# Patient Record
Sex: FEMALE | Race: WHITE | NOT HISPANIC OR LATINO | Employment: UNEMPLOYED | URBAN - METROPOLITAN AREA
[De-identification: names, ages, dates, MRNs, and addresses within clinical notes are randomized per-mention and may not be internally consistent; named-entity substitution may affect disease eponyms.]

---

## 2017-06-06 DIAGNOSIS — B00.1 COLD SORE: ICD-10-CM

## 2017-06-07 RX ORDER — VALACYCLOVIR HYDROCHLORIDE 1 G/1
1000 TABLET, FILM COATED ORAL 2 TIMES DAILY
Qty: 4 TABLET | Refills: 6 | Status: SHIPPED | OUTPATIENT
Start: 2017-06-07 | End: 2018-06-27 | Stop reason: SDUPTHER

## 2017-06-20 ENCOUNTER — HOSPITAL ENCOUNTER (OUTPATIENT)
Dept: RADIOLOGY | Facility: HOSPITAL | Age: 42
Discharge: HOME | End: 2017-06-20
Attending: FAMILY MEDICINE
Payer: COMMERCIAL

## 2017-06-20 ENCOUNTER — OFFICE VISIT (OUTPATIENT)
Dept: INTERNAL MEDICINE | Facility: CLINIC | Age: 42
End: 2017-06-20
Payer: COMMERCIAL

## 2017-06-20 VITALS
DIASTOLIC BLOOD PRESSURE: 70 MMHG | HEIGHT: 60 IN | SYSTOLIC BLOOD PRESSURE: 118 MMHG | HEART RATE: 66 BPM | WEIGHT: 122.81 LBS | BODY MASS INDEX: 24.11 KG/M2

## 2017-06-20 VITALS
BODY MASS INDEX: 24.6 KG/M2 | HEIGHT: 60 IN | HEIGHT: 59 IN | WEIGHT: 122 LBS | BODY MASS INDEX: 23.95 KG/M2 | WEIGHT: 122 LBS

## 2017-06-20 DIAGNOSIS — Z00.00 PREVENTATIVE HEALTH CARE: Primary | ICD-10-CM

## 2017-06-20 DIAGNOSIS — Z00.00 PREVENTATIVE HEALTH CARE: ICD-10-CM

## 2017-06-20 DIAGNOSIS — Z12.31 VISIT FOR SCREENING MAMMOGRAM: ICD-10-CM

## 2017-06-20 PROCEDURE — 77067 SCR MAMMO BI INCL CAD: CPT | Mod: TC

## 2017-06-20 PROCEDURE — 99396 PREV VISIT EST AGE 40-64: CPT | Mod: S$GLB,,, | Performed by: FAMILY MEDICINE

## 2017-06-20 PROCEDURE — 99999 PR PBB SHADOW E&M-EST. PATIENT-LVL III: CPT | Mod: PBBFAC,,, | Performed by: FAMILY MEDICINE

## 2017-06-20 PROCEDURE — 77063 BREAST TOMOSYNTHESIS BI: CPT | Mod: 26,,, | Performed by: RADIOLOGY

## 2017-06-20 PROCEDURE — 77067 SCR MAMMO BI INCL CAD: CPT | Mod: 26,,, | Performed by: RADIOLOGY

## 2017-06-22 ENCOUNTER — TELEPHONE (OUTPATIENT)
Dept: RADIOLOGY | Facility: HOSPITAL | Age: 42
End: 2017-06-22

## 2017-06-22 NOTE — TELEPHONE ENCOUNTER
Spoke with patient and explained mammogram findings.Patient expressed understanding of results. Patient is scheduled for a abnormal mammogram follow up appointment at [facility] on 6/28/17

## 2017-06-28 ENCOUNTER — HOSPITAL ENCOUNTER (OUTPATIENT)
Dept: RADIOLOGY | Facility: HOSPITAL | Age: 42
Discharge: HOME | End: 2017-06-28
Attending: FAMILY MEDICINE
Payer: COMMERCIAL

## 2017-06-28 DIAGNOSIS — R92.8 ABNORMAL MAMMOGRAM: ICD-10-CM

## 2017-06-28 PROCEDURE — 77061 BREAST TOMOSYNTHESIS UNI: CPT | Mod: 26,LT,, | Performed by: RADIOLOGY

## 2017-06-28 PROCEDURE — 77061 BREAST TOMOSYNTHESIS UNI: CPT | Mod: TC,LT

## 2017-06-28 PROCEDURE — 77065 DX MAMMO INCL CAD UNI: CPT | Mod: TC,LT

## 2017-06-28 PROCEDURE — 77065 DX MAMMO INCL CAD UNI: CPT | Mod: 26,LT,, | Performed by: RADIOLOGY

## 2017-06-28 PROCEDURE — 76642 ULTRASOUND BREAST LIMITED: CPT | Mod: TC,LT

## 2017-06-28 PROCEDURE — 76642 ULTRASOUND BREAST LIMITED: CPT | Mod: 26,LT,, | Performed by: RADIOLOGY

## 2017-11-21 ENCOUNTER — TELEPHONE (OUTPATIENT)
Dept: PRIMARY CARE CLINIC | Facility: CLINIC | Age: 42
End: 2017-11-21

## 2017-11-21 ENCOUNTER — OFFICE VISIT (OUTPATIENT)
Dept: PRIMARY CARE CLINIC | Facility: CLINIC | Age: 42
End: 2017-11-21
Payer: COMMERCIAL

## 2017-11-21 VITALS
BODY MASS INDEX: 23.99 KG/M2 | TEMPERATURE: 98 F | DIASTOLIC BLOOD PRESSURE: 86 MMHG | HEART RATE: 94 BPM | HEIGHT: 59 IN | WEIGHT: 119 LBS | SYSTOLIC BLOOD PRESSURE: 132 MMHG

## 2017-11-21 DIAGNOSIS — J06.9 UPPER RESPIRATORY TRACT INFECTION, UNSPECIFIED TYPE: Primary | ICD-10-CM

## 2017-11-21 PROCEDURE — 99999 PR PBB SHADOW E&M-EST. PATIENT-LVL III: CPT | Mod: PBBFAC,,, | Performed by: PHYSICIAN ASSISTANT

## 2017-11-21 PROCEDURE — 99213 OFFICE O/P EST LOW 20 MIN: CPT | Mod: S$GLB,,, | Performed by: PHYSICIAN ASSISTANT

## 2017-11-21 RX ORDER — METHYLPREDNISOLONE 4 MG/1
4 TABLET ORAL DAILY
Qty: 10 TABLET | Refills: 0 | Status: SHIPPED | OUTPATIENT
Start: 2017-11-21 | End: 2017-12-01

## 2017-11-21 RX ORDER — PROMETHAZINE HYDROCHLORIDE AND DEXTROMETHORPHAN HYDROBROMIDE 6.25; 15 MG/5ML; MG/5ML
5 SYRUP ORAL EVERY 6 HOURS PRN
Qty: 240 ML | Refills: 0 | Status: SHIPPED | OUTPATIENT
Start: 2017-11-21 | End: 2017-12-01

## 2017-11-21 NOTE — PROGRESS NOTES
Subjective:       Patient ID: Dina Sosa is a 42 y.o. female.    Chief Complaint: Cough (congestion) and Sore Throat    HPI  Review of Systems    Objective:      Physical Exam    Assessment:       1. Upper respiratory tract infection, unspecified type        Plan:       ***

## 2017-11-21 NOTE — TELEPHONE ENCOUNTER
----- Message from Lori Jasmine sent at 11/21/2017  8:42 AM CST -----  Contact: Patient  Dina, patient [phone], Calling for same day appointment, sore throat, sinus congestion. Please advise. Thanks.

## 2017-11-21 NOTE — PROGRESS NOTES
Subjective:       Patient ID: Dina Sosa is a 42 y.o. female.    Chief Complaint: Cough (congestion) and Sore Throat    Cough   This is a new problem. The current episode started in the past 7 days. The problem has been gradually worsening. The problem occurs every few minutes. The cough is non-productive. Associated symptoms include chills, a fever, nasal congestion, postnasal drip, rhinorrhea, a sore throat and sweats. Pertinent negatives include no chest pain, ear congestion, ear pain, eye redness, hemoptysis, myalgias, shortness of breath or wheezing. Associated symptoms comments: Red, itchy eyes. The symptoms are aggravated by lying down. Treatments tried: sudafed. The treatment provided mild relief. Her past medical history is significant for environmental allergies.     Review of Systems   Constitutional: Positive for chills and fever. Negative for activity change, appetite change and fatigue.   HENT: Positive for congestion, postnasal drip, rhinorrhea and sore throat. Negative for ear discharge, ear pain, hearing loss, nosebleeds, trouble swallowing and voice change.    Eyes: Negative for discharge, redness and visual disturbance.   Respiratory: Positive for cough. Negative for hemoptysis, chest tightness, shortness of breath and wheezing.    Cardiovascular: Negative for chest pain and leg swelling.   Gastrointestinal: Negative.    Musculoskeletal: Negative for myalgias and neck pain.   Allergic/Immunologic: Positive for environmental allergies.       Objective:      Physical Exam   Constitutional: She appears well-developed and well-nourished. No distress.   HENT:   Head: Normocephalic and atraumatic.   Right Ear: External ear normal.   Left Ear: External ear normal.   Mouth/Throat: Uvula is midline, oropharynx is clear and moist and mucous membranes are normal. No oral lesions. No uvula swelling. No oropharyngeal exudate, posterior oropharyngeal edema or posterior oropharyngeal erythema.   Eyes:  Conjunctivae and EOM are normal. Pupils are equal, round, and reactive to light.   Neck: Normal range of motion. Neck supple. No thyromegaly present.   Cardiovascular: Normal rate, regular rhythm and normal heart sounds.  Exam reveals no gallop and no friction rub.    No murmur heard.  Pulmonary/Chest: Effort normal and breath sounds normal. No respiratory distress. She has no wheezes. She has no rales.   Abdominal: Soft. Bowel sounds are normal. There is no tenderness.   Skin: She is not diaphoretic.       Assessment:       1. Upper respiratory tract infection, unspecified type        Plan:       Dina was seen today for cough and sore throat.    Diagnoses and all orders for this visit:    Upper respiratory tract infection, unspecified type  -     methylPREDNISolone (MEDROL) 4 MG Tab; Take 1 tablet (4 mg total) by mouth once daily.  -     promethazine-dextromethorphan (PROMETHAZINE-DM) 6.25-15 mg/5 mL Syrp; Take 5 mLs by mouth every 6 (six) hours as needed.    Patient instructed to contact clinic if symptoms worsen

## 2017-12-08 DIAGNOSIS — Z00.00 PREVENTATIVE HEALTH CARE: ICD-10-CM

## 2017-12-08 RX ORDER — BUPROPION HYDROCHLORIDE 150 MG/1
150 TABLET ORAL DAILY
Qty: 90 TABLET | Refills: 1 | Status: SHIPPED | OUTPATIENT
Start: 2017-12-08 | End: 2018-06-02 | Stop reason: SDUPTHER

## 2018-03-06 ENCOUNTER — HOSPITAL ENCOUNTER (OUTPATIENT)
Dept: RADIOLOGY | Facility: HOSPITAL | Age: 43
Discharge: HOME | End: 2018-03-06
Attending: NURSE PRACTITIONER
Payer: COMMERCIAL

## 2018-03-06 ENCOUNTER — OFFICE VISIT (OUTPATIENT)
Dept: INTERNAL MEDICINE | Facility: CLINIC | Age: 43
End: 2018-03-06
Payer: COMMERCIAL

## 2018-03-06 VITALS
BODY MASS INDEX: 23.24 KG/M2 | WEIGHT: 118.38 LBS | SYSTOLIC BLOOD PRESSURE: 130 MMHG | OXYGEN SATURATION: 98 % | HEART RATE: 89 BPM | DIASTOLIC BLOOD PRESSURE: 100 MMHG | HEIGHT: 60 IN

## 2018-03-06 DIAGNOSIS — R03.0 ELEVATED BLOOD PRESSURE READING: ICD-10-CM

## 2018-03-06 DIAGNOSIS — R07.9 CHEST PAIN, UNSPECIFIED TYPE: Primary | ICD-10-CM

## 2018-03-06 DIAGNOSIS — R07.9 CHEST PAIN, UNSPECIFIED TYPE: ICD-10-CM

## 2018-03-06 PROCEDURE — 71046 X-RAY EXAM CHEST 2 VIEWS: CPT | Mod: 26,,, | Performed by: RADIOLOGY

## 2018-03-06 PROCEDURE — 99214 OFFICE O/P EST MOD 30 MIN: CPT | Mod: S$GLB,,, | Performed by: NURSE PRACTITIONER

## 2018-03-06 PROCEDURE — 71046 X-RAY EXAM CHEST 2 VIEWS: CPT | Mod: TC

## 2018-03-06 PROCEDURE — 93010 ELECTROCARDIOGRAM REPORT: CPT | Mod: S$GLB,,, | Performed by: INTERNAL MEDICINE

## 2018-03-06 PROCEDURE — 93005 ELECTROCARDIOGRAM TRACING: CPT | Mod: S$GLB,,, | Performed by: NURSE PRACTITIONER

## 2018-03-06 PROCEDURE — 99999 PR PBB SHADOW E&M-EST. PATIENT-LVL IV: CPT | Mod: PBBFAC,,, | Performed by: NURSE PRACTITIONER

## 2018-03-06 NOTE — PATIENT INSTRUCTIONS
Blood work and chest xray today  I will send you a message with your results    Follow up in 2 weeks for blood pressure evaluation, bring in your home blood pressure readings    To ER for any worsening

## 2018-03-06 NOTE — PROGRESS NOTES
Subjective:       Patient ID: Dina Sosa is a 43 y.o. female.    Chief Complaint: Chest Pain    Pt here c/o left sided chest pain. Pt states pain is constant/ dull .     Started  afternoon.  Pt denies any trauma.  Pain worse with inspiration or movement.  Pt ran a 5k on Saturday.  Pt states that she had some nausea at night.        Chest Pain    Associated symptoms include abdominal pain, headaches and nausea. Pertinent negatives include no cough, diaphoresis, fever or shortness of breath.   Abdominal Pain   This is a new problem. The current episode started yesterday. The onset quality is sudden. The problem occurs constantly. The most recent episode lasted 24 hours. The pain is located in the LUQ. The pain is at a severity of 6/10. The pain is moderate. Associated symptoms include headaches, myalgias and nausea. Pertinent negatives include no dysuria or fever. The pain is aggravated by certain positions, coughing, deep breathing and movement. The pain is relieved by nothing. The treatment provided no relief. Her past medical history is significant for abdominal surgery.     Past Medical History:   Diagnosis Date    Anxiety      Past Surgical History:   Procedure Laterality Date     SECTION      x2    HYSTERECTOMY  2002    ROGER, ovaries remain (AUB)    TONSILLECTOMY      TYMPANOSTOMY TUBE PLACEMENT      x6     Social History     Social History Narrative    No narrative on file     Family History   Problem Relation Age of Onset    Breast cancer Maternal Aunt 40    Breast cancer Paternal Grandmother      Dec'd    Hypertension Father     Hypertension Sister     Melanoma Neg Hx      Vitals:    18 1436 18 1500 18 1501   BP: (!) 128/90 (!) 110/90 (!) 130/100   Pulse: 89     SpO2: 98%     Weight: 53.7 kg (118 lb 6.2 oz)     Height: 5' (1.524 m)     PainSc:   6       Outpatient Encounter Prescriptions as of 3/6/2018   Medication Sig Dispense Refill    buPROPion  "(WELLBUTRIN XL) 150 MG TB24 tablet TAKE 1 TABLET (150 MG TOTAL) BY MOUTH ONCE DAILY. 90 tablet 1    valacyclovir (VALTREX) 1000 MG tablet Take 1 tablet (1,000 mg total) by mouth 2 (two) times daily. 4 tablet 6     No facility-administered encounter medications on file as of 3/6/2018.      Wt Readings from Last 3 Encounters:   03/06/18 53.7 kg (118 lb 6.2 oz)   11/21/17 54 kg (119 lb)   06/20/17 55.3 kg (122 lb)     Last 3 sets of Vitals    Vitals - 1 value per visit 6/20/2017 11/21/2017 3/6/2018   SYSTOLIC - 132 130   DIASTOLIC - 86 100   PULSE - 94 89   TEMPERATURE - 97.8 -   SPO2 - - 98   Weight (lb) 122 119 118.39   Weight (kg) 55.339 53.978 53.7   HEIGHT 4' 11" 4' 11" 5' 0"   BODY MASS INDEX 24.64 24.04 23.12   VISIT REPORT - - -   Pain Score  - 7 6     No results found for: CBC  Review of Systems   Constitutional: Negative for chills, diaphoresis, fatigue and fever.   HENT: Negative for congestion.    Eyes: Negative for discharge and itching.   Respiratory: Negative for cough and shortness of breath.    Cardiovascular: Positive for chest pain.   Gastrointestinal: Positive for abdominal pain and nausea.   Genitourinary: Negative for dysuria.   Musculoskeletal: Positive for myalgias.   Skin: Negative for rash.   Neurological: Positive for headaches.   Hematological: Negative for adenopathy.       Objective:      Physical Exam   Constitutional: She is oriented to person, place, and time. She appears well-developed and well-nourished. No distress.   HENT:   Head: Normocephalic and atraumatic.   Right Ear: External ear normal.   Left Ear: External ear normal.   Nose: Nose normal.   Mouth/Throat: Oropharynx is clear and moist. No oropharyngeal exudate.   Eyes: Conjunctivae and EOM are normal. Pupils are equal, round, and reactive to light. Right eye exhibits no discharge. Left eye exhibits no discharge.   Neck: Normal range of motion. Neck supple.   Cardiovascular: Normal rate, regular rhythm, normal heart sounds " and intact distal pulses.    Pulmonary/Chest: Effort normal and breath sounds normal. No stridor. No respiratory distress. She exhibits tenderness.       Neurological: She is alert and oriented to person, place, and time.   Skin: Skin is warm and dry. Capillary refill takes less than 2 seconds. No rash noted. She is not diaphoretic. No erythema. No pallor.   Psychiatric: She has a normal mood and affect. Her behavior is normal. Judgment and thought content normal.   Nursing note and vitals reviewed.      Assessment:       1. Chest pain, unspecified type    2. Elevated blood pressure reading        Plan:       Dina was seen today for chest pain.    Diagnoses and all orders for this visit:    Chest pain, unspecified type  -     EKG 12-lead  -     CBC auto differential; Future  -     Comprehensive metabolic panel; Future  -     Amylase; Future  -     Lipase; Future  -     X-Ray Chest PA And Lateral; Future  -     Troponin I; Future    Elevated blood pressure reading  Comments:  pt to take hoem BP ( mom is a retired nurse )  and follow up in 2 weeks for BP check adn bring in home readings        Patient Instructions   Blood work and chest xray today  I will send you a message with your results    Follow up in 2 weeks for blood pressure evaluation, bring in your home blood pressure readings    To ER for any worsening

## 2018-04-10 ENCOUNTER — OFFICE VISIT (OUTPATIENT)
Dept: PRIMARY CARE CLINIC | Facility: CLINIC | Age: 43
End: 2018-04-10
Payer: COMMERCIAL

## 2018-04-10 VITALS
HEART RATE: 76 BPM | DIASTOLIC BLOOD PRESSURE: 93 MMHG | TEMPERATURE: 99 F | BODY MASS INDEX: 23.16 KG/M2 | WEIGHT: 118 LBS | RESPIRATION RATE: 18 BRPM | HEIGHT: 60 IN | SYSTOLIC BLOOD PRESSURE: 131 MMHG | OXYGEN SATURATION: 100 %

## 2018-04-10 DIAGNOSIS — J32.9 SINUSITIS, UNSPECIFIED CHRONICITY, UNSPECIFIED LOCATION: Primary | ICD-10-CM

## 2018-04-10 DIAGNOSIS — J40 BRONCHITIS: ICD-10-CM

## 2018-04-10 DIAGNOSIS — F41.9 ANXIETY: ICD-10-CM

## 2018-04-10 PROCEDURE — 96372 THER/PROPH/DIAG INJ SC/IM: CPT | Mod: S$GLB,,, | Performed by: FAMILY MEDICINE

## 2018-04-10 PROCEDURE — 99999 PR PBB SHADOW E&M-EST. PATIENT-LVL III: CPT | Mod: PBBFAC,,, | Performed by: FAMILY MEDICINE

## 2018-04-10 PROCEDURE — 99213 OFFICE O/P EST LOW 20 MIN: CPT | Mod: 25,S$GLB,, | Performed by: FAMILY MEDICINE

## 2018-04-10 RX ORDER — AZITHROMYCIN 250 MG/1
TABLET, FILM COATED ORAL
Qty: 6 TABLET | Refills: 0 | Status: SHIPPED | OUTPATIENT
Start: 2018-04-10 | End: 2018-04-14

## 2018-04-10 RX ORDER — BETAMETHASONE SODIUM PHOSPHATE AND BETAMETHASONE ACETATE 3; 3 MG/ML; MG/ML
12 INJECTION, SUSPENSION INTRA-ARTICULAR; INTRALESIONAL; INTRAMUSCULAR; SOFT TISSUE
Status: COMPLETED | OUTPATIENT
Start: 2018-04-10 | End: 2018-04-10

## 2018-04-10 RX ORDER — FLUTICASONE PROPIONATE 50 MCG
2 SPRAY, SUSPENSION (ML) NASAL DAILY
Qty: 1 BOTTLE | Refills: 5 | Status: SHIPPED | OUTPATIENT
Start: 2018-04-10 | End: 2018-05-01

## 2018-04-10 RX ORDER — METHYLPREDNISOLONE 4 MG/1
TABLET ORAL
Qty: 1 PACKAGE | Refills: 0 | Status: SHIPPED | OUTPATIENT
Start: 2018-04-10 | End: 2018-05-01

## 2018-04-10 RX ORDER — VALACYCLOVIR HYDROCHLORIDE 1 G/1
1 TABLET, FILM COATED ORAL CONTINUOUS PRN
COMMUNITY
Start: 2018-04-10 | End: 2018-05-01

## 2018-04-10 RX ADMIN — BETAMETHASONE SODIUM PHOSPHATE AND BETAMETHASONE ACETATE 12 MG: 3; 3 INJECTION, SUSPENSION INTRA-ARTICULAR; INTRALESIONAL; INTRAMUSCULAR; SOFT TISSUE at 06:04

## 2018-04-10 NOTE — PROGRESS NOTES
Patient ID by name and . Allergies verified. Celestone 12 mg IM injection given in left ventrogluteal using aseptic technique. Aspirated with no blood noted. Patient tolerated well. Given per physicians order. No adverse reaction noted.

## 2018-04-10 NOTE — PROGRESS NOTES
Subjective:       Patient ID: Dina Sosa is a 43 y.o. female.    Chief Complaint: URI (x 3 days. Denies fever. )    HPI: 43-year-old white female states on Saturday proximally 4 PM became ill--no fever, profusely runny nose, with the postnasal drip, slight sore throat, no cough.  No pneumonia asthma TB no smoking    ROS:  Skin: no psoriasis, eczema, skin cancer  HEENT: Occas  Headache, no ocular pain, blurred vision, diplopia, epistaxis, hoarseness change in voice, thyroid trouble  Lung: No pneumonia, asthma, Tb, wheezing, SOB, no smoking  Heart: No chest pain, ankle edema, palpitations, MI, yumiko murmur, hypertension, hyperlipidemia  Abdomen: No nausea, vomiting, diarrhea, constipation, ulcers, hepatitis, gallbladder disease, melena, hematochezia, hematemesis  : no UTI, renal disease, stones  GYN LMP hysterectomy about 17 years ago--mammogram June of last year  MS: no fractures, O/A, lupus, rheumatoid, gout  Neuro: No dizziness, LOC, seizures    No diabetes, no anemia, + anxiety, no depression   Single, 2 children, , lives alone        Objective:   Physical Exam:  General: Well nourished, well developed, no acute distress  Skin: No lesions  HEENT: Eyes PERRLA, EOM intact, nose + clear discharge, throat +1/4 erythema, ears fluid bilaterally   NECK: Supple, no bruits, No JVD, no nodes  Lungs: Clear, no rales, rhonchi, wheezing + coarse cough  Heart: Regular rate and rhythm, no murmurs, gallops, or rubs  Abdomen: flat, bowel sounds positive, no tenderness, or organomegaly  MS: Range of motion and muscle strength intact  Neuro: Alert, CN intact, oriented X 3  Extremities: No cyanosis, clubbing, or edema         Assessment:       1. Sinusitis, unspecified chronicity, unspecified location    2. Bronchitis    3. Anxiety        Plan:       Sinusitis, unspecified chronicity, unspecified location    Bronchitis    Anxiety    Other orders  -     betamethasone acetate-betamethasone sodium  phosphate injection 12 mg; Inject 2 mLs (12 mg total) into the muscle one time.  -     azithromycin (Z-TRAMAINE) 250 MG tablet; 2 tabs by mouth day 1, then 1 tab by mouth daily x 4 days  Dispense: 6 tablet; Refill: 0  -     methylPREDNISolone (MEDROL DOSEPACK) 4 mg tablet; use as directed  Dispense: 1 Package; Refill: 0      Celestone/Zithromax/Medrol/Bromfed-DM if prefers later may get Hydromet ALLERGIC to codeine so now Phenergan With Codeine  Flonase 2 sprays each nostril every morning with claritan 10 mg once finished medicationb

## 2018-05-02 ENCOUNTER — TELEPHONE (OUTPATIENT)
Dept: INTERNAL MEDICINE | Facility: CLINIC | Age: 43
End: 2018-05-02

## 2018-05-02 NOTE — TELEPHONE ENCOUNTER
----- Message from Timothy Ceballos sent at 5/2/2018  2:30 PM CDT -----  Contact: self/[phone]  Pt states that she was over at the ER yesterday because she started to cough up blood. They ran some test on her and was unable to find anything wrong. Now pt is starting to have these things happen all over again. She states that this is the only thing happening nothing else is going on. She would like to speak with someone . Please advise.      Thanks

## 2018-05-03 ENCOUNTER — HOSPITAL ENCOUNTER (EMERGENCY)
Facility: HOSPITAL | Age: 43
Discharge: HOME | End: 2018-05-03
Attending: EMERGENCY MEDICINE
Payer: COMMERCIAL

## 2018-05-03 VITALS
RESPIRATION RATE: 16 BRPM | WEIGHT: 118 LBS | HEIGHT: 60 IN | TEMPERATURE: 99 F | DIASTOLIC BLOOD PRESSURE: 91 MMHG | BODY MASS INDEX: 23.16 KG/M2 | HEART RATE: 85 BPM | OXYGEN SATURATION: 98 % | SYSTOLIC BLOOD PRESSURE: 134 MMHG

## 2018-05-03 DIAGNOSIS — R04.2 COUGH WITH HEMOPTYSIS: Primary | ICD-10-CM

## 2018-05-03 LAB
ALBUMIN SERPL BCP-MCNC: 4.3 G/DL
ALP SERPL-CCNC: 82 U/L
ALT SERPL W/O P-5'-P-CCNC: 20 U/L
ANION GAP SERPL CALC-SCNC: 9 MMOL/L
AST SERPL-CCNC: 22 U/L
BASOPHILS # BLD AUTO: 0.05 K/UL
BASOPHILS NFR BLD: 0.5 %
BILIRUB SERPL-MCNC: 0.4 MG/DL
BUN SERPL-MCNC: 9 MG/DL
CALCIUM SERPL-MCNC: 9.9 MG/DL
CHLORIDE SERPL-SCNC: 104 MMOL/L
CO2 SERPL-SCNC: 26 MMOL/L
CREAT SERPL-MCNC: 0.8 MG/DL
DIFFERENTIAL METHOD: ABNORMAL
EOSINOPHIL # BLD AUTO: 0.1 K/UL
EOSINOPHIL NFR BLD: 1.3 %
ERYTHROCYTE [DISTWIDTH] IN BLOOD BY AUTOMATED COUNT: 14.1 %
EST. GFR  (AFRICAN AMERICAN): >60 ML/MIN/1.73 M^2
EST. GFR  (NON AFRICAN AMERICAN): >60 ML/MIN/1.73 M^2
GLUCOSE SERPL-MCNC: 92 MG/DL
HCT VFR BLD AUTO: 45.4 %
HGB BLD-MCNC: 14.6 G/DL
IMM GRANULOCYTES # BLD AUTO: 0.1 K/UL
IMM GRANULOCYTES NFR BLD AUTO: 1 %
INR PPP: 0.9
LYMPHOCYTES # BLD AUTO: 2.4 K/UL
LYMPHOCYTES NFR BLD: 23.7 %
MCH RBC QN AUTO: 28.3 PG
MCHC RBC AUTO-ENTMCNC: 32.2 G/DL
MCV RBC AUTO: 88 FL
MONOCYTES # BLD AUTO: 0.7 K/UL
MONOCYTES NFR BLD: 6.5 %
NEUTROPHILS # BLD AUTO: 6.8 K/UL
NEUTROPHILS NFR BLD: 67 %
NRBC BLD-RTO: 0 /100 WBC
PLATELET # BLD AUTO: 374 K/UL
PMV BLD AUTO: 9.7 FL
POTASSIUM SERPL-SCNC: 3.7 MMOL/L
PROT SERPL-MCNC: 7.8 G/DL
PROTHROMBIN TIME: 10 SEC
RBC # BLD AUTO: 5.16 M/UL
SODIUM SERPL-SCNC: 139 MMOL/L
WBC # BLD AUTO: 10.19 K/UL

## 2018-05-03 PROCEDURE — 85610 PROTHROMBIN TIME: CPT

## 2018-05-03 PROCEDURE — 99283 EMERGENCY DEPT VISIT LOW MDM: CPT | Mod: ,,, | Performed by: PHYSICIAN ASSISTANT

## 2018-05-03 PROCEDURE — 85025 COMPLETE CBC W/AUTO DIFF WBC: CPT

## 2018-05-03 PROCEDURE — 80053 COMPREHEN METABOLIC PANEL: CPT

## 2018-05-03 PROCEDURE — 99284 EMERGENCY DEPT VISIT MOD MDM: CPT

## 2018-05-03 NOTE — ED PROVIDER NOTES
"Encounter Date: 5/3/2018       History     Chief Complaint   Patient presents with    Hemoptysis     Pt reports 3 episodes of "coughing up blood" today.  pt has been seen twice in [facility] this week for same.     Patient is a 43 year old female with PMHX of anxiety. She presents to the ED for hemoptysis. Patient was seen at [facility] on 18 and yesterday for similar complaints. She reports having three episodes of hemoptysis this morning approximately one hour apart. Reports having sensation within throat prior to initiation of cough. Reports having bright red blood tinged sputum that has increased since yesterday. Reports associated lightheadedness and generalized weakness. She denies fever,chills, nausea, vomiting, sob, chest pain, abd pain, dysuria, diarrhea, or constipation. She is a former smoker and denies alcohol use. Patient is scheduled to see Dr. Maxwell in pulmonology on 05/10/18.     According to our records, patient underwent CTA chest non coronary yesterday and found to have No pulmonary artery thromboembolism. 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 No acute abnormality.          Review of patient's allergies indicates:   Allergen Reactions    Codeine Itching    Morphine Swelling     Past Medical History:   Diagnosis Date    Anxiety      Past Surgical History:   Procedure Laterality Date     SECTION      x2    HYSTERECTOMY      ROGER, ovaries remain (AUB)    TONSILLECTOMY      TYMPANOSTOMY TUBE PLACEMENT      x6     Family History   Problem Relation Age of Onset    Breast cancer Maternal Aunt 40    Breast cancer Paternal Grandmother      Dec'd    Hypertension Father     Hypertension Sister     Melanoma Neg Hx      Social History   Substance Use Topics    Smoking status: Former Smoker    Smokeless tobacco: Never Used    Alcohol use No     Review of Systems "   Constitutional: Negative for fever.   HENT: Negative for sore throat.    Respiratory: Positive for cough (blood tinged sputum). Negative for shortness of breath.    Cardiovascular: Negative for chest pain.   Gastrointestinal: Negative for abdominal pain, nausea and vomiting.   Genitourinary: Negative for dysuria.   Musculoskeletal: Negative for back pain.   Skin: Negative for rash.   Neurological: Positive for weakness and light-headedness.   Hematological: Does not bruise/bleed easily.       Physical Exam     Initial Vitals [05/03/18 1158]   BP Pulse Resp Temp SpO2   (!) 134/91 85 16 98.9 °F (37.2 °C) 98 %      MAP       105.33         Physical Exam    Vitals reviewed.  Constitutional: She appears well-developed and well-nourished. She is not diaphoretic. No distress.   Patient resting comfortably in NAD on RA.    HENT:   Head: Normocephalic and atraumatic.   Nose: Nose normal.   Mouth/Throat: Uvula is midline, oropharynx is clear and moist and mucous membranes are normal. No trismus in the jaw. No uvula swelling. No oropharyngeal exudate, posterior oropharyngeal edema or posterior oropharyngeal erythema.   Eyes: Conjunctivae and EOM are normal. Pupils are equal, round, and reactive to light.   Neck: Normal range of motion.   Cardiovascular: Normal rate and regular rhythm. Exam reveals no friction rub.    No murmur heard.  Pulmonary/Chest: Breath sounds normal. No respiratory distress. She has no wheezes. She has no rales.   Abdominal: Soft. Bowel sounds are normal. She exhibits no distension. There is no tenderness. There is no rebound.   Musculoskeletal: Normal range of motion.   Neurological: She is alert and oriented to person, place, and time. She has normal strength. No sensory deficit.   Skin: Skin is warm and dry. No erythema.   Psychiatric: She has a normal mood and affect. Thought content normal.         ED Course   Procedures  Labs Reviewed   CBC W/ AUTO DIFFERENTIAL - Abnormal; Notable for the  following:        Result Value    Platelets 374 (*)     Immature Granulocytes 1.0 (*)     Immature Grans (Abs) 0.10 (*)     All other components within normal limits   COMPREHENSIVE METABOLIC PANEL   PROTIME-INR        Imaging Results          X-Ray Chest PA And Lateral (Final result)  Result time 05/03/18 12:54:15    Final result by Phillip Marvin MD (05/03/18 12:54:15)                 Impression:      1. Possible developing consolidation versus artifact projected over the lateral aspect of the left upper lung zone, not confirmed on lateral view.  Correlation recommended.      Electronically signed by: Phillip Marvin MD  Date:    05/03/2018  Time:    12:54             Narrative:    EXAMINATION:  XR CHEST PA AND LATERAL    CLINICAL HISTORY:  Hemoptysis    TECHNIQUE:  PA and lateral views of the chest were performed.    COMPARISON:  CT 05/02/2018, radiograph 05/01/2018    FINDINGS:  The cardiomediastinal silhouette is not enlarged.  There is no pleural effusion.  The trachea is midline.  The lungs are symmetrically expanded bilaterally , noting a vague focus of possible increased parenchymal attenuation versus artifact projecting over the lateral aspect of the left upper lung zone, not confirmed on previous exam.  No large focal consolidation seen.  There is no pneumothorax.  The osseous structures are unremarkable.                                         APC / Resident Notes:   Patient is a 43 year old female with PMHX of anxiety. She presents to the ED for hemoptysis.    Will order labs and imaging. Will continue to monitor.     Differential diagnoses include, but are not limited to: bronchitis, URI, symptomatic anemia, or electrolyte imbalance.     No leukocytosis. Hemodynamically stable. Thrombocytosis. PT-INR 10.0-0.9. CXR found to have Possible developing consolidation versus artifact projected over the lateral aspect of the left upper lung zone.  No acute process.     Patient reassessed, reports symptoms  improved with ED management. Patient denies repeat episodes of hemoptysis while in ED. I have discussed emergency department findings, and plan with the patient. Will discharge home with F/U with pulmonologist and PCP. Patient verbalizes understanding of plan and agrees. Return precautions given.     I have discussed and reviewed with my supervising physician.            Clinical Impression:   The encounter diagnosis was Cough with hemoptysis.    Disposition:   Disposition: Discharged  Condition: Stable                        Saloni Morris PA-C  05/03/18 2003

## 2018-05-03 NOTE — ED TRIAGE NOTES
Pt presents with a c/o hemoptysis for the last 3 days. Symptoms were sudden. Pt has a bag with bright red blood stained tissue. Pt states symptoms are worsening. Pt denies SOB. Pt denies being on blood thinners. Pt denies recent travel or incarceration.

## 2018-05-09 DIAGNOSIS — R05.9 COUGH: Primary | ICD-10-CM

## 2018-05-10 ENCOUNTER — OFFICE VISIT (OUTPATIENT)
Dept: PULMONOLOGY | Facility: CLINIC | Age: 43
End: 2018-05-10
Payer: COMMERCIAL

## 2018-05-10 ENCOUNTER — HOSPITAL ENCOUNTER (OUTPATIENT)
Dept: PULMONOLOGY | Facility: CLINIC | Age: 43
Discharge: HOME | End: 2018-05-10
Payer: COMMERCIAL

## 2018-05-10 VITALS
WEIGHT: 120 LBS | BODY MASS INDEX: 24.19 KG/M2 | HEART RATE: 97 BPM | DIASTOLIC BLOOD PRESSURE: 76 MMHG | SYSTOLIC BLOOD PRESSURE: 106 MMHG | HEIGHT: 59 IN | OXYGEN SATURATION: 99 %

## 2018-05-10 DIAGNOSIS — Z87.898 HISTORY OF HEMOPTYSIS: Primary | ICD-10-CM

## 2018-05-10 DIAGNOSIS — R05.9 COUGH: ICD-10-CM

## 2018-05-10 LAB
PRE FEV1 FVC: 85
PRE FEV1: 2.94
PRE FVC: 3.44
PREDICTED FEV1 FVC: 87
PREDICTED FEV1: 2.39
PREDICTED FVC: 2.79

## 2018-05-10 PROCEDURE — 3008F BODY MASS INDEX DOCD: CPT | Mod: CPTII,S$GLB,, | Performed by: INTERNAL MEDICINE

## 2018-05-10 PROCEDURE — 99204 OFFICE O/P NEW MOD 45 MIN: CPT | Mod: S$GLB,,, | Performed by: INTERNAL MEDICINE

## 2018-05-10 PROCEDURE — 99999 PR PBB SHADOW E&M-EST. PATIENT-LVL III: CPT | Mod: PBBFAC,,, | Performed by: INTERNAL MEDICINE

## 2018-05-10 PROCEDURE — 94010 BREATHING CAPACITY TEST: CPT | Mod: S$GLB,,, | Performed by: INTERNAL MEDICINE

## 2018-05-10 NOTE — PROGRESS NOTES
Subjective:       Patient ID: Dina Sosa is a 43 y.o. female.    Chief Complaint: Hemoptysis    HPI  42 yo female comes for follow up on painless hemoptysis that occurred earlier in the week on three separate days. No invoking event. Hasn't smoked in several years , has two family members with the hx of lung cancer. 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 Denies any accompanying symptoms such as cough or pleuritic pain.  She had a chest x-ray and a CTA this week, both are negative and I reviewed them with the patient. No obvious source of bleeding.  PFT's today are excellent.       No flowsheet data found.]  Review of Systems   Constitutional: Negative.    HENT: Negative.    Eyes: Negative.    Respiratory: Negative.    Cardiovascular: Negative.    Genitourinary: Negative.    Musculoskeletal: Negative.    Skin: Negative.    Gastrointestinal: Negative.    Neurological: Negative.    Psychiatric/Behavioral: Negative.        Objective:      Physical Exam   Constitutional: She is oriented to person, place, and time. She appears well-developed and well-nourished. No distress.   HENT:   Head: Normocephalic and atraumatic.   Right Ear: External ear normal.   Left Ear: External ear normal.   Nose: Nose normal.   Mouth/Throat: Oropharynx is clear and moist.   Eyes: Conjunctivae and EOM are normal. Pupils are equal, round, and reactive to light.   Neck: Normal range of motion. Neck supple. No JVD present. No thyromegaly present.   Cardiovascular: Normal rate, regular rhythm, normal heart sounds and intact distal pulses.  Exam reveals no gallop.    No murmur heard.  Pulmonary/Chest: Breath sounds normal. No stridor. No respiratory distress. She has no wheezes. She has no rales. She exhibits no tenderness.   Bilateral faint sibilant rales over the upper lobes.    Abdominal: Soft. Bowel sounds are normal. She exhibits no distension and no mass. There is no  tenderness. There is no rebound and no guarding.   Musculoskeletal: Normal range of motion. She exhibits no edema.   Lymphadenopathy:     She has no cervical adenopathy.   Neurological: She is alert and oriented to person, place, and time. She has normal reflexes. She displays normal reflexes. No cranial nerve deficit.   Skin: Skin is warm and dry. No rash noted.   Psychiatric: She has a normal mood and affect. Her behavior is normal. Judgment and thought content normal.   Nursing note and vitals reviewed.          Assessment:       No diagnosis found.    Outpatient Encounter Prescriptions as of 5/10/2018   Medication Sig Dispense Refill    buPROPion (WELLBUTRIN XL) 150 MG TB24 tablet TAKE 1 TABLET (150 MG TOTAL) BY MOUTH ONCE DAILY. 90 tablet 1     No facility-administered encounter medications on file as of 5/10/2018.      No orders of the defined types were placed in this encounter.    Plan:             Normal pulmonary assessment, Have given her my card with my cell #, to call if bleeding re occurs. Then would consider bronchoscope in the face of active bleeding.

## 2018-06-02 DIAGNOSIS — Z00.00 PREVENTATIVE HEALTH CARE: ICD-10-CM

## 2018-06-04 RX ORDER — BUPROPION HYDROCHLORIDE 150 MG/1
150 TABLET ORAL DAILY
Qty: 90 TABLET | Refills: 1 | Status: SHIPPED | OUTPATIENT
Start: 2018-06-04 | End: 2018-07-10 | Stop reason: SDUPTHER

## 2018-06-27 DIAGNOSIS — B00.1 COLD SORE: ICD-10-CM

## 2018-06-27 RX ORDER — VALACYCLOVIR HYDROCHLORIDE 1 G/1
1000 TABLET, FILM COATED ORAL 2 TIMES DAILY
Qty: 4 TABLET | Refills: 2 | Status: SHIPPED | OUTPATIENT
Start: 2018-06-27 | End: 2018-07-10 | Stop reason: SDUPTHER

## 2018-07-10 ENCOUNTER — OFFICE VISIT (OUTPATIENT)
Dept: INTERNAL MEDICINE | Facility: CLINIC | Age: 43
End: 2018-07-10
Payer: COMMERCIAL

## 2018-07-10 ENCOUNTER — HOSPITAL ENCOUNTER (OUTPATIENT)
Dept: RADIOLOGY | Facility: HOSPITAL | Age: 43
Discharge: HOME | End: 2018-07-10
Attending: FAMILY MEDICINE
Payer: COMMERCIAL

## 2018-07-10 VITALS
WEIGHT: 121.69 LBS | BODY MASS INDEX: 23.89 KG/M2 | HEIGHT: 60 IN | SYSTOLIC BLOOD PRESSURE: 124 MMHG | HEART RATE: 80 BPM | DIASTOLIC BLOOD PRESSURE: 84 MMHG | OXYGEN SATURATION: 98 %

## 2018-07-10 DIAGNOSIS — Z00.00 PREVENTATIVE HEALTH CARE: ICD-10-CM

## 2018-07-10 DIAGNOSIS — B00.1 COLD SORE: ICD-10-CM

## 2018-07-10 PROCEDURE — 99214 OFFICE O/P EST MOD 30 MIN: CPT | Mod: S$GLB,,, | Performed by: FAMILY MEDICINE

## 2018-07-10 PROCEDURE — 77067 SCR MAMMO BI INCL CAD: CPT | Mod: 26,,, | Performed by: RADIOLOGY

## 2018-07-10 PROCEDURE — 77063 BREAST TOMOSYNTHESIS BI: CPT | Mod: 26,,, | Performed by: RADIOLOGY

## 2018-07-10 PROCEDURE — 3008F BODY MASS INDEX DOCD: CPT | Mod: CPTII,S$GLB,, | Performed by: FAMILY MEDICINE

## 2018-07-10 PROCEDURE — 77067 SCR MAMMO BI INCL CAD: CPT | Mod: TC

## 2018-07-10 PROCEDURE — 99999 PR PBB SHADOW E&M-EST. PATIENT-LVL III: CPT | Mod: PBBFAC,,, | Performed by: FAMILY MEDICINE

## 2018-07-10 RX ORDER — VALACYCLOVIR HYDROCHLORIDE 1 G/1
1000 TABLET, FILM COATED ORAL 2 TIMES DAILY
Qty: 4 TABLET | Refills: 6 | Status: SHIPPED | OUTPATIENT
Start: 2018-07-10 | End: 2019-01-17

## 2018-07-10 RX ORDER — BUPROPION HYDROCHLORIDE 150 MG/1
150 TABLET ORAL DAILY
Qty: 90 TABLET | Refills: 3 | Status: SHIPPED | OUTPATIENT
Start: 2018-07-10 | End: 2019-11-08 | Stop reason: ALTCHOICE

## 2018-07-10 NOTE — PROGRESS NOTES
Subjective:       Patient ID: Dina Sosa is a 43 y.o. female.    Chief Complaint: Annual Exam  Dina Sosa 43 y.o. female is here for office visit to review care and physical exam, reports had coughed up blood.  Went to ERm,, had Pulm appt.  Radiography, testing reviewed, not revealing.  Had nose bleed not long ago as well.  Feels well presently      HPI  Review of Systems   Constitutional: Negative for activity change, fatigue, fever and unexpected weight change.   HENT: Positive for rhinorrhea. Negative for congestion, hearing loss, postnasal drip and trouble swallowing.    Eyes: Negative for discharge, redness and visual disturbance.   Respiratory: Negative for chest tightness, shortness of breath and wheezing.    Cardiovascular: Negative for chest pain, palpitations and leg swelling.   Gastrointestinal: Negative for abdominal distention, blood in stool, constipation, diarrhea and vomiting.   Endocrine: Negative for polydipsia and polyuria.   Genitourinary: Negative for decreased urine volume, difficulty urinating, dysuria, flank pain, hematuria, menstrual problem, pelvic pain and urgency.   Musculoskeletal: Negative for arthralgias, back pain, gait problem, joint swelling, neck pain and neck stiffness.   Skin: Negative for color change, rash and wound.   Neurological: Negative for dizziness, syncope, weakness and headaches.   Psychiatric/Behavioral: Negative for behavioral problems, confusion, dysphoric mood and sleep disturbance. The patient is not nervous/anxious.        Objective:      Physical Exam   Constitutional: She is oriented to person, place, and time. She appears well-developed and well-nourished. No distress.   HENT:   Head: Normocephalic.   Mouth/Throat: No oropharyngeal exudate.   Eyes: EOM are normal. Pupils are equal, round, and reactive to light. No scleral icterus.   Neck: Neck supple. No JVD present. No thyromegaly present.   Cardiovascular: Normal rate, regular rhythm and  normal heart sounds.  Exam reveals no gallop and no friction rub.    No murmur heard.  Pulmonary/Chest: Effort normal and breath sounds normal. She has no wheezes. She has no rales.   Abdominal: Soft. Bowel sounds are normal. She exhibits no distension and no mass. There is no tenderness. There is no guarding.   Musculoskeletal: Normal range of motion. She exhibits no edema.   Lymphadenopathy:     She has no cervical adenopathy.   Neurological: She is alert and oriented to person, place, and time. She has normal reflexes. She displays normal reflexes. No cranial nerve deficit. She exhibits normal muscle tone.   Skin: Skin is warm. No rash noted. No erythema.   Psychiatric: She has a normal mood and affect. Thought content normal.       Assessment:       1. Preventative health care    2. Cold sore        Plan:       Dina was seen today for annual exam.    Diagnoses and all orders for this visit:    Preventative health care  -     Cancel: Mammo Digital Screening Bilateral With CAD; Future  -     buPROPion (WELLBUTRIN XL) 150 MG TB24 tablet; Take 1 tablet (150 mg total) by mouth once daily.    Cold sore  -     valACYclovir (VALTREX) 1000 MG tablet; Take 1 tablet (1,000 mg total) by mouth 2 (two) times daily.    - discussed xcare, rtc if has bleeding again

## 2018-08-17 ENCOUNTER — TELEPHONE (OUTPATIENT)
Dept: INTERNAL MEDICINE | Facility: CLINIC | Age: 43
End: 2018-08-17
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
## 2018-08-21 ENCOUNTER — OFFICE VISIT (OUTPATIENT)
Dept: PRIMARY CARE CLINIC | Facility: CLINIC | Age: 43
End: 2018-08-21
Payer: COMMERCIAL

## 2018-08-21 VITALS
DIASTOLIC BLOOD PRESSURE: 83 MMHG | BODY MASS INDEX: 23.75 KG/M2 | RESPIRATION RATE: 18 BRPM | WEIGHT: 121 LBS | OXYGEN SATURATION: 99 % | HEART RATE: 84 BPM | SYSTOLIC BLOOD PRESSURE: 118 MMHG | HEIGHT: 60 IN

## 2018-08-21 DIAGNOSIS — S16.1XXA STRAIN OF NECK MUSCLE, INITIAL ENCOUNTER: Primary | ICD-10-CM

## 2018-08-21 DIAGNOSIS — F41.9 ANXIETY: ICD-10-CM

## 2018-08-21 DIAGNOSIS — M62.9 MUSCULOSKELETAL DISORDER INVOLVING UPPER TRAPEZIUS MUSCLE: ICD-10-CM

## 2018-08-21 PROCEDURE — 99213 OFFICE O/P EST LOW 20 MIN: CPT | Mod: 25,S$GLB,, | Performed by: FAMILY MEDICINE

## 2018-08-21 PROCEDURE — 99999 PR PBB SHADOW E&M-EST. PATIENT-LVL III: CPT | Mod: PBBFAC,,, | Performed by: FAMILY MEDICINE

## 2018-08-21 PROCEDURE — 3008F BODY MASS INDEX DOCD: CPT | Mod: CPTII,S$GLB,, | Performed by: FAMILY MEDICINE

## 2018-08-21 PROCEDURE — 96372 THER/PROPH/DIAG INJ SC/IM: CPT | Mod: S$GLB,,, | Performed by: FAMILY MEDICINE

## 2018-08-21 RX ORDER — CYCLOBENZAPRINE HCL 10 MG
10 TABLET ORAL NIGHTLY
Qty: 30 TABLET | Refills: 2 | Status: SHIPPED | OUTPATIENT
Start: 2018-08-21 | End: 2018-08-21

## 2018-08-21 RX ORDER — METHYLPREDNISOLONE 4 MG/1
TABLET ORAL
Qty: 1 PACKAGE | Refills: 0 | Status: SHIPPED | OUTPATIENT
Start: 2018-08-21 | End: 2018-09-11

## 2018-08-21 RX ORDER — IBUPROFEN 600 MG/1
TABLET ORAL
Qty: 60 TABLET | Refills: 2 | Status: SHIPPED | OUTPATIENT
Start: 2018-08-21 | End: 2018-09-14

## 2018-08-21 RX ORDER — BUTALBITAL, ACETAMINOPHEN AND CAFFEINE 50; 325; 40 MG/1; MG/1; MG/1
1 TABLET ORAL EVERY 6 HOURS PRN
Qty: 30 TABLET | Refills: 0 | Status: SHIPPED | OUTPATIENT
Start: 2018-08-21 | End: 2018-09-14

## 2018-08-21 RX ORDER — CYCLOBENZAPRINE HCL 10 MG
10 TABLET ORAL 3 TIMES DAILY PRN
Qty: 30 TABLET | Refills: 2 | Status: SHIPPED | OUTPATIENT
Start: 2018-08-21 | End: 2018-08-31

## 2018-08-21 RX ORDER — BETAMETHASONE SODIUM PHOSPHATE AND BETAMETHASONE ACETATE 3; 3 MG/ML; MG/ML
12 INJECTION, SUSPENSION INTRA-ARTICULAR; INTRALESIONAL; INTRAMUSCULAR; SOFT TISSUE
Status: COMPLETED | OUTPATIENT
Start: 2018-08-21 | End: 2018-08-21

## 2018-08-21 RX ADMIN — BETAMETHASONE SODIUM PHOSPHATE AND BETAMETHASONE ACETATE 12 MG: 3; 3 INJECTION, SUSPENSION INTRA-ARTICULAR; INTRALESIONAL; INTRAMUSCULAR; SOFT TISSUE at 05:08
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
## 2018-09-14 ENCOUNTER — OFFICE VISIT (OUTPATIENT)
Dept: PRIMARY CARE CLINIC | Facility: CLINIC | Age: 43
End: 2018-09-14
Payer: COMMERCIAL

## 2018-09-14 VITALS
HEIGHT: 60 IN | RESPIRATION RATE: 18 BRPM | BODY MASS INDEX: 23.16 KG/M2 | DIASTOLIC BLOOD PRESSURE: 93 MMHG | OXYGEN SATURATION: 97 % | HEART RATE: 80 BPM | SYSTOLIC BLOOD PRESSURE: 144 MMHG | WEIGHT: 118 LBS

## 2018-09-14 DIAGNOSIS — J40 BRONCHITIS: ICD-10-CM

## 2018-09-14 DIAGNOSIS — J32.9 SINUSITIS, UNSPECIFIED CHRONICITY, UNSPECIFIED LOCATION: Primary | ICD-10-CM

## 2018-09-14 PROCEDURE — 3008F BODY MASS INDEX DOCD: CPT | Mod: CPTII,S$GLB,, | Performed by: FAMILY MEDICINE

## 2018-09-14 PROCEDURE — 99213 OFFICE O/P EST LOW 20 MIN: CPT | Mod: 25,S$GLB,, | Performed by: FAMILY MEDICINE

## 2018-09-14 PROCEDURE — 99999 PR PBB SHADOW E&M-EST. PATIENT-LVL III: CPT | Mod: PBBFAC,,, | Performed by: FAMILY MEDICINE

## 2018-09-14 PROCEDURE — 96372 THER/PROPH/DIAG INJ SC/IM: CPT | Mod: S$GLB,,, | Performed by: FAMILY MEDICINE

## 2018-09-14 RX ORDER — BETAMETHASONE SODIUM PHOSPHATE AND BETAMETHASONE ACETATE 3; 3 MG/ML; MG/ML
12 INJECTION, SUSPENSION INTRA-ARTICULAR; INTRALESIONAL; INTRAMUSCULAR; SOFT TISSUE
Status: COMPLETED | OUTPATIENT
Start: 2018-09-14 | End: 2018-09-14

## 2018-09-14 RX ORDER — METHYLPREDNISOLONE 4 MG/1
TABLET ORAL
Qty: 1 PACKAGE | Refills: 0 | Status: SHIPPED | OUTPATIENT
Start: 2018-09-14 | End: 2019-01-17

## 2018-09-14 RX ORDER — BROMPHENIRAMINE MALEATE, PSEUDOEPHEDRINE HYDROCHLORIDE, AND DEXTROMETHORPHAN HYDROBROMIDE 2; 30; 10 MG/5ML; MG/5ML; MG/5ML
5 SYRUP ORAL EVERY 6 HOURS PRN
Qty: 180 ML | Refills: 0 | Status: SHIPPED | OUTPATIENT
Start: 2018-09-14 | End: 2018-09-24

## 2018-09-14 RX ORDER — AZITHROMYCIN 250 MG/1
TABLET, FILM COATED ORAL
Qty: 6 TABLET | Refills: 0 | Status: SHIPPED | OUTPATIENT
Start: 2018-09-14 | End: 2018-09-18

## 2018-09-14 RX ADMIN — BETAMETHASONE SODIUM PHOSPHATE AND BETAMETHASONE ACETATE 12 MG: 3; 3 INJECTION, SUSPENSION INTRA-ARTICULAR; INTRALESIONAL; INTRAMUSCULAR; SOFT TISSUE at 03:09

## 2018-09-14 NOTE — PROGRESS NOTES
Subjective:       Patient ID: Dina Sosa is a 43 y.o. female.    Chief Complaint: Sinus Problem (cough congestion)    HPI:  43-year-old female in for cold--sick x3 days--+fever, +runny nose, +sore throat, + cough, +phlegm-yellow.  No pneumonia asthma TB no smoking    ROS:  Skin: no psoriasis, eczema, skin cancer  HEENT: + headache,no  ocular pain, blurred vision, diplopia, epistaxis, hoarseness change in voice, thyroid trouble  Lung: No pneumonia, asthma, Tb, wheezing, SOB, no smoking  Heart: No chest pain, ankle edema, palpitations, MI, yumiko murmur, hypertension, hyperlipidemia  Abdomen: No nausea, vomiting, diarrhea, constipation, ulcers, hepatitis, gallbladder disease, melena, hematochezia, hematemesis  : no UTI, renal disease, stones  GYN LMP hysterectomy mammogram 5 months ago  MS: no fractures, O/A, lupus, rheumatoid, gout  Neuro: + dizziness, no LOC, seizures   No diabetes, no anemia, no anxiety, no depression   , 2 children , 4 step children , Work office work Lives with  and 4 children      Objective:   Physical Exam:  General: Well nourished, well developed, no acute distress  Skin: No lesions  HEENT: Eyes PERRLA, EOM intact, nose clear discharge, throat 1/4 erythematous ears TMs clear  NECK: Supple, no bruits, No JVD, no nodes  Lungs: Clear, no rales, rhonchi, wheezing +coarse cough  Heart: Regular rate and rhythm, no murmurs, gallops, or rubs  Abdomen: flat, bowel sounds positive, no tenderness, or organomegaly  MS: Range of motion and muscle strength intact  Neuro: Alert, CN intact, oriented X 3  Extremities: No cyanosis, clubbing, or edema         Assessment:       1. Sinusitis, unspecified chronicity, unspecified location    2. Bronchitis        Plan:       Sinusitis, unspecified chronicity, unspecified location    Bronchitis    Other orders  -     betamethasone acetate-betamethasone sodium phosphate injection 12 mg; Inject 2 mLs (12 mg total) into the muscle one time.  -      azithromycin (Z-TRAMAINE) 250 MG tablet; 2 tabs by mouth day 1, then 1 tab by mouth daily x 4 days  Dispense: 6 tablet; Refill: 0  -     methylPREDNISolone (MEDROL DOSEPACK) 4 mg tablet; use as directed  Dispense: 1 Package; Refill: 0  -     brompheniramine-pseudoeph-DM (BROMFED DM) 2-30-10 mg/5 mL Syrp; Take 5 mLs by mouth every 6 (six) hours as needed.  Dispense: 180 mL; Refill: 0      If not better 1 week Levaquin   Answers for HPI/ROS submitted by the patient on 9/14/2018   activity change: No  unexpected weight change: No  neck pain: No  hearing loss: No  rhinorrhea: Yes  trouble swallowing: No  eye discharge: No  visual disturbance: No  chest tightness: No  wheezing: No  chest pain: No  palpitations: No  blood in stool: No  constipation: No  vomiting: No  diarrhea: No  polydipsia: No  polyuria: No  difficulty urinating: No  hematuria: No  menstrual problem: No  dysuria: No  joint swelling: No  arthralgias: No  headaches: Yes  weakness: Yes  confusion: No  dysphoric mood: No

## 2018-09-14 NOTE — LETTER
September 14, 2018      [facility]  [address] [facility]  [address] [address]  Phone: [phone]  Fax: [phone]       Patient: Dina Sosa   YOB: 1975  Date of Visit: 09/14/2018    To Whom It May Concern:    Mikal Sosa  was at [facility] on 09/14/2018. Please excuse patient from work 09/13/2018 - 09/14/2018. She may return to work on 09/17/2018 with no restrictions. If you have any questions or concerns, or if I can be of further assistance, please do not hesitate to contact me.    Sincerely,    Tamra Roberts LPN

## 2018-09-18 RX ORDER — BUTALBITAL, ACETAMINOPHEN AND CAFFEINE 50; 325; 40 MG/1; MG/1; MG/1
TABLET ORAL
Qty: 30 TABLET | Refills: 0 | Status: SHIPPED | OUTPATIENT
Start: 2018-09-18 | End: 2019-01-17

## 2018-09-18 NOTE — TELEPHONE ENCOUNTER
Call tell patient Fioricet night usually refilled over the phone since just seen will refill but in the future please give 1 patient is seen in the office

## 2018-10-15 PROBLEM — Z00.00 PREVENTATIVE HEALTH CARE: Status: RESOLVED | Noted: 2018-07-10 | Resolved: 2018-10-15

## 2018-11-02 RX ORDER — IBUPROFEN 600 MG/1
TABLET ORAL
Qty: 60 TABLET | Refills: 5 | Status: SHIPPED | OUTPATIENT
Start: 2018-11-02 | End: 2019-01-17

## 2018-11-29 ENCOUNTER — PATIENT MESSAGE (OUTPATIENT)
Dept: PRIMARY CARE CLINIC | Facility: CLINIC | Age: 43
End: 2018-11-29

## 2018-12-01 RX ORDER — VARENICLINE TARTRATE 0.5 (11)-1
KIT ORAL
Qty: 1 PACKAGE | Refills: 0 | Status: SHIPPED | OUTPATIENT
Start: 2018-12-01 | End: 2019-01-17

## 2019-01-17 ENCOUNTER — OFFICE VISIT (OUTPATIENT)
Dept: PRIMARY CARE CLINIC | Facility: CLINIC | Age: 44
End: 2019-01-17
Payer: COMMERCIAL

## 2019-01-17 VITALS
BODY MASS INDEX: 24.94 KG/M2 | WEIGHT: 127 LBS | RESPIRATION RATE: 18 BRPM | DIASTOLIC BLOOD PRESSURE: 84 MMHG | SYSTOLIC BLOOD PRESSURE: 126 MMHG | HEIGHT: 60 IN | HEART RATE: 87 BPM | OXYGEN SATURATION: 99 %

## 2019-01-17 DIAGNOSIS — F41.9 ANXIETY: ICD-10-CM

## 2019-01-17 DIAGNOSIS — J40 BRONCHITIS: ICD-10-CM

## 2019-01-17 DIAGNOSIS — J32.9 SINUSITIS, UNSPECIFIED CHRONICITY, UNSPECIFIED LOCATION: Primary | ICD-10-CM

## 2019-01-17 PROCEDURE — 99213 PR OFFICE/OUTPT VISIT, EST, LEVL III, 20-29 MIN: ICD-10-PCS | Mod: 25,S$GLB,, | Performed by: FAMILY MEDICINE

## 2019-01-17 PROCEDURE — 96372 PR INJECTION,THERAP/PROPH/DIAG2ST, IM OR SUBCUT: ICD-10-PCS | Mod: S$GLB,,, | Performed by: FAMILY MEDICINE

## 2019-01-17 PROCEDURE — 3008F PR BODY MASS INDEX (BMI) DOCUMENTED: ICD-10-PCS | Mod: CPTII,S$GLB,, | Performed by: FAMILY MEDICINE

## 2019-01-17 PROCEDURE — 99999 PR PBB SHADOW E&M-EST. PATIENT-LVL III: CPT | Mod: PBBFAC,,, | Performed by: FAMILY MEDICINE

## 2019-01-17 PROCEDURE — 3008F BODY MASS INDEX DOCD: CPT | Mod: CPTII,S$GLB,, | Performed by: FAMILY MEDICINE

## 2019-01-17 PROCEDURE — 99999 PR PBB SHADOW E&M-EST. PATIENT-LVL III: ICD-10-PCS | Mod: PBBFAC,,, | Performed by: FAMILY MEDICINE

## 2019-01-17 PROCEDURE — 96372 THER/PROPH/DIAG INJ SC/IM: CPT | Mod: S$GLB,,, | Performed by: FAMILY MEDICINE

## 2019-01-17 PROCEDURE — 99213 OFFICE O/P EST LOW 20 MIN: CPT | Mod: 25,S$GLB,, | Performed by: FAMILY MEDICINE

## 2019-01-17 RX ORDER — AZITHROMYCIN 250 MG/1
TABLET, FILM COATED ORAL
Qty: 6 TABLET | Refills: 0 | Status: SHIPPED | OUTPATIENT
Start: 2019-01-17 | End: 2019-01-21

## 2019-01-17 RX ORDER — BROMPHENIRAMINE MALEATE, PSEUDOEPHEDRINE HYDROCHLORIDE, AND DEXTROMETHORPHAN HYDROBROMIDE 2; 30; 10 MG/5ML; MG/5ML; MG/5ML
5 SYRUP ORAL EVERY 6 HOURS PRN
Qty: 180 ML | Refills: 0 | Status: SHIPPED | OUTPATIENT
Start: 2019-01-17 | End: 2019-01-27

## 2019-01-17 RX ORDER — METHYLPREDNISOLONE 4 MG/1
TABLET ORAL
Qty: 1 PACKAGE | Refills: 0 | Status: SHIPPED | OUTPATIENT
Start: 2019-01-17 | End: 2019-05-09

## 2019-01-17 RX ORDER — BETAMETHASONE SODIUM PHOSPHATE AND BETAMETHASONE ACETATE 3; 3 MG/ML; MG/ML
12 INJECTION, SUSPENSION INTRA-ARTICULAR; INTRALESIONAL; INTRAMUSCULAR; SOFT TISSUE
Status: COMPLETED | OUTPATIENT
Start: 2019-01-17 | End: 2019-01-17

## 2019-01-17 RX ADMIN — BETAMETHASONE SODIUM PHOSPHATE AND BETAMETHASONE ACETATE 12 MG: 3; 3 INJECTION, SUSPENSION INTRA-ARTICULAR; INTRALESIONAL; INTRAMUSCULAR; SOFT TISSUE at 04:01

## 2019-01-17 NOTE — PROGRESS NOTES
Patient ID verified by name and . Allergies verified. Celestone 12 mg IM injection given in right ventrogluteal using aseptic technique. Aspirated with no blood noted. Patient tolerated well. Given per physicians order. No adverse reaction noted.

## 2019-01-17 NOTE — PROGRESS NOTES
Subjective:       Patient ID: Dina Sosa is a 43 y.o. female.    Chief Complaint: Nasal Congestion; Sinusitis; and Cough    HPI:  43-year-old female in for cold--sick times 4-5 days--no fever, +runny nose, +sore throat, +cough, +phlegm--occasionally clear occasionally green yellow.  No pneumonia asthma TB.  No smoking. LMP hysterectomy 2002    ROS:  Skin: no psoriasis, eczema, skin cancer  HEENT: No headache, ocular pain, blurred vision, diplopia, epistaxis, hoarseness change in voice, thyroid trouble  Lung: No pneumonia, asthma, Tb, wheezing, SOB, no smoking  Heart: No chest pain, ankle edema, palpitations, MI, yumiko murmur, hypertension, hyperlipidemia  Abdomen: No nausea, vomiting, diarrhea, constipation, ulcers, hepatitis, gallbladder disease, melena, hematochezia, hematemesis  : no UTI, renal disease, stones  GYN -hysterectomy  MS: no fractures, O/A, lupus, rheumatoid, gout  Neuro: No dizziness, LOC, seizures   No diabetes, no anemia, no anxiety, no depression   , 2 biological children and 4 step children,work , lives with  and 4 children6    Objective:   Physical Exam:  General: Well nourished, well developed, no acute distress  Skin: No lesions  HEENT: Eyes PERRLA, EOM intact, nose clear discharge, throat +1/4 erythematous ears TMs clear  NECK: Supple, no bruits, No JVD, no nodes  Lungs: Clear, no rales, rhonchi, wheezing +coarse cough  Heart: Regular rate and rhythm, no murmurs, gallops, or rubs  Abdomen: flat, bowel sounds positive, no tenderness, or organomegaly  MS: Range of motion and muscle strength intact  Neuro: Alert, CN intact, oriented X 3  Extremities: No cyanosis, clubbing, or edema         Assessment:       1. Sinusitis, unspecified chronicity, unspecified location    2. Bronchitis    3. Anxiety        Plan:       Sinusitis, unspecified chronicity, unspecified location    Bronchitis    Anxiety      Zithromax/Medrol/Bromfed DM/Celestone  If not  better in 2 weeks OK to call for Omnicef

## 2019-02-05 RX ORDER — CYCLOBENZAPRINE HCL 10 MG
10 TABLET ORAL NIGHTLY
Qty: 30 TABLET | Refills: 5 | Status: SHIPPED | OUTPATIENT
Start: 2019-02-05 | End: 2019-02-15

## 2019-05-02 ENCOUNTER — HOSPITAL ENCOUNTER (EMERGENCY)
Facility: HOSPITAL | Age: 44
Discharge: HOME | End: 2019-05-02
Attending: EMERGENCY MEDICINE
Payer: COMMERCIAL

## 2019-05-02 VITALS
OXYGEN SATURATION: 97 % | SYSTOLIC BLOOD PRESSURE: 136 MMHG | RESPIRATION RATE: 12 BRPM | TEMPERATURE: 98 F | HEART RATE: 90 BPM | DIASTOLIC BLOOD PRESSURE: 91 MMHG
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
## 2019-05-02 DIAGNOSIS — M54.50 ACUTE LOW BACK PAIN WITHOUT SCIATICA, UNSPECIFIED BACK PAIN LATERALITY: ICD-10-CM

## 2019-05-02 DIAGNOSIS — M54.2 NECK PAIN: ICD-10-CM

## 2019-05-02 PROCEDURE — 96375 TX/PRO/DX INJ NEW DRUG ADDON: CPT

## 2019-05-02 PROCEDURE — 99284 EMERGENCY DEPT VISIT MOD MDM: CPT | Mod: ,,, | Performed by: PHYSICIAN ASSISTANT

## 2019-05-02 PROCEDURE — 99284 EMERGENCY DEPT VISIT MOD MDM: CPT | Mod: 25

## 2019-05-02 PROCEDURE — 99284 PR EMERGENCY DEPT VISIT,LEVEL IV: ICD-10-PCS | Mod: ,,, | Performed by: PHYSICIAN ASSISTANT

## 2019-05-02 PROCEDURE — 63600175 PHARM REV CODE 636 W HCPCS: Performed by: PHYSICIAN ASSISTANT

## 2019-05-02 PROCEDURE — 96374 THER/PROPH/DIAG INJ IV PUSH: CPT

## 2019-05-02 RX ORDER — FENTANYL CITRATE 50 UG/ML
25 INJECTION, SOLUTION INTRAMUSCULAR; INTRAVENOUS
Status: COMPLETED | OUTPATIENT
Start: 2019-05-02 | End: 2019-05-02

## 2019-05-02 RX ORDER — METHOCARBAMOL 500 MG/1
1000 TABLET, FILM COATED ORAL 3 TIMES DAILY PRN
Qty: 20 TABLET | Refills: 0 | Status: SHIPPED | OUTPATIENT
Start: 2019-05-02 | End: 2019-05-07

## 2019-05-02 RX ORDER — ORPHENADRINE CITRATE 30 MG/ML
60 INJECTION INTRAMUSCULAR; INTRAVENOUS
Status: COMPLETED | OUTPATIENT
Start: 2019-05-02 | End: 2019-05-02

## 2019-05-02 RX ORDER — NAPROXEN 500 MG/1
500 TABLET ORAL 2 TIMES DAILY WITH MEALS
Qty: 20 TABLET | Refills: 0 | Status: SHIPPED | OUTPATIENT
Start: 2019-05-02 | End: 2019-08-23

## 2019-05-02 RX ORDER — KETOROLAC TROMETHAMINE 30 MG/ML
15 INJECTION, SOLUTION INTRAMUSCULAR; INTRAVENOUS
Status: COMPLETED | OUTPATIENT
Start: 2019-05-02 | End: 2019-05-02

## 2019-05-02 RX ADMIN — KETOROLAC TROMETHAMINE 15 MG: 30 INJECTION, SOLUTION INTRAMUSCULAR at 04:05

## 2019-05-02 RX ADMIN — FENTANYL CITRATE 25 MCG: 50 INJECTION INTRAMUSCULAR; INTRAVENOUS at 01:05

## 2019-05-02 RX ADMIN — ORPHENADRINE CITRATE 60 MG: 30 INJECTION INTRAMUSCULAR; INTRAVENOUS at 04:05

## 2019-05-02 NOTE — ED NOTES
Pt resting quietly; awaiting transportation home to arrive; will continue to monitor and provide care

## 2019-05-02 NOTE — ED NOTES
Pt resting quietly at this time; awaiting further orders; will continue to monitor and provide care

## 2019-05-02 NOTE — ED TRIAGE NOTES
Pt reports she fell down an 8 steps approx 1hr PTA; reports her foot caught on a pad on the top step as she was ascending the staircase, awoke laying on her back on the bottom step; +LOC; pt endorses neck pain, midthoracic and lower back pain, bilateral UE tingling; pt endorses decreased sensation to R arm and R leg; pt denies N/V, CP, SOB, change in vision; pt A&Ox4, respirations even, unlabored; lung sounds clear; no obvious deformity noted; no facial droop noted; equal  strength demonstrated

## 2019-05-02 NOTE — ED NOTES
Pt up, ambulating with steady gait; nurse at side to monitor and provide assistance if necessary; pt reports generalized body aches; denies CP, SOB, N/V; awaiting further orders; will continue to monitor and provide care

## 2019-05-02 NOTE — ED NOTES
Pt resting quietly; A&Ox4; respirations even, unlabored; pt remains in c-collar; awaiting orders and results to be completed; will continue to monitor and provide care

## 2019-05-02 NOTE — ED PROVIDER NOTES
Encounter Date: 2019    SCRIBE #1 NOTE: I, Taylor Clemente, am scribing for, and in the presence of,  Dr. Michael. I have scribed the following portions of the note - the APC attestation.       History     Chief Complaint   Patient presents with    Fall     fell down 8 stairs at work, c/o tailbone pain, c collar in place on EMS arrival, moving extremities, reports BUE tingling to fingertips, AAox4     44 year old female with medical history of anxiety presenting to the ED with the chief complaint of fall. Patient experienced a mechanical fall downs stairs today at 10:30am while at work. She reports a floor mat was sticking out over the top of the stairs which caught her foot causing her to fall down 9 stairs. She believes she lost consciousness as she does not remember falling and remembers sitting up at the bottom of the stairs. She was able to stand up and walk back up the stairs after the fall. She report shaving neck pain, bilateral shoulder pain, and lower back pain. She reports having paresthesias to her hands and feet. She denies vision changes, chest pain, SOB, abdominal pain, nausea, vomiting, unilateral extremity weakness, loss of bowel/bladder control. She denies history of neck or back surgeries. Patient received 50mcg of Fentanyl with EMS during transit.          Review of patient's allergies indicates:   Allergen Reactions    Codeine Itching    Morphine Swelling     Past Medical History:   Diagnosis Date    Anxiety      Past Surgical History:   Procedure Laterality Date     SECTION      x2    HYSTERECTOMY  2002    ROGER, ovaries remain (AUB)    TONSILLECTOMY      TYMPANOSTOMY TUBE PLACEMENT      x6     Family History   Problem Relation Age of Onset    Breast cancer Maternal Aunt 40    Breast cancer Paternal Grandmother         Dec'd    Hypertension Father     Hypertension Sister     Melanoma Neg Hx      Social History     Tobacco Use    Smoking status: Former Smoker     Packs/day:  1.00     Years: 22.00     Pack years: 22.00     Types: Cigarettes     Last attempt to quit: 5/10/2016     Years since quittin.9    Smokeless tobacco: Never Used   Substance Use Topics    Alcohol use: No    Drug use: No     Review of Systems   Constitutional: Negative for chills, diaphoresis, fatigue and fever.   HENT: Negative for congestion, sore throat and trouble swallowing.    Eyes: Negative for pain and redness.   Respiratory: Negative for cough and shortness of breath.    Cardiovascular: Negative for chest pain.   Gastrointestinal: Negative for abdominal pain, diarrhea, nausea and vomiting.   Genitourinary: Negative for dysuria and hematuria.   Musculoskeletal: Positive for back pain and neck pain.   Skin: Negative for rash and wound.   Neurological: Positive for numbness and headaches. Negative for dizziness, seizures, speech difficulty, weakness and light-headedness.       Physical Exam     Initial Vitals   BP Pulse Resp Temp SpO2   19 1101 19 1101 19 1101 19 1106 19 1101   (!) 170/80 106 16 99.1 °F (37.3 °C) 100 %      MAP       --                Physical Exam    Constitutional: She appears well-developed and well-nourished. She is not diaphoretic. No distress.   HENT:   Head: Normocephalic and atraumatic.   Mouth/Throat: Oropharynx is clear and moist. No oropharyngeal exudate.   Eyes: EOM are normal. Pupils are equal, round, and reactive to light.   Neck:   C-collar in place   Cardiovascular: Normal rate, regular rhythm and intact distal pulses.   Pulmonary/Chest: Breath sounds normal. No respiratory distress. She has no wheezes.   Abdominal: Soft. She exhibits no distension. There is no tenderness.   Musculoskeletal:   TTP L-spine paraspinal musculature   Neurological: She is alert and oriented to person, place, and time. She has normal strength. A sensory deficit is present. No cranial nerve deficit.   Reduced sensation to LUE and left foot  Follows commands  appropriately. No dysmetria, dysdiadochokinesia, peripheral vision deficits. Negative pronator drift.   Skin: Skin is warm and dry.       ED Course   Procedures  Labs Reviewed - No data to display       Imaging Results          X-Ray Chest PA And Lateral (Final result)  Result time 05/02/19 15:30:51    Final result by Phillip Marvin MD (05/02/19 15:30:51)                 Impression:      1. No acute cardiopulmonary process.      Electronically signed by: Phillip Marvin MD  Date:    05/02/2019  Time:    15:30             Narrative:    EXAMINATION:  XR CHEST PA AND LATERAL    CLINICAL HISTORY:  Unspecified fall, initial encounter    TECHNIQUE:  PA and lateral views of the chest were performed.    COMPARISON:  05/03/2018    FINDINGS:  The cardiomediastinal silhouette is not enlarged..  There is no pleural effusion.  The trachea is midline.  The lungs are symmetrically expanded bilaterally without evidence of acute parenchymal process. No large focal consolidation seen.  There is no pneumothorax.  The osseous structures are unremarkable.                               X-Ray Thoracic Spine AP Lateral (Final result)  Result time 05/02/19 15:29:09    Final result by Phillip Marvin MD (05/02/19 15:29:09)                 Impression:      1. No acute displaced fracture or dislocation of the thoracic spine.      Electronically signed by: Phillip Marvin MD  Date:    05/02/2019  Time:    15:29             Narrative:    EXAMINATION:  XR THORACIC SPINE AP LATERAL    CLINICAL HISTORY:  Unspecified fall, initial encounter    TECHNIQUE:  AP and lateral views of the thoracic spine were performed.    COMPARISON:  None    FINDINGS:  Two views.    Lateral imaging demonstrates adequate alignment of the thoracic spine without significant vertebral body height loss or disc space height loss.  The facet joints are aligned.  AP spinal alignment is grossly unremarkable allowing for positioning.  The visualized lung zones are grossly  clear.  No acute displaced rib fracture.  There may be left nephrolithiasis.                               X-Ray Lumbar Spine Ap And Lateral (Final result)  Result time 05/02/19 15:28:23    Final result by Phillip Marvin MD (05/02/19 15:28:23)                 Impression:      1. No acute displaced fracture or dislocation of the lumbar spine.      Electronically signed by: Phillip Marvin MD  Date:    05/02/2019  Time:    15:28             Narrative:    EXAMINATION:  XR LUMBAR SPINE AP AND LATERAL    CLINICAL HISTORY:  Fall, paresthesias to BLE; reduced sensation to RLE below knee on exam;    TECHNIQUE:  AP, lateral and spot images were performed of the lumbar spine.    COMPARISON:  None    FINDINGS:  Three views.    Lateral imaging demonstrates adequate alignment of the lumbar spine.  The facet joints are aligned noting lower lumbar facet arthropathy, particularly involving L4-L5.  The sacral segments appear grossly aligned.  AP spinal alignment is unremarkable.  The sacroiliac joints are intact noting pseudoarticulation of L5 with the left sacral ala.  There is a focus of calcification projected in the region of the left kidney, nephrolithiasis not excluded.                               CT Head Without Contrast (Final result)  Result time 05/02/19 14:53:41    Final result by Hernán Vázquez DO (05/02/19 14:53:41)                 Impression:      Unremarkable noncontrast CT head specifically without evidence for acute intracranial hemorrhage.  Clinical correlation and further evaluation as warranted.      Electronically signed by: Hernán Vázquez DO  Date:    05/02/2019  Time:    14:53             Narrative:    EXAMINATION:  CT HEAD WITHOUT CONTRAST    CLINICAL HISTORY:  Head trauma, minor, GCS>=13, NOC/NEXUS/CCR neg, first study;Fall down 8 stairs; paresthesias B/L hands, reduced sensation R hand on exam;    TECHNIQUE:  Multiple sequential 5 mm axial images of the head without contrast.  Coronal and sagittal  reformatted imaging from the axial acquisition.    COMPARISON:  None    FINDINGS:  There is no evidence for acute intracranial hemorrhage or sulcal effacement.  The ventricles are normal in size without hydrocephalus.  There is no midline shift or mass effect.  Visualized paranasal sinuses and mastoid air cells are clear.                               CT Cervical Spine Without Contrast (Final result)  Result time 05/02/19 15:02:16    Final result by Phillip Marvin MD (05/02/19 15:02:16)                 Impression:      1. No acute displaced fracture or dislocation of the cervical spine.  Please see separately dictated report for details of the intracranial content.      Electronically signed by: Phillip Marvin MD  Date:    05/02/2019  Time:    15:02             Narrative:    EXAMINATION:  CT CERVICAL SPINE WITHOUT CONTRAST    CLINICAL HISTORY:  Fall down 8 stairs; paresthesias B/L hands, reduced sensation R hand on exam;    TECHNIQUE:  Low dose axial images, sagittal and coronal reformations were performed though the cervical spine.  Contrast was not administered.    COMPARISON:  None    FINDINGS:  There is mild bilateral apical atelectasis.  The soft tissues of the neck are grossly unremarkable, no significant tonsillar enlargement or cervical lymphadenopathy.  The paraspinous and hypopharyngeal soft tissues are grossly unremarkable.    Sagittal reformatted imaging demonstrates adequate alignment of the cervical spine without significant vertebral body height loss or disc space height loss noting mild disc space height loss involving C5-C6.  No acute displaced fracture or dislocation of the cervical spine.  The facet joints are aligned.  Allowing for motion artifact, no convincing severe spinal canal stenosis or severe neuroforaminal narrowing at any level.  There is minimal facet arthropathy.                                 Medical Decision Making:   History:   Old Medical Records: I decided to obtain old  medical records.  Old Records Summarized: records from clinic visits.  Clinical Tests:   Radiological Study: Ordered and Reviewed       APC / Resident Notes:   44 year old female with medical history of anxiety presenting to the ED c/o fall down stairs today at 10:30 am. DDx includes but not limited to concussion, SDH, ICH, cervical muscle sprain, disc herniation, vertebral fracture, cervical radiculopathy, lumbar radiculopathy. Will get CT head, CT c-spine, CXR, X-ray L-spine. Will give analgesia as needed.    CXR without acute intrathoracic processes. X-ray T and L spine without fracture or dislocation. CT head without acute intracranial hemorrhage. CT c-spine shows no acute displaced fracture or dislocation of the cervical spine.    Patient stable on reassessment. C-collar cleared at bedside. Mild tenderness to R lower paraspinal c-spine. No midline tenderness. Sensation to extremities equal on reassessment. Strength symmetric. Patient able to ambulate around the ED without difficulty. Patient stable for discharge with PCP follow-up for re-evaluation in 1 week. Will give RX for Naprosyn and Robaxin for ongoing management of pain. Patient expresses understanding and agreeable to the plan. Return to ED precautions given for new, worsening, or concerning symptoms. I have discussed the care of this patient with my supervising physician.        Scribe Attestation:   Scribe #1: I performed the above scribed service and the documentation accurately describes the services I performed. I attest to the accuracy of the note.    Attending Attestation:     Physician Attestation Statement for NP/PA:   I discussed this assessment and plan of this patient with the NP/PA, but I did not personally examine the patient. The face to face encounter was performed by the NP/PA.    Other NP/PA Attestation Additions:    History of Present Illness: 44 year old woman presents for evaluation of injury sustained earlier today in a trip and  fall.                       Clinical Impression:       ICD-10-CM ICD-9-CM   1. Fall down stairs, initial encounter W10.8XXA E880.9   2. Fall W19.XXXA E888.9   3. Neck pain M54.2 723.1   4. Acute low back pain without sciatica, unspecified back pain laterality M54.5 724.2         Disposition:   Disposition: Discharged  Condition: Stable                        Laith Erwin PA-C  05/02/19 [id number]

## 2019-05-06 ENCOUNTER — TELEPHONE (OUTPATIENT)
Dept: FAMILY MEDICINE | Facility: HOSPITAL | Age: 44
End: 2019-05-06

## 2019-05-06 NOTE — TELEPHONE ENCOUNTER
----- Message from Liz RENETTA Izaguirre sent at 5/6/2019 12:31 PM CDT -----  Contact: PT Portal Request  Appointment Request From: Dina Field    With Provider: Dr. Summers    Preferred Date Range: 5/7/2019 - 5/10/2019    Preferred Times: Any time    Reason for visit: Existing Patient    Comments:  I fell down a flight of stairs at work on 5/2/19 and I need to schedule a follow up appointment.    On Thirsday, May 2nd I fell down a flight of stairs at work.  Still suffering with lower back paid, neck and shoulder pain.

## 2019-05-06 NOTE — TELEPHONE ENCOUNTER
Contacted patient and advised her that I can schedule an appointment with her to see Dr. Summers but that are our clinic does not do workers comp. Pt said she will wait to hear back from her work.

## 2019-05-09 ENCOUNTER — OFFICE VISIT (OUTPATIENT)
Dept: PRIMARY CARE CLINIC | Facility: CLINIC | Age: 44
End: 2019-05-09
Payer: COMMERCIAL

## 2019-05-09 ENCOUNTER — TELEPHONE (OUTPATIENT)
Dept: PRIMARY CARE CLINIC | Facility: CLINIC | Age: 44
End: 2019-05-09

## 2019-05-09 VITALS
BODY MASS INDEX: 26.04 KG/M2 | WEIGHT: 132.63 LBS | TEMPERATURE: 99 F | OXYGEN SATURATION: 99 % | DIASTOLIC BLOOD PRESSURE: 92 MMHG | SYSTOLIC BLOOD PRESSURE: 133 MMHG | HEART RATE: 91 BPM | HEIGHT: 60 IN | RESPIRATION RATE: 18 BRPM

## 2019-05-09 DIAGNOSIS — M62.9 MUSCULOSKELETAL DISORDER INVOLVING UPPER TRAPEZIUS MUSCLE: ICD-10-CM

## 2019-05-09 DIAGNOSIS — S06.0X0A CONCUSSION WITHOUT LOSS OF CONSCIOUSNESS, INITIAL ENCOUNTER: Primary | ICD-10-CM

## 2019-05-09 DIAGNOSIS — S16.1XXA STRAIN OF NECK MUSCLE, INITIAL ENCOUNTER: ICD-10-CM

## 2019-05-09 DIAGNOSIS — G62.9 NEUROPATHY: ICD-10-CM

## 2019-05-09 DIAGNOSIS — S39.012A LUMBOSACRAL STRAIN, INITIAL ENCOUNTER: ICD-10-CM

## 2019-05-09 DIAGNOSIS — M47.816 LUMBAR FACET ARTHROPATHY: ICD-10-CM

## 2019-05-09 DIAGNOSIS — S40.022A TRAUMATIC ECCHYMOSIS OF LEFT UPPER ARM, INITIAL ENCOUNTER: ICD-10-CM

## 2019-05-09 PROCEDURE — 99214 PR OFFICE/OUTPT VISIT, EST, LEVL IV, 30-39 MIN: ICD-10-PCS | Mod: S$GLB,,, | Performed by: FAMILY MEDICINE

## 2019-05-09 PROCEDURE — 99213 OFFICE O/P EST LOW 20 MIN: CPT | Mod: PBBFAC,PN | Performed by: FAMILY MEDICINE

## 2019-05-09 PROCEDURE — 99999 PR PBB SHADOW E&M-EST. PATIENT-LVL III: CPT | Mod: PBBFAC,,, | Performed by: FAMILY MEDICINE

## 2019-05-09 PROCEDURE — 99214 OFFICE O/P EST MOD 30 MIN: CPT | Mod: S$GLB,,, | Performed by: FAMILY MEDICINE

## 2019-05-09 PROCEDURE — 99999 PR PBB SHADOW E&M-EST. PATIENT-LVL III: ICD-10-PCS | Mod: PBBFAC,,, | Performed by: FAMILY MEDICINE

## 2019-05-09 RX ORDER — BUTALBITAL, ACETAMINOPHEN AND CAFFEINE 50; 325; 40 MG/1; MG/1; MG/1
1 TABLET ORAL EVERY 6 HOURS PRN
Qty: 30 TABLET | Refills: 1 | Status: SHIPPED | OUTPATIENT
Start: 2019-05-09 | End: 2019-06-08

## 2019-05-09 RX ORDER — METHOCARBAMOL 500 MG/1
TABLET, FILM COATED ORAL
Qty: 40 TABLET | Refills: 0 | Status: SHIPPED | OUTPATIENT
Start: 2019-05-09 | End: 2019-08-23

## 2019-05-09 RX ORDER — IBUPROFEN 600 MG/1
600 TABLET ORAL EVERY 8 HOURS PRN
Qty: 60 TABLET | Refills: 5 | Status: SHIPPED | OUTPATIENT
Start: 2019-05-09 | End: 2019-08-23 | Stop reason: ALTCHOICE

## 2019-05-09 NOTE — PROGRESS NOTES
Subjective:       Patient ID: Dina Field is a 44 y.o. female.    Chief Complaint: Fall (pt. fell down stair at work on 5/3/19); Back Pain (lower back pain); Neck Pain (that radiates down arms); and Headache (pt. was unconscious )    HPI:  44-year-old white female in for a fall at work--fell 05/02/2019--works for [employer]-- around 10 AM-- patient went downstairs to check the mail--as patient went to walk back up the steps when she got to the top of the steps a map was overlapping the upper step in as she went to put her weight on mat --there was no step under the protruding part of the mat so she lost her balance and fell.  About 8-9 thinks would steps--does not remember the fall at all--just remember stepping on the mat of the top step and then waking up at the bottom of the steps.  Patient does not think she was unconscious very with long because she could hear a woman at the top of the steps calling her name.  Lay down on the floor for about 5-10 minutes before she could catch her breath and was able to set up.   help patient back up to steps--patient statin office chair for about 10-15 minutes.  Started feeling pain everywhere--neck front and back, lower back, left arm.  Patient went to the emergency room [facility]--patient had CT scan of the head which was negative CT scan of the cervical spine showing mild disc space height loss C5-6 some minimal facet arthropathy x-ray lumbar spine showed lumbar facet arthropathy L4-5 pseudo articulation of L5 and left sacral ala x-ray of thoracic spine was normal chest x-ray normal.  Bruise noted on left upper arm--few bruises on the left thigh few bruises on the right thigh and right lateral knee.  Most of the bruises have improved except for the 1 the left upper arm.  Also had a bruise on her chin.  Lab had CBCs CMP PT INR        Told had a concussion--cervical strain lumbosacral strain --multiple contusions.         Patient has a headache nonstop since the accident--occasionally pain is the worst in the bilateral temporal areas and bilateral TMJ areas in jaw other times in the occipital area--quality occasional throbbing and other times a dull headache, severity yesterday was the worst 7 1/2 /10, duration constant.  Occasional gets pins and needles down both shoulders and both arms to the hand like there falling asleep has to constantly shake the arms to get sensation back.  ROS:  Skin: no psoriasis, eczema, skin cancer history multiple ecchymosis to the left thigh right thigh right knee left upper arm see history of present illness  HEENT: + headache, no ocular pain, blurred vision, diplopia, epistaxis, hoarseness change in voice, thyroid trouble  Lung: No pneumonia, asthma, Tb, wheezing, SOB, social smoker  Heart: No chest pain, ankle edema, palpitations, MI, yumiko murmur, hypertension, hyperlipidemia  Abdomen: No nausea, vomiting, diarrhea, constipation, ulcers, hepatitis, gallbladder disease, melena, hematochezia, hematemesis  : no UTI, renal disease, stones  GYN -hysterectomy--last mammogram last year  MS: no fractures, O/A, lupus, rheumatoid, gout--no significant arthritic problems prior to fall see history of present illness cervical strain lumbosacral strain numbness of the arms bilaterally headache  Neuro: No dizziness, LOC, seizures   No diabetes, no anemia, + anxiety, no depression   , 2 biological children and 4 step children,work , lives with  and 4 children    Objective:   Physical Exam:  General: Well nourished, well developed, in moderate discomfort secondary to headache/neck pain/back pain  Skin:  Ecchymosis 9 1/2 x 6 cm area has fading in the center with a purple outline tender to touch  HEENT: Eyes PERRLA, EOM intact, nose clear, throat nonerythematous ears TMs clear  NECK: Supple, no bruits, No JVD, no nodes  Lungs: Clear, no rales, rhonchi, wheezing   Heart: Regular rate  and rhythm, no murmurs, gallops, or rubs  Abdomen: flat, bowel sounds positive, no tenderness, or organomegaly  MS:  Tenderness cervical spine C3 through 7 bilaterally upper trapezius muscles bilaterally upper mid scapular area bilaterally all tender to palpation pain with lateral flexion of the neck to the right with spasm of the left upper trapezius and left cervical paravertebral muscles with lateral flexion rotation of the neck to the left--has muscle spasm in on the right upper trapezius muscle and right paravertebral muscles of the cervical spine pain with anterior flexion 10° extension 10 degree:  Tenderness midthoracic spine but overall range of motion with strength that is normal tenderness lumbar spine L1-S1 bilaterally left greater than right with some radicular pain to the left flank and left hip area--anterior flexion 10° extension 10° lateral flexion rotation 10° straight leg lift pulling sensation with slight radicular pain on the left to the left thigh reflexes +1/for  Neuro: Alert, CN intact, oriented X 3 Romberg negative heel-toe intact  Extremities: No cyanosis, clubbing, or edema         Assessment:       1. Concussion without loss of consciousness, initial encounter    2. Strain of neck muscle, initial encounter    3. Musculoskeletal disorder involving upper trapezius muscle    4. Lumbosacral strain, initial encounter    5. Lumbar facet arthropathy    6. Neuropathy    7. Traumatic ecchymosis of left upper arm, initial encounter        Plan:       Concussion without loss of consciousness, initial encounter    Strain of neck muscle, initial encounter    Musculoskeletal disorder involving upper trapezius muscle    Lumbosacral strain, initial encounter    Lumbar facet arthropathy    Neuropathy    Traumatic ecchymosis of left upper arm, initial encounter    Other orders  -     butalbital-acetaminophen-caffeine -40 mg (FIORICET, ESGIC) -40 mg per tablet; Take 1 tablet by mouth every 6  (six) hours as needed for Pain.  Dispense: 30 tablet; Refill: 1  -     ibuprofen (ADVIL,MOTRIN) 600 MG tablet; Take 1 tablet (600 mg total) by mouth every 8 (eight) hours as needed for Pain.  Dispense: 60 tablet; Refill: 5  -     methocarbamol (ROBAXIN) 500 MG Tab; One p.o. q.h.s. p.r.n. muscle spasm may increase 1 p.o. q.6 hours p.r.n. muscle spasm if needed but will make sleepy  Dispense: 40 tablet; Refill: 0      patient with concussion/cervical strain/thoracic strain/lumbosacral strain/neuropathy upper arms bilaterally left thigh/ecchymosis left upper arm history ecchymosis left thigh right thigh right knee/persistent headache  Fioricet--ibuprofen-Robaxin-headache diary  I saw times 1st 48 hr than Moist heat/theragesic or Tiger balm/range of motion exercise  Return 1 week  Had trauma sheet given  No duty x1 week  Return 1 week if headaches fail to improve will consider Neurology consult possible MRI of the brain--patient appears neurologically intact at this time--does have cervical strain/thoracic strain/lumbosacral strain/ecchymosis left shoulder/neuropathies of the arms bilaterally/left thigh neuropathy/intractable headache

## 2019-05-09 NOTE — TELEPHONE ENCOUNTER
Spoke with patient states when she takes hydrocodone she startes itching all over severely. Says she needs something different.

## 2019-05-09 NOTE — TELEPHONE ENCOUNTER
----- Message from Lori Jasmine sent at 5/9/2019 10:46 AM CDT -----  Contact: Patient  Type: Needs Medical Advice    Who Called:  Dina, patient  Symptoms (please be specific):  N/A  How long has patient had these symptoms:  N/A  Pharmacy name and phone #:    [facility]  [place],  Best Call Back Number: [phone]  Additional Information: Calling because she is allergic to codeine and she was given a prescription for Norco which the pharmacy told her it has codeine in it. Please advise. Thanks.

## 2019-05-09 NOTE — TELEPHONE ENCOUNTER
----- Message from Katie Kessler sent at 5/9/2019 11:36 AM CDT -----  Type:  Patient Returning Call    Who Called:  Patient  Who Left Message for Patient:  n/a  Does the patient know what this is regarding?:  no  Best Call Back Number:  [phone] (home) [phone] (work)

## 2019-05-15 ENCOUNTER — OFFICE VISIT (OUTPATIENT)
Dept: PRIMARY CARE CLINIC | Facility: CLINIC | Age: 44
End: 2019-05-15
Payer: COMMERCIAL

## 2019-05-15 VITALS
TEMPERATURE: 98 F | SYSTOLIC BLOOD PRESSURE: 126 MMHG | DIASTOLIC BLOOD PRESSURE: 87 MMHG | HEART RATE: 70 BPM | WEIGHT: 131.31 LBS | OXYGEN SATURATION: 99 % | BODY MASS INDEX: 25.78 KG/M2 | RESPIRATION RATE: 18 BRPM | HEIGHT: 60 IN

## 2019-05-15 DIAGNOSIS — S40.022A TRAUMATIC ECCHYMOSIS OF LEFT UPPER ARM, INITIAL ENCOUNTER: ICD-10-CM

## 2019-05-15 DIAGNOSIS — F41.9 ANXIETY: ICD-10-CM

## 2019-05-15 DIAGNOSIS — G62.9 NEUROPATHY: ICD-10-CM

## 2019-05-15 DIAGNOSIS — S39.012A LUMBOSACRAL STRAIN, INITIAL ENCOUNTER: ICD-10-CM

## 2019-05-15 DIAGNOSIS — M62.9 MUSCULOSKELETAL DISORDER INVOLVING UPPER TRAPEZIUS MUSCLE: ICD-10-CM

## 2019-05-15 DIAGNOSIS — S16.1XXA STRAIN OF NECK MUSCLE, INITIAL ENCOUNTER: Primary | ICD-10-CM

## 2019-05-15 PROCEDURE — 99214 PR OFFICE/OUTPT VISIT, EST, LEVL IV, 30-39 MIN: ICD-10-PCS | Mod: S$GLB,,, | Performed by: FAMILY MEDICINE

## 2019-05-15 PROCEDURE — 99999 PR PBB SHADOW E&M-EST. PATIENT-LVL IV: ICD-10-PCS | Mod: PBBFAC,,, | Performed by: FAMILY MEDICINE

## 2019-05-15 PROCEDURE — 99999 PR PBB SHADOW E&M-EST. PATIENT-LVL IV: CPT | Mod: PBBFAC,,, | Performed by: FAMILY MEDICINE

## 2019-05-15 PROCEDURE — 99214 OFFICE O/P EST MOD 30 MIN: CPT | Mod: S$GLB,,, | Performed by: FAMILY MEDICINE

## 2019-05-15 RX ORDER — HYDROCODONE BITARTRATE AND ACETAMINOPHEN 5; 325 MG/1; MG/1
1 TABLET ORAL EVERY 6 HOURS PRN
Qty: 30 TABLET | Refills: 0 | Status: SHIPPED | OUTPATIENT
Start: 2019-05-15 | End: 2019-08-23
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
## 2019-06-03 ENCOUNTER — TELEPHONE (OUTPATIENT)
Dept: PRIMARY CARE CLINIC | Facility: CLINIC | Age: 44
End: 2019-06-03

## 2019-06-03 NOTE — TELEPHONE ENCOUNTER
----- Message from Alicia Waddell sent at 6/3/2019  1:21 PM CDT -----  Contact: Ro sanon/MAREKS [phone]  She is calling to request an updated work status.  She also wants to know when her next appt will be? Please call her.  Thank you!

## 2019-06-18 ENCOUNTER — TELEPHONE (OUTPATIENT)
Dept: PRIMARY CARE CLINIC | Facility: CLINIC | Age: 44
End: 2019-06-18

## 2019-06-18 NOTE — TELEPHONE ENCOUNTER
Spoke with patient states she needs a note for workers comp. Says she was given a note to stay out of work until the 29th of June but she has been waiting for an appointment with neurology. Says she got an appointment next week but she needs a note extending her time off. Advised her Dr. Torres is not here and he wont be back until next Tuesday. States she will get the note from her neurologist since she will see them before.

## 2019-06-18 NOTE — TELEPHONE ENCOUNTER
----- Message from Cari Lawson sent at 6/18/2019 12:57 PM CDT -----  Type: Needs Medical Advice    Who Called:  Patient  Best Call Back Number: [phone]  Additional Information: Patient requesting to speak with nurse concerning workers compensation and note she needs/has question/please call patient back to advise.

## 2019-08-23 ENCOUNTER — HOSPITAL ENCOUNTER (OUTPATIENT)
Dept: RADIOLOGY | Facility: HOSPITAL | Age: 44
Discharge: HOME | End: 2019-08-23
Attending: PHYSICIAN ASSISTANT
Payer: COMMERCIAL

## 2019-08-23 ENCOUNTER — OFFICE VISIT (OUTPATIENT)
Dept: INTERNAL MEDICINE | Facility: CLINIC | Age: 44
End: 2019-08-23
Payer: COMMERCIAL

## 2019-08-23 VITALS
BODY MASS INDEX: 25.88 KG/M2 | WEIGHT: 131.81 LBS | SYSTOLIC BLOOD PRESSURE: 112 MMHG | OXYGEN SATURATION: 99 % | HEART RATE: 96 BPM | DIASTOLIC BLOOD PRESSURE: 70 MMHG | HEIGHT: 60 IN | TEMPERATURE: 99 F

## 2019-08-23 DIAGNOSIS — K21.9 GASTROESOPHAGEAL REFLUX DISEASE WITHOUT ESOPHAGITIS: ICD-10-CM

## 2019-08-23 DIAGNOSIS — Z91.09 ENVIRONMENTAL ALLERGIES: ICD-10-CM

## 2019-08-23 DIAGNOSIS — Z00.00 ANNUAL PHYSICAL EXAM: Primary | ICD-10-CM

## 2019-08-23 DIAGNOSIS — Z12.39 BREAST CANCER SCREENING: ICD-10-CM

## 2019-08-23 PROBLEM — M62.9 MUSCULOSKELETAL DISORDER INVOLVING UPPER TRAPEZIUS MUSCLE: Status: RESOLVED | Noted: 2018-08-21 | Resolved: 2019-08-23

## 2019-08-23 PROCEDURE — 77067 MAMMO DIGITAL SCREENING BILAT WITH TOMOSYNTHESIS_CAD: ICD-10-PCS | Mod: 26,,, | Performed by: RADIOLOGY

## 2019-08-23 PROCEDURE — 99999 PR PBB SHADOW E&M-EST. PATIENT-LVL IV: CPT | Mod: PBBFAC,,, | Performed by: PHYSICIAN ASSISTANT

## 2019-08-23 PROCEDURE — 99396 PREV VISIT EST AGE 40-64: CPT | Mod: S$GLB,,, | Performed by: PHYSICIAN ASSISTANT

## 2019-08-23 PROCEDURE — 99396 PR PREVENTIVE VISIT,EST,40-64: ICD-10-PCS | Mod: S$GLB,,, | Performed by: PHYSICIAN ASSISTANT

## 2019-08-23 PROCEDURE — 77063 BREAST TOMOSYNTHESIS BI: CPT | Mod: 26,,, | Performed by: RADIOLOGY

## 2019-08-23 PROCEDURE — 99999 PR PBB SHADOW E&M-EST. PATIENT-LVL IV: ICD-10-PCS | Mod: PBBFAC,,, | Performed by: PHYSICIAN ASSISTANT

## 2019-08-23 PROCEDURE — 77067 SCR MAMMO BI INCL CAD: CPT | Mod: 26,,, | Performed by: RADIOLOGY

## 2019-08-23 PROCEDURE — 77063 MAMMO DIGITAL SCREENING BILAT WITH TOMOSYNTHESIS_CAD: ICD-10-PCS | Mod: 26,,, | Performed by: RADIOLOGY

## 2019-08-23 PROCEDURE — 77067 SCR MAMMO BI INCL CAD: CPT | Mod: TC

## 2019-08-23 RX ORDER — TIZANIDINE 4 MG/1
TABLET ORAL
Refills: 1 | COMMUNITY
Start: 2019-08-19 | End: 2019-09-03

## 2019-08-23 RX ORDER — VALACYCLOVIR HYDROCHLORIDE 1 G/1
1000 TABLET, FILM COATED ORAL 2 TIMES DAILY
Refills: 6 | COMMUNITY
Start: 2019-06-17 | End: 2019-09-03 | Stop reason: SDUPTHER

## 2019-08-23 RX ORDER — HYDROCODONE BITARTRATE AND ACETAMINOPHEN 7.5; 325 MG/1; MG/1
TABLET ORAL
Refills: 0 | COMMUNITY
Start: 2019-08-20 | End: 2021-10-21

## 2019-08-23 NOTE — PROGRESS NOTES
Subjective:       Patient ID: Dina Field is a 44 y.o. female who presents to clinic to establish care and to set up a mammogram appointment. Patient is doing well and denies of any concerns today. However, she does report of allergies with associated coughing and coryza symptoms that is partially alleviated with over the counter allergy medications. She also reports episodes of gastric reflux that she believes is the result of taking Excedrin and naproxen with a burning sensation after eating certain meals. However, she reports relief after avoiding certain foods. Patient also reports of a recent episode of blood in her stool in which she witnessed bright red stool without any pain that occurred only once without any recurrence. Patient is stable otherwise and has no further complaints.    Chief Complaint: Annual Exam    HPI  Review of Systems   Constitutional: Negative for activity change, appetite change, fever and unexpected weight change.   HENT: Positive for rhinorrhea and sneezing. Negative for hearing loss, sore throat and trouble swallowing.    Eyes: Negative for discharge and visual disturbance.   Respiratory: Positive for cough. Negative for chest tightness, shortness of breath and wheezing.    Cardiovascular: Negative for chest pain and palpitations.   Gastrointestinal: Positive for blood in stool. Negative for constipation, diarrhea, nausea and vomiting.   Endocrine: Negative for polydipsia and polyuria.   Genitourinary: Negative for difficulty urinating, dysuria, hematuria and menstrual problem.   Musculoskeletal: Positive for neck pain. Negative for arthralgias and joint swelling.   Neurological: Positive for headaches. Negative for weakness.   Psychiatric/Behavioral: Negative for confusion and dysphoric mood.       Objective:      Physical Exam   Constitutional: She is oriented to person, place, and time. She appears well-developed and well-nourished. No distress.   HENT:   Head:  Normocephalic and atraumatic.   Right Ear: External ear normal.   Left Ear: External ear normal.   Nose: Nose normal.   Mouth/Throat: Oropharynx is clear and moist.   Eyes: Pupils are equal, round, and reactive to light. Conjunctivae and EOM are normal.   Neck: Normal range of motion. Neck supple.   Cardiovascular: Normal rate, regular rhythm and normal heart sounds.   No murmur heard.  Pulmonary/Chest: Effort normal and breath sounds normal. No respiratory distress. She has no wheezes. She exhibits no tenderness.   Abdominal: Soft. Bowel sounds are normal. There is no tenderness.   Musculoskeletal: She exhibits tenderness.   Neurological: She is alert and oriented to person, place, and time.   Skin: Skin is warm and dry. She is not diaphoretic.   Psychiatric: She has a normal mood and affect.   Nursing note and vitals reviewed.      Assessment:       1. Annual physical exam    2. Breast cancer screening    3. Gastroesophageal reflux disease without esophagitis    4. Environmental allergies        Plan:         Dina was seen today for annual exam.    Diagnoses and all orders for this visit:    Annual physical exam  -     CBC auto differential; Future  -     Comprehensive metabolic panel; Future  -     Lipid panel; Future  -     TSH; Future    Breast cancer screening  -     Cancel: Mammo Digital Screening Bilat; Future    Gastroesophageal reflux disease without esophagitis    Environmental allergies    Will call patient with lab results. Patient will return PRN.

## 2019-08-23 NOTE — PROGRESS NOTES
Answers for HPI/ROS submitted by the patient on 8/21/2019   activity change: No  unexpected weight change: No  neck pain: Yes  hearing loss: No  rhinorrhea: Yes  trouble swallowing: No  eye discharge: No  visual disturbance: No  chest tightness: No  wheezing: No  chest pain: No  palpitations: No  blood in stool: Yes  constipation: No  vomiting: No  diarrhea: No  polydipsia: No  polyuria: No  difficulty urinating: No  hematuria: No  menstrual problem: No  dysuria: No  joint swelling: No  arthralgias: No  headaches: Yes  weakness: No  confusion: No  dysphoric mood: No

## 2019-08-30 ENCOUNTER — PATIENT MESSAGE (OUTPATIENT)
Dept: INTERNAL MEDICINE | Facility: CLINIC | Age: 44
End: 2019-08-30

## 2019-08-30 DIAGNOSIS — G47.00 INSOMNIA, UNSPECIFIED TYPE: ICD-10-CM

## 2019-08-30 DIAGNOSIS — E78.5 HYPERLIPIDEMIA, UNSPECIFIED HYPERLIPIDEMIA TYPE: ICD-10-CM

## 2019-08-30 DIAGNOSIS — B00.9 HERPES SIMPLEX: Primary | ICD-10-CM

## 2019-09-03 RX ORDER — VALACYCLOVIR HYDROCHLORIDE 1 G/1
2000 TABLET, FILM COATED ORAL EVERY 12 HOURS
Qty: 30 TABLET | Refills: 0 | Status: SHIPPED | OUTPATIENT
Start: 2019-09-03 | End: 2019-11-08 | Stop reason: SDUPTHER

## 2019-09-03 RX ORDER — TRAZODONE HYDROCHLORIDE 50 MG/1
50 TABLET ORAL NIGHTLY
Qty: 30 TABLET | Refills: 11 | Status: SHIPPED | OUTPATIENT
Start: 2019-09-03 | End: 2020-05-19 | Stop reason: SDUPTHER

## 2019-11-05 ENCOUNTER — PATIENT MESSAGE (OUTPATIENT)
Dept: INTERNAL MEDICINE | Facility: CLINIC | Age: 44
End: 2019-11-05

## 2019-11-05 DIAGNOSIS — R79.89 ABNORMAL CBC: Primary | ICD-10-CM

## 2019-11-08 ENCOUNTER — OFFICE VISIT (OUTPATIENT)
Dept: INTERNAL MEDICINE | Facility: CLINIC | Age: 44
End: 2019-11-08
Payer: MEDICAID

## 2019-11-08 VITALS
WEIGHT: 135.56 LBS | HEART RATE: 88 BPM | HEIGHT: 60 IN | SYSTOLIC BLOOD PRESSURE: 128 MMHG | DIASTOLIC BLOOD PRESSURE: 82 MMHG | BODY MASS INDEX: 26.61 KG/M2

## 2019-11-08 DIAGNOSIS — K21.00 GASTROESOPHAGEAL REFLUX DISEASE WITH ESOPHAGITIS: ICD-10-CM

## 2019-11-08 DIAGNOSIS — F41.9 ANXIETY: ICD-10-CM

## 2019-11-08 DIAGNOSIS — B00.9 HERPES SIMPLEX: ICD-10-CM

## 2019-11-08 DIAGNOSIS — J32.1 CHRONIC FRONTAL SINUSITIS: Primary | ICD-10-CM

## 2019-11-08 DIAGNOSIS — G62.9 NEUROPATHY: ICD-10-CM

## 2019-11-08 PROCEDURE — 99213 OFFICE O/P EST LOW 20 MIN: CPT | Mod: PBBFAC | Performed by: PHYSICIAN ASSISTANT

## 2019-11-08 PROCEDURE — 99999 PR PBB SHADOW E&M-EST. PATIENT-LVL III: ICD-10-PCS | Mod: PBBFAC,,, | Performed by: PHYSICIAN ASSISTANT

## 2019-11-08 PROCEDURE — 99214 PR OFFICE/OUTPT VISIT, EST, LEVL IV, 30-39 MIN: ICD-10-PCS | Mod: S$PBB,,, | Performed by: PHYSICIAN ASSISTANT

## 2019-11-08 PROCEDURE — 99999 PR PBB SHADOW E&M-EST. PATIENT-LVL III: CPT | Mod: PBBFAC,,, | Performed by: PHYSICIAN ASSISTANT

## 2019-11-08 PROCEDURE — 99214 OFFICE O/P EST MOD 30 MIN: CPT | Mod: S$PBB,,, | Performed by: PHYSICIAN ASSISTANT

## 2019-11-08 RX ORDER — TIZANIDINE 4 MG/1
4 TABLET ORAL EVERY 6 HOURS
Refills: 0 | COMMUNITY
Start: 2019-10-14 | End: 2021-01-25

## 2019-11-08 RX ORDER — PANTOPRAZOLE SODIUM 20 MG/1
20 TABLET, DELAYED RELEASE ORAL DAILY
Qty: 30 TABLET | Refills: 11 | Status: SHIPPED | OUTPATIENT
Start: 2019-11-08 | End: 2019-11-25

## 2019-11-08 RX ORDER — DULOXETIN HYDROCHLORIDE 30 MG/1
30 CAPSULE, DELAYED RELEASE ORAL DAILY
Qty: 30 CAPSULE | Refills: 11 | Status: SHIPPED | OUTPATIENT
Start: 2019-11-08 | End: 2019-11-25

## 2019-11-08 RX ORDER — DOXYCYCLINE 100 MG/1
100 CAPSULE ORAL 2 TIMES DAILY
Qty: 20 CAPSULE | Refills: 0 | Status: SHIPPED | OUTPATIENT
Start: 2019-11-08 | End: 2019-11-18

## 2019-11-08 RX ORDER — VALACYCLOVIR HYDROCHLORIDE 1 G/1
2000 TABLET, FILM COATED ORAL EVERY 12 HOURS
Qty: 30 TABLET | Refills: 0 | Status: SHIPPED | OUTPATIENT
Start: 2019-11-08 | End: 2021-04-21 | Stop reason: SDUPTHER

## 2019-11-08 NOTE — PROGRESS NOTES
Subjective:       Patient ID: Dina Field is a 44 y.o. female.    Chief Complaint: Hypertension    Patient presents to clinic with multiple complaints.  Her 1st concern is her blood pressure.  She states she is told whenever she goes to her pain management doctor's office that her pressure is high.  She has no record of high blood pressures [facility].  She states they do use an electronic blood pressure cuff which is very painful when they check her pressure.  She does have occasional headaches but attributes this to a chronic sinus infection.  She states she has had the sinus infection for 3-4 months and is now having frequent bleeding from her sinuses as well as green mucus.  She has not tried any medications for the symptoms.  She has never been on blood pressure medication in the past.  She denies any chest pain or shortness of breath or leg swelling.    Patient also complains of uncontrolled anxiety.  She had been on Wellbutrin for a long time but states she stopped the medication as she felt it was no longer working.  She complains of anxiety, loss of interest, fatigue which she attributes to her pain.  She denies any suicidal or homicidal ideations.  She states she was tried on Lexapro in the past but had an adverse reaction to the medication.    Patient also complains of uncontrolled reflux.  She states she notices the symptoms mainly at night when lying down.  She complains of a burning coming up into her throat.  She states she saw blood in her stool several months ago but has not had a recurrence of any blood or black tarry stools.  She has not tried any medications for the symptoms.        Vitals:    11/08/19 1010   BP: 128/82   BP Location: Left arm   Patient Position: Sitting   BP Method: Medium (Manual)   Pulse: 88   Weight: 61.5 kg (135 lb 9.3 oz)   Height: 5' (1.524 m)         Vitals - 1 value per visit 7/10/2018 8/21/2018 9/14/2018 1/17/2019 5/2/2019   SYSTOLIC 124 118 144  "126 136   DIASTOLIC 84 83 93 84 91   PULSE 80 84 80 87 90   TEMPERATURE     98.2   RESPIRATIONS  18 18 18 12   SPO2 98 99 97 99 97   Weight (lb) 121.69 121 118 127    Weight (kg) 55.2 54.885 53.524 57.607    HEIGHT 5' 0" 5' 0" 5' 0" 5' 0"    BODY MASS INDEX 23.77 23.63 23.05 24.8    VISIT REPORT        Pain Score  0         Vitals - 1 value per visit 5/9/2019 5/15/2019 8/23/2019 11/8/2019   SYSTOLIC 133 126 112 128   DIASTOLIC 92 87 70 82   PULSE 91 70 96 88   TEMPERATURE 98.5 97.9 99.1    RESPIRATIONS 18 18     SPO2 99 99 99    Weight (lb) 132.6 131.3 131.84 135.58   Weight (kg) 60.147 59.557 59.8 61.5   HEIGHT 5' 0" 5' 0" 5' 0" 5' 0"   BODY MASS INDEX 25.9 25.64 25.75 26.48   VISIT REPORT       Pain Score  6 6 0 6         Review of Systems   Constitutional: Negative for activity change, appetite change, chills, fatigue and fever.   HENT: Positive for congestion, postnasal drip, rhinorrhea and sore throat. Negative for ear discharge, ear pain, hearing loss, nosebleeds, trouble swallowing and voice change.    Eyes: Negative for pain, discharge, redness and visual disturbance.   Respiratory: Positive for cough. Negative for chest tightness, shortness of breath and wheezing.    Cardiovascular: Negative for chest pain, palpitations and leg swelling.   Gastrointestinal: Negative.    Genitourinary: Negative for difficulty urinating, dysuria and frequency.   Musculoskeletal: Positive for neck pain. Negative for arthralgias, back pain, gait problem and joint swelling.   Neurological: Positive for headaches. Negative for dizziness, tremors, seizures, speech difficulty, weakness, light-headedness and numbness.   Psychiatric/Behavioral: Negative.        Objective:      Physical Exam   Constitutional: She is oriented to person, place, and time. She appears well-developed and well-nourished.   HENT:   Head: Normocephalic and atraumatic.   Eyes: Pupils are equal, round, and reactive to light. Conjunctivae are normal.   Neck: " Normal range of motion. Neck supple. No JVD present.   Cardiovascular: Normal rate and regular rhythm. Exam reveals no gallop and no friction rub.   No murmur heard.  Pulmonary/Chest: Effort normal and breath sounds normal. No respiratory distress. She has no wheezes. She has no rales.   Neurological: She is alert and oriented to person, place, and time.   Skin: Skin is warm and dry.   Psychiatric: She has a normal mood and affect. Her behavior is normal. Judgment and thought content normal.       Assessment:       1. Chronic frontal sinusitis    2. Gastroesophageal reflux disease with esophagitis    3. Herpes simplex    4. Anxiety    5. Neuropathy        Plan:       Dina was seen today for hypertension.    Diagnoses and all orders for this visit:    Chronic frontal sinusitis  -     doxycycline (MONODOX) 100 MG capsule; Take 1 capsule (100 mg total) by mouth 2 (two) times daily. for 10 days    Gastroesophageal reflux disease with esophagitis  -     pantoprazole (PROTONIX) 20 MG tablet; Take 1 tablet (20 mg total) by mouth once daily.    Herpes simplex  -     valACYclovir (VALTREX) 1000 MG tablet; Take 2 tablets (2,000 mg total) by mouth every 12 (twelve) hours. For 1 day at sign of breakout    Anxiety  -     DULoxetine (CYMBALTA) 30 MG capsule; Take 1 capsule (30 mg total) by mouth once daily.    Neuropathy  -     DULoxetine (CYMBALTA) 30 MG capsule; Take 1 capsule (30 mg total) by mouth once daily.     I explained to patient at this time I did not think she needed blood pressure medication as her blood pressures here with a manual cuff for always controlled.  I encouraged her to monitor her blood pressures at home and give us a call if she noticed that the numbers were getting high.  She expressed understanding.  She is to return to clinic in 2 weeks for medication check for the Cymbalta.

## 2019-11-25 ENCOUNTER — PATIENT MESSAGE (OUTPATIENT)
Dept: INTERNAL MEDICINE | Facility: CLINIC | Age: 44
End: 2019-11-25

## 2019-11-25 ENCOUNTER — OFFICE VISIT (OUTPATIENT)
Dept: INTERNAL MEDICINE | Facility: CLINIC | Age: 44
End: 2019-11-25
Payer: MEDICAID

## 2019-11-25 VITALS
HEART RATE: 88 BPM | OXYGEN SATURATION: 99 % | WEIGHT: 135.81 LBS | BODY MASS INDEX: 26.66 KG/M2 | SYSTOLIC BLOOD PRESSURE: 128 MMHG | DIASTOLIC BLOOD PRESSURE: 88 MMHG | HEIGHT: 60 IN

## 2019-11-25 DIAGNOSIS — B37.9 YEAST INFECTION: Primary | ICD-10-CM

## 2019-11-25 DIAGNOSIS — G62.9 NEUROPATHY: ICD-10-CM

## 2019-11-25 DIAGNOSIS — K21.00 GASTROESOPHAGEAL REFLUX DISEASE WITH ESOPHAGITIS: ICD-10-CM

## 2019-11-25 DIAGNOSIS — F41.9 ANXIETY: ICD-10-CM

## 2019-11-25 PROCEDURE — 99999 PR PBB SHADOW E&M-EST. PATIENT-LVL IV: ICD-10-PCS | Mod: PBBFAC,,, | Performed by: PHYSICIAN ASSISTANT

## 2019-11-25 PROCEDURE — 99214 OFFICE O/P EST MOD 30 MIN: CPT | Mod: PBBFAC | Performed by: PHYSICIAN ASSISTANT

## 2019-11-25 PROCEDURE — 99999 PR PBB SHADOW E&M-EST. PATIENT-LVL IV: CPT | Mod: PBBFAC,,, | Performed by: PHYSICIAN ASSISTANT

## 2019-11-25 PROCEDURE — 99213 OFFICE O/P EST LOW 20 MIN: CPT | Mod: S$PBB,,, | Performed by: PHYSICIAN ASSISTANT

## 2019-11-25 PROCEDURE — 99213 PR OFFICE/OUTPT VISIT, EST, LEVL III, 20-29 MIN: ICD-10-PCS | Mod: S$PBB,,, | Performed by: PHYSICIAN ASSISTANT

## 2019-11-25 RX ORDER — PANTOPRAZOLE SODIUM 40 MG/1
40 TABLET, DELAYED RELEASE ORAL DAILY
Qty: 30 TABLET | Refills: 11 | Status: SHIPPED | OUTPATIENT
Start: 2019-11-25 | End: 2020-03-26 | Stop reason: SDUPTHER

## 2019-11-25 RX ORDER — FLUCONAZOLE 150 MG/1
150 TABLET ORAL DAILY
Qty: 1 TABLET | Refills: 0 | Status: SHIPPED | OUTPATIENT
Start: 2019-11-25 | End: 2019-11-26

## 2019-11-25 RX ORDER — DULOXETIN HYDROCHLORIDE 30 MG/1
30 CAPSULE, DELAYED RELEASE ORAL DAILY
Qty: 90 CAPSULE | Refills: 3 | Status: SHIPPED | OUTPATIENT
Start: 2019-11-25 | End: 2020-03-26 | Stop reason: SDUPTHER

## 2019-11-25 NOTE — PROGRESS NOTES
Subjective:       Patient ID: Dina Field is a 44 y.o. female.    Chief Complaint: Follow-up (cymbalta med check)    Patient presents for follow up of Cymbalta. She reports feeling much better and is very happy with the medication. She still endorses some reflux symptoms and feeling as though something is stuck in her throat. Patient reports prior sinus infection has resolved with antibiotics but now feels she has a yeast infection. She is seeing physical therapy for her back and neck pain. Feels the back is imprvoing but still endorses neck pain. Patient feeling well today otherwise.  She denies any suicidal or homicidal ideations    Review of Systems   Constitutional: Negative for activity change, appetite change, chills, fatigue, fever and unexpected weight change.   HENT: Positive for trouble swallowing. Negative for congestion, ear pain, hearing loss, postnasal drip, rhinorrhea, sinus pressure, sinus pain and sore throat.    Eyes: Negative for pain, discharge and visual disturbance.   Respiratory: Negative for cough, chest tightness, shortness of breath and wheezing.    Cardiovascular: Negative for chest pain and palpitations.   Gastrointestinal: Negative for abdominal distention, abdominal pain, blood in stool, constipation, diarrhea, nausea and vomiting.   Endocrine: Negative for polydipsia and polyuria.   Genitourinary: Negative for difficulty urinating, dysuria, flank pain, hematuria, menstrual problem and urgency.   Musculoskeletal: Negative for arthralgias, back pain, joint swelling, myalgias and neck pain.   Skin: Negative for color change and rash.   Neurological: Positive for headaches. Negative for dizziness, weakness, light-headedness and numbness.   Psychiatric/Behavioral: Negative for agitation, behavioral problems, confusion, decreased concentration, dysphoric mood, hallucinations, self-injury, sleep disturbance and suicidal ideas. The patient is not nervous/anxious and is not  hyperactive.        Objective:      Physical Exam   Constitutional: She is oriented to person, place, and time. She appears well-developed and well-nourished. No distress.   HENT:   Head: Normocephalic and atraumatic.   Mouth/Throat: No oropharyngeal exudate.   Eyes: Pupils are equal, round, and reactive to light. Conjunctivae and EOM are normal.   Neck: Normal range of motion. Neck supple.   Cardiovascular: Normal rate, regular rhythm and normal heart sounds.   No murmur heard.  Pulmonary/Chest: Effort normal. No respiratory distress. She has no wheezes.   Abdominal: Soft. Bowel sounds are normal. She exhibits no distension.   Musculoskeletal: Normal range of motion. She exhibits no edema or deformity.   Neurological: She is alert and oriented to person, place, and time. Coordination normal.   Skin: Skin is warm and dry. No rash noted.   Psychiatric: She has a normal mood and affect. Her behavior is normal. Judgment and thought content normal.       Assessment:       1. Yeast infection    2. Gastroesophageal reflux disease with esophagitis    3. Anxiety    4. Neuropathy        Plan:       Dina was seen today for follow-up.    Diagnoses and all orders for this visit:    Yeast infection  -     fluconazole (DIFLUCAN) 150 MG Tab; Take 1 tablet (150 mg total) by mouth once daily. for 1 day    Gastroesophageal reflux disease with esophagitis  -     pantoprazole (PROTONIX) 40 MG tablet; Take 1 tablet (40 mg total) by mouth once daily.    Anxiety  -     DULoxetine (CYMBALTA) 30 MG capsule; Take 1 capsule (30 mg total) by mouth once daily.    Neuropathy  -     DULoxetine (CYMBALTA) 30 MG capsule; Take 1 capsule (30 mg total) by mouth once daily.

## 2019-12-03 ENCOUNTER — PATIENT MESSAGE (OUTPATIENT)
Dept: INTERNAL MEDICINE | Facility: CLINIC | Age: 44
End: 2019-12-03

## 2019-12-03 DIAGNOSIS — J32.9 SINUSITIS, UNSPECIFIED CHRONICITY, UNSPECIFIED LOCATION: Primary | ICD-10-CM

## 2019-12-03 NOTE — TELEPHONE ENCOUNTER
Pt was seen on 11/08/2019 for sinus infection and is requesting another prescription for antibiotics.    Please advise,  Thank You.

## 2019-12-04 RX ORDER — FLUCONAZOLE 150 MG/1
150 TABLET ORAL DAILY
Qty: 1 TABLET | Refills: 0 | Status: SHIPPED | OUTPATIENT
Start: 2019-12-04 | End: 2019-12-05

## 2019-12-04 RX ORDER — AMOXICILLIN AND CLAVULANATE POTASSIUM 875; 125 MG/1; MG/1
1 TABLET, FILM COATED ORAL EVERY 12 HOURS
Qty: 20 TABLET | Refills: 0 | Status: SHIPPED | OUTPATIENT
Start: 2019-12-04 | End: 2019-12-14

## 2019-12-20 ENCOUNTER — PATIENT MESSAGE (OUTPATIENT)
Dept: INTERNAL MEDICINE | Facility: CLINIC | Age: 44
End: 2019-12-20

## 2019-12-20 DIAGNOSIS — J32.9 SINUSITIS, UNSPECIFIED CHRONICITY, UNSPECIFIED LOCATION: Primary | ICD-10-CM

## 2019-12-20 DIAGNOSIS — J32.1 CHRONIC FRONTAL SINUSITIS: ICD-10-CM

## 2019-12-20 RX ORDER — DOXYCYCLINE 100 MG/1
100 CAPSULE ORAL 2 TIMES DAILY
Qty: 20 CAPSULE | Refills: 0 | Status: SHIPPED | OUTPATIENT
Start: 2019-12-20 | End: 2019-12-30

## 2019-12-20 RX ORDER — FLUCONAZOLE 150 MG/1
150 TABLET ORAL DAILY
Qty: 1 TABLET | Refills: 0 | Status: SHIPPED | OUTPATIENT
Start: 2019-12-20 | End: 2019-12-21

## 2020-01-16 ENCOUNTER — PATIENT OUTREACH (OUTPATIENT)
Dept: ADMINISTRATIVE | Facility: OTHER | Age: 45
End: 2020-01-16

## 2020-01-20 ENCOUNTER — OFFICE VISIT (OUTPATIENT)
Dept: OTOLARYNGOLOGY | Facility: CLINIC | Age: 45
End: 2020-01-20
Payer: MEDICAID

## 2020-01-20 VITALS
WEIGHT: 136.56 LBS | HEIGHT: 60 IN | BODY MASS INDEX: 26.81 KG/M2 | DIASTOLIC BLOOD PRESSURE: 90 MMHG | SYSTOLIC BLOOD PRESSURE: 140 MMHG

## 2020-01-20 DIAGNOSIS — K21.9 GASTROESOPHAGEAL REFLUX DISEASE, ESOPHAGITIS PRESENCE NOT SPECIFIED: ICD-10-CM

## 2020-01-20 DIAGNOSIS — F17.210 CONTINUOUS DEPENDENCE ON CIGARETTE SMOKING: ICD-10-CM

## 2020-01-20 DIAGNOSIS — J34.89 NASAL OBSTRUCTION: ICD-10-CM

## 2020-01-20 DIAGNOSIS — J01.11 ACUTE RECURRENT FRONTAL SINUSITIS: ICD-10-CM

## 2020-01-20 DIAGNOSIS — J34.2 NASAL SEPTAL DEVIATION: ICD-10-CM

## 2020-01-20 DIAGNOSIS — R04.0 RECURRENT EPISTAXIS: ICD-10-CM

## 2020-01-20 DIAGNOSIS — R09.82 POST-NASAL DRIP: ICD-10-CM

## 2020-01-20 DIAGNOSIS — J01.01 ACUTE RECURRENT MAXILLARY SINUSITIS: Primary | ICD-10-CM

## 2020-01-20 DIAGNOSIS — J34.3 HYPERTROPHY OF BOTH INFERIOR NASAL TURBINATES: ICD-10-CM

## 2020-01-20 DIAGNOSIS — J34.89 NASAL VESTIBULITIS: ICD-10-CM

## 2020-01-20 DIAGNOSIS — H92.03 OTALGIA OF BOTH EARS: ICD-10-CM

## 2020-01-20 PROCEDURE — 99205 OFFICE O/P NEW HI 60 MIN: CPT | Mod: 25,S$GLB,, | Performed by: OTOLARYNGOLOGY

## 2020-01-20 PROCEDURE — 99205 PR OFFICE/OUTPT VISIT, NEW, LEVL V, 60-74 MIN: ICD-10-PCS | Mod: 25,S$GLB,, | Performed by: OTOLARYNGOLOGY

## 2020-01-20 PROCEDURE — 31231 NASAL/SINUS ENDOSCOPY: ICD-10-PCS | Mod: S$GLB,,, | Performed by: OTOLARYNGOLOGY

## 2020-01-20 PROCEDURE — 31231 NASAL ENDOSCOPY DX: CPT | Mod: S$GLB,,, | Performed by: OTOLARYNGOLOGY

## 2020-01-20 RX ORDER — MUPIROCIN 20 MG/G
OINTMENT TOPICAL 2 TIMES DAILY
Qty: 22 G | Refills: 0 | Status: SHIPPED | OUTPATIENT
Start: 2020-01-20 | End: 2020-01-27

## 2020-01-20 NOTE — LETTER
March 1, 2020      Ro Evans MD  [address]  [facility] [address]           [facility]  [address]   [place] [address]  Phone: [phone]          Patient: Dina Field   MR Number: [id number]   YOB: 1975   Date of Visit: 1/20/2020       Dear Dr. Ro Evans:    Thank you for referring Dina Field to me for evaluation. Attached you will find relevant portions of my assessment and plan of care.    If you have questions, please do not hesitate to call me. I look forward to following Dina Field along with you.    Sincerely,    Benson Quiros MD    Enclosure  CC:  No Recipients    If you would like to receive this communication electronically, please contact [email or web address] or [phone] to request more information on [employer] access.    For providers and/or their staff who would like to refer a patient to [facility], please contact us through our one-stop-shop provider referral line, [facility], at [phone].    If you feel you have received this communication in error or would no longer like to receive these types of communications, please e-mail [email or web address]

## 2020-01-20 NOTE — PROGRESS NOTES
Subjective:      Dina Field is a 45 y.o. female who was referred to me by Dr. Ro Evans in consultation for sinusitis. Symptoms including post-nasal drip, nasal congestion, ear pain and pressure, sinus pressure/pain, frequent nose bleeds, and nasal drainage.  She describes the symptoms as a chief complaint of recurring sinus infections and ear infections with associated postnasal drip and sometimes bloody mucus.  She endorses partial nasal blockage alternating between the left on the right side but usually worse on the left.  She also endorses nasal discharge which is usually mucus material with sometimes bloody drainage in the mucus.  She denies yellow or green or other discolored nasal discharge usually but does endorse this type of discolored nasal discharge during periods of acute infection.  He reports that she had 3 acute sinus infection and ear infection in the last month the.  These infections have been treated with antibiotics and this result in temporary relief but symptoms return in 2 weeks discontinuing antibiotics.  Her last treatment oral and intramuscular steroids was approximately 6-7 months ago which also provided some relief of symptoms.  Her frequently occurring infections and baseline constant sinonasal symptoms have been present since a trauma that she relates to worsening of these symptoms as well as other injuries in May of 2019 when she fell down a flight of stairs.  She reports multiple injuries associated this trauma and that she apparently needed to have spine surgery but refused to cervical spine surgery that had been offered to her.  Has constant neck and back that she relates these injuries.      She denies allergic rhinitis but does endorse some runny nose symptoms and uses an oral antihistamine (zyrtec) and has also never had allergy testing.  She denies a history of asthma.  She does endorse a history of gastroesophageal reflux disease with symptoms of heartburn and  started medication for this 3 month ago with out much significant difference noted yet.  She also reports recently constant ear fullness and pressure and that her ears have her for the last 3 days.  She has had ear tubes and adenoidectomy in the remote past with bother performed in , and no history of any ear nose and throat procedures since that time. She does not use any nasal sprays because they cause her to have nose bleeds.  She does use some saline spray but not use nasal saline irrigations.  She is a current tobacco smoker and uses 1/2 pack per day.    SNOT-22 score = 73, NOSE score = 19%    Global QOL = 50%      Past Medical History  She has a past medical history of Anxiety.    Past Surgical History  She has a past surgical history that includes  section; Tonsillectomy; Tympanostomy tube placement; and Hysterectomy ().    Family History  Her family history includes Breast cancer in her paternal grandmother; Breast cancer (age of onset: 40) in her maternal aunt; Hypertension in her father and sister.    Social History  She reports that she quit smoking about 3 years ago. Her smoking use included cigarettes. She has a 22.00 pack-year smoking history. She has never used smokeless tobacco. She reports that she does not drink alcohol or use drugs.    Allergies  She is allergic to codeine; lexapro [escitalopram oxalate]; and morphine.    Medications   She has a current medication list which includes the following prescription(s): duloxetine, hydrocodone-acetaminophen, pantoprazole, tizanidine, trazodone, valacyclovir, and triamcinolone.    Review of Systems  Review of Systems   Constitutional: Positive for fatigue.   HENT: Positive for congestion, ear discharge, ear pain, nosebleeds, postnasal drip, rhinorrhea and sinus pressure.    Eyes: Negative.    Respiratory: Negative.    Cardiovascular: Negative.    Gastrointestinal: Negative.    Endocrine: Negative.    Genitourinary: Negative.     Musculoskeletal: Positive for back pain and neck pain.   Skin: Negative.    Allergic/Immunologic: Negative.         Seasonal Allergies   Neurological: Positive for headaches.   Hematological: Negative.    Psychiatric/Behavioral: Negative.           Objective:     BP (!) 140/90 (BP Location: Left arm, Patient Position: Sitting, BP Method: Small (Manual))   Ht 5' (1.524 m)   Wt 62 kg (136 lb 9.2 oz)   LMP 06/01/2001 (Approximate)   BMI 26.67 kg/m²        Constitutional:   Vital signs are normal. She appears well-developed and well-nourished. No distress. Normal speech.      Head:  Normocephalic and atraumatic. No skin lesions. Salivary glands normal.  Facial strength is normal.      Ears:  Hearing normal to normal and whispered voice; external ear normal without scars, lesions, or masses; ear canal, tympanic membrane, and middle ear normal..   Right Ear: No drainage. Tympanic membrane is not perforated, not retracted and not bulging. No middle ear effusion.   Left Ear: No drainage. Tympanic membrane is not perforated, not retracted and not bulging.  No middle ear effusion.     Nose:  Mucosal edema, rhinorrhea (clear mucous) and septal deviation present. No nose lacerations, sinus tenderness, nasal septal hematoma or polyps. No epistaxis.  No foreign bodies. Turbinate hypertrophy.  Right sinus exhibits maxillary sinus tenderness and frontal sinus tenderness. Left sinus exhibits maxillary sinus tenderness and frontal sinus tenderness.     Mouth/Throat  Oropharynx clear and moist without lesions or asymmetry, normal uvula midline and lips, teeth, and gums normal.     Neck:  Neck normal without thyromegaly masses, asymmetry, normal tracheal structure, crepitus, and tenderness, thyroid normal, trachea normal, phonation normal, full range of motion with neck supple and no adenopathy. No stridor present.     Pulmonary/Chest:   Effort normal. No stridor.     Psychiatric:   She has a normal mood and affect. Her speech is  normal and behavior is normal.     Neurological:   She has neurological normal, alert and oriented.     Skin:   No abrasions, lacerations, lesions, or rashes.         Partial dentition with multiple dental extractions.            Left and right ear exams are normal with evidence mild scarring from prior tympanostomy tubes but no middle ear effusion, no tympanic membrane perforation, retraction, or bulging and the external auditory canal is normal.      Procedure    Nasal endoscopy performed. Details of procedure described in separate procedure note.     Images obtained from endoscopy procedure today representing key findings (images also uploaded to media associated with patient's chart):                                                   Data Reviewed    WBC (K/uL)   Date Value   11/25/2019 13.61 (H)     Eosinophil% (%)   Date Value   11/25/2019 1.0     Eos # (K/uL)   Date Value   11/25/2019 0.1     Platelets (K/uL)   Date Value   11/25/2019 391 (H)     Glucose (mg/dL)   Date Value   08/23/2019 88     No results found for: IGE    Past medical records were reviewed with data pertinent to the chief complaint summarized in the HPI. Data was obtained from records including specifically, the documentation from past trauma in May 2019 which the patient relates to onset of worsening sinonasal symptoms.    I independently reviewed the images of the CT sinuses dated 5/2/2019. Pertinent findings include no evidence of paranasal sinus disease or acute sinonasal trauma.        Radiology report reviewed including information from the scan indication:  CLINICAL HISTORY:  Head trauma, minor, GCS>=13, NOC/NEXUS/CCR neg, first study;Fall down 8 stairs; paresthesias B/L hands, reduced sensation R hand on exam;    Impression       Unremarkable noncontrast CT head specifically without evidence for acute intracranial hemorrhage.  Clinical correlation and further evaluation as warranted.     Report from CT cervical spine on May 2, 2019  was also reviewed in relationship to the patient's reported history trauma and sustained injuries including a cervical spine injury:  1. No acute displaced fracture or dislocation of the cervical spine.        Assessment:     1. Acute recurrent maxillary sinusitis    2. Nasal vestibulitis    3. Recurrent epistaxis    4. Acute recurrent frontal sinusitis    5. Nasal obstruction    6. Nasal septal deviation    7. Otalgia of both ears    8. Gastroesophageal reflux disease, esophagitis presence not specified    9. Post-nasal drip    10. Hypertrophy of both inferior nasal turbinates    11. Continuous dependence on cigarette smoking         Plan:     I had a long discussion with the patient regarding her condition and options for further workup and management. After discussing options and their associated risks and benefits, as well as answering all related questions, we agreed on the following plans for further evaluation and management of these issues:     Acute recurrent maxillary sinusitis, Acute recurrent frontal sinusitis  No current evidence of acute infection on examination nasal endoscopy today.  Recurrent infections consistent acute sinusitis patient provided history including facial pain and pressure with discolored nasal discharge which responds temporarily to antibiotics.  Her prior CT imaging which is a limited evaluation CT head performed for indication head trauma did not demonstrate any significant paranasal sinus disease though she reports it was following with trauma that she began to experience such significant infrequent recurrent sinusitis.  I have ordered a sinus CT to evaluate this further. I have also recommended calling to arrange re-evaluation if an acute episode of suspected bacterial sinusitis occurs in the meantime so that I can evaluate this and confirm presence of acute infection, potentially with acquisition of a culture if indicated.   - CT Medtronic Sinuses without; Future    Recurrent  epistaxis, Nasal vestibulitis  -- mupirocin (BACTROBAN) 2 % ointment; by Nasal route 2 (two) times daily. Apply to each nostril for 7 days  Dispense: 22 g; Refill: 0  - For treatment recurrent epistaxis and anterior nasal irritation vestibulitis I have prescribed mupirocin 2% ointment. Instructions on use were provided, specifically to apply a pea size amount inside each side of the nose twice daily to keep the front of the nose moist and coated to help it heal and prevent it from becoming dry and bleeding.   - Recommended to begin using nasal saline spray to keep the nose moist. Examples of nasal saline spray to obtain over-the-counter were provided.  - Recommended to begin using using nasal saline gel to keep the nose moist after finnishing the prescribed mupirocin ointment. Examples of nasal saline gel to obtain over-the-counter were provided.  - Recommendations were also given for treatment acute episodes epistaxis when they occur. I advised the patient to obtain Afrin or equivalent generic drug formulation of oxymetazoline, sold as a nasal decongestant, but to not use this as a nasal decongestant and rather to keep on hand for control of epistaxis. This is very successful at resolving minor nose bleeds.   -- I specifically advised to use 3 large sprays in the nose if bleeding, then to place a cotton ball soaked in the afrin gently inside of the nose. I also advised that they could continue using 3 sprays up to 2 times per day for 3 days if needed for ongoing oozing/bleeding, but should then stop completely as using more than 3 days causes significant congestion after discontinuing.   - Advised to call or return for significant bleeding which cannot be controlled with the above method.    Nasal obstruction, Nasal septal deviation, Hypertrophy of Inferior Nasal Turbinates  - I recommended medical management  nasal saline irrigations and specific instructions were provided on acquiring this treatment. Explanation  was given regarding performing these irrigations, and also provided as written instructions. The importance of daily regular use to achieve therapeutic effect was emphasized.  - CT Medtronic Sinuses without; Future    GERD  - Continue Pantoprazole 40mg daily  - Discussed diet and lifestyle modifications and made recommendations for non-pharmacologic therapy in addition to use of prescribed medication.    Cigarette Smoking  - Recommendations for smoking cessation were given including counseling on the relationship of smoking to worsening of the specific conditions discussed today and for which the patient has sought relief from, as well as the general negative impact of smoking on overall health.    Otalgia  - Patient demonstrates a normal ear exam but does have some sinonasal inflammation and symptomatic rhinitis and/or sinusitis which may be contributing to eustachian tube dysfunction.  I reassured her of her normal examination and no obstruction of her eustachian tubes based on nasal endoscopy.  I explained eustachian tube dysfunction and how this relates sinonasal disease/inflammation to ear symptoms including fullness, pain, and pressure, and thus the indication for addressing sinonasal inflammation in improving eustachian tube function and treating the symptoms which are likely related to this. I have specifically advised nasal steroid spray and nasal saline irrigation therapy. I have explained that this may take awhile to produce noticeable improvement and encouraged daily compliance to obtain the intended benefit over time. Recommended follow-up evaluation and possible future consideration of audiogram if symptoms worsen or there is development of new concerning signs/symptoms associated with this.     Post-nasal drip  - I discussed the many etiologies which can contribute to post-nasal drip and potential multifactorial cause for these symptoms, including allergies, rhinitis of many causes, sinusitis of many  causes, acid reflux, viral illnesses, medications, environmental factors (foods, temperature changes, irritants, etc), and abnormalities of nasal anatomy. The challenge of addressing this issue was also explained given there is often a less clear direct relationship to a single treatable cause, aside from when more clearly associated with a single specific event or other medical condition such as with pregnancy, trauma, or obvious association to change in medication.   -I have advised addressing the most likely potential causes supported by today's evaluation. The plan for addressing each of these potential contributions to post-nasal drip is detailed for each of these respective problems separately.     She voiced understanding of this discussion and instructions for management, and all of her questions were answered. I have recommended follow-up, which has been arranged for the patient today. Follow up in about 1 month (around 2/20/2020). I have encouraged her to call for any questions or concerns in the meantime.     Benson Quiros MD    Orders Placed This Encounter   Procedures    CT Medtronic Sinuses without           Benson Quiros MD    Rhinology, Allergy, and Sinus-Skull Base Surgery    Department of Otorhinolaryngology    [facility]    Phone  [phone]    Fax      [phone]

## 2020-01-20 NOTE — PATIENT INSTRUCTIONS
Information and instructions from your visit with me today:      SALINE SINUS RINSE ([employer] brand): You should do a full bottle, half on one side of your nose and half on the other, 1-2 times per day (or more if able to, you cannot do this too much). Follow the instructions on the box: mix the salt packet with clean water (bottle, previously boiled, distilled, etc -- not tap water) to the line on the bottle to make the irrigation.      It was nice meeting you today, and I look forward to helping you feel better soon. Please don't hesitate to call if you have any other questions or concerns, or if I can be of any assistance in the meantime.       Benson Quiros    [facility]    Phone  [phone]    Fax      [phone]        Benson Quiros MD  Rhinology, Sinus, and Skull Base Surgery  Department of Otorhinolaryngology

## 2020-02-11 ENCOUNTER — TELEPHONE (OUTPATIENT)
Dept: OTOLARYNGOLOGY | Facility: CLINIC | Age: 45
End: 2020-02-11

## 2020-02-11 NOTE — TELEPHONE ENCOUNTER
Called Patient and no answer, left message on voicemail to call me back regarding having to reschedule her follow up appt for Feb 19.

## 2020-02-26 ENCOUNTER — PATIENT OUTREACH (OUTPATIENT)
Dept: ADMINISTRATIVE | Facility: OTHER | Age: 45
End: 2020-02-26

## 2020-02-27 ENCOUNTER — OFFICE VISIT (OUTPATIENT)
Dept: OTOLARYNGOLOGY | Facility: CLINIC | Age: 45
End: 2020-02-27
Payer: MEDICAID

## 2020-02-27 VITALS
DIASTOLIC BLOOD PRESSURE: 80 MMHG | BODY MASS INDEX: 26.75 KG/M2 | HEIGHT: 60 IN | SYSTOLIC BLOOD PRESSURE: 130 MMHG | WEIGHT: 136.25 LBS

## 2020-02-27 DIAGNOSIS — R09.82 POSTNASAL DRIP: ICD-10-CM

## 2020-02-27 DIAGNOSIS — F17.210 CONTINUOUS DEPENDENCE ON CIGARETTE SMOKING: ICD-10-CM

## 2020-02-27 DIAGNOSIS — K21.9 GASTROESOPHAGEAL REFLUX DISEASE, ESOPHAGITIS PRESENCE NOT SPECIFIED: ICD-10-CM

## 2020-02-27 DIAGNOSIS — J01.01 ACUTE RECURRENT MAXILLARY SINUSITIS: Primary | ICD-10-CM

## 2020-02-27 DIAGNOSIS — J34.2 NASAL SEPTAL DEVIATION: ICD-10-CM

## 2020-02-27 DIAGNOSIS — J34.3 HYPERTROPHY OF BOTH INFERIOR NASAL TURBINATES: ICD-10-CM

## 2020-02-27 DIAGNOSIS — J34.89 NASAL OBSTRUCTION: ICD-10-CM

## 2020-02-27 DIAGNOSIS — J01.11 ACUTE RECURRENT FRONTAL SINUSITIS: ICD-10-CM

## 2020-02-27 DIAGNOSIS — J30.2 SEASONAL ALLERGIC RHINITIS, UNSPECIFIED TRIGGER: ICD-10-CM

## 2020-02-27 DIAGNOSIS — R04.0 RECURRENT EPISTAXIS: ICD-10-CM

## 2020-02-27 PROCEDURE — 99214 OFFICE O/P EST MOD 30 MIN: CPT | Mod: S$GLB,,, | Performed by: OTOLARYNGOLOGY

## 2020-02-27 PROCEDURE — 99214 PR OFFICE/OUTPT VISIT, EST, LEVL IV, 30-39 MIN: ICD-10-PCS | Mod: S$GLB,,, | Performed by: OTOLARYNGOLOGY

## 2020-02-27 RX ORDER — TRIAMCINOLONE ACETONIDE 55 UG/1
2 SPRAY, METERED NASAL DAILY
Qty: 16.5 G | Refills: 2 | Status: SHIPPED | OUTPATIENT
Start: 2020-02-27 | End: 2021-01-25

## 2020-02-27 NOTE — PROGRESS NOTES
"  Subjective:      Dina is a 45 y.o. female who comes for follow-up of sinusitis.  Her last visit with me was on 1/20/2020. Her chief complaint today is "chronic sinus infections" and her primary symptoms at this time are of postnasal drip and cough which she relates to her postnasal drip.  Despite anatomic findings of septal deviation and inferior turbinate hypertrophy with significant narrowing of her nasal airway she overall has not had severe nasal congestion or obstruction symptoms and these are less of a concern to her than the postnasal drip.  She has had no recurrence of sinus infections since she was last seen.  She has been using nasal saline irrigations and no nasal steroid sprays.  She uses oral antihistamines which she takes daily for suspected allergies which are worsened seasonally though without clear triggers or allergens identified by any allergy testing.  She has not started any new medications and she was last seen.  She has continued take oral medication for control of reflux which she has started soon before she was last evaluated and these symptoms are unchanged.  She does not have significant symptoms of heartburn reflux currently.    SNOT-22 score = 41  NOSE score = 12    Global QOL assessment ("overall, how do you feel today?" from 1 "very bad" to 10 "very good"): 5    History from prior evaluation 1/20/2020 which this current visit is in follow-up to, is copied below for reference with all changes and updates as described above:  History and Subjective Data 1/20/2020:   Dina Field is a 45 y.o. female who was referred to me by Dr. Ro Evans in consultation for sinusitis. Symptoms including post-nasal drip, nasal congestion, ear pain and pressure, sinus pressure/pain, frequent nose bleeds, and nasal drainage.  She describes the symptoms as a chief complaint of recurring sinus infections and ear infections with associated postnasal drip and sometimes bloody mucus.  She endorses " partial nasal blockage alternating between the left on the right side but usually worse on the left.  She also endorses nasal discharge which is usually mucus material with sometimes bloody drainage in the mucus.  She denies yellow or green or other discolored nasal discharge usually but does endorse this type of discolored nasal discharge during periods of acute infection.  He reports that she had 3 acute sinus infection and ear infection in the last month the.  These infections have been treated with antibiotics and this result in temporary relief but symptoms return in 2 weeks discontinuing antibiotics.  Her last treatment oral and intramuscular steroids was approximately 6-7 months ago which also provided some relief of symptoms.  Her frequently occurring infections and baseline constant sinonasal symptoms have been present since a trauma that she relates to worsening of these symptoms as well as other injuries in May of 2019 when she fell down a flight of stairs.  She reports multiple injuries associated this trauma and that she apparently needed to have spine surgery but refused to cervical spine surgery that had been offered to her.  Has constant neck and back that she relates these injuries.       She denies allergic rhinitis but does endorse some runny nose symptoms and uses an oral antihistamine (zyrtec) and has also never had allergy testing.  She denies a history of asthma.  She does endorse a history of gastroesophageal reflux disease with symptoms of heartburn and started medication for this 3 month ago with out much significant difference noted yet.  She also reports recently constant ear fullness and pressure and that her ears have her for the last 3 days.  She has had ear tubes and adenoidectomy in the remote past with bother performed in 1981, and no history of any ear nose and throat procedures since that time. She does not use any nasal sprays because they cause her to have nose bleeds.  She does  use some saline spray but not use nasal saline irrigations.  She is a current tobacco smoker and uses 1/2 pack per day.     SNOT-22 score = 73, NOSE score = 19%     Global QOL = 50%    Review of Systems:  A detailed review of systems was obtained with pertinent positives, and pertinent changes from last review of systems obtained on 2020, as per the above HPI.    New ROS performed today:  Review of Systems   Constitutional: Positive for fatigue.   HENT: Positive for congestion, postnasal drip, rhinorrhea, sinus pressure, sore throat and trouble swallowing.    Eyes: Negative.    Respiratory: Positive for cough.    Cardiovascular: Negative.    Gastrointestinal: Negative.    Endocrine: Negative.    Genitourinary: Negative.    Musculoskeletal: Negative.    Skin: Negative.    Allergic/Immunologic: Negative.    Neurological: Negative.    Hematological: Negative.    Psychiatric/Behavioral: Negative.        Past Medical History:  She has a past medical history of Anxiety.    Past Surgical History:  She has a past surgical history that includes  section; Tonsillectomy; Tympanostomy tube placement; and Hysterectomy ().    Allergies:  She is allergic to codeine; lexapro [escitalopram oxalate]; and morphine.    Medications:  She has a current medication list which includes the following prescription(s): duloxetine, hydrocodone-acetaminophen, pantoprazole, tizanidine, trazodone, valacyclovir, and triamcinolone.      Objective:     /80 (BP Location: Right arm, Patient Position: Sitting, BP Method: Small (Manual))   Ht 5' (1.524 m)   Wt 61.8 kg (136 lb 3.9 oz)   LMP 2001 (Approximate)   BMI 26.61 kg/m²        Constitutional:   Vital signs are normal. She appears well-developed and well-nourished. No distress. Normal speech.      Head:  Normocephalic and atraumatic. No skin lesions. Salivary glands normal.  Facial strength is normal.      Ears:  Hearing normal to normal and whispered voice; external  ear normal without scars, lesions, or masses; ear canal, tympanic membrane, and middle ear normal..   Right Ear: No drainage. Tympanic membrane is not perforated, not retracted and not bulging. No middle ear effusion.   Left Ear: No drainage. Tympanic membrane is not perforated, not retracted and not bulging.  No middle ear effusion.     Nose:  Mucosal edema, rhinorrhea (clear mucous) and septal deviation present. No nose lacerations, sinus tenderness, nasal septal hematoma or polyps. No epistaxis.  No foreign bodies. Turbinate hypertrophy.  Right sinus exhibits no maxillary sinus tenderness and no frontal sinus tenderness. Left sinus exhibits no maxillary sinus tenderness and no frontal sinus tenderness.     Mouth/Throat  Oropharynx clear and moist without lesions or asymmetry, normal uvula midline and lips, teeth, and gums normal.     Neck:  Neck normal without thyromegaly masses, asymmetry, normal tracheal structure, crepitus, and tenderness, thyroid normal, trachea normal, phonation normal, full range of motion with neck supple and no adenopathy. No stridor present.     Pulmonary/Chest:   Effort normal. No stridor.     Psychiatric:   She has a normal mood and affect. Her speech is normal and behavior is normal.     Neurological:   She has neurological normal, alert and oriented.     Skin:   No abrasions, lacerations, lesions, or rashes.       Procedure    None    Data Reviewed    WBC (K/uL)   Date Value   11/25/2019 13.61 (H)     Eosinophil% (%)   Date Value   11/25/2019 1.0     Eos # (K/uL)   Date Value   11/25/2019 0.1     Platelets (K/uL)   Date Value   11/25/2019 391 (H)     Glucose (mg/dL)   Date Value   08/23/2019 88     No results found for: IGE    Review of radiographic imaging:I personally reviewed and interpreted imaging relative to the chief complaint consisting of CT GetO2 Sinuses performed on 1/27/2020.  Key findings from this imaging study include septal deviation, mild mucosal thickening of  ethmoids and maxillary antrum, with left ana bullosa.  Representative images selected from this study and demonstrating key findings are copied below:               Radiology report reviewed:  Paranasal sinuses and ostiomeatal units are clear.  Deviation of nasal septum to the patient's right.    Assessment:     1. Acute recurrent maxillary sinusitis    2. Acute recurrent frontal sinusitis    3. Recurrent epistaxis    4. Seasonal allergic rhinitis, unspecified trigger    5. Nasal obstruction    6. Nasal septal deviation    7. Hypertrophy of both inferior nasal turbinates    8. Postnasal drip    9. Gastroesophageal reflux disease, esophagitis presence not specified    10. Continuous dependence on cigarette smoking         Plan:     I had a long discussion with the patient regarding her condition and options for further workup and management. After discussing options and their associated risks and benefits, as well as answering all related questions, we agreed on the following plans for further evaluation and management of these issues:    Acute recurrent maxillary and frontal sinusitis  -CT imaging was reviewed which demonstrates no evidence of mucosal thickening or sinus opacifications.  She also denies any additional episodes of sinusitis since she was last seen.  She does however report a total of 4 episodes of sinusitis in the last year after having nasal trauma approximately a year ago and prior to then having about 1 sinus infection per year.  We discussed recommendations for trial appropriate medical therapy with nasal saline irrigations and nasal steroid and findings of nasal congestion and partial obstruction which were reviewed on her CT scan that may predispose to increased frequency of sinus infections from development of obstruction with nasal inflammation and edema of any etiology that compromises the small remaining space for sinus drainage.  - triamcinolone (NASACORT) 55 mcg nasal inhaler; 2 sprays  by Nasal route once daily.  Dispense: 16.5 g; Refill: 2    Recurrent epistaxis  -Past recommendations for reducing recurrence of epistaxis particularly in the setting of starting nasal steroid spray which has provoked epistaxis in the past when she would use fluticasone nasal spray.  I recommended starting Nasacort as an alternative to fluticasone given less occurrence of epistaxis given the difference in the formulation without alcohol in the Nasacort and therefore less drying and less likely to provoke epistaxis.  Additionally I gave counseling on how to appropriately uses medication to avoid distribution of the medication over the anterior septum and thereby deliver medication more effectively into the sinonasal cavities and in less concentration anteriorly were epistaxis may be provoked.    Seasonal allergic rhinitis, unspecified trigger  Symptoms of possible seasonal allergic rhinitis with additional history today to suggest possible seasonal allergies. Treatment with nasal steroids and saline irrigations for allergic rhinitis. Additional considerations of allergy testing and further therapy to be made depending on response.   - Continue oral antihistamine as needed for symptoms/seasonally  - triamcinolone (NASACORT) 55 mcg nasal inhaler; 2 sprays by Nasal route once daily.  Dispense: 16.5 g; Refill: 2    Nasal obstruction, Nasal septal deviation, Hypertrophy of both inferior nasal turbinates  -Discussed potential role for surgical interventions if refractory to adequate medical therapy including topical nasal steroid sprays and saline irrigations. These potential sinonasal surgical interventions would include inferior turbinate reduction and septoplasty. Discussed that this would address partial obstruction and congestion primarily, but would not be directly anticipated to impact symptoms of post-nasal drip, sinus/facial pressure or pain, or other sinonasal concerns, though could secondarily improve additional  symptoms by improving nasal drug and rinse delivery. The likely need for ongoing medical management to treat inflammatory etiologies of symptoms despite possible surgery was also reviewed, emphasizing that surgical procedures would address physical causes of obstruction and congestion and serve as an adjunct rather than replacement of medical management.  - triamcinolone (NASACORT) 55 mcg nasal inhaler; 2 sprays by Nasal route once daily.  Dispense: 16.5 g; Refill: 2    GERD  - Continue Pantoprazole 40mg daily  - Discussed diet and lifestyle modifications and made recommendations for non-pharmacologic therapy in addition to use of prescribed medication.     Cigarette Smoking  - Recommendations for smoking cessation were given including counseling on the relationship of smoking to worsening of the specific conditions discussed today and for which the patient has sought relief from, as well as the general negative impact of smoking on overall health.    Postnasal drip  - I discussed the many etiologies which can contribute to post-nasal drip and potential multifactorial cause for these symptoms, including allergies, rhinitis of many causes, sinusitis of many causes, acid reflux, viral illnesses, medications, environmental factors (foods, temperature changes, irritants, etc), and abnormalities of nasal anatomy. The challenge of addressing this issue was also explained given there is often a less clear direct relationship to a single treatable cause, aside from when more clearly associated with a single specific event or other medical condition such as with pregnancy, trauma, or obvious association to change in medication.   -I have advised addressing the most likely potential causes supported by today's evaluation. The plan for addressing each of these potential contributions to post-nasal drip is detailed for each of these respective problems separately.     She voiced understanding of this discussion and instructions  for management, and all of her questions were answered. I have recommended follow-up, which has been arranged for the patient today. I have encouraged her to call for any questions or concerns in the meantime.     MD Benson Alicea MD    Rhinology, Allergy, and Sinus-Skull Base Surgery    Department of Otorhinolaryngology    [facility]    Phone  [phone]    Fax      [phone]

## 2020-02-27 NOTE — PATIENT INSTRUCTIONS
SALINE NASAL SPRAY (any brand, such as ocean saline spray or simply saline): Begin using nasal saline spray to keep the nose moist. Examples of nasal saline spray:        SALINE NASAL GEL: Begin using nasal saline gel to keep the nose moist. Examples of nasal saline gel:         AFRIN (regular strength): Only use if you have bleeding. Use 3 large sprays in your nose if bleeding, then place a cotton ball soaked in the afrin gently inside your nose. You can continue using 3 sprays up to 2 times per day for 3 days if needed for ongoing oozing bleeding, but should then stop completely as using more than 3 days causes significant congestion after discontinuing. This is very successful at resolving minor nose bleeds. The generic drug name for Afrin is oxymetazoline, and it is sold as a nasal decongestant.  NOTE:  You may not need to do this at all.      Benson Quiros MD  Rhinology, Sinus, and Skull Base Surgery  Department of Otorhinolaryngology

## 2020-03-01 NOTE — PROCEDURES
Nasal/sinus endoscopy  Date/Time: 1/20/2020 2:00 PM  Performed by: Benson Quiros MD  Authorized by: Benson Quiros MD     Consent Done?:  Yes (Verbal)  Anesthesia:     Local anesthetic:  4% Xylocaine spray with Chandrakant-Synephrine    Patient tolerance:  Patient tolerated the procedure well with no immediate complications  Nose:     Procedure Performed:  Nasal Endoscopy  External:      No external nasal deformity  Intranasal:      Mucosa no polyps     Mucosa ulcers not present     Mucosa lesions present (mucosal edema and areas of enlarged vessels)     Enlarged turbinates     Septum gross deformity  Nasopharynx:      No mucosa lesions     Adenoids not present     Posterior choanae patent     Eustachian tube patent       Nasal endoscopy was performed as indicated to evaluate the patient's chief concerns and for further evaluation of findings not able to be fully assessed by physical exam with anterior rhinoscopy. The procedure was performed successfully, completely, and without complications. Positive and negative findings are indicated above for key anatomic structures and clinical questions addressed by this procedure.     Descriptions and representative images of key findings from this diagnostic endoscopy procedure can be found in the associated clinic note of the same date of service. All uploaded images obtained during this exam may also be found in the media section of the patient's chart.        Benson Quiros MD    Rhinology, Allergy, and Sinus-Skull Base Surgery    Department of Otorhinolaryngology    [facility]    Phone  [phone]    Fax      [phone]

## 2020-03-17 ENCOUNTER — PATIENT MESSAGE (OUTPATIENT)
Dept: OTOLARYNGOLOGY | Facility: CLINIC | Age: 45
End: 2020-03-17

## 2020-03-26 DIAGNOSIS — F41.9 ANXIETY: ICD-10-CM

## 2020-03-26 DIAGNOSIS — G62.9 NEUROPATHY: ICD-10-CM

## 2020-03-26 DIAGNOSIS — K21.00 GASTROESOPHAGEAL REFLUX DISEASE WITH ESOPHAGITIS: ICD-10-CM

## 2020-03-26 NOTE — TELEPHONE ENCOUNTER
----- Message from Radha Bang sent at 3/26/2020  2:27 PM CDT -----  Contact: [employer] Request  Appointment Request From: Dina Field    With Provider: BRYSON Song [[facility] - Internal Medicine]    Preferred Date Range: 4/1/2020 - 4/15/2020    Preferred Times: Any time    Reason for visit: Existing Patient    Comments:  Gastrointestinal issues and need to increase the dosage of my medications

## 2020-03-26 NOTE — TELEPHONE ENCOUNTER
Pt stated she needs an increase on both of these medication. Per pt she stated that BRYSON Kuhn advised her if she needed an increase to just let her know.  Please advise.

## 2020-03-27 RX ORDER — DULOXETIN HYDROCHLORIDE 60 MG/1
60 CAPSULE, DELAYED RELEASE ORAL DAILY
Qty: 90 CAPSULE | Refills: 3 | Status: SHIPPED | OUTPATIENT
Start: 2020-03-27 | End: 2021-01-28 | Stop reason: SDUPTHER

## 2020-03-27 RX ORDER — DULOXETIN HYDROCHLORIDE 30 MG/1
30 CAPSULE, DELAYED RELEASE ORAL DAILY
Qty: 90 CAPSULE | Refills: 3 | Status: SHIPPED | OUTPATIENT
Start: 2020-03-27 | End: 2020-03-27

## 2020-03-27 RX ORDER — PANTOPRAZOLE SODIUM 40 MG/1
40 TABLET, DELAYED RELEASE ORAL DAILY
Qty: 30 TABLET | Refills: 11 | Status: SHIPPED | OUTPATIENT
Start: 2020-03-27 | End: 2022-01-26 | Stop reason: SDUPTHER

## 2020-05-19 ENCOUNTER — PATIENT MESSAGE (OUTPATIENT)
Dept: INTERNAL MEDICINE | Facility: CLINIC | Age: 45
End: 2020-05-19

## 2020-05-19 DIAGNOSIS — G47.00 INSOMNIA, UNSPECIFIED TYPE: ICD-10-CM

## 2020-05-19 RX ORDER — TRAZODONE HYDROCHLORIDE 50 MG/1
50 TABLET ORAL NIGHTLY
Qty: 30 TABLET | Refills: 11 | Status: SHIPPED | OUTPATIENT
Start: 2020-05-19 | End: 2021-01-28 | Stop reason: SDUPTHER

## 2021-01-04 ENCOUNTER — PATIENT MESSAGE (OUTPATIENT)
Dept: ADMINISTRATIVE | Facility: HOSPITAL | Age: 46
End: 2021-01-04

## 2021-01-21 ENCOUNTER — OFFICE VISIT (OUTPATIENT)
Dept: PRIMARY CARE CLINIC | Facility: CLINIC | Age: 46
End: 2021-01-21
Payer: MEDICAID

## 2021-01-21 VITALS
OXYGEN SATURATION: 99 % | WEIGHT: 132.25 LBS | HEIGHT: 60 IN | SYSTOLIC BLOOD PRESSURE: 102 MMHG | DIASTOLIC BLOOD PRESSURE: 62 MMHG | TEMPERATURE: 97 F | HEART RATE: 86 BPM | BODY MASS INDEX: 25.97 KG/M2

## 2021-01-21 DIAGNOSIS — Z11.59 NEED FOR HEPATITIS C SCREENING TEST: ICD-10-CM

## 2021-01-21 DIAGNOSIS — N95.1 MENOPAUSAL SYNDROME: ICD-10-CM

## 2021-01-21 DIAGNOSIS — M51.36 DDD (DEGENERATIVE DISC DISEASE), LUMBAR: ICD-10-CM

## 2021-01-21 DIAGNOSIS — K21.9 GASTROESOPHAGEAL REFLUX DISEASE WITHOUT ESOPHAGITIS: ICD-10-CM

## 2021-01-21 DIAGNOSIS — S39.012D SACROILIAC STRAIN, SUBSEQUENT ENCOUNTER: ICD-10-CM

## 2021-01-21 DIAGNOSIS — J32.9 SINUSITIS, UNSPECIFIED CHRONICITY, UNSPECIFIED LOCATION: Primary | ICD-10-CM

## 2021-01-21 DIAGNOSIS — F41.9 ANXIETY: ICD-10-CM

## 2021-01-21 DIAGNOSIS — Z11.4 ENCOUNTER FOR SCREENING FOR HIV: ICD-10-CM

## 2021-01-21 DIAGNOSIS — R51.9 NONINTRACTABLE HEADACHE, UNSPECIFIED CHRONICITY PATTERN, UNSPECIFIED HEADACHE TYPE: ICD-10-CM

## 2021-01-21 LAB
BILIRUB SERPL-MCNC: NORMAL MG/DL
BLOOD URINE, POC: NORMAL
CLARITY, POC UA: CLEAR
COLOR, POC UA: NORMAL
GLUCOSE UR QL STRIP: NORMAL
KETONES UR QL STRIP: NORMAL
LEUKOCYTE ESTERASE URINE, POC: NORMAL
NITRITE, POC UA: NORMAL
PH, POC UA: 5
PROTEIN, POC: NORMAL
SPECIFIC GRAVITY, POC UA: 1
UROBILINOGEN, POC UA: NORMAL

## 2021-01-21 PROCEDURE — 81002 URINALYSIS NONAUTO W/O SCOPE: CPT | Mod: PBBFAC,PN | Performed by: FAMILY MEDICINE

## 2021-01-21 PROCEDURE — 99396 PR PREVENTIVE VISIT,EST,40-64: ICD-10-PCS | Mod: S$PBB,,, | Performed by: FAMILY MEDICINE

## 2021-01-21 PROCEDURE — 99396 PREV VISIT EST AGE 40-64: CPT | Mod: S$PBB,,, | Performed by: FAMILY MEDICINE

## 2021-01-21 PROCEDURE — 99999 PR PBB SHADOW E&M-EST. PATIENT-LVL IV: ICD-10-PCS | Mod: PBBFAC,,, | Performed by: FAMILY MEDICINE

## 2021-01-21 PROCEDURE — 96372 THER/PROPH/DIAG INJ SC/IM: CPT | Mod: PBBFAC,PN

## 2021-01-21 PROCEDURE — 99999 PR PBB SHADOW E&M-EST. PATIENT-LVL IV: CPT | Mod: PBBFAC,,, | Performed by: FAMILY MEDICINE

## 2021-01-21 PROCEDURE — 99214 OFFICE O/P EST MOD 30 MIN: CPT | Mod: PBBFAC,PN,25 | Performed by: FAMILY MEDICINE

## 2021-01-21 RX ORDER — METHYLPREDNISOLONE 4 MG/1
TABLET ORAL
Qty: 1 PACKAGE | Refills: 0 | Status: SHIPPED | OUTPATIENT
Start: 2021-01-21 | End: 2021-04-21

## 2021-01-21 RX ORDER — IBUPROFEN 600 MG/1
TABLET ORAL
Qty: 100 TABLET | Refills: 5 | Status: SHIPPED | OUTPATIENT
Start: 2021-01-21 | End: 2021-10-21

## 2021-01-21 RX ORDER — FLUTICASONE PROPIONATE 50 MCG
2 SPRAY, SUSPENSION (ML) NASAL DAILY
Qty: 18.2 ML | Refills: 5 | Status: SHIPPED | OUTPATIENT
Start: 2021-01-21 | End: 2021-04-21

## 2021-01-21 RX ORDER — TRIAMCINOLONE ACETONIDE 40 MG/ML
40 INJECTION, SUSPENSION INTRA-ARTICULAR; INTRAMUSCULAR ONCE
Status: COMPLETED | OUTPATIENT
Start: 2021-01-21 | End: 2021-01-21

## 2021-01-21 RX ORDER — BUTALBITAL, ACETAMINOPHEN AND CAFFEINE 50; 325; 40 MG/1; MG/1; MG/1
1 TABLET ORAL EVERY 4 HOURS PRN
Qty: 30 TABLET | Refills: 2 | Status: SHIPPED | OUTPATIENT
Start: 2021-01-21 | End: 2021-02-20

## 2021-01-21 RX ORDER — CEFDINIR 300 MG/1
300 CAPSULE ORAL 2 TIMES DAILY
Qty: 20 CAPSULE | Refills: 0 | Status: SHIPPED | OUTPATIENT
Start: 2021-01-21 | End: 2021-01-31

## 2021-01-21 RX ORDER — PREGABALIN 150 MG/1
150 CAPSULE ORAL 2 TIMES DAILY PRN
COMMUNITY
Start: 2020-12-23 | End: 2021-10-21

## 2021-01-21 RX ADMIN — TRIAMCINOLONE ACETONIDE 40 MG: 400 INJECTION, SUSPENSION INTRA-ARTICULAR; INTRAMUSCULAR at 05:01

## 2021-01-25 ENCOUNTER — TELEPHONE (OUTPATIENT)
Dept: NEUROLOGY | Facility: CLINIC | Age: 46
End: 2021-01-25

## 2021-01-28 DIAGNOSIS — G47.00 INSOMNIA, UNSPECIFIED TYPE: ICD-10-CM

## 2021-01-28 DIAGNOSIS — F41.9 ANXIETY: ICD-10-CM

## 2021-01-28 DIAGNOSIS — G62.9 NEUROPATHY: ICD-10-CM

## 2021-02-03 RX ORDER — DULOXETIN HYDROCHLORIDE 60 MG/1
60 CAPSULE, DELAYED RELEASE ORAL DAILY
Qty: 90 CAPSULE | Refills: 3 | Status: SHIPPED | OUTPATIENT
Start: 2021-02-03 | End: 2021-03-17

## 2021-02-03 RX ORDER — TRAZODONE HYDROCHLORIDE 50 MG/1
50 TABLET ORAL NIGHTLY
Qty: 30 TABLET | Refills: 11 | Status: SHIPPED | OUTPATIENT
Start: 2021-02-03 | End: 2021-10-21

## 2021-03-22 ENCOUNTER — TELEPHONE (OUTPATIENT)
Dept: SLEEP MEDICINE | Facility: CLINIC | Age: 46
End: 2021-03-22

## 2021-04-21 ENCOUNTER — OFFICE VISIT (OUTPATIENT)
Dept: PRIMARY CARE CLINIC | Facility: CLINIC | Age: 46
End: 2021-04-21
Payer: MEDICAID

## 2021-04-21 VITALS
OXYGEN SATURATION: 96 % | WEIGHT: 128.75 LBS | HEART RATE: 90 BPM | SYSTOLIC BLOOD PRESSURE: 110 MMHG | BODY MASS INDEX: 25.28 KG/M2 | DIASTOLIC BLOOD PRESSURE: 70 MMHG | RESPIRATION RATE: 16 BRPM | HEIGHT: 60 IN

## 2021-04-21 DIAGNOSIS — B00.9 HERPES SIMPLEX: ICD-10-CM

## 2021-04-21 DIAGNOSIS — N95.1 MENOPAUSAL SYNDROME: ICD-10-CM

## 2021-04-21 DIAGNOSIS — Z98.1 HISTORY OF FUSION OF CERVICAL SPINE: ICD-10-CM

## 2021-04-21 DIAGNOSIS — K21.9 GASTROESOPHAGEAL REFLUX DISEASE WITHOUT ESOPHAGITIS: Primary | ICD-10-CM

## 2021-04-21 DIAGNOSIS — F41.9 ANXIETY: ICD-10-CM

## 2021-04-21 PROCEDURE — 99999 PR PBB SHADOW E&M-EST. PATIENT-LVL III: CPT | Mod: PBBFAC,,, | Performed by: FAMILY MEDICINE

## 2021-04-21 PROCEDURE — 99214 PR OFFICE/OUTPT VISIT, EST, LEVL IV, 30-39 MIN: ICD-10-PCS | Mod: S$PBB,,, | Performed by: FAMILY MEDICINE

## 2021-04-21 PROCEDURE — 99214 OFFICE O/P EST MOD 30 MIN: CPT | Mod: S$PBB,,, | Performed by: FAMILY MEDICINE

## 2021-04-21 PROCEDURE — 99213 OFFICE O/P EST LOW 20 MIN: CPT | Mod: PBBFAC,PN | Performed by: FAMILY MEDICINE

## 2021-04-21 PROCEDURE — 99999 PR PBB SHADOW E&M-EST. PATIENT-LVL III: ICD-10-PCS | Mod: PBBFAC,,, | Performed by: FAMILY MEDICINE

## 2021-04-21 RX ORDER — VALACYCLOVIR HYDROCHLORIDE 1 G/1
2000 TABLET, FILM COATED ORAL EVERY 12 HOURS
Qty: 30 TABLET | Refills: 0 | Status: ON HOLD | OUTPATIENT
Start: 2021-04-21 | End: 2022-10-08 | Stop reason: HOSPADM

## 2021-04-21 RX ORDER — OXYCODONE AND ACETAMINOPHEN 7.5; 325 MG/1; MG/1
1 TABLET ORAL
COMMUNITY
Start: 2021-04-08 | End: 2022-07-19

## 2021-04-21 RX ORDER — DIAZEPAM 5 MG/1
5-10 TABLET ORAL EVERY 6 HOURS PRN
COMMUNITY
Start: 2021-03-19 | End: 2022-07-19 | Stop reason: SDUPTHER

## 2021-04-21 RX ORDER — AMOXICILLIN AND CLAVULANATE POTASSIUM 875; 125 MG/1; MG/1
TABLET, FILM COATED ORAL
COMMUNITY
Start: 2021-03-19 | End: 2021-10-21

## 2021-04-21 RX ORDER — FLUCONAZOLE 150 MG/1
150 TABLET ORAL DAILY
COMMUNITY
Start: 2021-03-19 | End: 2021-10-21

## 2021-04-28 ENCOUNTER — HOSPITAL ENCOUNTER (OUTPATIENT)
Dept: RADIOLOGY | Facility: HOSPITAL | Age: 46
Discharge: HOME | End: 2021-04-28
Attending: FAMILY MEDICINE
Payer: MEDICAID

## 2021-04-28 VITALS — HEIGHT: 60 IN | BODY MASS INDEX: 24.94 KG/M2 | WEIGHT: 127 LBS

## 2021-04-28 DIAGNOSIS — Z12.31 BREAST CANCER SCREENING BY MAMMOGRAM: ICD-10-CM

## 2021-04-28 DIAGNOSIS — N95.1 MENOPAUSAL SYNDROME: ICD-10-CM

## 2021-04-28 PROCEDURE — 77067 SCR MAMMO BI INCL CAD: CPT | Mod: TC

## 2021-04-28 PROCEDURE — 77067 SCR MAMMO BI INCL CAD: CPT | Mod: 26,,, | Performed by: RADIOLOGY

## 2021-04-28 PROCEDURE — 77063 MAMMO DIGITAL SCREENING BILAT WITH TOMO: ICD-10-PCS | Mod: 26,,, | Performed by: RADIOLOGY

## 2021-04-28 PROCEDURE — 77067 MAMMO DIGITAL SCREENING BILAT WITH TOMO: ICD-10-PCS | Mod: 26,,, | Performed by: RADIOLOGY

## 2021-04-28 PROCEDURE — 77063 BREAST TOMOSYNTHESIS BI: CPT | Mod: 26,,, | Performed by: RADIOLOGY

## 2021-04-30 ENCOUNTER — TELEPHONE (OUTPATIENT)
Dept: PRIMARY CARE CLINIC | Facility: CLINIC | Age: 46
End: 2021-04-30

## 2021-10-21 ENCOUNTER — OFFICE VISIT (OUTPATIENT)
Dept: PRIMARY CARE CLINIC | Facility: CLINIC | Age: 46
End: 2021-10-21
Payer: MEDICAID

## 2021-10-21 VITALS
OXYGEN SATURATION: 97 % | BODY MASS INDEX: 25.88 KG/M2 | RESPIRATION RATE: 18 BRPM | HEART RATE: 83 BPM | SYSTOLIC BLOOD PRESSURE: 114 MMHG | WEIGHT: 131.81 LBS | DIASTOLIC BLOOD PRESSURE: 72 MMHG | HEIGHT: 60 IN

## 2021-10-21 DIAGNOSIS — S16.1XXA STRAIN OF NECK MUSCLE, INITIAL ENCOUNTER: ICD-10-CM

## 2021-10-21 DIAGNOSIS — F32.A DEPRESSION, UNSPECIFIED DEPRESSION TYPE: ICD-10-CM

## 2021-10-21 DIAGNOSIS — G62.9 NEUROPATHY: ICD-10-CM

## 2021-10-21 DIAGNOSIS — F41.9 ANXIETY: ICD-10-CM

## 2021-10-21 DIAGNOSIS — M47.816 LUMBAR FACET ARTHROPATHY: ICD-10-CM

## 2021-10-21 DIAGNOSIS — Z98.1 HISTORY OF FUSION OF CERVICAL SPINE: ICD-10-CM

## 2021-10-21 DIAGNOSIS — N95.1 MENOPAUSAL SYNDROME: ICD-10-CM

## 2021-10-21 DIAGNOSIS — K21.9 GASTROESOPHAGEAL REFLUX DISEASE WITHOUT ESOPHAGITIS: ICD-10-CM

## 2021-10-21 DIAGNOSIS — S39.012A LUMBOSACRAL STRAIN, INITIAL ENCOUNTER: ICD-10-CM

## 2021-10-21 DIAGNOSIS — Z23 FLU VACCINE NEED: Primary | ICD-10-CM

## 2021-10-21 PROCEDURE — 99999 PR PBB SHADOW E&M-EST. PATIENT-LVL III: ICD-10-PCS | Mod: PBBFAC,,, | Performed by: FAMILY MEDICINE

## 2021-10-21 PROCEDURE — 90686 IIV4 VACC NO PRSV 0.5 ML IM: CPT | Mod: PBBFAC,PN

## 2021-10-21 PROCEDURE — 99214 PR OFFICE/OUTPT VISIT, EST, LEVL IV, 30-39 MIN: ICD-10-PCS | Mod: S$PBB,,, | Performed by: FAMILY MEDICINE

## 2021-10-21 PROCEDURE — 99999 PR PBB SHADOW E&M-EST. PATIENT-LVL III: CPT | Mod: PBBFAC,,, | Performed by: FAMILY MEDICINE

## 2021-10-21 PROCEDURE — 99214 OFFICE O/P EST MOD 30 MIN: CPT | Mod: S$PBB,,, | Performed by: FAMILY MEDICINE

## 2021-10-21 PROCEDURE — 99213 OFFICE O/P EST LOW 20 MIN: CPT | Mod: PBBFAC,PN | Performed by: FAMILY MEDICINE

## 2021-10-21 RX ORDER — TRAZODONE HYDROCHLORIDE 100 MG/1
100 TABLET ORAL NIGHTLY
Qty: 30 TABLET | Refills: 11 | Status: SHIPPED | OUTPATIENT
Start: 2021-10-21 | End: 2022-04-21

## 2022-01-20 DIAGNOSIS — F41.9 ANXIETY: ICD-10-CM

## 2022-01-20 DIAGNOSIS — G62.9 NEUROPATHY: ICD-10-CM

## 2022-01-20 NOTE — TELEPHONE ENCOUNTER
Care Due:                  Date            Visit Type   Department     Provider  --------------------------------------------------------------------------------                                             [facility]  Last Visit: 10-      None         PRIMARY CARE   Myke Torres                                           [facility]  Next Visit: 04-      None         PRIMARY [facility]   Myke Torres                                                            Last  Test          Frequency    Reason                     Performed    Due Date  --------------------------------------------------------------------------------    CBC.........  12 months..  valACYclovir.............  01- 01-    Cr..........  12 months..  valACYclovir.............  01- 01-    Powered by [employer] by [employer]. Reference number: [id number].   1/20/2022 8:01:53 AM CST

## 2022-01-20 NOTE — TELEPHONE ENCOUNTER
Refill Routing Note   Medication(s) are not appropriate for processing by [facility] for the following reason(s):      - Outside of protocol    [facility] action(s):  Route Medication-related problems identified: Requires labs     Medication Therapy Plan: CDMR. LABS (CBC, SCr) Cymbalta is prescribed for Neuropathy  --->Care Gap information included in message below if applicable.   Medication reconciliation completed: No   Automatic Epic Generated Protocol Data:        Requested Prescriptions   Pending Prescriptions Disp Refills    DULoxetine (CYMBALTA) 60 MG capsule [Pharmacy Med Name: DULOXETINE DR 60MG CAPSULES] 90 capsule 0     Sig: TAKE 1 CAPSULE(60 MG) BY MOUTH EVERY DAY       Psychiatry: Antidepressants - SNRI Failed - 1/20/2022  8:01 AM        Failed - Cr is 1.39 or below and within 360 days     Lab Results   Component Value Date    CREATININE 0.7 01/22/2021    CREATININE 0.9 08/23/2019    CREATININE 0.8 05/03/2018              Failed - eGFR is 30 or above and within 360 days     Lab Results   Component Value Date    EGFRNONAA >60.0 01/22/2021    EGFRNONAA >60 08/23/2019    EGFRNONAA >60.0 05/03/2018                Passed - Patient is at least 18 years old        Passed - Negative Pregnancy Status Check        Passed - Last BP in normal range within 360 days     BP Readings from Last 1 Encounters:   10/21/21 114/72               Passed - Valid encounter within last 15 months     Recent Visits  Date Type Provider Dept   10/21/21 Office Visit Myke Torres MD [facility]   04/21/21 Office Visit Myke Torres MD [facility]   01/21/21 Office Visit Myke Torres MD [facility]   Showing recent visits within past 720 days and meeting all other requirements  Future Appointments  No visits were found meeting these conditions.  Showing future appointments within next 150 days and meeting all other requirements      Future Appointments              In 3  months Myke Torres MD [facility] [address]                      Appointments  past 12m or future 3m with PCP    Date Provider   Last Visit   10/21/2021 Myke Torres MD   Next Visit   4/21/2022 Myke Torres MD   ED visits in past 90 days: 0        Note composed:2:38 PM 01/20/2022

## 2022-01-21 RX ORDER — DULOXETIN HYDROCHLORIDE 60 MG/1
CAPSULE, DELAYED RELEASE ORAL
Qty: 90 CAPSULE | Refills: 0 | Status: SHIPPED | OUTPATIENT
Start: 2022-01-21 | End: 2022-01-28

## 2022-01-25 NOTE — TELEPHONE ENCOUNTER
No new care gaps identified.  Powered by [employer] by "[employer]. Reference number: [id number].   1/25/2022 4:11:15 AM CST

## 2022-01-26 DIAGNOSIS — K21.00 GASTROESOPHAGEAL REFLUX DISEASE WITH ESOPHAGITIS: ICD-10-CM

## 2022-01-27 DIAGNOSIS — G62.9 NEUROPATHY: ICD-10-CM

## 2022-01-27 DIAGNOSIS — F41.9 ANXIETY: ICD-10-CM

## 2022-01-27 NOTE — TELEPHONE ENCOUNTER
No new care gaps identified.  Powered by [employer] by [employer]. Reference number: [id number].   1/27/2022 8:02:02 AM CST

## 2022-01-28 RX ORDER — DULOXETIN HYDROCHLORIDE 60 MG/1
CAPSULE, DELAYED RELEASE ORAL
Qty: 90 CAPSULE | Refills: 1 | Status: SHIPPED | OUTPATIENT
Start: 2022-01-28 | End: 2022-07-01 | Stop reason: SDUPTHER

## 2022-01-28 RX ORDER — PANTOPRAZOLE SODIUM 40 MG/1
40 TABLET, DELAYED RELEASE ORAL DAILY
Qty: 30 TABLET | Refills: 11 | Status: SHIPPED | OUTPATIENT
Start: 2022-01-28 | End: 2023-02-24 | Stop reason: SDUPTHER

## 2022-02-09 NOTE — TELEPHONE ENCOUNTER
Refill Routing Note   Medication(s) are not appropriate for processing by [facility] for the following reason(s):      - Medication not active on medication list    [facility] action(s): Defer             --->Care Gap information included in message below if applicable.       Automatic Epic Generated Protocol Data:        Requested Prescriptions   Pending Prescriptions Disp Refills    fluticasone propionate (FLONASE) 50 mcg/actuation nasal spray [Pharmacy Med Name: FLUTICASONE 50MCG NASAL SP (120) RX] 48 g 2     Sig: SHAKE LIQUID AND USE 2 SPRAYS(100 MCG) IN EACH NOSTRIL EVERY DAY        Ear, Nose, and Throat: Nasal Preparations - Corticosteroids Passed - 1/25/2022  4:10 AM        Passed - Patient is at least 18 years old        Passed - Negative Pregnancy Status Check        Passed - Valid encounter within last 15 months     Recent Visits  Date Type Provider Dept   10/21/21 Office Visit Myke Torres MD [facility]   04/21/21 Office Visit Myke Torres MD [facility]   01/21/21 Office Visit Myke Torres MD [facility]   Showing recent visits within past 720 days and meeting all other requirements  Future Appointments  No visits were found meeting these conditions.  Showing future appointments within next 150 days and meeting all other requirements        Future Appointments                In 2 months Myke Torres MD [facility] [address]                    Appointments  past 12m or future 3m with PCP    Date Provider   Last Visit   10/21/2021 Myke Torres MD   Next Visit   1/27/2022 Myke Torres MD   ED visits in past 90 days: 0        Note composed:12:23 PM 02/09/2022

## 2022-02-11 RX ORDER — FLUTICASONE PROPIONATE 50 MCG
SPRAY, SUSPENSION (ML) NASAL
Qty: 48 G | Refills: 5 | Status: SHIPPED | OUTPATIENT
Start: 2022-02-11 | End: 2022-04-21

## 2022-03-09 ENCOUNTER — PATIENT MESSAGE (OUTPATIENT)
Dept: FAMILY MEDICINE | Facility: CLINIC | Age: 47
End: 2022-03-09
Payer: MEDICAID

## 2022-03-21 ENCOUNTER — PATIENT MESSAGE (OUTPATIENT)
Dept: ADMINISTRATIVE | Facility: HOSPITAL | Age: 47
End: 2022-03-21
Payer: MEDICAID

## 2022-03-30 RX ORDER — ESTROGENS, CONJUGATED 0.3 MG/1
TABLET, FILM COATED ORAL
Qty: 30 TABLET | Refills: 5 | Status: SHIPPED | OUTPATIENT
Start: 2022-03-30 | End: 2022-08-31

## 2022-04-21 ENCOUNTER — OFFICE VISIT (OUTPATIENT)
Dept: PRIMARY CARE CLINIC | Facility: CLINIC | Age: 47
End: 2022-04-21
Payer: MEDICAID

## 2022-04-21 VITALS
HEIGHT: 60 IN | SYSTOLIC BLOOD PRESSURE: 112 MMHG | WEIGHT: 131.19 LBS | HEART RATE: 104 BPM | BODY MASS INDEX: 25.76 KG/M2 | DIASTOLIC BLOOD PRESSURE: 88 MMHG | RESPIRATION RATE: 18 BRPM | OXYGEN SATURATION: 97 %

## 2022-04-21 DIAGNOSIS — Z12.11 ENCOUNTER FOR SCREENING FOR MALIGNANT NEOPLASM OF COLON: Primary | ICD-10-CM

## 2022-04-21 DIAGNOSIS — Z98.1 HISTORY OF FUSION OF CERVICAL SPINE: ICD-10-CM

## 2022-04-21 DIAGNOSIS — Z98.890 HISTORY OF CERVICAL SPINAL SURGERY: ICD-10-CM

## 2022-04-21 DIAGNOSIS — S39.012D LUMBOSACRAL STRAIN, SUBSEQUENT ENCOUNTER: ICD-10-CM

## 2022-04-21 DIAGNOSIS — F32.0 CURRENT MILD EPISODE OF MAJOR DEPRESSIVE DISORDER, UNSPECIFIED WHETHER RECURRENT: ICD-10-CM

## 2022-04-21 DIAGNOSIS — S16.1XXD STRAIN OF NECK MUSCLE, SUBSEQUENT ENCOUNTER: ICD-10-CM

## 2022-04-21 DIAGNOSIS — F41.9 ANXIETY: ICD-10-CM

## 2022-04-21 DIAGNOSIS — K21.9 GASTROESOPHAGEAL REFLUX DISEASE WITHOUT ESOPHAGITIS: ICD-10-CM

## 2022-04-21 PROCEDURE — 99214 PR OFFICE/OUTPT VISIT, EST, LEVL IV, 30-39 MIN: ICD-10-PCS | Mod: S$PBB,,, | Performed by: FAMILY MEDICINE

## 2022-04-21 PROCEDURE — 1159F PR MEDICATION LIST DOCUMENTED IN MEDICAL RECORD: ICD-10-PCS | Mod: CPTII,,, | Performed by: FAMILY MEDICINE

## 2022-04-21 PROCEDURE — 3079F DIAST BP 80-89 MM HG: CPT | Mod: CPTII,,, | Performed by: FAMILY MEDICINE

## 2022-04-21 PROCEDURE — 99999 PR PBB SHADOW E&M-EST. PATIENT-LVL III: CPT | Mod: PBBFAC,,, | Performed by: FAMILY MEDICINE

## 2022-04-21 PROCEDURE — 3079F PR MOST RECENT DIASTOLIC BLOOD PRESSURE 80-89 MM HG: ICD-10-PCS | Mod: CPTII,,, | Performed by: FAMILY MEDICINE

## 2022-04-21 PROCEDURE — 3074F PR MOST RECENT SYSTOLIC BLOOD PRESSURE < 130 MM HG: ICD-10-PCS | Mod: CPTII,,, | Performed by: FAMILY MEDICINE

## 2022-04-21 PROCEDURE — 3008F PR BODY MASS INDEX (BMI) DOCUMENTED: ICD-10-PCS | Mod: CPTII,,, | Performed by: FAMILY MEDICINE

## 2022-04-21 PROCEDURE — 99999 PR PBB SHADOW E&M-EST. PATIENT-LVL III: ICD-10-PCS | Mod: PBBFAC,,, | Performed by: FAMILY MEDICINE

## 2022-04-21 PROCEDURE — 99213 OFFICE O/P EST LOW 20 MIN: CPT | Mod: PBBFAC,PN | Performed by: FAMILY MEDICINE

## 2022-04-21 PROCEDURE — 99214 OFFICE O/P EST MOD 30 MIN: CPT | Mod: S$PBB,,, | Performed by: FAMILY MEDICINE

## 2022-04-21 PROCEDURE — 1159F MED LIST DOCD IN RCRD: CPT | Mod: CPTII,,, | Performed by: FAMILY MEDICINE

## 2022-04-21 PROCEDURE — 3008F BODY MASS INDEX DOCD: CPT | Mod: CPTII,,, | Performed by: FAMILY MEDICINE

## 2022-04-21 PROCEDURE — 3074F SYST BP LT 130 MM HG: CPT | Mod: CPTII,,, | Performed by: FAMILY MEDICINE
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
## 2022-04-29 ENCOUNTER — TELEPHONE (OUTPATIENT)
Dept: GASTROENTEROLOGY | Facility: CLINIC | Age: 47
End: 2022-04-29
Payer: MEDICAID

## 2022-05-04 ENCOUNTER — TELEPHONE (OUTPATIENT)
Dept: GASTROENTEROLOGY | Facility: CLINIC | Age: 47
End: 2022-05-04
Payer: MEDICAID

## 2022-05-04 NOTE — TELEPHONE ENCOUNTER
I called the patient and she stated that she wants to talk to Dr Torres first because she was not informed by him that he was ordering this.  Patient will call us back after she speaks with him.

## 2022-06-02 ENCOUNTER — TELEPHONE (OUTPATIENT)
Dept: SURGERY | Facility: CLINIC | Age: 47
End: 2022-06-02
Payer: MEDICAID

## 2022-06-02 NOTE — TELEPHONE ENCOUNTER
Called patient to schedule colonoscopy. Left message on patient's voicemail to return call at [phone].

## 2022-06-20 ENCOUNTER — TELEPHONE (OUTPATIENT)
Dept: PRIMARY CARE CLINIC | Facility: CLINIC | Age: 47
End: 2022-06-20
Payer: MEDICAID

## 2022-06-20 DIAGNOSIS — Z12.31 ENCOUNTER FOR SCREENING MAMMOGRAM FOR MALIGNANT NEOPLASM OF BREAST: Primary | ICD-10-CM

## 2022-06-20 NOTE — TELEPHONE ENCOUNTER
----- Message from Michela Peñaloza sent at 6/20/2022 11:59 AM CDT -----  Contact: Pt [phone]  Caller is requesting to schedule their annual screening mammogram appointment. Order is not listed in Epic.  Please enter order and contact patient to schedule.  Would the patient like a call back, or a response through their [facility] portal?:   Portal    Please call and advise.    Thank You

## 2022-07-01 ENCOUNTER — PATIENT MESSAGE (OUTPATIENT)
Dept: ADMINISTRATIVE | Facility: HOSPITAL | Age: 47
End: 2022-07-01
Payer: MEDICAID

## 2022-07-01 DIAGNOSIS — Z12.11 SCREENING FOR COLON CANCER: ICD-10-CM

## 2022-07-05 ENCOUNTER — TELEPHONE (OUTPATIENT)
Dept: GASTROENTEROLOGY | Facility: CLINIC | Age: 47
End: 2022-07-05
Payer: MEDICAID

## 2022-07-05 NOTE — TELEPHONE ENCOUNTER
I called the patient to schedule her colonoscopy and she stated that her PCP ordered a home test so she will do that instead.

## 2022-07-07 ENCOUNTER — PATIENT OUTREACH (OUTPATIENT)
Dept: ADMINISTRATIVE | Facility: HOSPITAL | Age: 47
End: 2022-07-07
Payer: MEDICAID

## 2022-07-13 ENCOUNTER — PATIENT MESSAGE (OUTPATIENT)
Dept: PRIMARY CARE CLINIC | Facility: CLINIC | Age: 47
End: 2022-07-13
Payer: MEDICAID

## 2022-07-13 LAB — HEMOCCULT STL QL IA: NEGATIVE

## 2022-07-19 ENCOUNTER — OFFICE VISIT (OUTPATIENT)
Dept: PRIMARY CARE CLINIC | Facility: CLINIC | Age: 47
End: 2022-07-19
Payer: MEDICAID

## 2022-07-19 VITALS
HEIGHT: 60 IN | BODY MASS INDEX: 24.97 KG/M2 | TEMPERATURE: 98 F | DIASTOLIC BLOOD PRESSURE: 100 MMHG | WEIGHT: 127.19 LBS | HEART RATE: 89 BPM | OXYGEN SATURATION: 99 % | RESPIRATION RATE: 18 BRPM | SYSTOLIC BLOOD PRESSURE: 130 MMHG

## 2022-07-19 DIAGNOSIS — N95.1 MENOPAUSAL SYNDROME: ICD-10-CM

## 2022-07-19 DIAGNOSIS — K21.9 GASTROESOPHAGEAL REFLUX DISEASE WITHOUT ESOPHAGITIS: ICD-10-CM

## 2022-07-19 DIAGNOSIS — Z98.890 HISTORY OF HIP SURGERY: ICD-10-CM

## 2022-07-19 DIAGNOSIS — G62.9 NEUROPATHY: ICD-10-CM

## 2022-07-19 DIAGNOSIS — F32.0 CURRENT MILD EPISODE OF MAJOR DEPRESSIVE DISORDER, UNSPECIFIED WHETHER RECURRENT: ICD-10-CM

## 2022-07-19 DIAGNOSIS — I10 HYPERTENSION, UNSPECIFIED TYPE: Primary | ICD-10-CM

## 2022-07-19 DIAGNOSIS — Z98.890 HISTORY OF CERVICAL SPINAL SURGERY: ICD-10-CM

## 2022-07-19 DIAGNOSIS — F41.9 ANXIETY: ICD-10-CM

## 2022-07-19 DIAGNOSIS — Z98.1 HISTORY OF FUSION OF CERVICAL SPINE: ICD-10-CM

## 2022-07-19 PROCEDURE — 3008F PR BODY MASS INDEX (BMI) DOCUMENTED: ICD-10-PCS | Mod: CPTII,,, | Performed by: FAMILY MEDICINE

## 2022-07-19 PROCEDURE — 4010F ACE/ARB THERAPY RXD/TAKEN: CPT | Mod: CPTII,,, | Performed by: FAMILY MEDICINE

## 2022-07-19 PROCEDURE — 3080F PR MOST RECENT DIASTOLIC BLOOD PRESSURE >= 90 MM HG: ICD-10-PCS | Mod: CPTII,,, | Performed by: FAMILY MEDICINE

## 2022-07-19 PROCEDURE — 99214 PR OFFICE/OUTPT VISIT, EST, LEVL IV, 30-39 MIN: ICD-10-PCS | Mod: S$PBB,,, | Performed by: FAMILY MEDICINE

## 2022-07-19 PROCEDURE — 3008F BODY MASS INDEX DOCD: CPT | Mod: CPTII,,, | Performed by: FAMILY MEDICINE

## 2022-07-19 PROCEDURE — 3075F PR MOST RECENT SYSTOLIC BLOOD PRESS GE 130-139MM HG: ICD-10-PCS | Mod: CPTII,,, | Performed by: FAMILY MEDICINE

## 2022-07-19 PROCEDURE — 4010F PR ACE/ARB THEARPY RXD/TAKEN: ICD-10-PCS | Mod: CPTII,,, | Performed by: FAMILY MEDICINE

## 2022-07-19 PROCEDURE — 99999 PR PBB SHADOW E&M-EST. PATIENT-LVL III: ICD-10-PCS | Mod: PBBFAC,,, | Performed by: FAMILY MEDICINE

## 2022-07-19 PROCEDURE — 99213 OFFICE O/P EST LOW 20 MIN: CPT | Mod: PBBFAC,PN | Performed by: FAMILY MEDICINE

## 2022-07-19 PROCEDURE — 99999 PR PBB SHADOW E&M-EST. PATIENT-LVL III: CPT | Mod: PBBFAC,,, | Performed by: FAMILY MEDICINE

## 2022-07-19 PROCEDURE — 99214 OFFICE O/P EST MOD 30 MIN: CPT | Mod: S$PBB,,, | Performed by: FAMILY MEDICINE

## 2022-07-19 PROCEDURE — 3075F SYST BP GE 130 - 139MM HG: CPT | Mod: CPTII,,, | Performed by: FAMILY MEDICINE

## 2022-07-19 PROCEDURE — 3080F DIAST BP >= 90 MM HG: CPT | Mod: CPTII,,, | Performed by: FAMILY MEDICINE

## 2022-07-19 RX ORDER — LOSARTAN POTASSIUM 50 MG/1
50 TABLET ORAL DAILY
Qty: 90 TABLET | Refills: 3 | Status: SHIPPED | OUTPATIENT
Start: 2022-07-19 | End: 2022-09-30 | Stop reason: CLARIF

## 2022-07-19 RX ORDER — DIAZEPAM 5 MG/1
TABLET ORAL
Qty: 30 TABLET | Refills: 2 | Status: SHIPPED | OUTPATIENT
Start: 2022-07-19 | End: 2022-10-26 | Stop reason: SDUPTHER

## 2022-07-19 RX ORDER — TRAZODONE HYDROCHLORIDE 100 MG/1
100 TABLET ORAL NIGHTLY
COMMUNITY
End: 2022-08-02

## 2022-07-19 RX ORDER — BUPROPION HYDROCHLORIDE 300 MG/1
TABLET ORAL
Qty: 90 TABLET | Refills: 3 | Status: SHIPPED | OUTPATIENT
Start: 2022-07-19 | End: 2022-10-17

## 2022-07-19 RX ORDER — DULOXETIN HYDROCHLORIDE 30 MG/1
CAPSULE, DELAYED RELEASE ORAL
Qty: 60 CAPSULE | Refills: 0 | Status: SHIPPED | OUTPATIENT
Start: 2022-07-19 | End: 2022-09-30 | Stop reason: CLARIF

## 2022-07-19 RX ORDER — GABAPENTIN 300 MG/1
300 CAPSULE ORAL 2 TIMES DAILY
COMMUNITY
Start: 2022-07-12 | End: 2022-09-30 | Stop reason: CLARIF
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
## 2022-07-26 ENCOUNTER — HOSPITAL ENCOUNTER (OUTPATIENT)
Dept: RADIOLOGY | Facility: HOSPITAL | Age: 47
Discharge: HOME | End: 2022-07-26
Attending: FAMILY MEDICINE
Payer: MEDICAID

## 2022-07-26 VITALS — WEIGHT: 124 LBS | BODY MASS INDEX: 24.35 KG/M2 | HEIGHT: 60 IN

## 2022-07-26 DIAGNOSIS — Z12.31 ENCOUNTER FOR SCREENING MAMMOGRAM FOR MALIGNANT NEOPLASM OF BREAST: ICD-10-CM

## 2022-07-26 PROCEDURE — 77067 SCR MAMMO BI INCL CAD: CPT | Mod: TC

## 2022-07-26 PROCEDURE — 77067 MAMMO DIGITAL SCREENING BILAT WITH TOMO: ICD-10-PCS | Mod: 26,,, | Performed by: RADIOLOGY

## 2022-07-26 PROCEDURE — 77063 BREAST TOMOSYNTHESIS BI: CPT | Mod: 26,,, | Performed by: RADIOLOGY

## 2022-07-26 PROCEDURE — 77063 MAMMO DIGITAL SCREENING BILAT WITH TOMO: ICD-10-PCS | Mod: 26,,, | Performed by: RADIOLOGY

## 2022-07-26 PROCEDURE — 77067 SCR MAMMO BI INCL CAD: CPT | Mod: 26,,, | Performed by: RADIOLOGY

## 2022-07-29 DIAGNOSIS — R92.8 ABNORMAL MAMMOGRAM: Primary | ICD-10-CM

## 2022-08-01 ENCOUNTER — TELEPHONE (OUTPATIENT)
Dept: RADIOLOGY | Facility: HOSPITAL | Age: 47
End: 2022-08-01
Payer: MEDICAID

## 2022-08-01 NOTE — TELEPHONE ENCOUNTER
----- Message from Shira Lopez sent at 8/1/2022 10:49 AM CDT -----  Contact: @[phone]    ----- Message -----  From: Attila Smith  Sent: 8/1/2022  10:37 AM CDT  To: , #     Patient returning a missed call from juhi Correia return call

## 2022-08-01 NOTE — TELEPHONE ENCOUNTER
Spoke with patient and explained mammogram findings.Patient expressed understanding of results. Patient scheduled abnormal mammogram follow up appointment at [facility] [place] on 8/11/2022.

## 2022-08-02 ENCOUNTER — CLINICAL SUPPORT (OUTPATIENT)
Dept: PRIMARY CARE CLINIC | Facility: CLINIC | Age: 47
End: 2022-08-02
Payer: MEDICAID

## 2022-08-02 VITALS
DIASTOLIC BLOOD PRESSURE: 82 MMHG | HEART RATE: 93 BPM | SYSTOLIC BLOOD PRESSURE: 128 MMHG | OXYGEN SATURATION: 97 % | RESPIRATION RATE: 16 BRPM

## 2022-08-02 DIAGNOSIS — R03.0 ELEVATED BLOOD PRESSURE READING: Primary | ICD-10-CM

## 2022-08-02 PROCEDURE — 99999 PR PBB SHADOW E&M-EST. PATIENT-LVL II: ICD-10-PCS | Mod: PBBFAC,,,

## 2022-08-02 PROCEDURE — 99212 OFFICE O/P EST SF 10 MIN: CPT | Mod: PBBFAC,PN

## 2022-08-02 PROCEDURE — 99999 PR PBB SHADOW E&M-EST. PATIENT-LVL II: CPT | Mod: PBBFAC,,,

## 2022-08-02 RX ORDER — TRAZODONE HYDROCHLORIDE 100 MG/1
TABLET ORAL
Qty: 30 TABLET | Refills: 5 | Status: SHIPPED | OUTPATIENT
Start: 2022-08-02 | End: 2023-02-24 | Stop reason: SDUPTHER

## 2022-08-02 NOTE — TELEPHONE ENCOUNTER
Refill Routing Note   Medication(s) are not appropriate for processing by [facility] for the following reason(s):      - Medication not active on medication list    [facility] action(s):  Defer          Medication reconciliation completed: No     Appointments  past 12m or future 3m with PCP    Date Provider   Last Visit   7/19/2022 Myke Torres MD   Next Visit   10/19/2022 Myke Torres MD   ED visits in past 90 days: 0        Note composed:11:45 AM 08/02/2022

## 2022-08-02 NOTE — PROGRESS NOTES
Verified pt ID using name and . Obtained bp, p, and sp02. Patient not on any medications at this time, pressure just needed to be rechecked due to elevated at last visit. Patient was in a lot of pain at last visit due to nerve infusion. Instructed pt anyway to watch the sodium in diet, drink lots of fluids (water). Pt verbalized understanding

## 2022-08-02 NOTE — TELEPHONE ENCOUNTER
No new care gaps identified.  [facility] Embedded Care Gaps. Reference number: [id number]. 8/02/2022   8:01:59 AM SIXTOT

## 2022-08-05 ENCOUNTER — TELEPHONE (OUTPATIENT)
Dept: RADIOLOGY | Facility: HOSPITAL | Age: 47
End: 2022-08-05
Payer: MEDICAID

## 2022-08-05 NOTE — TELEPHONE ENCOUNTER
----- Message from Shira Lopez sent at 8/4/2022  4:32 PM CDT -----  Regarding: FW: RESCHEDULE TO NEXT WEEK IF POSSIBLE  Contact: pt    ----- Message -----  From: Gomez Soares  Sent: 8/4/2022   4:29 PM CDT  To: , #  Subject: RESCHEDULE TO NEXT WEEK IF POSSIBLE              Pt would like to reschedule for nest week id possible..       Confirmed contact info below:  Contact Name: Dina Salasramon  Phone Number: [phone]

## 2022-08-15 ENCOUNTER — HOSPITAL ENCOUNTER (OUTPATIENT)
Dept: RADIOLOGY | Facility: HOSPITAL | Age: 47
Discharge: HOME | End: 2022-08-15
Attending: FAMILY MEDICINE
Payer: MEDICAID

## 2022-08-15 DIAGNOSIS — R92.8 ABNORMAL MAMMOGRAM: ICD-10-CM

## 2022-08-15 PROCEDURE — 76642 ULTRASOUND BREAST LIMITED: CPT | Mod: 26,LT,, | Performed by: RADIOLOGY

## 2022-08-15 PROCEDURE — 77066 MAMMO DIGITAL DIAGNOSTIC BILAT WITH TOMO: ICD-10-PCS | Mod: 26,,, | Performed by: RADIOLOGY

## 2022-08-15 PROCEDURE — 76642 ULTRASOUND BREAST LIMITED: CPT | Mod: TC,LT

## 2022-08-15 PROCEDURE — 77066 DX MAMMO INCL CAD BI: CPT | Mod: 26,,, | Performed by: RADIOLOGY

## 2022-08-15 PROCEDURE — 76642 US BREAST LEFT LIMITED: ICD-10-PCS | Mod: 26,LT,, | Performed by: RADIOLOGY

## 2022-08-15 PROCEDURE — 77062 MAMMO DIGITAL DIAGNOSTIC BILAT WITH TOMO: ICD-10-PCS | Mod: 26,,, | Performed by: RADIOLOGY

## 2022-08-15 PROCEDURE — 77062 BREAST TOMOSYNTHESIS BI: CPT | Mod: 26,,, | Performed by: RADIOLOGY

## 2022-08-15 PROCEDURE — 77062 BREAST TOMOSYNTHESIS BI: CPT | Mod: TC

## 2022-08-16 ENCOUNTER — TELEPHONE (OUTPATIENT)
Dept: RADIOLOGY | Facility: HOSPITAL | Age: 47
End: 2022-08-16
Payer: MEDICAID

## 2022-08-16 NOTE — TELEPHONE ENCOUNTER
Spoke with patient. Reviewed breast biopsy procedure and reviewed instructions for breast biopsy. Patient expressed understanding and all questions were answered. Provided patient with my phone number to call for any further concerns or questions.   Patient scheduled breast biopsy at the [facility] on 8/18/2022.

## 2022-08-18 ENCOUNTER — HOSPITAL ENCOUNTER (OUTPATIENT)
Dept: RADIOLOGY | Facility: HOSPITAL | Age: 47
Discharge: HOME | End: 2022-08-18
Attending: FAMILY MEDICINE
Payer: MEDICAID

## 2022-08-18 DIAGNOSIS — R92.8 ABNORMAL FINDING ON BREAST IMAGING: ICD-10-CM

## 2022-08-18 PROCEDURE — 77065 MAMMO DIGITAL DIAGNOSTIC RIGHT: ICD-10-PCS | Mod: 26,RT,, | Performed by: RADIOLOGY

## 2022-08-18 PROCEDURE — 77065 DX MAMMO INCL CAD UNI: CPT | Mod: 26,RT,, | Performed by: RADIOLOGY

## 2022-08-18 PROCEDURE — A4648 IMPLANTABLE TISSUE MARKER: HCPCS

## 2022-08-18 PROCEDURE — 88305 TISSUE EXAM BY PATHOLOGIST: ICD-10-PCS | Mod: 26,,, | Performed by: PATHOLOGY

## 2022-08-18 PROCEDURE — 77065 DX MAMMO INCL CAD UNI: CPT | Mod: TC,RT

## 2022-08-18 PROCEDURE — 19081 BX BREAST 1ST LESION STRTCTC: CPT | Mod: RT,,, | Performed by: RADIOLOGY

## 2022-08-18 PROCEDURE — 88360 TUMOR IMMUNOHISTOCHEM/MANUAL: CPT | Mod: 59 | Performed by: PATHOLOGY

## 2022-08-18 PROCEDURE — 27200939 MAMMO BREAST STEREOTACTIC BREAST BIOPSY RIGHT

## 2022-08-18 PROCEDURE — 88305 TISSUE EXAM BY PATHOLOGIST: CPT | Performed by: PATHOLOGY

## 2022-08-18 PROCEDURE — 25000003 PHARM REV CODE 250: Performed by: FAMILY MEDICINE

## 2022-08-18 PROCEDURE — 88305 TISSUE EXAM BY PATHOLOGIST: CPT | Mod: 26,,, | Performed by: PATHOLOGY

## 2022-08-18 PROCEDURE — 88342 CHG IMMUNOCYTOCHEMISTRY: ICD-10-PCS | Mod: 26,59,, | Performed by: PATHOLOGY

## 2022-08-18 PROCEDURE — 88360 PR  TUMOR IMMUNOHISTOCHEM/MANUAL: ICD-10-PCS | Mod: 26,,, | Performed by: PATHOLOGY

## 2022-08-18 PROCEDURE — 88342 IMHCHEM/IMCYTCHM 1ST ANTB: CPT | Mod: 59 | Performed by: PATHOLOGY

## 2022-08-18 PROCEDURE — 88360 TUMOR IMMUNOHISTOCHEM/MANUAL: CPT | Mod: 26,,, | Performed by: PATHOLOGY

## 2022-08-18 PROCEDURE — 19081 MAMMO BREAST STEREOTACTIC BREAST BIOPSY RIGHT: ICD-10-PCS | Mod: RT,,, | Performed by: RADIOLOGY

## 2022-08-18 PROCEDURE — 88342 IMHCHEM/IMCYTCHM 1ST ANTB: CPT | Mod: 26,59,, | Performed by: PATHOLOGY

## 2022-08-18 RX ORDER — LIDOCAINE HCL/EPINEPHRINE/PF 2%-1:200K
20 VIAL (ML) INJECTION ONCE
Status: COMPLETED | OUTPATIENT
Start: 2022-08-18 | End: 2022-08-18

## 2022-08-18 RX ORDER — LIDOCAINE HYDROCHLORIDE 10 MG/ML
3 INJECTION, SOLUTION EPIDURAL; INFILTRATION; INTRACAUDAL; PERINEURAL ONCE
Status: COMPLETED | OUTPATIENT
Start: 2022-08-18 | End: 2022-08-18

## 2022-08-18 RX ADMIN — LIDOCAINE HYDROCHLORIDE 3 ML: 10 INJECTION, SOLUTION EPIDURAL; INFILTRATION; INTRACAUDAL; PERINEURAL at 01:08

## 2022-08-18 RX ADMIN — LIDOCAINE HYDROCHLORIDE,EPINEPHRINE BITARTRATE 20 ML: 20; .005 INJECTION, SOLUTION EPIDURAL; INFILTRATION; INTRACAUDAL; PERINEURAL at 01:08

## 2022-08-23 ENCOUNTER — DOCUMENTATION ONLY (OUTPATIENT)
Dept: SURGERY | Facility: CLINIC | Age: 47
End: 2022-08-23
Payer: MEDICAID

## 2022-08-23 DIAGNOSIS — C50.219: Primary | ICD-10-CM

## 2022-08-23 LAB
FINAL PATHOLOGIC DIAGNOSIS: NORMAL
GROSS: NORMAL
Lab: NORMAL
MICROSCOPIC EXAM: NORMAL

## 2022-08-23 NOTE — NURSING
Called Patient with results of breast biopsy from 8/18/2022.  Explained that the biopsy showed DICS . Discussed what this means and that the next step is to meet with a breast surgeon. An appt was made for 8/25/2022 with Dr. Sawyer, breast MRI is scheduled on 8/29/2022.  Reviewed location of breast center. Patient verbalized understanding.  Oncology Navigation   Intake  Date of Diagnosis: 8/18/2022  Cancer Type: Breast  Internal / External Referral: Internal  Referral Source: Dr. Torres  Date of Referral: 8/23/2022  Initial Nurse Navigator Contact: 8/23/2022  Referral to Initial Contact Timeline (days): 0  Date Worked: 8/23/2022  First Appointment Available: 8/25/2022  Appointment Date: 8/25/2022  First Available Date vs. Scheduled Date (days): 0     Treatment  Current Status: Staging work-up    Surgical Oncologist: Dr. Sawyer  Consult Date: 8/25/2022          Procedures: Biopsy; MRI  Biopsy Schedule Date: 8/18/2022  MRI Schedule Date: 8/29/2022       ER: Positive  N[place]: Positive           Acuity      Follow Up  No follow-ups on file.

## 2022-08-25 ENCOUNTER — OFFICE VISIT (OUTPATIENT)
Dept: SURGERY | Facility: CLINIC | Age: 47
End: 2022-08-25
Payer: MEDICAID

## 2022-08-25 VITALS
DIASTOLIC BLOOD PRESSURE: 99 MMHG | WEIGHT: 127 LBS | BODY MASS INDEX: 24.94 KG/M2 | HEART RATE: 86 BPM | HEIGHT: 60 IN | SYSTOLIC BLOOD PRESSURE: 151 MMHG

## 2022-08-25 DIAGNOSIS — D05.11 DUCTAL CARCINOMA IN SITU (DCIS) OF RIGHT BREAST: Primary | ICD-10-CM

## 2022-08-25 DIAGNOSIS — Z01.818 PRE-OP TESTING: ICD-10-CM

## 2022-08-25 PROBLEM — D05.10 DCIS (DUCTAL CARCINOMA IN SITU): Status: ACTIVE | Noted: 2022-08-25

## 2022-08-25 PROBLEM — C50.919 BREAST CANCER: Status: ACTIVE | Noted: 2022-08-25

## 2022-08-25 PROCEDURE — 1160F PR REVIEW ALL MEDS BY PRESCRIBER/CLIN PHARMACIST DOCUMENTED: ICD-10-PCS | Mod: CPTII,,, | Performed by: SURGERY

## 2022-08-25 PROCEDURE — 3008F BODY MASS INDEX DOCD: CPT | Mod: CPTII,,, | Performed by: SURGERY

## 2022-08-25 PROCEDURE — 99205 OFFICE O/P NEW HI 60 MIN: CPT | Mod: S$PBB,,, | Performed by: SURGERY

## 2022-08-25 PROCEDURE — 1159F PR MEDICATION LIST DOCUMENTED IN MEDICAL RECORD: ICD-10-PCS | Mod: CPTII,,, | Performed by: SURGERY

## 2022-08-25 PROCEDURE — 4010F ACE/ARB THERAPY RXD/TAKEN: CPT | Mod: CPTII,,, | Performed by: SURGERY

## 2022-08-25 PROCEDURE — 99213 OFFICE O/P EST LOW 20 MIN: CPT | Mod: PBBFAC | Performed by: SURGERY

## 2022-08-25 PROCEDURE — 1159F MED LIST DOCD IN RCRD: CPT | Mod: CPTII,,, | Performed by: SURGERY

## 2022-08-25 PROCEDURE — 99205 PR OFFICE/OUTPT VISIT, NEW, LEVL V, 60-74 MIN: ICD-10-PCS | Mod: S$PBB,,, | Performed by: SURGERY

## 2022-08-25 PROCEDURE — 3008F PR BODY MASS INDEX (BMI) DOCUMENTED: ICD-10-PCS | Mod: CPTII,,, | Performed by: SURGERY

## 2022-08-25 PROCEDURE — 3077F PR MOST RECENT SYSTOLIC BLOOD PRESSURE >= 140 MM HG: ICD-10-PCS | Mod: CPTII,,, | Performed by: SURGERY

## 2022-08-25 PROCEDURE — 99999 PR PBB SHADOW E&M-EST. PATIENT-LVL III: CPT | Mod: PBBFAC,,, | Performed by: SURGERY

## 2022-08-25 PROCEDURE — 3080F PR MOST RECENT DIASTOLIC BLOOD PRESSURE >= 90 MM HG: ICD-10-PCS | Mod: CPTII,,, | Performed by: SURGERY

## 2022-08-25 PROCEDURE — 99999 PR PBB SHADOW E&M-EST. PATIENT-LVL III: ICD-10-PCS | Mod: PBBFAC,,, | Performed by: SURGERY

## 2022-08-25 PROCEDURE — 4010F PR ACE/ARB THEARPY RXD/TAKEN: ICD-10-PCS | Mod: CPTII,,, | Performed by: SURGERY

## 2022-08-25 PROCEDURE — 1160F RVW MEDS BY RX/DR IN RCRD: CPT | Mod: CPTII,,, | Performed by: SURGERY

## 2022-08-25 PROCEDURE — 3077F SYST BP >= 140 MM HG: CPT | Mod: CPTII,,, | Performed by: SURGERY

## 2022-08-25 PROCEDURE — 3080F DIAST BP >= 90 MM HG: CPT | Mod: CPTII,,, | Performed by: SURGERY

## 2022-08-25 NOTE — Clinical Note
I am seeing her for a new diagnosis of DCIS.  She will need additional workup prior to finalizing surgical plans, but has a very favorable prognosis.  Thanks for sending her! Leeann Sawyer MD Breast Surgical Oncology

## 2022-08-25 NOTE — PROGRESS NOTES
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 female who presents with abnormal screening MMG 22.     Follow-up mammogram (8/15/22) showed   1) right breast 8 mm calcifications in a grouped distribution seen in the upper inner quadrant of the right breast in the middle depth,   2) right breast calcifications at the 4 o'clock in the middle depth,  and   3) a left breast mass measuring 9 mm x 9 mm x 6 mm at the posterior 12 o'clock position, 6 CFM.      A stereotactic biopsy was performed on the right breast calcifications on 22 with pathology revealing ductal carcinoma in situ of the breast.     Biopsy not performed for the left lesion or for the R breast calcifications at 4 o'clock.    Patient does routinely do self breast exams.  Patient does not have noted a change on breast exam.  Patient denies nipple discharge. Patient denies to previous breast biopsy. Patient denies a personal history of breast cancer.    Findings at that time were the following for R breast calcification Upper Inner Quadrant:   Tumor size: 8mm  Tumor thgthrthathdtheth:th th4th Estrogen Receptor: + (80%)  Progesterone Receptor: + (30%)  Lymph node status: clinically neg  Lymphatic invasion: not indicated       GYN History:  Age of menarche was 11. Patient is . Age of first live birth was 19. Patient did not breast feed (attempted BF for 2 weeks per child but unable to maintain supply). Pt is perimenopausal.  Last menstrual period was . Patient admits to hormonal therapy (premerin after hysterectomy in , only took for a year).    Smokes .5 pack a day, been smoking on and off for ~20 years    Breast cancer in paternal grandmother + unknown breast disease that resulted in bilat mastectomy in maternal aunt    Past  Medical History:   Diagnosis Date    Anxiety      Past Surgical History:   Procedure Laterality Date     SECTION      x2    HYSTERECTOMY      ROGER, ovaries remain (AUB)    TONSILLECTOMY      TYMPANOSTOMY TUBE PLACEMENT      x6     Current Outpatient Medications on File Prior to Visit   Medication Sig Dispense Refill    buPROPion (WELLBUTRIN XL) 300 MG 24 hr tablet 1 po qod x 30 days then as directed 90 tablet 3    diazePAM (VALIUM) 5 MG tablet One p.o. q.d. p.r.n. anxiety 30 tablet 2    DULoxetine (CYMBALTA) 30 MG capsule One p.o. q.d. times 30 days--then 1 p.o. q.o.d. times 30 days--the 1po Q 3 days times 30 days 60 capsule 0    gabapentin (NEURONTIN) 300 MG capsule Take 300 mg by mouth 2 (two) times daily.      pantoprazole (PROTONIX) 40 MG tablet Take 1 tablet (40 mg total) by mouth once daily. 30 tablet 11    traZODone (DESYREL) 100 MG tablet TAKE 1 TABLET(100 MG) BY MOUTH EVERY EVENING 30 tablet 5    valACYclovir (VALTREX) 1000 MG tablet Take 2 tablets (2,000 mg total) by mouth every 12 (twelve) hours. For 1 day at sign of breakout 30 tablet 0    losartan (COZAAR) 50 MG tablet Take 1 tablet (50 mg total) by mouth once daily. 90 tablet 3     No current facility-administered medications on file prior to visit.     Social History     Socioeconomic History    Marital status:    Tobacco Use    Smoking status: Former     Packs/day: 1.00     Years: 22.00     Pack years: 22.00     Types: Cigarettes     Quit date: 5/10/2016     Years since quittin.3    Smokeless tobacco: Never   Substance and Sexual Activity    Alcohol use: No    Drug use: No    Sexual activity: Yes     Partners: Male     Birth control/protection: None     Social Determinants of Health     Financial Resource Strain: Low Risk     Difficulty of Paying Living Expenses: Not very hard   Food Insecurity: No Food Insecurity    Worried About Running Out of Food in the Last Year: Never true    Ran Out of Food in the Last Year: Never true    Transportation Needs: No Transportation Needs    Lack of Transportation (Medical): No    Lack of Transportation (Non-Medical): No   Physical Activity: Insufficiently Active    Days of Exercise per Week: 3 days    Minutes of Exercise per Session: 30 min   Stress: Stress Concern Present    Feeling of Stress : Very much   Social Connections: Unknown    Frequency of Communication with Friends and Family: Once a week    Frequency of Social Gatherings with Friends and Family: Once a week    Active Member of Clubs or Organizations: No    Attends Club or Organization Meetings: Never    Marital Status: Living with partner   Housing Stability: High Risk    Unable to Pay for Housing in the Last Year: Yes    Number of Places Lived in the Last Year: 2    Unstable Housing in the Last Year: No     Family History   Problem Relation Age of Onset    Genetic Disorder Mother         JAK2+ ET    Lung cancer Mother 63        ~30-yr smoker    Skin cancer Mother 67        mole nasal bridge (type?); maybe also one on hip    Hepatitis Father         w/wo cirrhosis?    Hypertension Father     Hypertension Sister     Von Willebrand disease Sister     Cancer Maternal Grandmother 83        origin? brain (mets?)    Lung cancer Maternal Grandfather         long-term smoker    Hemophilia Daughter         hemophilia A    Von Willebrand disease Daughter     Fibrocystic breast disease Maternal Aunt     Lung cancer Maternal Aunt         believes was smoker        Review of Systems   Constitutional:  Negative for appetite change, chills, diaphoresis, fatigue, fever and unexpected weight change.   HENT:  Negative for facial swelling, postnasal drip and sore throat.    Eyes:  Negative for redness, itching and visual disturbance.   Respiratory:  Negative for chest tightness, shortness of breath and wheezing.    Cardiovascular:  Negative for chest pain and palpitations.   Gastrointestinal:  Negative for blood in stool, diarrhea, nausea and vomiting.    Genitourinary:  Negative for difficulty urinating and dysuria.   Musculoskeletal:  Negative for arthralgias and joint swelling.   Skin:  Negative for rash and wound.   Neurological:  Negative for dizziness and syncope.   Hematological:  Negative for adenopathy.   Psychiatric/Behavioral:  Negative for agitation. The patient is not nervous/anxious.    Objective:   BP (!) 151/99 (BP Location: Left arm, Patient Position: Sitting, BP Method: Medium (Automatic))   Pulse 86   Ht 5' (1.524 m)   Wt 57.6 kg (127 lb)   LMP 06/01/2001 (Approximate)   BMI 24.80 kg/m²     Physical Exam   Constitutional: She appears well-developed.   HENT:   Head: Normocephalic.   Cardiovascular:  Normal rate.            Pulmonary/Chest: Effort normal. No respiratory distress. She has no wheezes. Right breast exhibits no inverted nipple, no mass, no nipple discharge, no skin change and no tenderness. Left breast exhibits no inverted nipple, no mass, no nipple discharge, no skin change and no tenderness.       Abdominal: Soft. There is no abdominal tenderness.   Musculoskeletal: Normal range of motion. No tenderness.   Neurological: She is alert.   Skin: Skin is warm and dry. No rash noted.     Psychiatric: She has a normal mood and affect.     Radiology review: Images personally reviewed by me in the clinic.   Screening [facility] 7/26/22 Findings:  This procedure was performed using tomosynthesis. Computer-aided detection was utilized in the interpretation of this examination. The breasts are heterogeneously dense, which may obscure small masses.    Left There is a focal asymmetry seen in the left breast at 12 o'clock in the middle depth.   Right There are amorphous calcifications in a grouped distribution seen in the upper inner quadrant of the right breast in the middle depth.    Impression: Left Focal Asymmetry: Left breast focal asymmetry at the middle 12 o'clock position. Assessment: 0 - Incomplete. Diagnostic Mammogram and/or Ultrasound is  recommended.    Right Calcifications: Right breast calcifications at the upper inner middle position. Assessment: 0 - Incomplete. Diagnostic Mammogram and/or Ultrasound is recommended.    BI-RADS Category:   Overall: 0 - Incomplete: Needs Additional Imaging Evaluation      8/15 Diag MMG:   Mammo Digital Diagnostic Bilat with Ren  Left Mass: There is a mass seen in the left breast at 12 o'clock in the posterior depth. This most likely corresponds to a similar mass is seen on prior mammogram dated 06/17/2014.      Right Calcifications: There are 8 mm fine linear or fine-linear branching calcifications in a grouped distribution seen in the upper inner quadrant of the right breast in the middle depth. The calcifications correlate with the prior mammogram finding.   Calcifications: There are amorphous calcifications in a grouped distribution seen in the right breast at 4 o'clock in the middle depth on the CC view.      US Breast Left Limited  Left Mass: There is a 9 mm x 9 mm x 6 mm oval, parallel, hypoechoic mass with circumscribed margins seen in the left breast at 12 o'clock in the posterior depth, 6 cm from the nipple.    Impression:   Right Calcifications: Right breast 8 mm calcifications at the upper inner middle position. Assessment: 4 - Suspicious finding. Stereotactic Biopsy is recommended.   Calcifications: Right breast calcifications at the middle 4 o'clock position. Assessment: 3 - Probably benign. Short Interval Follow-Up in 6 Months is recommended if above biopsy results are benign.    Left Mass: Left breast 9 mm x 9 mm x 6 mm mass at the posterior 12 o'clock position. Assessment: 3 - Probably benign. Short Interval Follow-Up in 6 Months is recommended as long as above biopsy results are benign.      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 We reviewed  the existing data noting the equivalency of breast conserving surgery with radiation therapy and mastectomy. We also reviewed the guidelines of the National Comprehensive Cancer Network for Stage 0 breast carcinoma. We discussed the need for lumpectomy margins to be negative for carcinoma, the necessity for postoperative radiation therapy after breast conservation in most cases, the possibility of a failed or false negative sentinel lymph node biopsy and the potential need for complete lymphadenectomy for a failed or positive sentinel lymph node biopsy were fully discussed. In the setting of mastectomy, delayed or immediate reconstruction options are available and were discussed.     In the setting of lumpectomy, radiation therapy would be recommended majority of the time.  The duration and treatment side effects were discussed with the patient.  This will coordinated with the radiation oncologist pending final pathology.    We also discussed the role of systemic therapy in the treatment of early stage breast cancer.  We discussed that this is based on tumor biology and [place] status and will be determined based on final pathology.  We discussed that if the cancer is hormone positive, endocrine therapy would be recommended in most cases and its use can reduce the risk of recurrence as well as improve survival. Side effects of treatment were briefly discussed. We also discussed the potential role for chemotherapy based on a number of factors such as tumor phenotype (ER+ vs. triple negative vs. Cdg1rhl+) and this would be determined in coordination with the medical oncologist.    Obtain biopsies on two other suspicious sites on Diag MMG.  Obtain genetic testing (to include hematology testing).  Fam hx of Von Willebrand and JAK2  MRI scheduled on 29 Aug  Meet with plastics - if additional biopsies are positive, she may not be a lumpectomy candidate    The patient, in consultation with her family, has elected to proceed  with right partial mastectomy and sentinel lymph node biopsy if additional studies allow.  She may need mastectomy so will meet with plastics in case needed. The operative risks of bleeding, infection, recurrence, scarring, and anesthetic complications and the possibility of requiring further surgery were all noted and informed consent obtained.    Surgery scheduled. Follow-up in clinic roughly 14 days after surgery.     Patient was educated on breast cancer, receptors, wire localization lumpectomy, mastectomy, sentinel lymph node mapping and biopsy, axillary lymph node dissection, reconstruction, breast prosthesis with post-mastectomy bra and radiation therapy. Patient was given patient information binder including [facility] breast cancer treatment brochure.  All her questions were answered.    Total time spent with the patient: 65 minutes.  45 minutes of face to face consultation and 20 minutes of chart review and coordination of care.

## 2022-08-26 ENCOUNTER — DOCUMENTATION ONLY (OUTPATIENT)
Dept: HEMATOLOGY/ONCOLOGY | Facility: CLINIC | Age: 47
End: 2022-08-26
Payer: MEDICAID

## 2022-08-26 NOTE — NURSING
Nurse Navigator Note:     Met with patient during her consult with Dr. Sawyer.  Patient and I reviewed the information she discussed with Dr. Sawyer, including treatment options, diagnosis, and future plans for workup. Patient and I went through the new patient binder, explained some of the information and why it is provided.     Also offered patient consults with our other specialty clinics: Dr. Randhawa for gynecological health during treatment, our breast physical therapy department for pre-op and post-operative assessments, Dr. Winters for psychological support, and Nikki Rothman for nutritional counseling. Explained to patient that all of these support services are completely optional. Discussed that physical therapy may call patient to offer pre-op appt, and what that appt would entail.     Patient was given a copy of her appointments, Dr. Sawyer's card, and my card. Encouraged her to call me if she has any questions or concerns or would like to schedule any additional appointments. Verbalized understanding of all information.   Oncology Navigation   Intake  Date of Diagnosis: 8/18/2022  Cancer Type: Breast  Internal / External Referral: Internal  Referral Source: Dr. Torres  Date of Referral: 8/23/2022  Initial Nurse Navigator Contact: 8/23/2022  Referral to Initial Contact Timeline (days): 0  Date Worked: 8/26/2022  First Appointment Available: 8/25/2022  Appointment Date: 8/25/2022  First Available Date vs. Scheduled Date (days): 0     Treatment  Current Status: Staging work-up    Surgical Oncologist: Dr. Sawyer  Consult Date: 8/25/2022          Procedures: Biopsy; MRI  Biopsy Schedule Date: 8/18/2022  MRI Schedule Date: 8/29/2022       ER: Positive  OR: Positive           Acuity  Treatment Tolerability: Has not started treatment yet/treatment fully completed and side effects resolved  Comorbidities in Medical History: 0  Hospitalization Within the Past Month: 0   Needed: 0  Support:  0  Verbalizes Financial Concerns: 0  Transportation: 0  History of noncompliance/frequent no shows and cancellations: 0  Verbalizes the need for more education: 0  Navigation Acuity: 0     Follow Up  Follow up in about 4 days (around 8/30/2022), or MRI 8/29, does pt have appt with Efe Xiong?.

## 2022-08-29 ENCOUNTER — HOSPITAL ENCOUNTER (OUTPATIENT)
Dept: RADIOLOGY | Facility: OTHER | Age: 47
Discharge: HOME | End: 2022-08-29
Attending: SURGERY
Payer: MEDICAID

## 2022-08-29 ENCOUNTER — TELEPHONE (OUTPATIENT)
Dept: HEMATOLOGY/ONCOLOGY | Facility: CLINIC | Age: 47
End: 2022-08-29
Payer: MEDICAID

## 2022-08-29 DIAGNOSIS — D05.10 DCIS (DUCTAL CARCINOMA IN SITU): Primary | ICD-10-CM

## 2022-08-29 DIAGNOSIS — C50.219: ICD-10-CM

## 2022-08-29 PROCEDURE — 77049 MRI BREAST C-+ W/CAD BI: CPT | Mod: 26,,, | Performed by: RADIOLOGY

## 2022-08-29 PROCEDURE — 77049 MRI BREAST C-+ W/CAD BI: CPT | Mod: TC

## 2022-08-29 PROCEDURE — 77049 MRI BREAST W/WO CONTRAST, W/CAD, BILATERAL: ICD-10-PCS | Mod: 26,,, | Performed by: RADIOLOGY

## 2022-08-29 PROCEDURE — 25500020 PHARM REV CODE 255: Performed by: SURGERY

## 2022-08-29 PROCEDURE — A9577 INJ MULTIHANCE: HCPCS | Performed by: SURGERY

## 2022-08-29 RX ADMIN — GADOBENATE DIMEGLUMINE 11 ML: 529 INJECTION, SOLUTION INTRAVENOUS at 02:08

## 2022-08-29 NOTE — TELEPHONE ENCOUNTER
Spoke with pt and confirmed genetics appt.        REID Parsons RN; Efe Xiong DNP; Kathi Díaz MA; Jessenia Hayes MA  Hi Delmar, happy to see her. Are there any records regarding the mutations? I don't see anything in her chart?     Jessenia, patient has breast cancer. Please schedule her in the next urgent slot. I have an opening today at 1:30. She has an appt at [facility] at 2:30, but I could do a virtual with her.     Thank you!   Luciana           Previous Messages     ----- Message -----   From: Delmar Mckeon RN   Sent: 8/29/2022   9:51 AM CDT   To: Efe Xiong DNP, Kathi Díaz MA, *   Subject: Genetics                                          Digna would like you to see the pt so that her genetics can be ordered correctly.  She has 2 mutations that are out of the ordinary.

## 2022-08-30 ENCOUNTER — PATIENT MESSAGE (OUTPATIENT)
Dept: HEMATOLOGY/ONCOLOGY | Facility: CLINIC | Age: 47
End: 2022-08-30
Payer: MEDICAID

## 2022-08-30 ENCOUNTER — DOCUMENTATION ONLY (OUTPATIENT)
Dept: HEMATOLOGY/ONCOLOGY | Facility: CLINIC | Age: 47
End: 2022-08-30
Payer: MEDICAID

## 2022-08-30 ENCOUNTER — TELEPHONE (OUTPATIENT)
Dept: RADIOLOGY | Facility: HOSPITAL | Age: 47
End: 2022-08-30
Payer: MEDICAID

## 2022-08-30 ENCOUNTER — TELEPHONE (OUTPATIENT)
Dept: HEMATOLOGY/ONCOLOGY | Facility: CLINIC | Age: 47
End: 2022-08-30
Payer: MEDICAID

## 2022-08-30 NOTE — PROGRESS NOTES
"Information regarding mother's genetic testing (the below is an excerpt of a note from another provider which was located in mother's chart and shared with Dina with mother's permission):            "Progress Notes  - documented in this encounter  Magdaleno Celis Jr., MD - 07/15/2021 9:30 AM CDT  ...      Patient Name: Juan Sosa  YOB: 1954  Age: 66 y.o.     ...     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          Electronically signed by Magdaleno Celis Jr., MD at 07/15/2021 9:58 AM CDT  "              Efe Xiong NP  Cancer Genetics  Hematology/Oncology  [facility]  [phone]    "

## 2022-08-30 NOTE — TELEPHONE ENCOUNTER
After consulting with genetic counseling team, plan is to have pt see General Genetics for heme genetic evaluation, counseling, and potentially testing and to have cancer genetic evaluation, counseling, and potentially testing conducted with Cancer Genetics.  Had advised pt via telephone earlier this morning that we would be working to determine most appropriate means of getting the heme genetic testing ordered and would follow up with her.

## 2022-08-30 NOTE — TELEPHONE ENCOUNTER
Phoned pt to gain clarity on family genetics.  Pt stated her mother is JAK2+, while her daughter is positive for Von Willebrand and hemophilia A.  Pt does not have these relatives' genetics reports but feels she could obtain her daughter's but maybe not her mother's.  Offered to phone these relatives of pt's, only with pt's permission to do so, in an effort to try to obtain and share with pt their genetics reports; pt verbally provided her permission for me to contact her mother and daughter Liz and let both of these relatives know that pt is scheduled to see me tomorrow and that I am trying to obtain their genetics reports to share with pt.  Pt provided me with both of these relatives' names, DOBs, and addresses.

## 2022-08-30 NOTE — TELEPHONE ENCOUNTER
- Phoned mother at ~9:50AM on 08/30/2022.  A male answered and stated that the pt was still sleeping and should be available to talk sometime between 11:00AM and noon.  I will call again.  - Phoned daughter Liz at ~9:52AM on 08/30/2022.  No answer.  LVM with my direct contact information.

## 2022-08-30 NOTE — PROGRESS NOTES
Was able to reach pt's mother via telephone on 08/30/2022 at around noon regarding mother's genetics.

## 2022-08-30 NOTE — TELEPHONE ENCOUNTER
Spoke with patient. Reviewed breast biopsy procedure and reviewed instructions for breast biopsy. Patient expressed understanding and all questions were answered. Provided patient with my phone number to call for any further concerns or questions.   Patient scheduled breast biopsy at the [facility] on 9/8/2022.

## 2022-08-31 ENCOUNTER — PATIENT MESSAGE (OUTPATIENT)
Dept: HEMATOLOGY/ONCOLOGY | Facility: CLINIC | Age: 47
End: 2022-08-31

## 2022-08-31 ENCOUNTER — OFFICE VISIT (OUTPATIENT)
Dept: HEMATOLOGY/ONCOLOGY | Facility: CLINIC | Age: 47
End: 2022-08-31
Payer: MEDICAID

## 2022-08-31 ENCOUNTER — TELEPHONE (OUTPATIENT)
Dept: HEMATOLOGY/ONCOLOGY | Facility: CLINIC | Age: 47
End: 2022-08-31
Payer: MEDICAID

## 2022-08-31 DIAGNOSIS — Z83.2 FAMILY HISTORY OF BLOOD DISORDER: ICD-10-CM

## 2022-08-31 DIAGNOSIS — C50.211 MALIGNANT NEOPLASM OF UPPER-INNER QUADRANT OF RIGHT BREAST IN FEMALE, ESTROGEN RECEPTOR POSITIVE: ICD-10-CM

## 2022-08-31 DIAGNOSIS — Z17.0 MALIGNANT NEOPLASM OF UPPER-INNER QUADRANT OF RIGHT BREAST IN FEMALE, ESTROGEN RECEPTOR POSITIVE: ICD-10-CM

## 2022-08-31 DIAGNOSIS — Z71.83 ENCOUNTER FOR NONPROCREATIVE GENETIC COUNSELING: Primary | ICD-10-CM

## 2022-08-31 PROCEDURE — 4010F PR ACE/ARB THEARPY RXD/TAKEN: ICD-10-PCS | Mod: CPTII,95,, | Performed by: NURSE PRACTITIONER

## 2022-08-31 PROCEDURE — 4010F ACE/ARB THERAPY RXD/TAKEN: CPT | Mod: CPTII,95,, | Performed by: NURSE PRACTITIONER

## 2022-08-31 PROCEDURE — 99205 PR OFFICE/OUTPT VISIT, NEW, LEVL V, 60-74 MIN: ICD-10-PCS | Mod: 95,,, | Performed by: NURSE PRACTITIONER

## 2022-08-31 PROCEDURE — 99205 OFFICE O/P NEW HI 60 MIN: CPT | Mod: 95,,, | Performed by: NURSE PRACTITIONER

## 2022-08-31 NOTE — TELEPHONE ENCOUNTER
Called and spoke w/ Dina. She will be coming in tomorrow, 9/1 for 11:30am for her consent and lab draw. Confirmed our office location, she voiced thanks and understanding.    ----- Message from Efe Xiong DNP sent at 8/31/2022 12:30 PM CDT -----  Kathi,    Can you please contact pt and let her know we can go ahead and get her on the schedule for a nurse visit for tomorrow (any time after she sees Dr. Obrien, which is in the morning)?      For your reference, Kathi, it will be DNA+RNA [id number], 90190.1, 96906.2, and 699579.1.    Thank you!  Efe

## 2022-08-31 NOTE — Clinical Note
Kathi,  Can you please contact pt and let her know we can go ahead and get her on the schedule for a nurse visit for tomorrow (any time after she sees Dr. Obrien, which is in the morning)?    For your reference, Kathi, it will be DNA+RNA 96579, 54842.1, 93601.2, and 869444.1.  Thank you! Efe

## 2022-08-31 NOTE — PROGRESS NOTES
"Cancer Genetics  Hereditary and High-Risk Clinic  Department of Hematology and Oncology  [facility]    [facility]    Date of Service:  22  Visit Provider:  Efe Xiong DNP  Collaborating Physician:  Sol South MD    Patient Identification  Name: Dina Sosa  : 1975  MRN: [id number]     Referring Provider    Leeann Sawyer MD  [address] [place] [address]    Televisit Information  The patient location is: [place], [place].  The chief complaint leading to consultation is: as below.  Visit type: audiovisual.   Face-to-face time with patient: approximately 48 minutes.  Approximately 63 minutes in total were spent on this encounter, which includes face-to-face time and non-face-to-face time preparing to see the patient (e.g., review of tests), obtaining and/or reviewing separately obtained history, documenting clinical information in the electronic or other health record, independently interpreting results (not separately reported) and communicating results to the patient/family/caregiver, or care coordination (not separately reported).  Each patient to whom he or she provides medical services by telemedicine is:  (1) informed of the relationship between the physician and patient and the respective role of any other health care provider with respect to management of the patient; and (2) notified that he or she may decline to receive medical services by telemedicine and may withdraw from such care at any time.  Notes:     SUBJECTIVE      Chief Complaint: Cancer Genetic Evaluation    History of Present Illness (HPI):  Dina Sosa ("Dina"), 47 y.o., assigned female sex at birth, is new to the [facility] of [facility]ology and [facility] and to me.  She was referred by Breast Surgical Oncology for cancer genetic risk assessment.    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  If it is appearing small/illegible on your screen, expand this note window horizontally. **    Hereditary cancer genetic testing:  No  Ashkenazi Islam inheritance:  Unknown  Consanguinity in ancestors:  No  Other than noted, no known history of cancer in relatives depicted in the pedigree or in:  maternal first cousins, maternal extended family, paternal family including those distantly related.  Other than noted, no known family history of colon polyps or any benign tumor.    (Cancer-related family history includes Cancer (age of onset: 83) in her maternal grandmother; Lung cancer in her maternal aunt and maternal grandfather; Lung cancer (age of onset: 63) in her mother; Skin cancer (age of onset: 67) in her mother.)    Review of Systems  See HPI.       OBJECTIVE      Past Medical History:   Diagnosis Date    Anxiety      Patient Active Problem List    Diagnosis Date Noted    Breast cancer 08/25/2022    DCIS (ductal carcinoma in situ) 08/25/2022    History of hip surgery 07/19/2022    History of cervical spinal surgery 04/21/2022    Depression 10/21/2021    History of fusion of cervical spine 04/21/2021    Menopausal syndrome 04/21/2021    Gastroesophageal reflux disease without esophagitis 08/23/2019    Traumatic ecchymosis of left upper arm 05/09/2019    Neuropathy 05/09/2019    Lumbar facet arthropathy 05/09/2019    Lumbosacral strain 05/09/2019    Cervical strain  08/21/2018    Anxiety 05/30/2014    Environmental allergies 05/30/2014      Physical Exam  Significantly limited secondary to the inherent nature of a virtual visit.  Very pleasant patient.  Constitutional       Appearance:  She appears to be in no distress.   Neurological     Mental Status:  She is alert and oriented.  Psychiatric        Mood and Affect:  She has a normal mood and affect.     Thought Content:  Thought content is normal.         Speech:  Speech is normal.     Behavior:  Behavior is normal.     Judgment:  Judgment is normal.   Genetics-specific     It is my assessment that the patient is ready to proceed with cancer genetic testing from a psychosocial perspective.    Platelets   Date/Time Value Ref Range Status   07/19/2022 03:31  (H) 150 - 450 K/uL Final   01/22/2021 09:52  (H) 150 - 350 K/uL Final   11/25/2019 11:18  (H) 150 - 350 K/uL Final   08/23/2019 03:16  (H) 150 - 350 K/uL Final   05/03/2018 12:26  (H) 150 - 350 K/uL Final   05/02/2018 04:20  (H) 150 - 350 K/uL Final   06/20/2017 01:45  (H) 150 - 350 K/uL Final   05/22/2015 02:58  150 - 350 K/uL Final   05/30/2014 11:06  (H) 150 - 350 K/uL Final      COUNSELING      Cancer Genetics     Germline cancer genetic testing is the testing of genes associated with cancer, known as cancer susceptibility genes.  Just as these genes are inherited from parents--one copy of each gene from each parent--mutations in these genes can be inherited, as well.  A mutation in a cancer susceptibility gene adversely affects the gene's ability to prevent cancer; therefore, carriers of cancer susceptibility gene mutations may be at increased risk for certain cancers.     Causes of Cancer    Only a small percentage of cancers are caused by an inherited cancer susceptibility gene mutation; rather, most cancers are sporadic.  Causes of sporadic cancers may include environmental risk factors, lifestyle risk factors,  and non-modifiable risk factors.  It is important to note that members of a family often share not only their genetics but also risk factors including environmental and lifestyle risk factors, so cancers can be familial.     Potential Results of Genetic Testing, and Their Implications     Potential results of genetic testing include positive, negative, and variant of unknown significance (VUS).    A positive result indicates the presence of at least one clinically significant mutation, and the patient's associated cancer risks vary depending upon the cancer susceptibility gene(s) in which there is/are a mutation(s).  With a positive result, in some cases, depending upon the specific result and the patient's clinical history, modified risk management may be recommended, including measures for risk reduction and/or surveillance; however, modified management is not always an option.    A negative result indicates that no clinically significant mutations were identified in the gene(s) tested.    A VUS indicates that there is not presently enough data for the laboratory to make a determination as to whether the variant is clinically significant.  VUSs are not typically acted upon clinically.       Genetic Mutation Inheritance     If the patient tests positive for a mutation, her first-degree relatives would each have a 50% chance of having the same mutation, and other, more distant blood relatives would also be at risk of having the same mutation.       Genetic Discrimination     The Genetic Information Nondiscrimination Act (DEEPALI) is [place] federal legislation that provides some protections against use of an individual's genetic information by their health insurer and by their employer.      Title I of DEEPALI prohibits most health insurers from utilizing an individual's genetic information to make decisions regarding insurance eligibility or premium charges.  DEEPALI does not protect individuals from genetic discrimination  toward health insurance obtained through a job with the  or through the Federal Employees Health Benefits Plan.    Title II of DEEPALI prohibits covered entities, including employers, from requesting the genetic information of employees and applicants.  DEEPALI does not protect individuals from genetic discrimination by employers with fewer than 15 employees or if employed by the .    DEEPALI does not protect individuals from genetic discrimination by any other type of policies/entities, including but not limited to life insurance, disability insurance, long-term care insurance,  benefits, and  Health Services benefits.    Genetic Testing Logistics     An outside laboratory would perform the testing after a blood sample is collected here at [facility] or a saliva sample is collected by the patient at home.      There is a potential for the patient to incur out-of-pocket costs related to the genetic testing.    Results can take several weeks - typically, about 2-3 weeks.     Post-test genetic counseling can be conducted once the genetic testing results are available.     Cancer Genetics Impression:  Dina's personal/family history is not suggestive of a hereditary cancer component, unless she has more than one primary breast cancer, but regardless from a clinical standpoint hereditary breast cancer susceptibility gene testing can be considered based on patient preference.  Offered Dina options of proceeding with hereditary cancer susceptibility gene testing at this time versus delaying/declining such.  Dina desires to proceed with germline cancer genetic testing at this time and has provided her informed consent to proceed.  Provided germline cancer genetic test options of focused panel versus broad panel, and Dina opted for the latter, so we will proceed with [facility] 84-gene panel with STAT processing of as many breast cancer susceptibility genes for which [facility] offers STAT  "processing which is not all of the known breast cancer susceptibility genes.    ASSESSMENT/PLAN      Dina Sosa ("Dina"), 47 y.o., presented today for cancer genetic risk assessment.    1. Encounter for nonprocreative genetic counseling  Cancer genetic risk assessment and pre-test cancer genetic counseling were conducted.  Dina's personal/family history is not suggestive of a hereditary cancer component, unless she has more than one primary breast cancer, but regardless from a clinical standpoint hereditary breast cancer susceptibility gene testing can be considered based on patient preference.  Dina desires to proceed with germline cancer genetic testing at this time.  Test will be the [facility] Multi-Cancer + RNA Panel with STAT processing of the breast cancer genes as offered by [facility].  A blood specimen will be collected by [facility] tomorrow (09/01/2022).      2. Malignant neoplasm of upper-inner quadrant of right breast in female, estrogen receptor positive  - Ambulatory referral/consult to Genetics - Completed    3. Family history of blood disorder  Close family history of JAK2 mutation, Von Willebrand, and hemophilia A.  Patient is scheduled to meet with Dr. Filipe Obrien with [facility] tomorrow (09/01/2022) for further evaluation and management (I had requested be scheduled for soonest possible given impending breast surgery and bleeding risk).  I have reached out directly to Dr. Obrien regarding whether patient has a diagnosis of essential thrombocytosis at this time, as if so I am going to recommend skin punch biopsy for obtainment of fibroblasts for germline cancer genetic testing, in lieu of blood/saliva.    Follow-up:  Follow up for genetic test consent completion and sample collection.    Questions were encouraged and answered to the patient's satisfaction, and she verbalized understanding of information and agreement with the plan.           Efe Xiong, DOTTIE, APRN, " FNP-BC, AOCNP  Nurse Practitioner, Hereditary and High-Risk Clinic  Department of Hematology and Oncology  [facility]        Diagnosis codes for this encounter:    ICD-10-CM ICD-9-CM   1. Encounter for nonprocreative genetic counseling  Z71.83 V26.33   2. Malignant neoplasm of upper-inner quadrant of right breast in female, estrogen receptor positive  C50.211 174.2    Z17.0 V86.0   3. Family history of blood disorder  Z83.2 V18.3          Urgent Cancer Genetics Visit  Patient MRN [id number]    Appointment date and time 8/31/22 11:30 AM   Appointment type Virtual   Chief Complaint Chief Complaint   Patient presents with    Cancer Genetic Evaluation      Reason for expediting visit Breast Surgical Oncology request   Appointment scheduled via Cancer Genetics MA   Intervention Cancer genetic risk assessment, pre-test cancer genetic counseling, plan for germline cancer genetic testing   Disposition Follow up for genetic test consent completion and sample collection   Time spent on encounter 63 minutes   Was the expediting of this visit ultimately appropriate?   yes

## 2022-09-01 ENCOUNTER — OFFICE VISIT (OUTPATIENT)
Dept: HEMATOLOGY/ONCOLOGY | Facility: CLINIC | Age: 47
End: 2022-09-01
Payer: MEDICAID

## 2022-09-01 ENCOUNTER — TELEPHONE (OUTPATIENT)
Dept: SURGERY | Facility: CLINIC | Age: 47
End: 2022-09-01
Payer: MEDICAID

## 2022-09-01 ENCOUNTER — LAB VISIT (OUTPATIENT)
Dept: LAB | Facility: HOSPITAL | Age: 47
End: 2022-09-01
Attending: NURSE PRACTITIONER
Payer: MEDICAID

## 2022-09-01 ENCOUNTER — TELEPHONE (OUTPATIENT)
Dept: HEMATOLOGY/ONCOLOGY | Facility: CLINIC | Age: 47
End: 2022-09-01
Payer: MEDICAID

## 2022-09-01 ENCOUNTER — CLINICAL SUPPORT (OUTPATIENT)
Dept: HEMATOLOGY/ONCOLOGY | Facility: CLINIC | Age: 47
End: 2022-09-01
Payer: MEDICAID

## 2022-09-01 DIAGNOSIS — Z83.2 FAMILY HISTORY OF VON WILLEBRAND DISEASE: ICD-10-CM

## 2022-09-01 DIAGNOSIS — Z17.0 MALIGNANT NEOPLASM OF UPPER-INNER QUADRANT OF RIGHT BREAST IN FEMALE, ESTROGEN RECEPTOR POSITIVE: ICD-10-CM

## 2022-09-01 DIAGNOSIS — C50.211 MALIGNANT NEOPLASM OF UPPER-INNER QUADRANT OF RIGHT BREAST IN FEMALE, ESTROGEN RECEPTOR POSITIVE: Primary | ICD-10-CM

## 2022-09-01 DIAGNOSIS — D75.839 THROMBOCYTOSIS: ICD-10-CM

## 2022-09-01 DIAGNOSIS — D68.00 VON WILLEBRAND DISEASE: Primary | ICD-10-CM

## 2022-09-01 DIAGNOSIS — C50.211 MALIGNANT NEOPLASM OF UPPER-INNER QUADRANT OF RIGHT BREAST IN FEMALE, ESTROGEN RECEPTOR POSITIVE: ICD-10-CM

## 2022-09-01 DIAGNOSIS — Z17.0 MALIGNANT NEOPLASM OF UPPER-INNER QUADRANT OF RIGHT BREAST IN FEMALE, ESTROGEN RECEPTOR POSITIVE: Primary | ICD-10-CM

## 2022-09-01 LAB
BASOPHILS # BLD AUTO: 0.06 K/UL (ref 0–0.2)
BASOPHILS NFR BLD: 0.6 % (ref 0–1.9)
DIFFERENTIAL METHOD: ABNORMAL
EOSINOPHIL # BLD AUTO: 0.1 K/UL (ref 0–0.5)
EOSINOPHIL NFR BLD: 1.4 % (ref 0–8)
ERYTHROCYTE [DISTWIDTH] IN BLOOD BY AUTOMATED COUNT: 13.5 % (ref 11.5–14.5)
FACT VIII ACT/NOR PPP: 116 % (ref 60–170)
FERRITIN SERPL-MCNC: 59 NG/ML (ref 20–300)
HCT VFR BLD AUTO: 41.3 % (ref 37–48.5)
HGB BLD-MCNC: 13.3 G/DL (ref 12–16)
IMM GRANULOCYTES # BLD AUTO: 0.07 K/UL (ref 0–0.04)
IMM GRANULOCYTES NFR BLD AUTO: 0.7 % (ref 0–0.5)
IRON SERPL-MCNC: 63 UG/DL (ref 30–160)
LYMPHOCYTES # BLD AUTO: 2.1 K/UL (ref 1–4.8)
LYMPHOCYTES NFR BLD: 21.5 % (ref 18–48)
MCH RBC QN AUTO: 27 PG (ref 27–31)
MCHC RBC AUTO-ENTMCNC: 32.2 G/DL (ref 32–36)
MCV RBC AUTO: 84 FL (ref 82–98)
MONOCYTES # BLD AUTO: 0.6 K/UL (ref 0.3–1)
MONOCYTES NFR BLD: 6 % (ref 4–15)
NEUTROPHILS # BLD AUTO: 6.7 K/UL (ref 1.8–7.7)
NEUTROPHILS NFR BLD: 69.8 % (ref 38–73)
NRBC BLD-RTO: 0 /100 WBC
PLATELET # BLD AUTO: 414 K/UL (ref 150–450)
PMV BLD AUTO: 8.9 FL (ref 9.2–12.9)
RBC # BLD AUTO: 4.92 M/UL (ref 4–5.4)
SATURATED IRON: 17 % (ref 20–50)
TOTAL IRON BINDING CAPACITY: 370 UG/DL (ref 250–450)
TRANSFERRIN SERPL-MCNC: 250 MG/DL (ref 200–375)
WBC # BLD AUTO: 9.53 K/UL (ref 3.9–12.7)

## 2022-09-01 PROCEDURE — 81270 JAK2 GENE: CPT | Performed by: INTERNAL MEDICINE

## 2022-09-01 PROCEDURE — 1159F PR MEDICATION LIST DOCUMENTED IN MEDICAL RECORD: ICD-10-PCS | Mod: CPTII,95,, | Performed by: INTERNAL MEDICINE

## 2022-09-01 PROCEDURE — 1160F PR REVIEW ALL MEDS BY PRESCRIBER/CLIN PHARMACIST DOCUMENTED: ICD-10-PCS | Mod: CPTII,95,, | Performed by: INTERNAL MEDICINE

## 2022-09-01 PROCEDURE — 99214 OFFICE O/P EST MOD 30 MIN: CPT | Mod: 95,,, | Performed by: INTERNAL MEDICINE

## 2022-09-01 PROCEDURE — 85246 CLOT FACTOR VIII VW ANTIGEN: CPT | Performed by: INTERNAL MEDICINE

## 2022-09-01 PROCEDURE — 85397 CLOTTING FUNCT ACTIVITY: CPT | Performed by: INTERNAL MEDICINE

## 2022-09-01 PROCEDURE — 1159F MED LIST DOCD IN RCRD: CPT | Mod: CPTII,95,, | Performed by: INTERNAL MEDICINE

## 2022-09-01 PROCEDURE — 84466 ASSAY OF TRANSFERRIN: CPT | Performed by: INTERNAL MEDICINE

## 2022-09-01 PROCEDURE — 1160F RVW MEDS BY RX/DR IN RCRD: CPT | Mod: CPTII,95,, | Performed by: INTERNAL MEDICINE

## 2022-09-01 PROCEDURE — 85025 COMPLETE CBC W/AUTO DIFF WBC: CPT | Performed by: INTERNAL MEDICINE

## 2022-09-01 PROCEDURE — 85245 CLOT FACTOR VIII VW RISTOCTN: CPT | Performed by: INTERNAL MEDICINE

## 2022-09-01 PROCEDURE — 81339 MPL GENE SEQ ALYS EXON 10: CPT | Performed by: INTERNAL MEDICINE

## 2022-09-01 PROCEDURE — 85240 CLOT FACTOR VIII AHG 1 STAGE: CPT | Performed by: INTERNAL MEDICINE

## 2022-09-01 PROCEDURE — 4010F ACE/ARB THERAPY RXD/TAKEN: CPT | Mod: CPTII,95,, | Performed by: INTERNAL MEDICINE

## 2022-09-01 PROCEDURE — 81219 CALR GENE COM VARIANTS: CPT | Performed by: INTERNAL MEDICINE

## 2022-09-01 PROCEDURE — 99214 PR OFFICE/OUTPT VISIT, EST, LEVL IV, 30-39 MIN: ICD-10-PCS | Mod: 95,,, | Performed by: INTERNAL MEDICINE

## 2022-09-01 PROCEDURE — 82728 ASSAY OF FERRITIN: CPT | Performed by: INTERNAL MEDICINE

## 2022-09-01 PROCEDURE — 4010F PR ACE/ARB THEARPY RXD/TAKEN: ICD-10-PCS | Mod: CPTII,95,, | Performed by: INTERNAL MEDICINE

## 2022-09-01 NOTE — TELEPHONE ENCOUNTER
Discussed MRI results with patient. Area of biopsy proven DCIS (4.7 cm on MRI versus 8 mm on mammogram). There is additional are of calcs in the right breast and left breast mass which she is scheduled for biopsy on 9/8/2022. Discussed mastectomy and reconstruction options. She is scheduled with Dr. Barrett on 9/15/2022. Will have her meet back in clinic or phone call after biopsy results. Patient agreeable to plan.

## 2022-09-01 NOTE — PROGRESS NOTES
Met with the patient to review and sign consents for genetic testing through [facility]. Gave patient the [facility] card with the tracking information for the test, reviewed follow up timeline and procedures, and escorted the patient to the lobby.

## 2022-09-01 NOTE — TELEPHONE ENCOUNTER
Discussed pt with Dr. Obrien after he met with her this AM.  Based on our conversation, since pt doesn't currently have a diagnosis of or strong suspicion for polycythemia vera, essential thrombocytosis, or any other blood disorder as of yet, I'll go ahead and get blood for her germline cancer genetics; then, if something comes back on Dr. Obrien's ordered blood work that would necessitate a skin sample for her germline cancer genetics, we'll get that at that point, to avoid her undergoing a skin punch biopsy unnecessarily.  Dr. Obrien and I agreed this was a reasonable path forward.    Phoned the pt and discussed the above, and pt is in agreement with the plan.    Pt will come to [facility] today for blood work for both my and Dr. Obrien's labs.

## 2022-09-01 NOTE — PROGRESS NOTES
HEMATOLOGIC MALIGNANCIES CONSULT NOTE    IDENTIFYING STATEMENT   Dina Sosa (Dina) is a 47 y.o. female with a  of 1975 from [place], [place], referred by Efe Xiong DNP for evaluation of family history of JAK2+ hematologic neoplasm and personal history of thrombocytosis.     HISTORY OF PRESENT ILLNESS:      Ms. Sosa is 47, has recent diagnosis of R breast DCIS and is evaluated as a referral from Efe Xiong who identified family history of a  blood cancer disorder. She reports the following:     Family history -   - mother with JAK2 mutation -   - oldest daughter (27) - diagnosed with von Willebrand disease - dx made at [facility]     No personal history of bleeding problems, including after surgeries and deliveries.     Past Medical History:   Diagnosis Date    Anxiety        Family History   Problem Relation Age of Onset    Genetic Disorder Mother         JAK2+ ET    Lung cancer Mother 63        ~30-yr smoker    Skin cancer Mother 67        mole nasal bridge (type?); maybe also one on hip    Hepatitis Father         w/wo cirrhosis?    Hypertension Father     Hypertension Sister     Von Willebrand disease Sister     Cancer Maternal Grandmother 83        origin? brain (mets?)    Lung cancer Maternal Grandfather         long-term smoker    Hemophilia Daughter         hemophilia A    Von Willebrand disease Daughter     Fibrocystic breast disease Maternal Aunt     Lung cancer Maternal Aunt         believes was smoker       Social History     Socioeconomic History    Marital status:    Tobacco Use    Smoking status: Former     Packs/day: 1.00     Years: 22.00     Pack years: 22.00     Types: Cigarettes     Quit date: 5/10/2016     Years since quittin.3    Smokeless tobacco: Never   Substance and Sexual Activity    Alcohol use: No    Drug use: No    Sexual activity: Yes     Partners: Male     Birth control/protection: None     Social Determinants of Health     Financial Resource Strain:  Low Risk     Difficulty of Paying Living Expenses: Not very hard   Food Insecurity: No Food Insecurity    Worried About Running Out of Food in the Last Year: Never true    Ran Out of Food in the Last Year: Never true   Transportation Needs: No Transportation Needs    Lack of Transportation (Medical): No    Lack of Transportation (Non-Medical): No   Physical Activity: Insufficiently Active    Days of Exercise per Week: 3 days    Minutes of Exercise per Session: 30 min   Stress: Stress Concern Present    Feeling of Stress : Very much   Social Connections: Unknown    Frequency of Communication with Friends and Family: Once a week    Frequency of Social Gatherings with Friends and Family: Once a week    Active Member of Clubs or Organizations: No    Attends Club or Organization Meetings: Never    Marital Status: Living with partner   Housing Stability: High Risk    Unable to Pay for Housing in the Last Year: Yes    Number of Places Lived in the Last Year: 2    Unstable Housing in the Last Year: No         MEDICATIONS:     Current Outpatient Medications on File Prior to Visit   Medication Sig Dispense Refill    buPROPion (WELLBUTRIN XL) 300 MG 24 hr tablet 1 po qod x 30 days then as directed 90 tablet 3    diazePAM (VALIUM) 5 MG tablet One p.o. q.d. p.r.n. anxiety 30 tablet 2    DULoxetine (CYMBALTA) 30 MG capsule One p.o. q.d. times 30 days--then 1 p.o. q.o.d. times 30 days--the 1po Q 3 days times 30 days 60 capsule 0    gabapentin (NEURONTIN) 300 MG capsule Take 300 mg by mouth 2 (two) times daily.      losartan (COZAAR) 50 MG tablet Take 1 tablet (50 mg total) by mouth once daily. 90 tablet 3    pantoprazole (PROTONIX) 40 MG tablet Take 1 tablet (40 mg total) by mouth once daily. 30 tablet 11    traZODone (DESYREL) 100 MG tablet TAKE 1 TABLET(100 MG) BY MOUTH EVERY EVENING 30 tablet 5    valACYclovir (VALTREX) 1000 MG tablet Take 2 tablets (2,000 mg total) by mouth every 12 (twelve) hours. For 1 day at sign of  breakout 30 tablet 0     No current facility-administered medications on file prior to visit.       ALLERGIES:   Review of patient's allergies indicates:   Allergen Reactions    Codeine Itching    Lexapro [escitalopram oxalate] Other (See Comments)     sedation    Morphine Swelling        ROS:       Review of Systems   Constitutional:  Positive for appetite change. Negative for unexpected weight change.   HENT:   Negative for mouth sores.    Respiratory:  Negative for cough and shortness of breath.    Cardiovascular:  Negative for chest pain.   Gastrointestinal:  Negative for abdominal pain and diarrhea.   Genitourinary:  Negative for frequency.    Musculoskeletal:  Positive for back pain.   Skin:  Negative for rash.   Neurological:  Positive for headaches.   Hematological:  Negative for adenopathy.   Psychiatric/Behavioral:  The patient is nervous/anxious.      PHYSICAL EXAM:  There were no vitals filed for this visit.    Physical Exam  Head and neck visualized. No abnormalities seen.     LAB:   Results for orders placed or performed during the hospital encounter of 08/18/22   Specimen to Pathology, Radiology Breast, needle biopsy   Result Value Ref Range    Final Pathologic Diagnosis       RIGHT BREAST CALCIFICATIONS, UPPER INNER QUADRANT, NEEDLE CORE BIOPSIES:  -  High-grade ductal carcinoma in situ with focal comedo necrosis.          Architectural patterns:  Cribriform, solid and comedo.          Nuclear grade:  Grade III (high).          Necrosis:  Present, central (expansive comedo necrosis).  -  Fibrocystic changes with columnar cell change, focal usual ductal  hyperplasia, focal sclerosing adenosis, and stromal fibrosis.  -  No evidence of invasive malignancy.  BREAST BIOMARKER RESULTS ON DCIS:  Estrogen receptor (ER) status:  Positive.       Percentage of cells with nuclear positivity:  80%.       Average intensity of staining:  Moderate to strong.  Progesterone receptor (PgR) status:  Positive.        "Percentage of cells with nuclear positivity:  30%.       Average intensity of staining:  Moderate.  COMMENT:  Dr. Sandhya Westbrook has reviewed this case and concurs in the  diagnosis.      Gross       Patient ID/Pathology ID [id number]  In formalin, labeled "right breast calcifications, upper inner quadrant ", in  a multi chamber container is a 3.0 x 2.2 cm aggregate of pink-yellow needle  biopsy cores.  Entirely submitted in cassettes:  [id number]:  Chambers A, G and H  [id number]:  Remainder chambers  Ischemic time is 2 minutes.  The fixation time is greater than 6 hrs and less  than 72 hrs.  A gross picture of the container and cassette is taken and  added to file.  Cameron MASSEY ([facility]) cm      Microscopic Exam       The right breast needle core biopsies from the upper inner quadrant reveal  high-grade ductal carcinoma in situ. These ducts demonstrate a proliferation  of malignant cells which are large and have increased nuclear to cytoplasmic  ratios.  The cells have vesicular nuclei with enlarged nucleoli.  There is  cellular pleomorphism and scattered mitotic figures.  The ducts demonstrate  cribriform, solid, and comedo architectural patterns.  No definitive  microcalcifications are identified.  There is periductal fibrosis and chronic  inflammation; however, there is no evidence of invasive malignancy.  The  surrounding benign breast tissue demonstrates columnar cell change, focal  usual ductal hyperplasia, focal sclerosing adenosis, and stromal fibrosis.  Immunostains for p63 are examined on blocks A and B and immunostains for ER  and ME are examined on block A to further evaluate the biopsies; the positive  and negative IHC tissue controls performed adequately.  The immunostain for  p63 on b lock A is positive for myoepithelial cells around the DCIS.  The  immunostain for p63 on block B is noncontributory because the focus of DCIS  is no longer present for evaluation.  The immunostain for ER on " block A  demonstrates moderate to strong nuclear staining in 80% of the tumor cells  and the immunostain for MA demonstrates moderate to focally strong and weak  nuclear staining in 30% of the tumor cells.      Disclaimer       Unless the case is a 'gross only' or additional testing only, the final  diagnosis for each specimen is based on a microscopic examination of  appropriate tissue sections.  ER immunohistochemical staining (clone SP1, DAB detection method) is  performed on formalin-fixed, paraffin embedded tissue sections. The  percentage of cell nuclei stained and the strength of staining is reported  (weak, moderate, strong), using the 2010 ACSO/CAP scoring guidelines. Tumors  used for determing predictive markers are fixed in10% neutral buffered  formalin for 6-72 hours. It has been cleared by the U.S[place] FDA for use as an  IVD test. This assay has not been validated on decalcified tissues. Results  should be interpreted with caution given the likelihood of false negativity  on decalcified specimens.  PgR immunohistochemical staining (clone 1E2, DAB detection method) is  performed on formalin-fixed, paraffin embedded tissue sections. The  percentage of cell nuclei stained and the strength of staining is  report  (weak, moderate, strong), using 2010 ASCO/CAP scoring guidelines. Tumors used  for determing predictive markers are fixed in 10% neutral buffered formalin  for 6-72 hours. It has been cleared by the [place].S. FDA for use as an IVD test.  This assay has not been validated on decalcified tissues. Results should be  interpreted with caution given the likelihood of false negativity on  decalcified specimens.         PROBLEMS ASSESSED THIS VISIT:    No diagnosis found.    IMPRESSION:    1. Von Willebrand disease (subtype pending confirmation)    2. Ductal carcinoma in situ of the right breast    3. Neuropathy  4. Anxiety and depression  5. Gastroesophageal reflux disease    PLAN:       Von Willebrand  Disease  Ms. Sosa has evidence of possible vWD, though it is noted that she has not had any actual bleeding events. Thus, it is likely that she may be a carrier for either Type 1, Type 2a, or Type 2b vWD. As these are autosomal dominant in inheritance, it is possible that she may have passed this to one of her children.    Given the very mild decrease in vWF activity, we will repeat testing as well as obtain multimer and ristocetin-induced platelet aggregation studies.     I do not anticipate any bleeding complications of surgery, as she has had multiple hemostatic challenges in the past without bleeding problems.    Thrombocytosis  Resolved on repeat labs. Given family history of JAK2-positive MPN, we are repeating this; however, I have low expectation that she will have a myeloproliferative neoplastic disorder.    Follow-up  Pending repeat testing    Route Chart for Scheduling    BMT Chart Routing      Follow up with physician 1 month. in benign heme clinic   Follow up with ERICA    Infusion scheduling note    Injection scheduling note    Labs   Lab interval:  Please schedule labs for von willebrand antigen, von willebrand activity, factor 8, platelet aggregation, and von willebrand multimer analysis   Imaging    Pharmacy appointment    Other referrals             Filipe Obrien MD  Hematology and Stem Cell Transplant

## 2022-09-03 LAB
VWF AG ACT/NOR PPP IA: 61 % (ref 55–200)
VWF:AC ACT/NOR PPP IA: 43 % (ref 55–200)

## 2022-09-06 ENCOUNTER — PATIENT MESSAGE (OUTPATIENT)
Dept: SURGERY | Facility: CLINIC | Age: 47
End: 2022-09-06
Payer: MEDICAID

## 2022-09-06 PROBLEM — D05.11 DUCTAL CARCINOMA IN SITU (DCIS) OF RIGHT BREAST: Status: ACTIVE | Noted: 2022-08-25

## 2022-09-07 LAB — VWF:RCO ACT/NOR PPP PL AGG: 49 % (ref 55–200)

## 2022-09-08 ENCOUNTER — HOSPITAL ENCOUNTER (OUTPATIENT)
Dept: RADIOLOGY | Facility: HOSPITAL | Age: 47
Discharge: HOME | End: 2022-09-08
Attending: SURGERY
Payer: MEDICAID

## 2022-09-08 DIAGNOSIS — R92.8 ABNORMAL FINDING ON BREAST IMAGING: ICD-10-CM

## 2022-09-08 DIAGNOSIS — D05.11 DUCTAL CARCINOMA IN SITU (DCIS) OF RIGHT BREAST: Primary | ICD-10-CM

## 2022-09-08 PROCEDURE — 77066 DX MAMMO INCL CAD BI: CPT | Mod: 26,,, | Performed by: RADIOLOGY

## 2022-09-08 PROCEDURE — 88341 IMHCHEM/IMCYTCHM EA ADD ANTB: CPT | Mod: 26,,, | Performed by: PATHOLOGY

## 2022-09-08 PROCEDURE — 19081 MAMMO BREAST STEREOTACTIC BREAST BIOPSY RIGHT: ICD-10-PCS | Mod: RT,,, | Performed by: RADIOLOGY

## 2022-09-08 PROCEDURE — 88305 TISSUE EXAM BY PATHOLOGIST: CPT | Performed by: PATHOLOGY

## 2022-09-08 PROCEDURE — 77066 DX MAMMO INCL CAD BI: CPT | Mod: TC

## 2022-09-08 PROCEDURE — 25000003 PHARM REV CODE 250: Performed by: SURGERY

## 2022-09-08 PROCEDURE — 88342 IMHCHEM/IMCYTCHM 1ST ANTB: CPT | Performed by: PATHOLOGY

## 2022-09-08 PROCEDURE — 88305 TISSUE EXAM BY PATHOLOGIST: CPT | Mod: 26,,, | Performed by: PATHOLOGY

## 2022-09-08 PROCEDURE — 19081 BX BREAST 1ST LESION STRTCTC: CPT | Mod: RT,,, | Performed by: RADIOLOGY

## 2022-09-08 PROCEDURE — 19083 US BREAST BIOPSY WITH IMAGING 1ST SITE LEFT: ICD-10-PCS | Mod: LT,,, | Performed by: RADIOLOGY

## 2022-09-08 PROCEDURE — 27201068 US BREAST BIOPSY WITH IMAGING 1ST SITE LEFT

## 2022-09-08 PROCEDURE — 19083 BX BREAST 1ST LESION US IMAG: CPT | Mod: LT,,, | Performed by: RADIOLOGY

## 2022-09-08 PROCEDURE — 77066 MAMMO DIGITAL DIAGNOSTIC BILAT: ICD-10-PCS | Mod: 26,,, | Performed by: RADIOLOGY

## 2022-09-08 PROCEDURE — 88342 IMHCHEM/IMCYTCHM 1ST ANTB: CPT | Mod: 26,,, | Performed by: PATHOLOGY

## 2022-09-08 PROCEDURE — 88341 PR IHC OR ICC EACH ADD'L SINGLE ANTIBODY  STAINPR: ICD-10-PCS | Mod: 26,,, | Performed by: PATHOLOGY

## 2022-09-08 PROCEDURE — 27200939 MAMMO BREAST STEREOTACTIC BREAST BIOPSY RIGHT

## 2022-09-08 PROCEDURE — 88342 CHG IMMUNOCYTOCHEMISTRY: ICD-10-PCS | Mod: 26,,, | Performed by: PATHOLOGY

## 2022-09-08 PROCEDURE — 88341 IMHCHEM/IMCYTCHM EA ADD ANTB: CPT | Performed by: PATHOLOGY

## 2022-09-08 PROCEDURE — 88305 TISSUE EXAM BY PATHOLOGIST: ICD-10-PCS | Mod: 26,,, | Performed by: PATHOLOGY

## 2022-09-08 RX ORDER — LIDOCAINE HYDROCHLORIDE 10 MG/ML
3 INJECTION INFILTRATION; PERINEURAL ONCE
Status: COMPLETED | OUTPATIENT
Start: 2022-09-08 | End: 2022-09-08

## 2022-09-08 RX ORDER — LIDOCAINE HYDROCHLORIDE AND EPINEPHRINE 10; 10 MG/ML; UG/ML
20 INJECTION, SOLUTION INFILTRATION; PERINEURAL ONCE
Status: COMPLETED | OUTPATIENT
Start: 2022-09-08 | End: 2022-09-08

## 2022-09-08 RX ORDER — LIDOCAINE HYDROCHLORIDE AND EPINEPHRINE 10; 10 MG/ML; UG/ML
10 INJECTION, SOLUTION INFILTRATION; PERINEURAL ONCE
Status: COMPLETED | OUTPATIENT
Start: 2022-09-08 | End: 2022-09-08

## 2022-09-08 RX ADMIN — LIDOCAINE HYDROCHLORIDE,EPINEPHRINE BITARTRATE 20 ML: 10; .01 INJECTION, SOLUTION INFILTRATION; PERINEURAL at 10:09

## 2022-09-08 RX ADMIN — LIDOCAINE HYDROCHLORIDE 3 ML: 10 INJECTION, SOLUTION EPIDURAL; INFILTRATION; INTRACAUDAL; PERINEURAL at 10:09

## 2022-09-08 RX ADMIN — LIDOCAINE HYDROCHLORIDE,EPINEPHRINE BITARTRATE 10 ML: 10; .01 INJECTION, SOLUTION INFILTRATION; PERINEURAL at 10:09

## 2022-09-09 LAB
MPNR  FINAL DIAGNOSIS: NORMAL
MPNR  SPECIMEN TYPE: NORMAL
MPNR RESULT: NORMAL

## 2022-09-13 ENCOUNTER — PATIENT MESSAGE (OUTPATIENT)
Dept: HEMATOLOGY/ONCOLOGY | Facility: CLINIC | Age: 47
End: 2022-09-13
Payer: MEDICAID

## 2022-09-14 ENCOUNTER — HOSPITAL ENCOUNTER (OUTPATIENT)
Dept: RADIOLOGY | Facility: HOSPITAL | Age: 47
Discharge: HOME | End: 2022-09-14
Payer: MEDICAID

## 2022-09-14 DIAGNOSIS — N63.0 LUMP OR MASS IN BREAST: ICD-10-CM

## 2022-09-14 PROCEDURE — 76098 X-RAY EXAM SURGICAL SPECIMEN: CPT | Mod: 26,,, | Performed by: RADIOLOGY

## 2022-09-14 PROCEDURE — 76098 X-RAY EXAM SURGICAL SPECIMEN: CPT | Mod: TC

## 2022-09-14 PROCEDURE — 76098 MAMMO BREAST SPECIMEN: ICD-10-PCS | Mod: 26,,, | Performed by: RADIOLOGY

## 2022-09-15 ENCOUNTER — OFFICE VISIT (OUTPATIENT)
Dept: PLASTIC SURGERY | Facility: CLINIC | Age: 47
End: 2022-09-15
Payer: MEDICAID

## 2022-09-15 VITALS
BODY MASS INDEX: 24.94 KG/M2 | SYSTOLIC BLOOD PRESSURE: 155 MMHG | DIASTOLIC BLOOD PRESSURE: 99 MMHG | HEIGHT: 60 IN | HEART RATE: 86 BPM | WEIGHT: 127 LBS

## 2022-09-15 DIAGNOSIS — D05.11 DUCTAL CARCINOMA IN SITU (DCIS) OF RIGHT BREAST: ICD-10-CM

## 2022-09-15 PROCEDURE — 1159F PR MEDICATION LIST DOCUMENTED IN MEDICAL RECORD: ICD-10-PCS | Mod: CPTII,,, | Performed by: SURGERY

## 2022-09-15 PROCEDURE — 99999 PR PBB SHADOW E&M-EST. PATIENT-LVL III: ICD-10-PCS | Mod: PBBFAC,,, | Performed by: SURGERY

## 2022-09-15 PROCEDURE — 99204 OFFICE O/P NEW MOD 45 MIN: CPT | Mod: S$PBB,,, | Performed by: SURGERY

## 2022-09-15 PROCEDURE — 1159F MED LIST DOCD IN RCRD: CPT | Mod: CPTII,,, | Performed by: SURGERY

## 2022-09-15 PROCEDURE — 99204 PR OFFICE/OUTPT VISIT, NEW, LEVL IV, 45-59 MIN: ICD-10-PCS | Mod: S$PBB,,, | Performed by: SURGERY

## 2022-09-15 PROCEDURE — 99213 OFFICE O/P EST LOW 20 MIN: CPT | Mod: PBBFAC | Performed by: SURGERY

## 2022-09-15 PROCEDURE — 3077F SYST BP >= 140 MM HG: CPT | Mod: CPTII,,, | Performed by: SURGERY

## 2022-09-15 PROCEDURE — 3008F PR BODY MASS INDEX (BMI) DOCUMENTED: ICD-10-PCS | Mod: CPTII,,, | Performed by: SURGERY

## 2022-09-15 PROCEDURE — 4010F ACE/ARB THERAPY RXD/TAKEN: CPT | Mod: CPTII,,, | Performed by: SURGERY

## 2022-09-15 PROCEDURE — 99999 PR PBB SHADOW E&M-EST. PATIENT-LVL III: CPT | Mod: PBBFAC,,, | Performed by: SURGERY

## 2022-09-15 PROCEDURE — 3080F PR MOST RECENT DIASTOLIC BLOOD PRESSURE >= 90 MM HG: ICD-10-PCS | Mod: CPTII,,, | Performed by: SURGERY

## 2022-09-15 PROCEDURE — 3008F BODY MASS INDEX DOCD: CPT | Mod: CPTII,,, | Performed by: SURGERY

## 2022-09-15 PROCEDURE — 4010F PR ACE/ARB THEARPY RXD/TAKEN: ICD-10-PCS | Mod: CPTII,,, | Performed by: SURGERY

## 2022-09-15 PROCEDURE — 3080F DIAST BP >= 90 MM HG: CPT | Mod: CPTII,,, | Performed by: SURGERY

## 2022-09-15 PROCEDURE — 3077F PR MOST RECENT SYSTOLIC BLOOD PRESSURE >= 140 MM HG: ICD-10-PCS | Mod: CPTII,,, | Performed by: SURGERY

## 2022-09-16 NOTE — ASSESSMENT & PLAN NOTE
Also with biopsy of another right breast site and left breast pending  If multiple sites are positive it sounds likes she's favoring BL mastectomy    Began with discussion of implant based reconstruction. This included both direct-to-implant, tissue expander based reconstruction, the possible use of ADM, drains and expected post-operative course.  Risks of implant based recon including: hematoma, seroma, infection, extrusion, capsular contracture, risks of rupture, need for replacement later in life and BII/OSIEL-ALCL. Discussed basics of revision procedures including: tailoring of the skin envelope, repositioning of the implant, liposuction and fat grafting. Any questions answered.    Next we discussed autologous reconstruction with the most common donor sites (abdomen, thigh, back). Due to her surgical history on the abdomen, she is not a KARTHIKEYAN flap candidate, not enough thigh tissue for bilateral breast recon.    Due to ptosis not a candidate for traditional  nipple sparing mastectomy, but I believe she is a good candidate for bilateral dermal pedicles with wise pattern skin reduction.  Measurement taken  Order placed for medical photography at her next trip to [facility]  Will coordinate scheduling with Dr Sawyer if elects for mastectomy

## 2022-09-16 NOTE — PROGRESS NOTES
Plastic Surgery History & Physical    SUBJECTIVE:   Chief complaint: consult for breast recon    History of Present Illness:  47 y.o. female with known right breast DCIS, also with two other suspicious lesions that have been biopsied.  She is awaiting results from those.  Here today to discuss reconstructive options, its her understanding that she may be needing mastectomy and would prefer bilateral for symmetry  Nonsmoker  PMH neck pain from a trauma, GERD, anxiety  PSH significant for hysterectomy, c section and diastasis repair with lower abdominal skin removal  Not working, seen with her     Past Medical History:   Diagnosis Date    Anxiety        Past Surgical History:   Procedure Laterality Date     SECTION      x2    HYSTERECTOMY      ROGER, ovaries remain (AUB)    TONSILLECTOMY      TYMPANOSTOMY TUBE PLACEMENT      x6       Family History   Problem Relation Age of Onset    Genetic Disorder Mother         JAK2+ ET    Lung cancer Mother 63        ~30-yr smoker    Skin cancer Mother 67        mole nasal bridge (type?); maybe also one on hip    Hepatitis Father         w/wo cirrhosis?    Hypertension Father     Hypertension Sister     Von Willebrand disease Sister     Cancer Maternal Grandmother 83        origin? brain (mets?)    Lung cancer Maternal Grandfather         long-term smoker    Hemophilia Daughter         hemophilia A    Von Willebrand disease Daughter     Fibrocystic breast disease Maternal Aunt     Lung cancer Maternal Aunt         believes was smoker       Social History     Socioeconomic History    Marital status:    Tobacco Use    Smoking status: Former     Packs/day: 1.00     Years: 22.00     Pack years: 22.00     Types: Cigarettes     Quit date: 5/10/2016     Years since quittin.3    Smokeless tobacco: Never   Substance and Sexual Activity    Alcohol use: No    Drug use: No    Sexual activity: Yes     Partners: Male     Birth control/protection: None     Social  Determinants of Health     Financial Resource Strain: Low Risk     Difficulty of Paying Living Expenses: Not very hard   Food Insecurity: No Food Insecurity    Worried About Running Out of Food in the Last Year: Never true    Ran Out of Food in the Last Year: Never true   Transportation Needs: No Transportation Needs    Lack of Transportation (Medical): No    Lack of Transportation (Non-Medical): No   Physical Activity: Insufficiently Active    Days of Exercise per Week: 3 days    Minutes of Exercise per Session: 30 min   Stress: Stress Concern Present    Feeling of Stress : Very much   Social Connections: Unknown    Frequency of Communication with Friends and Family: Once a week    Frequency of Social Gatherings with Friends and Family: Once a week    Active Member of Clubs or Organizations: No    Attends Club or Organization Meetings: Never    Marital Status: Living with partner   Housing Stability: High Risk    Unable to Pay for Housing in the Last Year: Yes    Number of Places Lived in the Last Year: 2    Unstable Housing in the Last Year: No       Current Outpatient Medications   Medication Sig Dispense Refill    buPROPion (WELLBUTRIN XL) 300 MG 24 hr tablet 1 po qod x 30 days then as directed 90 tablet 3    diazePAM (VALIUM) 5 MG tablet One p.o. q.d. p.r.n. anxiety 30 tablet 2    DULoxetine (CYMBALTA) 30 MG capsule One p.o. q.d. times 30 days--then 1 p.o. q.o.d. times 30 days--the 1po Q 3 days times 30 days 60 capsule 0    gabapentin (NEURONTIN) 300 MG capsule Take 300 mg by mouth 2 (two) times daily.      losartan (COZAAR) 50 MG tablet Take 1 tablet (50 mg total) by mouth once daily. 90 tablet 3    pantoprazole (PROTONIX) 40 MG tablet Take 1 tablet (40 mg total) by mouth once daily. 30 tablet 11    traZODone (DESYREL) 100 MG tablet TAKE 1 TABLET(100 MG) BY MOUTH EVERY EVENING 30 tablet 5    valACYclovir (VALTREX) 1000 MG tablet Take 2 tablets (2,000 mg total) by mouth every 12 (twelve) hours. For 1 day at  sign of breakout 30 tablet 0     No current facility-administered medications for this visit.       Review of patient's allergies indicates:   Allergen Reactions    Codeine Itching    Lexapro [escitalopram oxalate] Other (See Comments)     sedation    Morphine Swelling         Review of Systems:  + acid refulx, chronic neck pain        OBJECTIVE:     BP (!) 155/99   Pulse 86   Ht 5' (1.524 m)   Wt 57.6 kg (127 lb)   LMP 06/01/2001 (Approximate)   BMI 24.80 kg/m²       Physical Exam:  Gen: NAD, appears stated age  Neuro: normal without focal findings, mental status and speech normal  HEENT: NCAT, neck supple, PEERL  CV: RRR  Pulm: Breathing non-labored, chest wall movement equal bilaterally   Breast: grade 3 ptosis, BL bruising at bx sites with palpable hematoma, no other scarring  Abdomen: soft, nontender, no guarding, lower transverse scar  Gu: genitalia not examined  Extremity:normal strength, no cyanosis or edema, thin thighs  Skin: Skin color, texture, turgor normal. No rashes or lesions or except as above  Psych: oriented to time, place and person, mood and affect are within normal limits          ASSESSMENT/PLAN:     Ductal carcinoma in situ (DCIS) of right breast  Also with biopsy of another right breast site and left breast pending  If multiple sites are positive it sounds likes she's favoring BL mastectomy    Began with discussion of implant based reconstruction. This included both direct-to-implant, tissue expander based reconstruction, the possible use of ADM, drains and expected post-operative course.  Risks of implant based recon including: hematoma, seroma, infection, extrusion, capsular contracture, risks of rupture, need for replacement later in life and BII/OSIEL-ALCL. Discussed basics of revision procedures including: tailoring of the skin envelope, repositioning of the implant, liposuction and fat grafting. Any questions answered.  Was able to show them TE and implant devices    Next we  discussed autologous reconstruction with the most common donor sites (abdomen, thigh, back). Due to her surgical history on the abdomen, she is not a KARTHIKEYAN flap candidate, not enough thigh tissue for bilateral breast recon.    Due to ptosis not a candidate for traditional  nipple sparing mastectomy, but I believe she is a good candidate for bilateral dermal pedicles with wise pattern skin reduction.  Measurement taken  Order placed for medical photography at her next trip to [facility]  Will coordinate scheduling with Dr Sawyer if elects for mastectomy      I spent a total of 50 minutes on the day of the visit.This includes face to face time and non-face to face time preparing to see the patient (eg, review of tests), obtaining and/or reviewing separately obtained history, documenting clinical information in the electronic or other health record, independently interpreting results and communicating results to the patient/family/caregiver, or care coordinator.    Luke Trinidad, DO  Plastic and Reconstructive Surgery

## 2022-09-19 ENCOUNTER — PATIENT MESSAGE (OUTPATIENT)
Dept: SURGERY | Facility: CLINIC | Age: 47
End: 2022-09-19
Payer: MEDICAID

## 2022-09-20 LAB
FINAL PATHOLOGIC DIAGNOSIS: NORMAL
GROSS: NORMAL
Lab: NORMAL

## 2022-09-21 ENCOUNTER — TELEPHONE (OUTPATIENT)
Dept: SURGERY | Facility: CLINIC | Age: 47
End: 2022-09-21
Payer: MEDICAID

## 2022-09-21 NOTE — TELEPHONE ENCOUNTER
Spoke with patient regarding most recent bx results. Patient would like to proceed with bilateral mastectomy with recon. Genetics still pending. Staff to coordinate surgical date with Dr. Sawyer and Dr. Trinidad.

## 2022-09-22 ENCOUNTER — PATIENT MESSAGE (OUTPATIENT)
Dept: PLASTIC SURGERY | Facility: CLINIC | Age: 47
End: 2022-09-22
Payer: MEDICAID

## 2022-09-28 ENCOUNTER — TELEPHONE (OUTPATIENT)
Dept: SURGERY | Facility: CLINIC | Age: 47
End: 2022-09-28
Payer: MEDICAID

## 2022-09-28 NOTE — TELEPHONE ENCOUNTER
Spoke to pt and gave surgery date of 10/7 with Niya Sawyer and Amos.  Patient agreeable to date of surgery.

## 2022-09-29 ENCOUNTER — TELEPHONE (OUTPATIENT)
Dept: HEMATOLOGY/ONCOLOGY | Facility: CLINIC | Age: 47
End: 2022-09-29
Payer: MEDICAID

## 2022-09-30 ENCOUNTER — ANESTHESIA EVENT (OUTPATIENT)
Dept: SURGERY | Facility: OTHER | Age: 47
End: 2022-09-30
Payer: MEDICAID

## 2022-09-30 ENCOUNTER — PATIENT MESSAGE (OUTPATIENT)
Dept: HEMATOLOGY/ONCOLOGY | Facility: CLINIC | Age: 47
End: 2022-09-30
Payer: MEDICAID

## 2022-09-30 ENCOUNTER — HOSPITAL ENCOUNTER (OUTPATIENT)
Dept: PREADMISSION TESTING | Facility: OTHER | Age: 47
Discharge: HOME | End: 2022-09-30
Attending: SURGERY
Payer: MEDICAID

## 2022-09-30 ENCOUNTER — PATIENT MESSAGE (OUTPATIENT)
Dept: PLASTIC SURGERY | Facility: CLINIC | Age: 47
End: 2022-09-30
Payer: MEDICAID

## 2022-09-30 VITALS
TEMPERATURE: 98 F | OXYGEN SATURATION: 100 % | HEART RATE: 90 BPM | RESPIRATION RATE: 18 BRPM | BODY MASS INDEX: 23.16 KG/M2 | DIASTOLIC BLOOD PRESSURE: 79 MMHG | SYSTOLIC BLOOD PRESSURE: 119 MMHG | HEIGHT: 60 IN | WEIGHT: 118 LBS

## 2022-09-30 DIAGNOSIS — Z01.818 PRE-OP TESTING: ICD-10-CM

## 2022-09-30 LAB
ALBUMIN SERPL BCP-MCNC: 4.1 G/DL (ref 3.5–5.2)
ALP SERPL-CCNC: 66 U/L (ref 55–135)
ALT SERPL W/O P-5'-P-CCNC: 13 U/L (ref 10–44)
ANION GAP SERPL CALC-SCNC: 10 MMOL/L (ref 8–16)
AST SERPL-CCNC: 15 U/L (ref 10–40)
BILIRUB SERPL-MCNC: 0.2 MG/DL (ref 0.1–1)
BUN SERPL-MCNC: 10 MG/DL (ref 6–20)
CALCIUM SERPL-MCNC: 9.4 MG/DL (ref 8.7–10.5)
CHLORIDE SERPL-SCNC: 106 MMOL/L (ref 95–110)
CO2 SERPL-SCNC: 23 MMOL/L (ref 23–29)
CREAT SERPL-MCNC: 0.7 MG/DL (ref 0.5–1.4)
EST. GFR  (NO RACE VARIABLE): >60 ML/MIN/1.73 M^2
GLUCOSE SERPL-MCNC: 91 MG/DL (ref 70–110)
POTASSIUM SERPL-SCNC: 4.3 MMOL/L (ref 3.5–5.1)
PROT SERPL-MCNC: 6.8 G/DL (ref 6–8.4)
SODIUM SERPL-SCNC: 139 MMOL/L (ref 136–145)

## 2022-09-30 PROCEDURE — 36415 COLL VENOUS BLD VENIPUNCTURE: CPT | Performed by: SURGERY

## 2022-09-30 PROCEDURE — 80053 COMPREHEN METABOLIC PANEL: CPT | Performed by: SURGERY

## 2022-09-30 RX ORDER — LIDOCAINE HYDROCHLORIDE 10 MG/ML
0.5 INJECTION, SOLUTION EPIDURAL; INFILTRATION; INTRACAUDAL; PERINEURAL ONCE
Status: CANCELLED | OUTPATIENT
Start: 2022-09-30 | End: 2022-09-30

## 2022-09-30 RX ORDER — SODIUM CHLORIDE, SODIUM LACTATE, POTASSIUM CHLORIDE, CALCIUM CHLORIDE 600; 310; 30; 20 MG/100ML; MG/100ML; MG/100ML; MG/100ML
INJECTION, SOLUTION INTRAVENOUS CONTINUOUS
Status: CANCELLED | OUTPATIENT
Start: 2022-09-30

## 2022-09-30 NOTE — ANESTHESIA PREPROCEDURE EVALUATION
09/30/2022  Dina Sosa is a 47 y.o., female.      Pre-op Assessment    I have reviewed the Patient Summary Reports.     I have reviewed the Nursing Notes. I have reviewed the NPO Status.   I have reviewed the Medications.     Review of Systems  Anesthesia Hx:  Denies Family Hx of Anesthesia complications.   Denies Personal Hx of Anesthesia complications.   Social:  Former Smoker    Hematology/Oncology:  Hematology Normal      Current/Recent Cancer. Breast right   EENT/Dental:   chronic allergic rhinitis   Cardiovascular:  Cardiovascular Normal     Pulmonary:  Pulmonary Normal    Renal/:  Renal/ Normal     Hepatic/GI:   GERD    Musculoskeletal:   S/p cervical disc replacement, still with pain, numbness nic arms Spine Disorders: cervical Chronic Pain    Neurological:  Neurology Normal    Endocrine:  Endocrine Normal    Dermatological:  Skin Normal    Psych:  Psychiatric Normal           Physical Exam  General: Well nourished, Cooperative, Alert and Oriented    Airway:  Mallampati: I   Mouth Opening: Normal  TM Distance: Normal  Neck ROM: Normal ROM    Dental:  Intact        Anesthesia Plan  Type of Anesthesia, risks & benefits discussed:    Anesthesia Type: Gen ETT  Intra-op Monitoring Plan: Standard ASA Monitors  Post Op Pain Control Plan: multimodal analgesia and IV/PO Opioids PRN  Induction:  IV  Airway Plan: Video  Informed Consent: Informed consent signed with the Patient and all parties understand the risks and agree with anesthesia plan.  All questions answered.   ASA Score: 2  Anesthesia Plan Notes: Labs in epic OK    Careful neck positioning    Pt has had problems with sinus exacerbation after receiving NC O2, requests FM instead of NC post op, may benefit from humidification    Ready For Surgery From Anesthesia Perspective.     .

## 2022-09-30 NOTE — DISCHARGE INSTRUCTIONS
Information to Prepare you for your Surgery    PRE-ADMIT TESTING -  [phone]    [address] [facility]          Your surgery has been scheduled at [facility]. We are pleased to have the opportunity to serve you. For Further Information please call [phone].    On the day of surgery please report to the Information Desk on the 1st floor.    CONTACT YOUR PHYSICIAN'S OFFICE THE DAY PRIOR TO YOUR SURGERY TO OBTAIN YOUR ARRIVAL TIME.     The evening before surgery do not eat anything after 9 p.m. ( this includes hard candy, chewing gum and mints).  You may only have GATORADE, POWERADE AND WATER  from 9 p.m. until you leave your home.   DO NOT DRINK ANY LIQUIDS ON THE WAY TO THE HOSPITAL.      Why does your anesthesiologist allow you to drink Gatorade/Powerade before surgery?  Gatorade/Powerade helps to increase your comfort before surgery and to decrease your nausea after surgery. The carbohydrates in Gatorade/Powerade help reduce your body's stress response to surgery.  If you are a diabetic-drink only water prior to surgery.      Current Visitor policy(12/27/2021) - Patients may have 2 visitors pre and post procedure. Only 2 visitors will be allowed in the Surgical building with the patient.     SPECIAL MEDICATION INSTRUCTIONS: TAKE medications checked off by the Anesthesiologist on your Medication List.    Angiogram Patients: Take medications as instructed by your physician, including aspirin.     Surgery Patients:    If you take ASPIRIN - Your PHYSICIAN/SURGEON will need to inform you IF/OR when you need to stop taking aspirin prior to your surgery.     Do Not take any medications containing IBUPROFEN.    Do Not Wear any make-up (especially eye make-up) to surgery. Please remove any false eyelashes or eyelash extensions. If you arrive the day of surgery with makeup/eyelashes on you will be required to remove prior to surgery. (There is a risk of corneal  abrasions if eye makeup/eyelash extensions are not removed)      Leave all valuables at home.   Do Not wear any jewelry or watches, including any metal in body piercings. Jewelry must be removed prior to coming to the hospital.  There is a possibility that rings that are unable to be removed may be cut off if they are on the surgical extremity.    Please remove all hair extensions, wigs, clips and any other metal accessories/ ornaments from your hair.  These items may pose a flammable/fire risk in Surgery and must be removed.    Do not shave your surgical area at least 5 days prior to your surgery. The surgical prep will be performed at the hospital according to Infection Control regulations.    Contact Lens must be removed before surgery. Either do not wear the contact lens or bring a case and solution for storage.  Please bring a container for eyeglasses or dentures as required.  Bring any paperwork your physician has provided, such as consent forms,  history and physicals, doctor's orders, etc.   Bring comfortable clothes that are loose fitting to wear upon discharge. Take into consideration the type of surgery being performed.  Maintain your diet as advised per your physician the day prior to surgery.      Adequate rest the night before surgery is advised.   Park in the Parking lot behind the hospital or in the [employer] [facility] across the street from the parking lot. Parking is complimentary.  If you will be discharged the same day as your procedure, please arrange for a responsible adult to drive you home or to accompany you if traveling by taxi.   YOU WILL NOT BE PERMITTED TO DRIVE OR TO LEAVE THE HOSPITAL ALONE AFTER SURGERY.   If you are being discharged the same day, it is strongly recommended that you arrange for someone to remain with you for the first 24 hrs following your surgery.    The Surgeon will speak to your family/visitor after your surgery regarding the outcome of your surgery and post op  care.  The Surgeon may speak to you after your surgery, but there is a possibility you may not remember the details.  Please check with your family members regarding the conversation with the Surgeon.    We strongly recommend whoever is bringing you home be present for discharge instructions.  This will ensure a thorough understanding for your post op home care.    ALL CHILDREN MUST ALWAYS BE ACCOMPANIED BY AN ADULT.    Visitors-Refer to current Visitor policy handouts.    Thank you for your cooperation.  The Staff of [facility].            Bathing Instructions with Hibiclens    Shower the evening before and morning of your procedure with Hibiclens:  Wash your face with water and your regular face wash/soap  Apply Hibiclens directly on your skin or on a wet washcloth and wash gently. When showering: Move away from the shower stream when applying Hibiclens to avoid rinsing off too soon.  Rinse thoroughly with warm water  Do not dilute Hibiclens        Dry off as usual, do not use any deodorant, powder, body lotions, perfume, after shave or cologne.

## 2022-10-03 ENCOUNTER — PATIENT MESSAGE (OUTPATIENT)
Dept: PLASTIC SURGERY | Facility: CLINIC | Age: 47
End: 2022-10-03
Payer: MEDICAID

## 2022-10-03 ENCOUNTER — OFFICE VISIT (OUTPATIENT)
Dept: HEMATOLOGY/ONCOLOGY | Facility: CLINIC | Age: 47
End: 2022-10-03
Payer: MEDICAID

## 2022-10-03 DIAGNOSIS — D68.00 VON WILLEBRAND DISEASE: Primary | ICD-10-CM

## 2022-10-03 DIAGNOSIS — Z83.2 FAMILY HISTORY OF VON WILLEBRAND DISEASE: ICD-10-CM

## 2022-10-03 DIAGNOSIS — D05.11 DUCTAL CARCINOMA IN SITU (DCIS) OF RIGHT BREAST: ICD-10-CM

## 2022-10-03 PROCEDURE — 99214 OFFICE O/P EST MOD 30 MIN: CPT | Mod: 95,,, | Performed by: PHYSICIAN ASSISTANT

## 2022-10-03 PROCEDURE — 1160F RVW MEDS BY RX/DR IN RCRD: CPT | Mod: CPTII,95,, | Performed by: PHYSICIAN ASSISTANT

## 2022-10-03 PROCEDURE — 1159F MED LIST DOCD IN RCRD: CPT | Mod: CPTII,95,, | Performed by: PHYSICIAN ASSISTANT

## 2022-10-03 PROCEDURE — 99214 PR OFFICE/OUTPT VISIT, EST, LEVL IV, 30-39 MIN: ICD-10-PCS | Mod: 95,,, | Performed by: PHYSICIAN ASSISTANT

## 2022-10-03 PROCEDURE — 1159F PR MEDICATION LIST DOCUMENTED IN MEDICAL RECORD: ICD-10-PCS | Mod: CPTII,95,, | Performed by: PHYSICIAN ASSISTANT

## 2022-10-03 PROCEDURE — 1160F PR REVIEW ALL MEDS BY PRESCRIBER/CLIN PHARMACIST DOCUMENTED: ICD-10-PCS | Mod: CPTII,95,, | Performed by: PHYSICIAN ASSISTANT

## 2022-10-03 NOTE — PROGRESS NOTES
HEMATOLOGIC MALIGNANCIES CONSULT NOTE      The patient location is: Home, [facility]  The chief complaint leading to consultation is: vWF eval f/u    Visit type: audiovisual    Face to Face time with patient: 15  30 minutes of total time spent on the encounter, which includes face to face time and non-face to face time preparing to see the patient (eg, review of tests), Obtaining and/or reviewing separately obtained history, Documenting clinical information in the electronic or other health record, Independently interpreting results (not separately reported) and communicating results to the patient/family/caregiver, or Care coordination (not separately reported).         Each patient to whom he or she provides medical services by telemedicine is:  (1) informed of the relationship between the physician and patient and the respective role of any other health care provider with respect to management of the patient; and (2) notified that he or she may decline to receive medical services by telemedicine and may withdraw from such care at any time.    Notes:         IDENTIFYING STATEMENT   Dina Bain) is a 47 y.o. female with a  of 1975 from [place], [place], referred by Efe Xiong DNP for evaluation of family history of JAK2+ hematologic neoplasm and personal history of thrombocytosis.     HISTORY OF PRESENT ILLNESS:    Ms. Sosa is 47, has recent diagnosis of R breast DCIS and is evaluated as a referral from Efe Xiong who identified family history of a  blood cancer disorder. She reports the following:     Family history -   - mother with JAK2 mutation -   - oldest daughter (27) - diagnosed with von Willebrand disease - dx made at [facility] after vaginal delivery complicated my heavy bleeding and later menorrhagia requiring hysterectomy.     No personal history of bleeding problems, including after surgeries (partial hysterectomy, SI join fusion, spinal surgeries) and vaginal deliveries.  Denies gum bleeding/epistaxis or other sources of bleeding at present. No hx of hemarthrosis. She is scheduled for surgery this Friday. Her most recent FVIII levels are normal, repeat vWF and Ristocetin CoF are pending, they were slightly low at last check (43%, 49% respectively).     Past Medical History:   Diagnosis Date    Anxiety     Cancer     right breast       Family History   Problem Relation Age of Onset    Genetic Disorder Mother         JAK2+ ET    Lung cancer Mother 63        ~30-yr smoker    Skin cancer Mother 67        mole nasal bridge (type?); maybe also one on hip    Hepatitis Father         w/wo cirrhosis?    Hypertension Father     Hypertension Sister     Von Willebrand disease Sister     Cancer Maternal Grandmother 83        origin? brain (mets?)    Lung cancer Maternal Grandfather         long-term smoker    Hemophilia Daughter         hemophilia A    Von Willebrand disease Daughter     Fibrocystic breast disease Maternal Aunt     Lung cancer Maternal Aunt         believes was smoker       Social History     Socioeconomic History    Marital status:    Tobacco Use    Smoking status: Former     Packs/day: 1.00     Years: 22.00     Pack years: 22.00     Types: Cigarettes     Quit date: 5/10/2016     Years since quittin.4    Smokeless tobacco: Never   Substance and Sexual Activity    Alcohol use: No    Drug use: No    Sexual activity: Yes     Partners: Male     Birth control/protection: None     Social Determinants of Health     Financial Resource Strain: Medium Risk    Difficulty of Paying Living Expenses: Somewhat hard   Food Insecurity: Unknown    Worried About Running Out of Food in the Last Year: Patient refused    Ran Out of Food in the Last Year: Patient refused   Transportation Needs: No Transportation Needs    Lack of Transportation (Medical): No    Lack of Transportation (Non-Medical): No   Physical Activity: Insufficiently Active    Days of Exercise per Week: 3 days    Minutes  of Exercise per Session: 30 min   Stress: Stress Concern Present    Feeling of Stress : To some extent   Social Connections: Unknown    Frequency of Communication with Friends and Family: More than three times a week    Frequency of Social Gatherings with Friends and Family: Once a week    Active Member of Clubs or Organizations: No    Attends Club or Organization Meetings: Never    Marital Status: Living with partner   Housing Stability: High Risk    Unable to Pay for Housing in the Last Year: Yes    Number of Places Lived in the Last Year: 2    Unstable Housing in the Last Year: No         MEDICATIONS:     Current Outpatient Medications on File Prior to Visit   Medication Sig Dispense Refill    buPROPion (WELLBUTRIN XL) 300 MG 24 hr tablet 1 po qod x 30 days then as directed 90 tablet 3    diazePAM (VALIUM) 5 MG tablet One p.o. q.d. p.r.n. anxiety 30 tablet 2    pantoprazole (PROTONIX) 40 MG tablet Take 1 tablet (40 mg total) by mouth once daily. 30 tablet 11    traZODone (DESYREL) 100 MG tablet TAKE 1 TABLET(100 MG) BY MOUTH EVERY EVENING 30 tablet 5    valACYclovir (VALTREX) 1000 MG tablet Take 2 tablets (2,000 mg total) by mouth every 12 (twelve) hours. For 1 day at sign of breakout 30 tablet 0     No current facility-administered medications on file prior to visit.       ALLERGIES:   Review of patient's allergies indicates:   Allergen Reactions    Codeine Itching     Can take hydrocodone and oxycodone    Lexapro [escitalopram oxalate] Other (See Comments)     sedation    Morphine Swelling        ROS:       Review of Systems   Constitutional:  Negative for appetite change and unexpected weight change.   HENT:   Negative for mouth sores.    Respiratory:  Negative for cough and shortness of breath.    Cardiovascular:  Negative for chest pain.   Gastrointestinal:  Negative for abdominal pain and diarrhea.   Genitourinary:  Negative for frequency.    Musculoskeletal:  Positive for back pain.   Skin:  Negative for  rash.   Neurological:  Positive for headaches.   Hematological:  Negative for adenopathy.   Psychiatric/Behavioral:  The patient is nervous/anxious.      PHYSICAL EXAM:  There were no vitals filed for this visit.      Physical exam limited due to virtual visit. Via camera, the patient appears well and in no acute distress. Speaking full sentences with ease, with normal mood and thought content.      LAB:   Results for orders placed or performed during the hospital encounter of 09/30/22   Comprehensive Metabolic Panel   Result Value Ref Range    Sodium 139 136 - 145 mmol/L    Potassium 4.3 3.5 - 5.1 mmol/L    Chloride 106 95 - 110 mmol/L    CO2 23 23 - 29 mmol/L    Glucose 91 70 - 110 mg/dL    BUN 10 6 - 20 mg/dL    Creatinine 0.7 0.5 - 1.4 mg/dL    Calcium 9.4 8.7 - 10.5 mg/dL    Total Protein 6.8 6.0 - 8.4 g/dL    Albumin 4.1 3.5 - 5.2 g/dL    Total Bilirubin 0.2 0.1 - 1.0 mg/dL    Alkaline Phosphatase 66 55 - 135 U/L    AST 15 10 - 40 U/L    ALT 13 10 - 44 U/L    Anion Gap 10 8 - 16 mmol/L    eGFR >60 >60 mL/min/1.73 m^2       PROBLEMS ASSESSED THIS VISIT:    1. Von Willebrand disease    2. Family history of von Willebrand disease    3. Ductal carcinoma in situ (DCIS) of right breast        IMPRESSION:    1. Von Willebrand disease (subtype pending confirmation)    2. Ductal carcinoma in situ of the right breast    3. Neuropathy  4. Anxiety and depression  5. Gastroesophageal reflux disease    PLAN:       Von Willebrand Disease  Ms. Sosa has evidence of possible vWD, though it is noted that she has not franca;d any actual bleeding events. Thus, it is likely that she may be a carrier for either Type 1, Type 2a, or Type 2b vWD. As these are autosomal dominant in inheritance, it is possible that she may have passed this to one of her children.    She had repat vWF activity and ristocetin cofactor checked last week, these results are still pending and we will follow for these results, hopeful they will return prior to  surgery. Her FVIII levels are normal.    We discussed her mildly reduced vWF and normal FVIII levels in respect to the patients lack of bleeding complications during other major hemostatic challenges. As such, we do not anticipate any bleeding complications related to her surgery and do not think prophylactic DDAVP is warranted.    Thrombocytosis  Resolved on repeat labs. [id number] negative. Low suspicion for MPN.    Follow-up  No follow up needed, will f/u on repeat testing      Route Chart for Scheduling    BMT Chart Routing      Follow up with physician No follow up needed.   Follow up with ERICA    Infusion scheduling note    Injection scheduling note    Labs    Imaging    Pharmacy appointment    Other referrals             Sana Snushine PA-C  Hematology and Stem Cell Transplant

## 2022-10-03 NOTE — Clinical Note
This patients vWF and Rco levels are pending. Her FVIII was normal. Her surgery is scheduled for Friday.  Reviewed case and previous labs w/ Dr. Silva and patient's surgical/bleeding hx. We said okay to proceed w/ surgery and will follow along for those pending labs in the interim.  Let me know if you have any concerns

## 2022-10-04 ENCOUNTER — OFFICE VISIT (OUTPATIENT)
Dept: PLASTIC SURGERY | Facility: CLINIC | Age: 47
End: 2022-10-04
Payer: MEDICAID

## 2022-10-04 ENCOUNTER — PATIENT MESSAGE (OUTPATIENT)
Dept: PLASTIC SURGERY | Facility: CLINIC | Age: 47
End: 2022-10-04
Payer: MEDICAID

## 2022-10-04 VITALS
DIASTOLIC BLOOD PRESSURE: 79 MMHG | HEIGHT: 60 IN | BODY MASS INDEX: 23.37 KG/M2 | SYSTOLIC BLOOD PRESSURE: 123 MMHG | OXYGEN SATURATION: 99 % | HEART RATE: 87 BPM | WEIGHT: 119.06 LBS

## 2022-10-04 DIAGNOSIS — D05.11 DUCTAL CARCINOMA IN SITU (DCIS) OF RIGHT BREAST: ICD-10-CM

## 2022-10-04 PROCEDURE — 3008F PR BODY MASS INDEX (BMI) DOCUMENTED: ICD-10-PCS | Mod: CPTII,S$GLB,, | Performed by: SURGERY

## 2022-10-04 PROCEDURE — 99213 PR OFFICE/OUTPT VISIT, EST, LEVL III, 20-29 MIN: ICD-10-PCS | Mod: S$GLB,,, | Performed by: SURGERY

## 2022-10-04 PROCEDURE — 3074F PR MOST RECENT SYSTOLIC BLOOD PRESSURE < 130 MM HG: ICD-10-PCS | Mod: CPTII,S$GLB,, | Performed by: SURGERY

## 2022-10-04 PROCEDURE — 3078F PR MOST RECENT DIASTOLIC BLOOD PRESSURE < 80 MM HG: ICD-10-PCS | Mod: CPTII,S$GLB,, | Performed by: SURGERY

## 2022-10-04 PROCEDURE — 99213 OFFICE O/P EST LOW 20 MIN: CPT | Mod: S$GLB,,, | Performed by: SURGERY

## 2022-10-04 PROCEDURE — 3078F DIAST BP <80 MM HG: CPT | Mod: CPTII,S$GLB,, | Performed by: SURGERY

## 2022-10-04 PROCEDURE — 3074F SYST BP LT 130 MM HG: CPT | Mod: CPTII,S$GLB,, | Performed by: SURGERY

## 2022-10-04 PROCEDURE — 3008F BODY MASS INDEX DOCD: CPT | Mod: CPTII,S$GLB,, | Performed by: SURGERY

## 2022-10-04 NOTE — PROGRESS NOTES
Plastic Surgery  Pre-op visit    SUBJECTIVE:   Chief complaint:  Right breast DCIS    History of Present Illness:  47 y.o. female since her last visit, we have been able to finalize the surgical plan.  I was able to discuss her case with Dr. Sawyer MRI require biopsies of 2 lesions of the left breast.  Both of these showed fat necrosis but no evidence of malignancy.  She has right breast DCIS is planning for bilateral mastectomies.  She had previous abdominal wall reconstruction we are planning for nipple sparing, tissue expander reconstruction    Past Medical History:   Diagnosis Date    Anxiety     Cancer     right breast       Past Surgical History:   Procedure Laterality Date     SECTION      x2    HYSTERECTOMY  2002    ROGER, ovaries remain (AUB)    mobi c      disk replacement C5-C6    si joint fusion      left hip    TONSILLECTOMY      TYMPANOSTOMY TUBE PLACEMENT      x6       Family History   Problem Relation Age of Onset    Genetic Disorder Mother         JAK2+ ET    Lung cancer Mother 63        ~30-yr smoker    Skin cancer Mother 67        mole nasal bridge (type?); maybe also one on hip    Hepatitis Father         w/wo cirrhosis?    Hypertension Father     Hypertension Sister     Von Willebrand disease Sister     Cancer Maternal Grandmother 83        origin? brain (mets?)    Lung cancer Maternal Grandfather         long-term smoker    Hemophilia Daughter         hemophilia A    Von Willebrand disease Daughter     Fibrocystic breast disease Maternal Aunt     Lung cancer Maternal Aunt         believes was smoker       Social History     Socioeconomic History    Marital status:    Tobacco Use    Smoking status: Former     Packs/day: 1.00     Years: 22.00     Pack years: 22.00     Types: Cigarettes     Quit date: 5/10/2016     Years since quittin.4    Smokeless tobacco: Never   Substance and Sexual Activity    Alcohol use: No    Drug use: No    Sexual activity: Yes     Partners: Male      Birth control/protection: None     Social Determinants of Health     Financial Resource Strain: Medium Risk    Difficulty of Paying Living Expenses: Somewhat hard   Food Insecurity: Unknown    Worried About Running Out of Food in the Last Year: Patient refused    Ran Out of Food in the Last Year: Patient refused   Transportation Needs: No Transportation Needs    Lack of Transportation (Medical): No    Lack of Transportation (Non-Medical): No   Physical Activity: Insufficiently Active    Days of Exercise per Week: 3 days    Minutes of Exercise per Session: 30 min   Stress: Stress Concern Present    Feeling of Stress : To some extent   Social Connections: Unknown    Frequency of Communication with Friends and Family: More than three times a week    Frequency of Social Gatherings with Friends and Family: Once a week    Active Member of Clubs or Organizations: No    Attends Club or Organization Meetings: Never    Marital Status: Living with partner   Housing Stability: High Risk    Unable to Pay for Housing in the Last Year: Yes    Number of Places Lived in the Last Year: 2    Unstable Housing in the Last Year: No       Current Outpatient Medications   Medication Sig Dispense Refill    buPROPion (WELLBUTRIN XL) 300 MG 24 hr tablet 1 po qod x 30 days then as directed 90 tablet 3    diazePAM (VALIUM) 5 MG tablet One p.o. q.d. p.r.n. anxiety 30 tablet 2    pantoprazole (PROTONIX) 40 MG tablet Take 1 tablet (40 mg total) by mouth once daily. 30 tablet 11    traZODone (DESYREL) 100 MG tablet TAKE 1 TABLET(100 MG) BY MOUTH EVERY EVENING 30 tablet 5    valACYclovir (VALTREX) 1000 MG tablet Take 2 tablets (2,000 mg total) by mouth every 12 (twelve) hours. For 1 day at sign of breakout (Patient not taking: Reported on 10/4/2022) 30 tablet 0     No current facility-administered medications for this visit.       Review of patient's allergies indicates:   Allergen Reactions    Codeine Itching     Can take hydrocodone and  oxycodone    Lexapro [escitalopram oxalate] Other (See Comments)     sedation    Morphine Swelling         Review of Systems:  No recent illness, no breast mass, no nipple discharge.        OBJECTIVE:     /79   Pulse 87   Ht 5' (1.524 m)   Wt 54 kg (119 lb 0.8 oz)   LMP 06/01/2001 (Approximate)   SpO2 99%   BMI 23.25 kg/m²       Physical Exam:  Gen: NAD, appears stated age  Neuro: normal without focal findings, mental status and speech normal  HEENT: NCAT, neck supple, PEERL  CV: RRR  Pulm: Breathing non-labored, chest wall movement equal bilaterally   Breast:  grade 3 ptosis and soft, no masses, no scarring   Right Left   SN-N 29 31   N-IMF 12 12   N-N 20   BW 14 14       Abdomen: soft, nontender, no guarding  Gu: genitalia not examined  Extremity:normal strength, no cyanosis or edema  Skin: Skin color, texture, turgor normal. No rashes or lesions  Psych: oriented to time, place and person, mood and affect are within normal limits    Photos reviewed      ASSESSMENT/PLAN:     Ductal carcinoma in situ (DCIS) of right breast  Discussed her surgery in great detail.  We are planning for bilateral inferior dermal pedicles, for nipple sparing mastectomies with Wise pattern skin reduction.  I drew pictures for the patient to explain the procedure and how her nipple will be saved.  Discussed the use of the tissue expander over the implant in this scenario, and also the use of SPY angiography.    Discussed the most common complications including skin necrosis wound breakdown infection implant exposure, seroma, hematoma, capsular contracture, and OSIEL-ALCL.  Was able show the patient examples of deflated and inflated tissue expanders, as well as a permanent breast implant.  Also discussed her expected postoperative course postop visits, in the basics of second-stage reconstruction.      All questions were answered.    Consents signed.  Additional measurements were taken and tissue expanders will be ordered today  for her procedure on Friday.    Luke Trinidad, DO  Plastic and Reconstructive Surgery    I spent a total of 30 minutes on the day of the visit.This includes face to face time and non-face to face time preparing to see the patient (eg, review of tests), obtaining and/or reviewing separately obtained history, documenting clinical information in the electronic or other health record, independently interpreting results and communicating results to the patient/family/caregiver, or care coordinator.

## 2022-10-04 NOTE — ASSESSMENT & PLAN NOTE
Discussed her surgery in great detail.  We are planning for bilateral inferior dermal pedicles, for nipple sparing mastectomies with Wise pattern skin reduction.  I drew pictures for the patient to explain the procedure and how her nipple will be saved.  Discussed the use of the tissue expander over the implant in this scenario, and also the use of SPY angiography.    Discussed the most common complications including skin necrosis wound breakdown infection implant exposure, seroma, hematoma, capsular contracture, and OSIEL-ALCL.  Was able show the patient examples of deflated and inflated tissue expanders, as well as a permanent breast implant.  Also discussed her expected postoperative course postop visits, in the basics of second-stage reconstruction.      All questions were answered.    Consents signed.  Additional measurements were taken and tissue expanders will be ordered today for her procedure on Friday.

## 2022-10-06 ENCOUNTER — TELEPHONE (OUTPATIENT)
Dept: SURGERY | Facility: CLINIC | Age: 47
End: 2022-10-06
Payer: MEDICAID

## 2022-10-06 NOTE — TELEPHONE ENCOUNTER
Confirmed arrival time for surgery on 9/07/22 at the [facility] with Dr.Amy Sawyer. Arrival time is for 7:30 am surgery is scheduled for 9:30 am . Nothing to eat after 9 pm this evening 9/06/22. Clear liquids  Gatorade up until 4 hrs prior to surgery . Patient may have 2 people with her on the morning of surgery if needed. Leave all jewelry home also wear a button down shirt on the morning of surgery. Patient voiced understanding to this call .

## 2022-10-07 ENCOUNTER — HOSPITAL ENCOUNTER (OUTPATIENT)
Facility: OTHER | Age: 47
Discharge: HOME | End: 2022-10-08
Attending: SURGERY | Admitting: SURGERY
Payer: MEDICAID

## 2022-10-07 ENCOUNTER — HOSPITAL ENCOUNTER (OUTPATIENT)
Dept: RADIOLOGY | Facility: OTHER | Age: 47
Discharge: HOME | End: 2022-10-07
Attending: SURGERY
Payer: MEDICAID

## 2022-10-07 ENCOUNTER — ANESTHESIA (OUTPATIENT)
Dept: SURGERY | Facility: OTHER | Age: 47
End: 2022-10-07
Payer: MEDICAID

## 2022-10-07 DIAGNOSIS — D05.11 DUCTAL CARCINOMA IN SITU (DCIS) OF RIGHT BREAST: Primary | ICD-10-CM

## 2022-10-07 DIAGNOSIS — D05.11 DUCTAL CARCINOMA IN SITU (DCIS) OF RIGHT BREAST: ICD-10-CM

## 2022-10-07 DIAGNOSIS — C50.919 BREAST CANCER: ICD-10-CM

## 2022-10-07 PROCEDURE — 63600175 PHARM REV CODE 636 W HCPCS: Performed by: SURGERY

## 2022-10-07 PROCEDURE — C1729 CATH, DRAINAGE: HCPCS | Performed by: SURGERY

## 2022-10-07 PROCEDURE — 27201423 OPTIME MED/SURG SUP & DEVICES STERILE SUPPLY: Performed by: SURGERY

## 2022-10-07 PROCEDURE — 76098 X-RAY EXAM SURGICAL SPECIMEN: CPT | Mod: TC

## 2022-10-07 PROCEDURE — 15860 PR IV INJ TO TEST BLOOD FLOW IN FLAP/GRAFT: ICD-10-PCS | Mod: 51,,, | Performed by: SURGERY

## 2022-10-07 PROCEDURE — 88342 CHG IMMUNOCYTOCHEMISTRY: ICD-10-PCS | Mod: 26,,, | Performed by: PATHOLOGY

## 2022-10-07 PROCEDURE — 88342 IMHCHEM/IMCYTCHM 1ST ANTB: CPT | Mod: 26,,, | Performed by: PATHOLOGY

## 2022-10-07 PROCEDURE — 71000033 HC RECOVERY, INTIAL HOUR: Performed by: SURGERY

## 2022-10-07 PROCEDURE — 38525 PR BIOPSY/REM LYMPH NODES, AXILLARY: ICD-10-PCS | Mod: 51,RT,, | Performed by: SURGERY

## 2022-10-07 PROCEDURE — 63600175 PHARM REV CODE 636 W HCPCS

## 2022-10-07 PROCEDURE — 88341 PR IHC OR ICC EACH ADD'L SINGLE ANTIBODY  STAINPR: ICD-10-PCS | Mod: 26,,, | Performed by: PATHOLOGY

## 2022-10-07 PROCEDURE — 25000003 PHARM REV CODE 250: Performed by: SURGERY

## 2022-10-07 PROCEDURE — 36000707: Performed by: SURGERY

## 2022-10-07 PROCEDURE — P9045 ALBUMIN (HUMAN), 5%, 250 ML: HCPCS | Mod: JG | Performed by: STUDENT IN AN ORGANIZED HEALTH CARE EDUCATION/TRAINING PROGRAM

## 2022-10-07 PROCEDURE — 88307 PR  SURG PATH,LEVEL V: ICD-10-PCS | Mod: 26,,, | Performed by: PATHOLOGY

## 2022-10-07 PROCEDURE — 88302 TISSUE EXAM BY PATHOLOGIST: CPT | Mod: 59 | Performed by: PATHOLOGY

## 2022-10-07 PROCEDURE — 88331 PR  PATH CONSULT IN SURG,W FRZ SEC: ICD-10-PCS | Mod: 26,,, | Performed by: PATHOLOGY

## 2022-10-07 PROCEDURE — 88342 IMHCHEM/IMCYTCHM 1ST ANTB: CPT | Mod: 59 | Performed by: PATHOLOGY

## 2022-10-07 PROCEDURE — 00402 ANES INTEG SYS RCNSTV BREAST: CPT | Performed by: SURGERY

## 2022-10-07 PROCEDURE — 88302 PR  SURG PATH,LEVEL II: ICD-10-PCS | Mod: 26,,, | Performed by: PATHOLOGY

## 2022-10-07 PROCEDURE — 63600175 PHARM REV CODE 636 W HCPCS: Performed by: STUDENT IN AN ORGANIZED HEALTH CARE EDUCATION/TRAINING PROGRAM

## 2022-10-07 PROCEDURE — 88341 IMHCHEM/IMCYTCHM EA ADD ANTB: CPT | Mod: 26,,, | Performed by: PATHOLOGY

## 2022-10-07 PROCEDURE — C9290 INJ, BUPIVACAINE LIPOSOME: HCPCS | Performed by: SURGERY

## 2022-10-07 PROCEDURE — 88307 TISSUE EXAM BY PATHOLOGIST: CPT | Performed by: PATHOLOGY

## 2022-10-07 PROCEDURE — 19303 PR MASTECTOMY, SIMPLE, COMPLETE: ICD-10-PCS | Mod: 50,,, | Performed by: SURGERY

## 2022-10-07 PROCEDURE — 63600175 PHARM REV CODE 636 W HCPCS: Performed by: ANESTHESIOLOGY

## 2022-10-07 PROCEDURE — 88302 TISSUE EXAM BY PATHOLOGIST: CPT | Mod: 26,,, | Performed by: PATHOLOGY

## 2022-10-07 PROCEDURE — C1789 PROSTHESIS, BREAST, IMP: HCPCS | Performed by: SURGERY

## 2022-10-07 PROCEDURE — 88331 PATH CONSLTJ SURG 1 BLK 1SPC: CPT | Mod: 59 | Performed by: PATHOLOGY

## 2022-10-07 PROCEDURE — 88341 IMHCHEM/IMCYTCHM EA ADD ANTB: CPT | Performed by: PATHOLOGY

## 2022-10-07 PROCEDURE — 36000706: Performed by: SURGERY

## 2022-10-07 PROCEDURE — 19357 PR BREAST RECONSTRUC W TISS EXPANDR: ICD-10-PCS | Mod: 50,,, | Performed by: SURGERY

## 2022-10-07 PROCEDURE — 88307 TISSUE EXAM BY PATHOLOGIST: CPT | Mod: 26,,, | Performed by: PATHOLOGY

## 2022-10-07 PROCEDURE — 19303 MAST SIMPLE COMPLETE: CPT | Mod: 50,,, | Performed by: SURGERY

## 2022-10-07 PROCEDURE — 25000003 PHARM REV CODE 250: Performed by: STUDENT IN AN ORGANIZED HEALTH CARE EDUCATION/TRAINING PROGRAM

## 2022-10-07 PROCEDURE — 15860 IV NJX TST VASC FLO FLAP/GRF: CPT | Mod: 51,,, | Performed by: SURGERY

## 2022-10-07 PROCEDURE — 38792 PR IDENTIFY SENTINEL 2DE: ICD-10-PCS | Mod: RT,,, | Performed by: SURGERY

## 2022-10-07 PROCEDURE — 38525 BIOPSY/REMOVAL LYMPH NODES: CPT | Mod: 51,RT,, | Performed by: SURGERY

## 2022-10-07 PROCEDURE — 71000039 HC RECOVERY, EACH ADD'L HOUR: Performed by: SURGERY

## 2022-10-07 PROCEDURE — 37000008 HC ANESTHESIA 1ST 15 MINUTES: Performed by: SURGERY

## 2022-10-07 PROCEDURE — 25000003 PHARM REV CODE 250: Performed by: ANESTHESIOLOGY

## 2022-10-07 PROCEDURE — 19357 TISS XPNDR PLMT BRST RCNSTJ: CPT | Mod: 50,,, | Performed by: SURGERY

## 2022-10-07 PROCEDURE — A9520 TC99 TILMANOCEPT DIAG 0.5MCI: HCPCS

## 2022-10-07 PROCEDURE — 37000009 HC ANESTHESIA EA ADD 15 MINS: Performed by: SURGERY

## 2022-10-07 PROCEDURE — 88331 PATH CONSLTJ SURG 1 BLK 1SPC: CPT | Mod: 26,,, | Performed by: PATHOLOGY

## 2022-10-07 PROCEDURE — 38792 RA TRACER ID OF SENTINL NODE: CPT | Mod: RT,,, | Performed by: SURGERY

## 2022-10-07 DEVICE — IMPLANTABLE DEVICE: Type: IMPLANTABLE DEVICE | Site: BREAST | Status: FUNCTIONAL

## 2022-10-07 RX ORDER — DIPHENHYDRAMINE HYDROCHLORIDE 50 MG/ML
12.5 INJECTION INTRAMUSCULAR; INTRAVENOUS
Status: DISCONTINUED | OUTPATIENT
Start: 2022-10-07 | End: 2022-10-07 | Stop reason: HOSPADM

## 2022-10-07 RX ORDER — KETAMINE HCL IN 0.9 % NACL 50 MG/5 ML
SYRINGE (ML) INTRAVENOUS
Status: DISCONTINUED | OUTPATIENT
Start: 2022-10-07 | End: 2022-10-07

## 2022-10-07 RX ORDER — CEPHALEXIN 500 MG/1
500 CAPSULE ORAL EVERY 12 HOURS
Status: DISCONTINUED | OUTPATIENT
Start: 2022-10-08 | End: 2022-10-08 | Stop reason: HOSPADM

## 2022-10-07 RX ORDER — OXYCODONE HYDROCHLORIDE 5 MG/1
5 TABLET ORAL EVERY 4 HOURS PRN
Status: DISCONTINUED | OUTPATIENT
Start: 2022-10-07 | End: 2022-10-08 | Stop reason: HOSPADM

## 2022-10-07 RX ORDER — ACETAMINOPHEN 325 MG/1
650 TABLET ORAL EVERY 6 HOURS
Status: DISCONTINUED | OUTPATIENT
Start: 2022-10-07 | End: 2022-10-08 | Stop reason: HOSPADM

## 2022-10-07 RX ORDER — BUPIVACAINE HYDROCHLORIDE 2.5 MG/ML
INJECTION, SOLUTION EPIDURAL; INFILTRATION; INTRACAUDAL
Status: DISCONTINUED | OUTPATIENT
Start: 2022-10-07 | End: 2022-10-07 | Stop reason: HOSPADM

## 2022-10-07 RX ORDER — PROCHLORPERAZINE EDISYLATE 5 MG/ML
5 INJECTION INTRAMUSCULAR; INTRAVENOUS EVERY 30 MIN PRN
Status: DISCONTINUED | OUTPATIENT
Start: 2022-10-07 | End: 2022-10-07 | Stop reason: HOSPADM

## 2022-10-07 RX ORDER — CEPHALEXIN 500 MG/1
500 CAPSULE ORAL EVERY 12 HOURS
Qty: 14 CAPSULE | Refills: 0 | Status: ON HOLD | OUTPATIENT
Start: 2022-10-07 | End: 2022-10-17 | Stop reason: HOSPADM

## 2022-10-07 RX ORDER — DEXAMETHASONE SODIUM PHOSPHATE 4 MG/ML
INJECTION, SOLUTION INTRA-ARTICULAR; INTRALESIONAL; INTRAMUSCULAR; INTRAVENOUS; SOFT TISSUE
Status: DISCONTINUED | OUTPATIENT
Start: 2022-10-07 | End: 2022-10-07

## 2022-10-07 RX ORDER — MIDAZOLAM HYDROCHLORIDE 1 MG/ML
INJECTION INTRAMUSCULAR; INTRAVENOUS
Status: DISCONTINUED | OUTPATIENT
Start: 2022-10-07 | End: 2022-10-07

## 2022-10-07 RX ORDER — LIDOCAINE HYDROCHLORIDE 20 MG/ML
INJECTION INTRAVENOUS
Status: DISCONTINUED | OUTPATIENT
Start: 2022-10-07 | End: 2022-10-07

## 2022-10-07 RX ORDER — PANTOPRAZOLE SODIUM 40 MG/1
40 TABLET, DELAYED RELEASE ORAL DAILY
Status: DISCONTINUED | OUTPATIENT
Start: 2022-10-08 | End: 2022-10-08 | Stop reason: HOSPADM

## 2022-10-07 RX ORDER — SODIUM CHLORIDE 0.9 % (FLUSH) 0.9 %
3 SYRINGE (ML) INJECTION
Status: DISCONTINUED | OUTPATIENT
Start: 2022-10-07 | End: 2022-10-07 | Stop reason: HOSPADM

## 2022-10-07 RX ORDER — PROPOFOL 10 MG/ML
VIAL (ML) INTRAVENOUS
Status: DISCONTINUED | OUTPATIENT
Start: 2022-10-07 | End: 2022-10-07

## 2022-10-07 RX ORDER — OXYCODONE HYDROCHLORIDE 5 MG/1
5 TABLET ORAL EVERY 4 HOURS PRN
Qty: 15 TABLET | Refills: 0 | Status: ON HOLD | OUTPATIENT
Start: 2022-10-07 | End: 2022-10-17 | Stop reason: HOSPADM

## 2022-10-07 RX ORDER — ROCURONIUM BROMIDE 10 MG/ML
INJECTION, SOLUTION INTRAVENOUS
Status: DISCONTINUED | OUTPATIENT
Start: 2022-10-07 | End: 2022-10-07

## 2022-10-07 RX ORDER — METHOCARBAMOL 750 MG/1
750 TABLET, FILM COATED ORAL 4 TIMES DAILY
Status: DISCONTINUED | OUTPATIENT
Start: 2022-10-07 | End: 2022-10-08 | Stop reason: HOSPADM

## 2022-10-07 RX ORDER — INDOCYANINE GREEN AND WATER 25 MG
KIT INJECTION
Status: DISCONTINUED | OUTPATIENT
Start: 2022-10-07 | End: 2022-10-07

## 2022-10-07 RX ORDER — ALBUMIN HUMAN 50 G/1000ML
SOLUTION INTRAVENOUS CONTINUOUS PRN
Status: DISCONTINUED | OUTPATIENT
Start: 2022-10-07 | End: 2022-10-07

## 2022-10-07 RX ORDER — CEFAZOLIN SODIUM 2 G/50ML
2 SOLUTION INTRAVENOUS
Status: COMPLETED | OUTPATIENT
Start: 2022-10-07 | End: 2022-10-07

## 2022-10-07 RX ORDER — MEPERIDINE HYDROCHLORIDE 25 MG/ML
12.5 INJECTION INTRAMUSCULAR; INTRAVENOUS; SUBCUTANEOUS ONCE AS NEEDED
Status: DISCONTINUED | OUTPATIENT
Start: 2022-10-07 | End: 2022-10-07 | Stop reason: HOSPADM

## 2022-10-07 RX ORDER — HYDROMORPHONE HYDROCHLORIDE 2 MG/ML
0.4 INJECTION, SOLUTION INTRAMUSCULAR; INTRAVENOUS; SUBCUTANEOUS EVERY 5 MIN PRN
Status: DISCONTINUED | OUTPATIENT
Start: 2022-10-07 | End: 2022-10-07 | Stop reason: HOSPADM

## 2022-10-07 RX ORDER — GABAPENTIN 300 MG/1
300 CAPSULE ORAL 3 TIMES DAILY
Status: DISCONTINUED | OUTPATIENT
Start: 2022-10-07 | End: 2022-10-08 | Stop reason: HOSPADM

## 2022-10-07 RX ORDER — OXYCODONE HYDROCHLORIDE 5 MG/1
5 TABLET ORAL
Status: DISCONTINUED | OUTPATIENT
Start: 2022-10-07 | End: 2022-10-07 | Stop reason: HOSPADM

## 2022-10-07 RX ORDER — KETOROLAC TROMETHAMINE 30 MG/ML
INJECTION, SOLUTION INTRAMUSCULAR; INTRAVENOUS
Status: DISCONTINUED | OUTPATIENT
Start: 2022-10-07 | End: 2022-10-07

## 2022-10-07 RX ORDER — HYDROMORPHONE HYDROCHLORIDE 1 MG/ML
0.5 INJECTION, SOLUTION INTRAMUSCULAR; INTRAVENOUS; SUBCUTANEOUS EVERY 4 HOURS PRN
Status: DISCONTINUED | OUTPATIENT
Start: 2022-10-07 | End: 2022-10-08 | Stop reason: HOSPADM

## 2022-10-07 RX ORDER — METHOCARBAMOL 750 MG/1
750 TABLET, FILM COATED ORAL 4 TIMES DAILY
Qty: 25 TABLET | Refills: 0 | Status: SHIPPED | OUTPATIENT
Start: 2022-10-07 | End: 2022-10-17

## 2022-10-07 RX ORDER — CEFAZOLIN SODIUM 2 G/50ML
2 SOLUTION INTRAVENOUS
Status: COMPLETED | OUTPATIENT
Start: 2022-10-07 | End: 2022-10-08

## 2022-10-07 RX ORDER — IBUPROFEN 400 MG/1
800 TABLET ORAL EVERY 6 HOURS
Status: DISCONTINUED | OUTPATIENT
Start: 2022-10-07 | End: 2022-10-08 | Stop reason: HOSPADM

## 2022-10-07 RX ORDER — GABAPENTIN 300 MG/1
300 CAPSULE ORAL 3 TIMES DAILY
Qty: 15 CAPSULE | Refills: 11 | Status: ON HOLD | OUTPATIENT
Start: 2022-10-07 | End: 2022-10-17 | Stop reason: HOSPADM

## 2022-10-07 RX ORDER — SODIUM CHLORIDE 9 MG/ML
INJECTION, SOLUTION INTRAVENOUS CONTINUOUS
Status: DISCONTINUED | OUTPATIENT
Start: 2022-10-07 | End: 2022-10-07

## 2022-10-07 RX ORDER — FENTANYL CITRATE 50 UG/ML
INJECTION, SOLUTION INTRAMUSCULAR; INTRAVENOUS
Status: DISCONTINUED | OUTPATIENT
Start: 2022-10-07 | End: 2022-10-07

## 2022-10-07 RX ORDER — PHENYLEPHRINE HYDROCHLORIDE 10 MG/ML
INJECTION INTRAVENOUS
Status: DISCONTINUED | OUTPATIENT
Start: 2022-10-07 | End: 2022-10-07

## 2022-10-07 RX ORDER — TRAZODONE HYDROCHLORIDE 100 MG/1
100 TABLET ORAL NIGHTLY
Status: DISCONTINUED | OUTPATIENT
Start: 2022-10-07 | End: 2022-10-08 | Stop reason: HOSPADM

## 2022-10-07 RX ORDER — ONDANSETRON 2 MG/ML
INJECTION INTRAMUSCULAR; INTRAVENOUS
Status: DISCONTINUED | OUTPATIENT
Start: 2022-10-07 | End: 2022-10-07

## 2022-10-07 RX ORDER — SODIUM CHLORIDE, SODIUM LACTATE, POTASSIUM CHLORIDE, CALCIUM CHLORIDE 600; 310; 30; 20 MG/100ML; MG/100ML; MG/100ML; MG/100ML
INJECTION, SOLUTION INTRAVENOUS CONTINUOUS
Status: DISCONTINUED | OUTPATIENT
Start: 2022-10-07 | End: 2022-10-07

## 2022-10-07 RX ORDER — ONDANSETRON 8 MG/1
8 TABLET, ORALLY DISINTEGRATING ORAL EVERY 6 HOURS PRN
Status: DISCONTINUED | OUTPATIENT
Start: 2022-10-07 | End: 2022-10-08 | Stop reason: HOSPADM

## 2022-10-07 RX ORDER — LIDOCAINE HYDROCHLORIDE 10 MG/ML
0.5 INJECTION, SOLUTION EPIDURAL; INFILTRATION; INTRACAUDAL; PERINEURAL ONCE
Status: DISCONTINUED | OUTPATIENT
Start: 2022-10-07 | End: 2022-10-07 | Stop reason: HOSPADM

## 2022-10-07 RX ADMIN — DEXAMETHASONE SODIUM PHOSPHATE 8 MG: 4 INJECTION, SOLUTION INTRAMUSCULAR; INTRAVENOUS at 10:10

## 2022-10-07 RX ADMIN — CEFAZOLIN SODIUM 2 G: 2 SOLUTION INTRAVENOUS at 09:10

## 2022-10-07 RX ADMIN — PHENYLEPHRINE HYDROCHLORIDE 100 MCG: 10 INJECTION INTRAVENOUS at 10:10

## 2022-10-07 RX ADMIN — INDOCYANINE GREEN 10 MG: KIT INTRAVENOUS at 01:10

## 2022-10-07 RX ADMIN — ROCURONIUM BROMIDE 30 MG: 10 INJECTION, SOLUTION INTRAVENOUS at 09:10

## 2022-10-07 RX ADMIN — SODIUM CHLORIDE, SODIUM LACTATE, POTASSIUM CHLORIDE, AND CALCIUM CHLORIDE: 600; 310; 30; 20 INJECTION, SOLUTION INTRAVENOUS at 12:10

## 2022-10-07 RX ADMIN — METHOCARBAMOL 750 MG: 750 TABLET ORAL at 08:10

## 2022-10-07 RX ADMIN — ALBUMIN (HUMAN): 12.5 SOLUTION INTRAVENOUS at 10:10

## 2022-10-07 RX ADMIN — ACETAMINOPHEN 650 MG: 325 TABLET, FILM COATED ORAL at 11:10

## 2022-10-07 RX ADMIN — NITROGLYCERIN 0.5 INCH: 20 OINTMENT TOPICAL at 09:10

## 2022-10-07 RX ADMIN — MIDAZOLAM HYDROCHLORIDE 2 MG: 1 INJECTION, SOLUTION INTRAMUSCULAR; INTRAVENOUS at 09:10

## 2022-10-07 RX ADMIN — ONDANSETRON HYDROCHLORIDE 4 MG: 2 INJECTION INTRAMUSCULAR; INTRAVENOUS at 01:10

## 2022-10-07 RX ADMIN — CEFAZOLIN SODIUM 2 G: 2 SOLUTION INTRAVENOUS at 10:10

## 2022-10-07 RX ADMIN — IBUPROFEN 800 MG: 400 TABLET, FILM COATED ORAL at 11:10

## 2022-10-07 RX ADMIN — METHOCARBAMOL 750 MG: 750 TABLET ORAL at 06:10

## 2022-10-07 RX ADMIN — FENTANYL CITRATE 100 MCG: 50 INJECTION, SOLUTION INTRAMUSCULAR; INTRAVENOUS at 09:10

## 2022-10-07 RX ADMIN — OXYCODONE 5 MG: 5 TABLET ORAL at 03:10

## 2022-10-07 RX ADMIN — LIDOCAINE HYDROCHLORIDE 100 MG: 20 INJECTION, SOLUTION INTRAVENOUS at 09:10

## 2022-10-07 RX ADMIN — CEFAZOLIN SODIUM 2 G: 2 SOLUTION INTRAVENOUS at 01:10

## 2022-10-07 RX ADMIN — TRAZODONE HYDROCHLORIDE 100 MG: 100 TABLET ORAL at 08:10

## 2022-10-07 RX ADMIN — ACETAMINOPHEN 650 MG: 325 TABLET, FILM COATED ORAL at 06:10

## 2022-10-07 RX ADMIN — Medication 30 MG: at 10:10

## 2022-10-07 RX ADMIN — PROPOFOL 150 MG: 10 INJECTION, EMULSION INTRAVENOUS at 09:10

## 2022-10-07 RX ADMIN — KETOROLAC TROMETHAMINE 30 MG: 30 INJECTION, SOLUTION INTRAMUSCULAR; INTRAVENOUS at 01:10

## 2022-10-07 RX ADMIN — SODIUM CHLORIDE, SODIUM LACTATE, POTASSIUM CHLORIDE, AND CALCIUM CHLORIDE: 600; 310; 30; 20 INJECTION, SOLUTION INTRAVENOUS at 09:10

## 2022-10-07 RX ADMIN — IBUPROFEN 800 MG: 400 TABLET, FILM COATED ORAL at 06:10

## 2022-10-07 RX ADMIN — OXYCODONE 5 MG: 5 TABLET ORAL at 08:10

## 2022-10-07 RX ADMIN — Medication 10 MG: at 12:10

## 2022-10-07 RX ADMIN — FENTANYL CITRATE 50 MCG: 50 INJECTION, SOLUTION INTRAMUSCULAR; INTRAVENOUS at 11:10

## 2022-10-07 RX ADMIN — Medication 10 MG: at 11:10

## 2022-10-07 RX ADMIN — FENTANYL CITRATE 50 MCG: 50 INJECTION, SOLUTION INTRAMUSCULAR; INTRAVENOUS at 02:10

## 2022-10-07 NOTE — ANESTHESIA POSTPROCEDURE EVALUATION
Anesthesia Post Evaluation    Patient: Dina Sosa    Procedure(s) Performed: Procedure(s) (LRB):  BIOPSY, LYMPH NODE, SENTINEL (Right)  MASTECTOMY, BILATERAL (Bilateral)  INJECTION, FOR SENTINEL NODE IDENTIFICATION (Right)  INSERTION, TISSUE EXPANDER, BREAST / BILATERAL (Bilateral)    Final Anesthesia Type: general      Patient location during evaluation: PACU  Patient participation: Yes- Able to Participate  Level of consciousness: awake and alert  Post-procedure vital signs: reviewed and stable  Pain management: adequate  Airway patency: patent    PONV status at discharge: No PONV  Anesthetic complications: no      Cardiovascular status: blood pressure returned to baseline  Respiratory status: unassisted  Hydration status: euvolemic  Follow-up not needed.          Vitals Value Taken Time   /92 10/07/22 1640   Temp 36.2 °C (97.2 °F) 10/07/22 1522   Pulse 101 10/07/22 1641   Resp 16 10/07/22 1600   SpO2 96 % 10/07/22 1641   Vitals shown include unvalidated device data.      No case tracking events are documented in the log.      Pain/Daria Score: Pain Rating Prior to Med Admin: 4 (10/7/2022  3:46 PM)  Daria Score: 10 (10/7/2022  3:52 PM)

## 2022-10-07 NOTE — OP NOTE
Operative Note     10/7/2022    PRE-OP DIAGNOSIS: Malignant neoplasm of upper-inner quadrant of right female breast, positive estrogen receptor status      POST-OP DIAGNOSIS: same    Procedure(s):  BIOPSY, LYMPH NODE, SENTINEL right  MASTECTOMY, BILATERAL  INJECTION, FOR SENTINEL NODE IDENTIFICATION  INSERTION, TISSUE EXPANDER, BREAST / BILATERAL - to be dictated by Luke Trinidad    SURGEON: Surgeon(s) and Role:  Panel 1:     * Leeann Sawyer MD - Primary  Panel 2:     * Tae Trinidad,  - Primary    ANESTHESIA: General     OPERATIVE FINDINGS: Healthy appearing skin flaps and nipples at the completion of the mastectomies.  [place] nodes negative on frozen.    INDICATION FOR PROCEDURE: This patient presents with a history of DCIS of the right breast    PROCEDURE IN DETAIL:  Dina Sosa is a 47 y.o. female brought to the operating room for definitive surgery of DCIS of the right breast.  The patient has elected to undergo bilateral nipple sparing mastectomy with sentinel lymph node biopsy for kingston assessment. The patient was informed of the possible risks and complications of the procedure, including but not limited to anesthetic risks, bleeding, infection, and need for additional surgery.  The patient concurred with the proposed plan, and has given informed consent.  The site of surgery was properly noted/marked in the preoperative holding area.    The patient was then brought to the operating room and placed in the supine position with both upper extremities extended.  general anesthesia was administered. Perioperative antibiotics were administered consisting of Ancef and a time out was performed confirming the patient, site, and procedure.  The patient's right breast was injected with technetium to facilitate sentinel lymph node identification. The bilateral chest and axilla was then prepped and draped in the usual sterile fashion.    We first turned our attention to the left prophylactic breast  where a nipple sparing wise patern incision was made with inferior dermal pedicle, sparing the nipple areolar complex.  The incision was made with a plasmablade and deepened through the subcutaneous tissues with plasmablade.  Skin flaps were raised to the clavicle superiorly, to the lateral border of the sternum medially, to the inframammary fold inferiorly, and to the anterior border of the latissimus dorsi muscle laterally. Lighted retractors were used to assist in the creation of the skin flaps. The tissue beneath the nipple areolar complex was sharply dissected being careful to dissect to the dermis.  We were careful to ensure that the ductal tissue beneath the nipple was removed.  The breast tissue was then excised off the chest wall using plasmablade and taking care to incorporate the pectoralis fascia while leaving the serratus fascia behind.  The resulting mastectomy specimen was marked using a short stitch superiorly and a long stitch laterally, looped stitch subareolar.  The breast was sent to pathology for permanent evaluation.   The operative field was irrigated with normal saline and all bleeding points were secured with plasmablade.   The incision will be closed by the plastic surgery service.        We then turned our attention back to the right breast where a matching incision was fashioned sparing the nipple areolar complex.  The incision was made with a plasmablade and extended through the subcutaneous tissues with plasmablade.  Skin flaps were raised to the clavicle superiorly.  We then proceeded to raise the remainder of the flaps to the lateral border of the sternum medially, to the inframammary fold inferiorly, and to the anterior border of the latissimus dorsi muscle laterally.  Lighted retractors were used to assist in the creation of the skin flaps. The tissue beneath the nipple areolar complex was sharply dissected being careful to dissect to the dermis.  We were careful to ensure that the  ductal tissue beneath the nipple was removed. The breast tissue was excised off the chest wall taking care to incorporate the pectoralis fascia while leaving the serratus fascia behind.  The resulting mastectomy specimen was marked using a short stitch superiorly and long stitch laterally, looped stitch subareolar.  The breast was sent to pathology for permanent evaluation.        We then turned our attention to the right axilla.   The gamma probe was used to identify an area of increased radioactivity within the lower axilla.   The clavipectoral sheath was sharply incised to reveal the level I axillary lymph nodes. The probe was used to identify a single node with increased radioactivity.  This node was brought into the operative field and carefully dissected free of the surrounding lymphovascular structures.  The highest ex vivo count of the node was 1037.  The node was then sent to pathology for frozen section evaluation, labeled as sentinel node #1.  A total of 2 axillary sentinel nodes and 0 axillary non-sentinel nodes were identified, excised and submitted to pathology.  Bed counts were obtained to confirm that the 10% rule had not been violated.   The wound was irrigated with normal saline, and all bleeding points were secured with cautery.  The incision was closed with an interrupted 3-0 vicryl deep dermal stitch followed by a running 4-0 vicryl subcuticular.     Frozen section kingston evaluation revealed no evidence of metastatic disease.  Therefore, the operative field was irrigated with normal saline and all bleeding points were secured with Bovie electrocautery.  The incision will be closed by the plastic surgery service.      At the end of my portion of the operation, all sponge, instrument, and needle counts x 2 were correct.    ESTIMATED BLOOD LOSS: less than 50 mL    COMPLICATIONS: none    DISPOSITION: intraop transfer to the plastic surgery service    ATTESTATION:   I was present and scrubbed for the  entire procedure.

## 2022-10-07 NOTE — ANESTHESIA PROCEDURE NOTES
Intubation    Date/Time: 10/7/2022 9:57 AM  Performed by: Rashawn Atkins CRNA  Authorized by: Neil Kendall MD     Intubation:     Induction:  Intravenous    Intubated:  Postinduction    Mask Ventilation:  Easy mask    Attempts:  1    Attempted By:  Student    Method of Intubation:  Video laryngoscopy    Blade:  Gonzalez 3    Laryngeal View Grade: Grade I - full view of cords      Difficult Airway Encountered?: No      Complications:  None    Airway Device:  Oral endotracheal tube    Airway Device Size:  7.5    Style/Cuff Inflation:  Cuffed (inflated to minimal occlusive pressure)    Tube secured:  22    Secured at:  The lips    Placement Verified By:  Capnometry    Complicating Factors:  None    Findings Post-Intubation:  BS equal bilateral and atraumatic/condition of teeth unchanged

## 2022-10-07 NOTE — H&P
Breast Surgery  [facility]  Department of Surgery        REFERRING PROVIDER: Myke Torres MD  [address] [place] [address]     Chief Complaint: ductal carcinoma in situ of the R breast     Subjective:      Patient ID: Dina Sosa is a 47 y.o. female who presents with abnormal screening MMG 22.      Follow-up mammogram (8/15/22) showed   1) right breast 8 mm calcifications in a grouped distribution seen in the upper inner quadrant of the right breast in the middle depth,   2) right breast calcifications at the 4 o'clock in the middle depth,  and   3) a left breast mass measuring 9 mm x 9 mm x 6 mm at the posterior 12 o'clock position, 6 CFM.       A stereotactic biopsy was performed on the right breast calcifications on 22 with pathology revealing ductal carcinoma in situ of the breast.      Biopsy performed for the left lesion and for the R breast calcifications at 4 o'clock (8 o clock) on 22, no malignancy in either of the additional biopsies.   MRI showed lesion size of 4.7 cm.      Patient does routinely do self breast exams.  Patient does not have noted a change on breast exam.  Patient denies nipple discharge. Patient denies to previous breast biopsy. Patient denies a personal history of breast cancer.     Findings at that time were the following for R breast calcification Upper Inner Quadrant:   Tumor size: 8mm  Tumor ndgndrndanddndend:nd nd2nd Estrogen Receptor: + (80%)  Progesterone Receptor: + (30%)  Lymph node status: clinically neg  Lymphatic invasion: not indicated         GYN History:  Age of menarche was 11. Patient is . Age of first live birth was 19. Patient did not breast feed (attempted BF for 2 weeks per child but unable to maintain supply). Pt is perimenopausal.  Last menstrual period was . Patient admits to hormonal therapy (premerin after hysterectomy in , only took for a year).     Smokes .5 pack a day, been smoking on and off for ~20 years      Breast cancer in paternal grandmother + unknown breast disease that resulted in bilat mastectomy in maternal aunt          Past Medical History:   Diagnosis Date    Anxiety              Past Surgical History:   Procedure Laterality Date     SECTION         x2    HYSTERECTOMY   2002     ROGER, ovaries remain (AUB)    TONSILLECTOMY        TYMPANOSTOMY TUBE PLACEMENT         x6             Current Outpatient Medications on File Prior to Visit   Medication Sig Dispense Refill    buPROPion (WELLBUTRIN XL) 300 MG 24 hr tablet 1 po qod x 30 days then as directed 90 tablet 3    diazePAM (VALIUM) 5 MG tablet One p.o. q.d. p.r.n. anxiety 30 tablet 2    DULoxetine (CYMBALTA) 30 MG capsule One p.o. q.d. times 30 days--then 1 p.o. q.o.d. times 30 days--the 1po Q 3 days times 30 days 60 capsule 0    gabapentin (NEURONTIN) 300 MG capsule Take 300 mg by mouth 2 (two) times daily.        pantoprazole (PROTONIX) 40 MG tablet Take 1 tablet (40 mg total) by mouth once daily. 30 tablet 11    traZODone (DESYREL) 100 MG tablet TAKE 1 TABLET(100 MG) BY MOUTH EVERY EVENING 30 tablet 5    valACYclovir (VALTREX) 1000 MG tablet Take 2 tablets (2,000 mg total) by mouth every 12 (twelve) hours. For 1 day at sign of breakout 30 tablet 0    losartan (COZAAR) 50 MG tablet Take 1 tablet (50 mg total) by mouth once daily. 90 tablet 3      No current facility-administered medications on file prior to visit.      Social History               Socioeconomic History    Marital status:    Tobacco Use    Smoking status: Former       Packs/day: 1.00       Years: 22.00       Pack years: 22.00       Types: Cigarettes       Quit date: 5/10/2016       Years since quittin.3    Smokeless tobacco: Never   Substance and Sexual Activity    Alcohol use: No    Drug use: No    Sexual activity: Yes       Partners: Male       Birth control/protection: None      Social Determinants of Health          Financial Resource Strain: Low Risk     Difficulty of  Paying Living Expenses: Not very hard   Food Insecurity: No Food Insecurity    Worried About Running Out of Food in the Last Year: Never true    Ran Out of Food in the Last Year: Never true   Transportation Needs: No Transportation Needs    Lack of Transportation (Medical): No    Lack of Transportation (Non-Medical): No   Physical Activity: Insufficiently Active    Days of Exercise per Week: 3 days    Minutes of Exercise per Session: 30 min   Stress: Stress Concern Present    Feeling of Stress : Very much   Social Connections: Unknown    Frequency of Communication with Friends and Family: Once a week    Frequency of Social Gatherings with Friends and Family: Once a week    Active Member of Clubs or Organizations: No    Attends Club or Organization Meetings: Never    Marital Status: Living with partner   Housing Stability: High Risk    Unable to Pay for Housing in the Last Year: Yes    Number of Places Lived in the Last Year: 2    Unstable Housing in the Last Year: No               Family History   Problem Relation Age of Onset    Genetic Disorder Mother           JAK2+ ET    Lung cancer Mother 63         ~30-yr smoker    Skin cancer Mother 67         mole nasal bridge (type?); maybe also one on hip    Hepatitis Father           w/wo cirrhosis?    Hypertension Father      Hypertension Sister      Von Willebrand disease Sister      Cancer Maternal Grandmother 83         origin? brain (mets?)    Lung cancer Maternal Grandfather           long-term smoker    Hemophilia Daughter           hemophilia A    Von Willebrand disease Daughter      Fibrocystic breast disease Maternal Aunt      Lung cancer Maternal Aunt           believes was smoker         Review of Systems   Constitutional:  Negative for appetite change, chills, diaphoresis, fatigue, fever and unexpected weight change.   HENT:  Negative for facial swelling, postnasal drip and sore throat.    Eyes:  Negative for redness, itching and visual disturbance.    Respiratory:  Negative for chest tightness, shortness of breath and wheezing.    Cardiovascular:  Negative for chest pain and palpitations.   Gastrointestinal:  Negative for blood in stool, diarrhea, nausea and vomiting.   Genitourinary:  Negative for difficulty urinating and dysuria.   Musculoskeletal:  Negative for arthralgias and joint swelling.   Skin:  Negative for rash and wound.   Neurological:  Negative for dizziness and syncope.   Hematological:  Negative for adenopathy.   Psychiatric/Behavioral:  Negative for agitation. The patient is not nervous/anxious.    Objective:   BP (!) 151/99 (BP Location: Left arm, Patient Position: Sitting, BP Method: Medium (Automatic))   Pulse 86   Ht 5' (1.524 m)   Wt 57.6 kg (127 lb)   LMP 06/01/2001 (Approximate)   BMI 24.80 kg/m²      Physical Exam   Constitutional: She appears well-developed.   HENT:   Head: Normocephalic.   Cardiovascular:  Normal rate.            Pulmonary/Chest: Effort normal. No respiratory distress. She has no wheezes. Right breast exhibits no inverted nipple, no mass, no nipple discharge, no skin change and no tenderness. Left breast exhibits no inverted nipple, no mass, no nipple discharge, no skin change and no tenderness.      Abdominal: Soft. There is no abdominal tenderness.   Musculoskeletal: Normal range of motion. No tenderness.   Neurological: She is alert.   Skin: Skin is warm and dry. No rash noted.      Psychiatric: She has a normal mood and affect.      Radiology review: Images personally reviewed by me in the clinic.   Screening [facility] 7/26/22 Findings:  This procedure was performed using tomosynthesis. Computer-aided detection was utilized in the interpretation of this examination. The breasts are heterogeneously dense, which may obscure small masses.    Left There is a focal asymmetry seen in the left breast at 12 o'clock in the middle depth.   Right There are amorphous calcifications in a grouped distribution seen in the upper  inner quadrant of the right breast in the middle depth.    Impression: Left Focal Asymmetry: Left breast focal asymmetry at the middle 12 o'clock position. Assessment: 0 - Incomplete. Diagnostic Mammogram and/or Ultrasound is recommended.    Right Calcifications: Right breast calcifications at the upper inner middle position. Assessment: 0 - Incomplete. Diagnostic Mammogram and/or Ultrasound is recommended.    BI-RADS Category:   Overall: 0 - Incomplete: Needs Additional Imaging Evaluation        8/15 Diag MMG:   Mammo Digital Diagnostic Bilat with Ren  Left Mass: There is a mass seen in the left breast at 12 o'clock in the posterior depth. This most likely corresponds to a similar mass is seen on prior mammogram dated 06/17/2014.      Right Calcifications: There are 8 mm fine linear or fine-linear branching calcifications in a grouped distribution seen in the upper inner quadrant of the right breast in the middle depth. The calcifications correlate with the prior mammogram finding.   Calcifications: There are amorphous calcifications in a grouped distribution seen in the right breast at 4 o'clock in the middle depth on the CC view.      US Breast Left Limited  Left Mass: There is a 9 mm x 9 mm x 6 mm oval, parallel, hypoechoic mass with circumscribed margins seen in the left breast at 12 o'clock in the posterior depth, 6 cm from the nipple.    Impression:   Right Calcifications: Right breast 8 mm calcifications at the upper inner middle position. Assessment: 4 - Suspicious finding. Stereotactic Biopsy is recommended.   Calcifications: Right breast calcifications at the middle 4 o'clock position. Assessment: 3 - Probably benign. Short Interval Follow-Up in 6 Months is recommended if above biopsy results are benign.    Left Mass: Left breast 9 mm x 9 mm x 6 mm mass at the posterior 12 o'clock position. Assessment: 3 - Probably benign. Short Interval Follow-Up in 6 Months is recommended as long as above biopsy  results are benign.      BI-RADS Category: Overall: 4 - Suspicious   Result:   MRI Breast w/wo Contrast, w/CAD, Bilateral     History:  Patient is 47 y.o. and is seen for malignant neoplasm of upper inner quadrant of female breast.       Films Compared:  Compared to: 08/18/2022 Mammo Breast Stereotactic Biopsy Right, 08/18/2022 Mammo Digital Diagnostic Right, 08/15/2022 Mammo Digital Diagnostic Bilat with Ren, and 08/15/2022 US Breast Left Limited     Technique:  A routine breast MRI was performed with a dedicated breast coil. Pre-contrast STIR were acquired. Then, pre and post contrast T1 weighted fat saturated images were acquired and subtracted with MIP reconstruction. 11 ml of intravenous gadolinium contrast was administered. The study was reviewed with Cel-Fi by [employer] software.     Findings:  The breasts have scattered fibroglandular tissue. The background parenchymal enhancement is mild and symmetric.      Left  There is an 11 mm x 8 mm x 7 mm oval, homogeneous mass with circumscribed margins seen in the left breast at 12 o'clock in the middle depth. The mass correlates with the prior ultrasound finding. Delayed phase is persistent.      There is a reniform mass in the upper outer left breast measuring 11 mm which corresponds to a stable mammographic finding with features consistent benign intramammary.        Right  There is a 47 mm x 19 mm x 16 mm heterogeneous, non-mass enhancement in a segmental distribution seen in the upper inner quadrant of the right breast, 2.6 cm from the nipple and 6.3 cm from the chest wall. Delayed phase is persistent. The biopsy cavity corresponding to recent stereotactic biopsy is present within this NME.  NME extends anteroinferior from the biopsy site towards the nipple.      There is no of axillary or internal mammary adenopathy.     Impression:     1.  Known malignancy in the upper inner right breast has the appearance of segmental non mass enhancement measuring approximately 47 mm  extending anteriorly towards the nipple from the biopsy site.  Of note, this does not correspond with additional group of calcifications for which stereotactic biopsy is planned.  If it would alter surgical management, MRI guided biopsy is recommended. Assessment: 4 - Suspicious finding. MRI-guided Biopsy is recommended if it would affect surgical management.  This should be performed after stereotactic biopsy which is already planned.      2.  Left breast 11 mm x 8 mm x 7 mm mass at the middle 12 o'clock position.  This corresponds with previously described sonographic mass for which biopsy is recommended.  Assessment: 4 - Suspicious finding.  Ultrasound-guided Biopsy is recommended.       Assessment:   48 yo woman w ductal carcinoma in situ of the R breast     Plan:      Options for management were discussed with the patient and her family. We reviewed the existing data noting the equivalency of breast conserving surgery with radiation therapy and mastectomy. We also reviewed the guidelines of the National Comprehensive Cancer Network for Stage 0 breast carcinoma. We discussed the need for lumpectomy margins to be negative for carcinoma, the necessity for postoperative radiation therapy after breast conservation in most cases, the possibility of a failed or false negative sentinel lymph node biopsy and the potential need for complete lymphadenectomy for a failed or positive sentinel lymph node biopsy were fully discussed. In the setting of mastectomy, delayed or immediate reconstruction options are available and were discussed.      In the setting of lumpectomy, radiation therapy would be recommended majority of the time.  The duration and treatment side effects were discussed with the patient.  This will coordinated with the radiation oncologist pending final pathology.     We also discussed the role of systemic therapy in the treatment of early stage breast cancer.  We discussed that this is based on tumor  biology and kingston status and will be determined based on final pathology.  We discussed that if the cancer is hormone positive, endocrine therapy would be recommended in most cases and its use can reduce the risk of recurrence as well as improve survival. Side effects of treatment were briefly discussed. We also discussed the potential role for chemotherapy based on a number of factors such as tumor phenotype (ER+ vs. triple negative vs. [id number]+) and this would be determined in coordination with the medical oncologist.     Plan for Bilateral mastectomy today with SLNB on right and possible axillary dissection. Subsequent tissue expander insertion by plastics.  The patient, in consultation with her family, has elected to proceed with right partial mastectomy and sentinel lymph node biopsy if additional studies allow.  She may need mastectomy so will meet with plastics in case needed. The operative risks of bleeding, infection, recurrence, scarring, and anesthetic complications and the possibility of requiring further surgery were all noted and informed consent obtained.     Surgery scheduled. Follow-up in clinic roughly 14 days after surgery.      Patient was educated on breast cancer, receptors, wire localization lumpectomy, mastectomy, sentinel lymph node mapping and biopsy, axillary lymph node dissection, reconstruction, breast prosthesis with post-mastectomy bra and radiation therapy. Patient was given patient information binder [facility].  All her questions were answered

## 2022-10-07 NOTE — BRIEF OP NOTE
[facility] Surgery ([place])  Brief Operative Note    Surgery Date: 10/7/2022     Surgeon(s) and Role:  Panel 1:     * Leeann Sawyer MD - Primary  Panel 2:     * Tae Trinidad DO - Primary    Assisting Surgeon: Shanae Neal MD      Pre-op Diagnosis:  Malignant neoplasm of upper-inner quadrant of right female breast, positive estrogen receptor status     Post-op Diagnosis:    same    Procedure(s) (LRB):  BIOPSY, LYMPH NODE, SENTINEL (Right)  MASTECTOMY, BILATERAL (Bilateral)  INJECTION, FOR SENTINEL NODE IDENTIFICATION (Right)    INSERTION, TISSUE EXPANDER, BREAST / BILATERAL (Bilateral) ( to be dictated on separate brief op note by Dr Trinidad)     Anesthesia: General    Operative Findings: Right breast tissue removed, sparing nipple and Removal of biopsy markers in right breast  confirmed with Mozart.  2 axilary sentinel lymph nodes on right side removed and sent to pathology for frozen. Left breast tissue removed, sparing nipple. Removal of biopsy marker confirmed with Mozart. All tissue samples sent to pathology.     Estimated Blood Loss: 25ml         Specimens:   Specimen (24h ago, onward)       Start     Ordered    10/07/22 1218  Specimen to Pathology, Surgery Breast  Once        Comments: Pre-op Diagnosis: Malignant neoplasm of upper-inner quadrant of right female breast, unspecified estrogen receptor status [C50.211]Procedure(s):BIOPSY, LYMPH NODE, SENTINELMASTECTOMY, BILATERALINJECTION, FOR SENTINEL NODE IDENTIFICATIONMASTECTOMY, WITH SENTINEL NODE BIOPSY AND AXILLARY LYMPHADENECTOMYINSERTION, TISSUE EXPANDER, BREAST / BILATERAL Number of specimens: 6Name of specimens: 1. RIGHT BREAST SKIN2. LEFT BREAST SKIN3. RIGHT BREAST, SHORT STITCH SUPERIOR, LONG LATERAL4. RIGHT AXILLARY SENTINEL LYMPH NODE, HOT 1037 (FROZEN)5. RIGHT AXILLARY SENTINEL LYMPH NODE #2,  (FROZEN)6. LEFT BREAST, SHORT STITCH SUPERIOR, LONG LATERAL LOOPED SUB AREOLAR     References:    Click here for ordering Quick Tip    Question Answer Comment   Procedure Type: Breast    Specimen Class: Known or suspected malignancy    Which provider would you like to cc? ERIN CLIFFORD    Which provider would you like to cc? ANTONELLA CARMONA    Release to patient Immediate        10/07/22 1217    10/07/22 1154  Specimen to Pathology, Surgery Breast  Once,   Status:  Canceled        Comments: Pre-op Diagnosis: Malignant neoplasm of upper-inner quadrant of right female breast, unspecified estrogen receptor status [C50.211]Procedure(s):BIOPSY, LYMPH NODE, SENTINELMASTECTOMY, BILATERALINJECTION, FOR SENTINEL NODE IDENTIFICATIONMASTECTOMY, WITH SENTINEL NODE BIOPSY AND AXILLARY LYMPHADENECTOMYINSERTION, TISSUE EXPANDER, BREAST / BILATERAL Number of specimens: 6Name of specimens: 1. RIGHT BREAST SKIN2. LEFT BREAST SKIN3. RIGHT BREAST, SHORT STITCH SUPERIOR, LONG LATERAL4. RIGHT AXILLARY SENTINEL LYMPH NODE, HOT 1037 (FROZEN)5. RIGHT AXILLARY SENTINEL LYMPH NODE #2,  (FROZEN)6. LEFT BREAST, SHORT STITCH SUPERIOR, LONG LATERAL     References:    Click here for ordering Quick Tip   Question Answer Comment   Procedure Type: Breast    Specimen Class: Known or suspected malignancy    Which provider would you like to cc? ERIN CLIFFORD    Which provider would you like to cc? ANTONELLA CARMONA    Release to patient Immediate        10/07/22 1153    10/07/22 1121  Specimen to Pathology, Surgery Breast  Once,   Status:  Canceled        Comments: Pre-op Diagnosis: Malignant neoplasm of upper-inner quadrant of right female breast, unspecified estrogen receptor status [C50.211]Procedure(s):BIOPSY, LYMPH NODE, SENTINELMASTECTOMY, BILATERALINJECTION, FOR SENTINEL NODE IDENTIFICATIONMASTECTOMY, WITH SENTINEL NODE BIOPSY AND AXILLARY LYMPHADENECTOMYINSERTION, TISSUE EXPANDER, BREAST / BILATERAL Number of specimens: 6Name of specimens: 1. RIGHT BREAST SKIN2. LEFT BREAST SKIN3. RIGHT BREAST, SHORT STITCH SUPERIOR, LONG LATERAL4. RIGHT AXILLARY SENTINEL  LYMPH NODE, HOT 1037 (FROZEN)5. RIGHT AXILLARY SENTINEL LYMPH NODE #2,  (FROZEN)6. LEFT BREAST, SHORT STITCH SUPERIOR, LONG LATERAL     References:    Click here for ordering Quick Tip   Question Answer Comment   Procedure Type: Breast    Specimen Class: Known or suspected malignancy    Which provider would you like to cc? ERIN CLIFFORD    Which provider would you like to cc? ANTONELLA CARMONA    Release to patient Immediate        10/07/22 9018

## 2022-10-07 NOTE — TRANSFER OF CARE
Anesthesia Transfer of Care Note    Patient: Dina Sosa    Procedure(s) Performed: Procedure(s) (LRB):  BIOPSY, LYMPH NODE, SENTINEL (Right)  MASTECTOMY, BILATERAL (Bilateral)  INJECTION, FOR SENTINEL NODE IDENTIFICATION (Right)  INSERTION, TISSUE EXPANDER, BREAST / BILATERAL (Bilateral)    Patient location: PACU    Anesthesia Type: general    Transport from OR: Transported from OR on 6-10 L/min O2 by face mask with adequate spontaneous ventilation    Post pain: adequate analgesia    Post assessment: no apparent anesthetic complications and tolerated procedure well    Post vital signs: stable    Level of consciousness: awake and alert    Nausea/Vomiting: no nausea/vomiting    Complications: none    Transfer of care protocol was followed      Last vitals:   Visit Vitals  BP (!) 142/89 (BP Location: Left arm, Patient Position: Sitting)   Pulse 88   Temp 36.7 °C (98 °F) (Oral)   Resp 18   Ht 5' (1.524 m)   Wt 53.5 kg (118 lb)   LMP 06/01/2001 (Approximate)   SpO2 100%   Breastfeeding No   BMI 23.05 kg/m²

## 2022-10-07 NOTE — OP NOTE
[facility] ([place])  Plastic Surgery  Operative Note    SUMMARY     Date of Procedure: 10/7/2022     Location:  [facility]    Procedure(s): Procedure(s) (LRB):  BIOPSY, LYMPH NODE, SENTINEL (Right)  MASTECTOMY, BILATERAL (Bilateral)  INJECTION, FOR SENTINEL NODE IDENTIFICATION (Right)  INSERTION, TISSUE EXPANDER, BREAST / BILATERAL (Bilateral) ICG Angiography for tissue perfusion    Surgeon(s) and Role:  Panel 1:     * Leeann Sawyer MD - Primary  Panel 2:     * Tae Trinidad DO - Primary    Assistant:  none    Pre-Operative Diagnosis: Malignant neoplasm of upper-inner quadrant of right female breast, unspecified estrogen receptor status [C50.211]    Post-Operative Diagnosis: same    Anesthesia: General    Indications for Procedure:  47-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  She also on MRI biopsy of lesions of the left breast.  She elected for bilateral mastectomies.  On the patient had bilateral grade 3 ptosis, was otherwise not a great candidate for nipple sparing mastectomy.  Plan for bilateral inferior dermal pedicle for nipple preservation with Wise pattern skin excision and tissue expander placement.    Operative Findings (including complications, if any):  BLwise pattern skin excision with inferior dermal pedicle for the nipple    Description of Procedures: Patient was seen in the preoperative holding area.  Her Man pattern skin excision was marked on the breast.  Her consents had been signed a preop visit.  Any additional questions were answered.  Patient was taken operating room general anesthesia was induced.  Both breasts and axillas were prepped and draped in usual sterile fashion.    I began by marking a of a sweeping inferior pedicle around the nipple areola complex that covered the entire length of the IMF incision.  38 mm cookie cutter was used around the areola The entire lower pole of both breasts were de-epithelialized using 10 blade scalpel.  The procedure was turned  over to Dr. Sawyer for the mastectomies      After the mastectomies were completed as well as the right sentinel lymph node biopsy.  I re-entered the room.  The skin was re-prepped with a ChloraPrep stick.  the lower draped was covered with a clean 3/4 drape and  blue towels were placed around the field.  Hemostasis was checked in both breasts.    The inferior dermal pedicles were inspected.  There was some of bleeding from the wound edges as well as 2-3s cap refill in both nipples.  The the next 20 mL of Exparel was mixed with 20 cc of .25% Marcaine.  A 20 cc total volume was used for intercostal, pec 1 and drain site nerve blocks on each breast.  Next the right and in the left pocket was irrigated with dilute Betadine followed by Vashe solution.  Chest wall was measured and a  13 cm base width was appropriate.  the mentor 475 cc smooth expanders were chosen.  They were opened on the back table, deflated partially, and allowed to soak in Vashe solution.    On the right the IMF was reinforced with interrupted 2-0 PDS sutures.  Next tissue expander was sewn in place using all that the tab except the 6 o'clock position as it was abutting a vein in the dermal pedicle. a 2-0 Vicryl was used to set the tension of the T stitch.  The skin flaps were temporarily stapled.    Likewise on the right side the IMF was reinforced and the tissue expander was sewn into place, the T stitch was set and the mastectomy flaps temporarily staple closed.  Placing the T stitch on the right I feel some resistance with the tissue expander below.  This may have cut the tab were reinforced portion.  Expected the expander there was a small defect in the shell of the lower pole that I can see when it was inflated with air.  Once emerged could express bubbles but was not confident that there is not a small leak, so the tissue expander was exchanged for a   New one.  15 Kenyan Daniel drains were placed, 1 on each breast    Next 4 cc of reconstituted  ICG was injected.  The  vision sense was brought over the field and the lights were dimmed.  On both sides there was inadequate flow in the lower pole.  I believe this is due to tension.  In both tissue expanders were completely deflated.  The flow improved bilaterally.  good ICG uptake from the left nipple.  Although you cannot see clearly on the right nipple, the nipple is not congested, it had 3-4 second cap refill, and there is vessels and the pedicle that could be seen immediately inferior to the nipple.  Both nipples were left in place.  Attention was also adjusted on the mastectomy flaps with the T point moved slightly laterally to decrease tension on the lateral flap.    Finally all of the deep dermal layers of the T were closed using 3-0 Monocryl sutures.  The IMF incisions were will run using 3-0 Stratafix.  38 mm cookie cutter was used at the top of the T.  Was marked with a marker and position was checked on the chest wall.  The nipples were equidistant from the midline and also from the IMF incision.  Using iris scissors I cut on the inside of that line to decrease strain on the nipples.  The nipples were inset with buried 4-0 Monocryl.  A running 4 Monocryl was used around the nipple and vertical incision.    The T incision was dressed with Prineo tape.  Nitropaste was placed on the nipples.  ABD pads and a bra were applied.    Significant Surgical Tasks Conducted by the Assistant(s), if Applicable: na    Estimated Blood Loss (EBL): 20mL           Implants:   Implant Name Type Inv. Item Serial No.  Lot No. LRB No. Used Action   [employer], Smooth   [id number] [employer] KEL [id number] Left 1 Implanted   [employer] Smooth   [id number] [employer] KEL [id number] Right 1 Wasted   [employer] Smooth    [id number]  [id number] Right 1 Implanted       Specimens:   Specimen (24h ago, onward)       Start     Ordered    10/07/22 1218  Specimen to Pathology, Surgery Breast  Once         Comments: Pre-op Diagnosis: Malignant neoplasm of upper-inner quadrant of right female breast, unspecified estrogen receptor status [C50.211]Procedure(s):BIOPSY, LYMPH NODE, SENTINELMASTECTOMY, BILATERALINJECTION, FOR SENTINEL NODE IDENTIFICATIONMASTECTOMY, WITH SENTINEL NODE BIOPSY AND AXILLARY LYMPHADENECTOMYINSERTION, TISSUE EXPANDER, BREAST / BILATERAL Number of specimens: 6Name of specimens: 1. RIGHT BREAST SKIN2. LEFT BREAST SKIN3. RIGHT BREAST, SHORT STITCH SUPERIOR, LONG LATERAL4. RIGHT AXILLARY SENTINEL LYMPH NODE, HOT 1037 (FROZEN)5. RIGHT AXILLARY SENTINEL LYMPH NODE #2,  (FROZEN)6. LEFT BREAST, SHORT STITCH SUPERIOR, LONG LATERAL LOOPED SUB AREOLAR     References:    Click here for ordering Quick Tip   Question Answer Comment   Procedure Type: Breast    Specimen Class: Known or suspected malignancy    Which provider would you like to cc? ERIN CLIFFORD    Which provider would you like to cc? ANTONELLA CARMONA    Release to patient Immediate        10/07/22 1217    10/07/22 1154  Specimen to Pathology, Surgery Breast  Once,   Status:  Canceled        Comments: Pre-op Diagnosis: Malignant neoplasm of upper-inner quadrant of right female breast, unspecified estrogen receptor status [C50.211]Procedure(s):BIOPSY, LYMPH NODE, SENTINELMASTECTOMY, BILATERALINJECTION, FOR SENTINEL NODE IDENTIFICATIONMASTECTOMY, WITH SENTINEL NODE BIOPSY AND AXILLARY LYMPHADENECTOMYINSERTION, TISSUE EXPANDER, BREAST / BILATERAL Number of specimens: 6Name of specimens: 1. RIGHT BREAST SKIN2. LEFT BREAST SKIN3. RIGHT BREAST, SHORT STITCH SUPERIOR, LONG LATERAL4. RIGHT AXILLARY SENTINEL LYMPH NODE, HOT 1037 (FROZEN)5. RIGHT AXILLARY SENTINEL LYMPH NODE #2,  (FROZEN)6. LEFT BREAST, SHORT STITCH SUPERIOR, LONG LATERAL     References:    Click here for ordering Quick Tip   Question Answer Comment   Procedure Type: Breast    Specimen Class: Known or suspected malignancy    Which provider would you like to cc? VENESSA  ERIN LEIJA    Which provider would you like to cc? ANTONELLA CARMONA    Release to patient Immediate        10/07/22 1153    10/07/22 1121  Specimen to Pathology, Surgery Breast  Once,   Status:  Canceled        Comments: Pre-op Diagnosis: Malignant neoplasm of upper-inner quadrant of right female breast, unspecified estrogen receptor status [C50.211]Procedure(s):BIOPSY, LYMPH NODE, SENTINELMASTECTOMY, BILATERALINJECTION, FOR SENTINEL NODE IDENTIFICATIONMASTECTOMY, WITH SENTINEL NODE BIOPSY AND AXILLARY LYMPHADENECTOMYINSERTION, TISSUE EXPANDER, BREAST / BILATERAL Number of specimens: 6Name of specimens: 1. RIGHT BREAST SKIN2. LEFT BREAST SKIN3. RIGHT BREAST, SHORT STITCH SUPERIOR, LONG LATERAL4. RIGHT AXILLARY SENTINEL LYMPH NODE, HOT 1037 (FROZEN)5. RIGHT AXILLARY SENTINEL LYMPH NODE #2,  (FROZEN)6. LEFT BREAST, SHORT STITCH SUPERIOR, LONG LATERAL     References:    Click here for ordering Quick Tip   Question Answer Comment   Procedure Type: Breast    Specimen Class: Known or suspected malignancy    Which provider would you like to cc? ERIN CLIFFORD    Which provider would you like to cc? ANTONELLA CARMONA    Release to patient Immediate        10/07/22 1153                            Condition: Good    Disposition: PACU - hemodynamically stable., extended recovery overnight admission    Attestation: I was present and scrubbed for the entire procedure.

## 2022-10-07 NOTE — PLAN OF CARE
Pt transferred from PACU via bed w/ ALEX Santiago. Pt AAOX4. VSS on RA.Pt denies pain at this time. B breast incisions WNL. SHAI x2 in place; bloody draining noted. HOB @30 degrees. Room orientation given. Instructed pt and family on how to order meals.Pt has call light in reach, side rails up X2, bed in low position, SCDs/TEDs and nonskid socks on. Pt lying in bed in no distress w/ SO and mother at the bedside. Will continue to monitor.

## 2022-10-08 VITALS
WEIGHT: 119.06 LBS | DIASTOLIC BLOOD PRESSURE: 75 MMHG | BODY MASS INDEX: 23.37 KG/M2 | OXYGEN SATURATION: 98 % | SYSTOLIC BLOOD PRESSURE: 135 MMHG | RESPIRATION RATE: 16 BRPM | HEIGHT: 60 IN | HEART RATE: 92 BPM | TEMPERATURE: 96 F

## 2022-10-08 PROCEDURE — 63600175 PHARM REV CODE 636 W HCPCS: Performed by: SURGERY

## 2022-10-08 PROCEDURE — 94799 UNLISTED PULMONARY SVC/PX: CPT

## 2022-10-08 PROCEDURE — 94761 N-INVAS EAR/PLS OXIMETRY MLT: CPT

## 2022-10-08 PROCEDURE — 25000003 PHARM REV CODE 250: Performed by: SURGERY

## 2022-10-08 RX ORDER — FLUCONAZOLE 150 MG/1
150 TABLET ORAL
Qty: 2 TABLET | Refills: 0 | Status: SHIPPED | OUTPATIENT
Start: 2022-10-08 | End: 2022-10-12

## 2022-10-08 RX ADMIN — METHOCARBAMOL 750 MG: 750 TABLET ORAL at 09:10

## 2022-10-08 RX ADMIN — IBUPROFEN 800 MG: 400 TABLET, FILM COATED ORAL at 05:10

## 2022-10-08 RX ADMIN — OXYCODONE 5 MG: 5 TABLET ORAL at 04:10

## 2022-10-08 RX ADMIN — PANTOPRAZOLE SODIUM 40 MG: 40 TABLET, DELAYED RELEASE ORAL at 09:10

## 2022-10-08 RX ADMIN — CEPHALEXIN 500 MG: 500 CAPSULE ORAL at 09:10

## 2022-10-08 RX ADMIN — OXYCODONE 5 MG: 5 TABLET ORAL at 12:10

## 2022-10-08 RX ADMIN — ACETAMINOPHEN 650 MG: 325 TABLET, FILM COATED ORAL at 05:10

## 2022-10-08 RX ADMIN — CEFAZOLIN SODIUM 2 G: 2 SOLUTION INTRAVENOUS at 05:10

## 2022-10-08 RX ADMIN — HYDROMORPHONE HYDROCHLORIDE 0.5 MG: 1 INJECTION, SOLUTION INTRAMUSCULAR; INTRAVENOUS; SUBCUTANEOUS at 09:10

## 2022-10-08 RX ADMIN — NITROGLYCERIN 0.5 INCH: 20 OINTMENT TOPICAL at 05:10

## 2022-10-08 NOTE — PLAN OF CARE
10/08/22 0952   Discharge Assessment   Assessment Type Discharge Planning Assessment   Confirmed/corrected address, phone number and insurance Yes   Confirmed Demographics Correct on Facesheet   Source of Information patient;health record   Lives With other (see comments)  (Family)   Do you expect to return to your current living situation? Yes   Do you have help at home or someone to help you manage your care at home? Yes   Prior to hospitilization cognitive status: Alert/Oriented   Current cognitive status: Alert/Oriented   Walking or Climbing Stairs Difficulty none   Dressing/Bathing Difficulty none   Equipment Currently Used at Home none   Readmission within 30 days? No   Patient currently being followed by outpatient case management? No   Do you currently have service(s) that help you manage your care at home? No   Do you take prescription medications? Yes   Do you have prescription coverage? Yes   Do you have any problems affording any of your prescribed medications? No   How do you get to doctors appointments? car, drives self;family or friend will provide   Are you on dialysis? No   Do you take coumadin? No   Discharge Plan A Home with family   Discharge Plan B Home with family   DME Needed Upon Discharge  none   Discharge Plan discussed with: Patient   Discharge Barriers Identified None   Physical Activity   On average, how many days per week do you engage in moderate to strenuous exercise (like a brisk walk)? 0 days   On average, how many minutes do you engage in exercise at this level? 0 min   Financial Resource Strain   How hard is it for you to pay for the very basics like food, housing, medical care, and heating? Not hard   Housing Stability   In the last 12 months, was there a time when you did not have a steady place to sleep or slept in a shelter (including now)? N   Transportation Needs   In the past 12 months, has lack of transportation kept you from medical appointments or from getting  medications? no   In the past 12 months, has lack of transportation kept you from meetings, work, or from getting things needed for daily living? No   Food Insecurity   Within the past 12 months, you worried that your food would run out before you got the money to buy more. Never true   Within the past 12 months, the food you bought just didn't last and you didn't have money to get more. Never true   Stress   Do you feel stress - tense, restless, nervous, or anxious, or unable to sleep at night because your mind is troubled all the time - these days? Not at all   Social Connections   In a typical week, how many times do you talk on the phone with family, friends, or neighbors? Three   How often do you get together with friends or relatives? Three times   How often do you attend Jew or Oriental orthodox services? Never   Do you belong to any clubs or organizations such as Jew groups, unions, fraternal or athletic groups, or school groups? No   How often do you attend meetings of the clubs or organizations you belong to? 1 to 4   Are you , , , , never , or living with a partner?    Alcohol Use   Q1: How often do you have a drink containing alcohol? Never   Q2: How many drinks containing alcohol do you have on a typical day when you are drinking? None   Q3: How often do you have six or more drinks on one occasion? Never

## 2022-10-08 NOTE — PROGRESS NOTES
Progress Note  Breast Surgery    Admit Date: 10/7/2022  S/P: Procedure(s) (LRB):  BIOPSY, LYMPH NODE, SENTINEL (Right)  MASTECTOMY, BILATERAL (Bilateral)  INJECTION, FOR SENTINEL NODE IDENTIFICATION (Right)  INSERTION, TISSUE EXPANDER, BREAST / BILATERAL (Bilateral)    Post-operative Day: 1 Day Post-Op    Hospital Day: 2    SUBJECTIVE:     Patient states she was not able to sleep very well through the night due to pain, now better controlled. Denies nausea, vomiting, fever, chills. Tolerating regular diet, ate yesterday and has appetite today. Ambulating unassisted, getting up to void. Drains with watery bloody output, no clots overnight or on examination. She states that despite her not resting, she overall is feeling alright.           OBJECTIVE:     Vital Signs (Most Recent)  Temp: 98.9 °F (37.2 °C) (10/08/22 0451)  Pulse: 85 (10/08/22 0451)  Resp: 18 (10/08/22 0451)  BP: 108/69 (10/08/22 0451)  SpO2: 96 % (10/08/22 0451)    Vital Signs Range (Last 24H):  Temp:  [97.2 °F (36.2 °C)-98.9 °F (37.2 °C)]   Pulse:  []   Resp:  [16-18]   BP: (106-151)/(69-96)   SpO2:  [96 %-100 %]     I & O (Last 24H):  Intake/Output Summary (Last 24 hours) at 10/8/2022 0652  Last data filed at 10/8/2022 0252  Gross per 24 hour   Intake 2440 ml   Output 605 ml   Net 1835 ml     Physical Exam:  General: no distress  Lungs:  clear to auscultation bilaterally and normal respiratory effort  Heart: regular rate and rhythm, S1, S2 normal, no murmur, rub or gallop  Abdomen: soft, non-tender non-distented; bowel sounds normal; no masses,  no organomegaly    Wound/Incision:  clean, dry, intact, nipple on right looking somewhat gray, dressed with nitropaste.     Laboratory:  Labs within the past 24 hours have been reviewed.        ASSESSMENT/PLAN:     Assessment: Doing well, uncomplicated post-operative course.    Plan: continue current treatment, out of bed, ambulate, pain control, discharge planning, per plastics.

## 2022-10-08 NOTE — DISCHARGE SUMMARY
Plastic Surgery Discharge Summary    Admitting Dx: Malignant neoplasm of upper-inner quadrant of right female breast, unspecified estrogen receptor status [C50.211]  Breast cancer [C50.919]    Discharge Dx: same    Admit Date: 10/7/2022  Discharge day: 10/8/2022    Procedure performed during the hospital stay: BL mastectomy, nipple sparing, R SLNBx, BL TE placement (10/7)    Discharge Diet: regular    Discharge medications:      Medication List        START taking these medications      cephALEXin 500 MG capsule  Commonly known as: KEFLEX  Take 1 capsule (500 mg total) by mouth every 12 (twelve) hours.     fluconazole 150 MG Tab  Commonly known as: DIFLUCAN  Take 1 tablet (150 mg total) by mouth Every 3 (three) days. Take once every three days as needed for yeast infection for 2 doses     gabapentin 300 MG capsule  Commonly known as: NEURONTIN  Take 1 capsule (300 mg total) by mouth 3 (three) times daily.     methocarbamoL 750 MG Tab  Commonly known as: ROBAXIN  Take 1 tablet (750 mg total) by mouth 4 (four) times daily. for 10 days     nitroGLYCERIN 2% TD oint 2 % ointment  Commonly known as: NITROGLYN  Apply 0.5 inches topically every 6 (six) hours. Apply to nipples and lower medial right breast     oxyCODONE 5 MG immediate release tablet  Commonly known as: ROXICODONE  Take 1 tablet (5 mg total) by mouth every 4 (four) hours as needed for Pain.            CONTINUE taking these medications      buPROPion 300 MG 24 hr tablet  Commonly known as: WELLBUTRIN XL  1 po qod x 30 days then as directed     diazePAM 5 MG tablet  Commonly known as: VALIUM  One p.o. q.d. p.r.n. anxiety     pantoprazole 40 MG tablet  Commonly known as: PROTONIX  Take 1 tablet (40 mg total) by mouth once daily.     traZODone 100 MG tablet  Commonly known as: DESYREL  TAKE 1 TABLET(100 MG) BY MOUTH EVERY EVENING            STOP taking these medications      valACYclovir 1000 MG tablet  Commonly known as: VALTREX               Where to Get Your  Medications        These medications were sent to [facility]  [address], [facility] [address]      Hours: Mon-Fri, 8a-5:30p Phone: [phone]   cephALEXin 500 MG capsule  gabapentin 300 MG capsule  methocarbamoL 750 MG Tab  oxyCODONE 5 MG immediate release tablet       These medications were sent to [id number] [id number] [place] [address] AT [facility] [address] [facility]  [address] [address] [address]      Phone: [phone]   fluconazole 150 MG Tab  nitroGLYCERIN 2% TD oint 2 % ointment           Discharge Activity: light    Follow-up:    Follow-up Information       Tae Trinidad DO Follow up in 1 week(s).    Specialty: Plastic Surgery  Contact information:  [address]  [facility] [address]  [phone]                             Disposition: Home  Condition: Stable    Hospital course:   Patient is admitted to the floor for extended recovery after her mastectomies.  Now she did have threatened nipple loss ulnar tunnel pedicle.  She was treated with nitro paste every 6 hours.  Her pain was controlled.  She was ambulating without help and was tolerating a diet.    Postop day 1 her left mastectomy flap is viable.  Her nipple had a 2s cap refill although it was slightly discolored.  Right side she had some bruising of her lower medial mastectomy skin flap.  The nipple was slightly darker, it did have good turgor, and a 3-4 second capillary .  With approximately 40 cc out in each drain overnight    Gave instructions.  Will follow up on Thursday and will check in with her by phone on Monday.    Luke Trinidad DO      [facility] Plastic Surgery Fellow     Cell: [phone]

## 2022-10-08 NOTE — PLAN OF CARE
10/08/22 0955   Final Note   Assessment Type Final Discharge Note   Anticipated Discharge Disposition Home   Hospital Resources/Appts/Education Provided Provided patient/caregiver with written discharge plan information;Appointments scheduled and added to AVS;Appointments scheduled by Navigator/Coordinator   Post-Acute Status   Discharge Delays None known at this time

## 2022-10-08 NOTE — PLAN OF CARE
Patient is AAOx4.  at bedside. Patient reported pain during the night and medications were administered per orders. SHAI drain care performed. Education on drains given, patient and spouse verbalized understanding. Ambulates independently. No acute events overnight. Patient resting comfortably at present. Bed locked in lowest position with side rails up x 2. Call light within reach of patient. Will continue purposeful rounding.

## 2022-10-08 NOTE — PLAN OF CARE
Problem: Adult Inpatient Plan of Care  Goal: Plan of Care Review  Outcome: Met  Goal: Patient-Specific Goal (Individualized)  Outcome: Met  Goal: Absence of Hospital-Acquired Illness or Injury  Outcome: Met  Goal: Optimal Comfort and Wellbeing  Outcome: Met  Goal: Readiness for Transition of Care  Outcome: Met     Adequate for discharge

## 2022-10-08 NOTE — DISCHARGE INSTRUCTIONS
Plastic Surgery Discharge Instructions  Luke Trinidad DO    Wound Care  1. Shower daily beginning 48hrs after surgery  2. Okay to let warm soapy water run over the wound at that time  3. Do not submerge wounds in the bath  4. Leave any skin glue or mesh tape in place    Drain Care  If you have drains, strip tubing and record output when drain bulb becomes half full, or at least twice daily  Record output for each drain and bring to our follow up appointment    Diet  Regular Diet    Activity  Light activity only - no lifting greater than 10 lbs  Avoid strenuous activity that would cause you to get hot or sweaty    Medications  You have been given a prescription for antibiotics, narcotic pain medication, anti-inflammatory pain, muscle relaxer, and nerve pain  Please take medications as prescribed     What to call for:  1. Sustained fever > 101.0  2. Changes in color, sensation, temperature or pain at surgical site  3. Redness and/or drainage from the surgical site  4. Any reaction to prescribed medications  5. Continuous bloody drainage from surgical drains  6. Wound vac malfunction  7. Questions related to the procedure    Follow-up  1. Please call [phone] to schedule or confirm your follow up visit at the [facility] clinic on [facility]  2. Call with any questions or concerns.  2. After hours call [phone] - ask to speak with the Plastic Surgery fellow on call

## 2022-10-10 ENCOUNTER — TELEPHONE (OUTPATIENT)
Dept: PLASTIC SURGERY | Facility: CLINIC | Age: 47
End: 2022-10-10
Payer: MEDICAID

## 2022-10-10 NOTE — TELEPHONE ENCOUNTER
Spoke with patient daily since DC.    Saturday after discharge was unable to get nitro bid rx filled at their pharmacy.  I called around that night and was able to find a [facility] in [place] that had in stock.  Text pt the store location. Called patient back, they were grateful and will get Rx    Sunday she sent a photo of right breast.  Provided reassurance of lower pole appearance.  Nipple viable. No issues with left    Text patient this am.  She feels things are looking better.  Pain and ambulation improving.  Better sleep.  Offered earlier follow up if she wants  Will see her Thursday    Luke Trinidad, DO  Plastic and Reconstructive Surgery

## 2022-10-13 ENCOUNTER — OFFICE VISIT (OUTPATIENT)
Dept: PLASTIC SURGERY | Facility: CLINIC | Age: 47
End: 2022-10-13
Payer: MEDICAID

## 2022-10-13 ENCOUNTER — PATIENT MESSAGE (OUTPATIENT)
Dept: HEMATOLOGY/ONCOLOGY | Facility: CLINIC | Age: 47
End: 2022-10-13
Payer: MEDICAID

## 2022-10-13 VITALS
BODY MASS INDEX: 23.36 KG/M2 | HEART RATE: 86 BPM | DIASTOLIC BLOOD PRESSURE: 73 MMHG | SYSTOLIC BLOOD PRESSURE: 110 MMHG | HEIGHT: 60 IN | WEIGHT: 119 LBS

## 2022-10-13 DIAGNOSIS — D05.11 DUCTAL CARCINOMA IN SITU (DCIS) OF RIGHT BREAST: Primary | ICD-10-CM

## 2022-10-13 PROCEDURE — 99999 PR PBB SHADOW E&M-EST. PATIENT-LVL IV: CPT | Mod: PBBFAC,,, | Performed by: SURGERY

## 2022-10-13 PROCEDURE — 3074F SYST BP LT 130 MM HG: CPT | Mod: CPTII,,, | Performed by: SURGERY

## 2022-10-13 PROCEDURE — 1159F PR MEDICATION LIST DOCUMENTED IN MEDICAL RECORD: ICD-10-PCS | Mod: CPTII,,, | Performed by: SURGERY

## 2022-10-13 PROCEDURE — 99999 PR PBB SHADOW E&M-EST. PATIENT-LVL IV: ICD-10-PCS | Mod: PBBFAC,,, | Performed by: SURGERY

## 2022-10-13 PROCEDURE — 99499 NO LOS: ICD-10-PCS | Mod: S$PBB,,, | Performed by: SURGERY

## 2022-10-13 PROCEDURE — 3078F DIAST BP <80 MM HG: CPT | Mod: CPTII,,, | Performed by: SURGERY

## 2022-10-13 PROCEDURE — 3074F PR MOST RECENT SYSTOLIC BLOOD PRESSURE < 130 MM HG: ICD-10-PCS | Mod: CPTII,,, | Performed by: SURGERY

## 2022-10-13 PROCEDURE — 99499 UNLISTED E&M SERVICE: CPT | Mod: S$PBB,,, | Performed by: SURGERY

## 2022-10-13 PROCEDURE — 3078F PR MOST RECENT DIASTOLIC BLOOD PRESSURE < 80 MM HG: ICD-10-PCS | Mod: CPTII,,, | Performed by: SURGERY

## 2022-10-13 PROCEDURE — 99214 OFFICE O/P EST MOD 30 MIN: CPT | Mod: PBBFAC | Performed by: SURGERY

## 2022-10-13 PROCEDURE — 1159F MED LIST DOCD IN RCRD: CPT | Mod: CPTII,,, | Performed by: SURGERY

## 2022-10-13 NOTE — H&P (VIEW-ONLY)
One week status post nipple sparing mastectomy, with inferior dermal pedicle for the nipple.  Wise pattern skin excision.  I have been regular contact with just because of progressing skin necrosis.  She has been applying nitropaste to her skin the nipples.    Her SHAI drains are serous in putting out 40-50 mL daily.  Her tissue expanders flap.  She has mastectomy skin flap necrosis of her medial lower limb on the right, and the lower lateral limb of the left.  Her left nipple is alive.  I believe a portion of her right nipple is ischemic.    Is a very difficult situation.  I discussed this with the patient.  I have also consulted with mentors.  I will take her back to the operating room Monday, she has been scheduled at [facility].  I  will remove the tissue expander and any areas of necrotic skin.  I discussed we may need to recruit some abdominal skin for closure.   I discussed how this delayed her reconstruction.  After she has healed in several months we will plan on going back and replacing expander expanding her up ultimately for implant based reconstruction.  I am hopeful the salvage of nipples.  Photos were taken today.     The patient states her agreement and understanding.  I called her after she left to confirm the surgery time.  I will see her Monday in preop.      Her next follow-up appointment is Thursday with Dr. Sawyer.  I will also try and see her at that visit as well    .Lkue Trinidad, DO  Plastic and Reconstructive Surgery

## 2022-10-13 NOTE — PROGRESS NOTES
One week status post nipple sparing mastectomy, with inferior dermal pedicle for the nipple.  Wise pattern skin excision.  I have been regular contact with just because of progressing skin necrosis.  She has been applying nitropaste to her skin the nipples.    Her SHAI drains are serous in putting out 40-50 mL daily.  Her tissue expanders flap.  She has mastectomy skin flap necrosis of her medial lower limb on the right, and the lower lateral limb of the left.  Her left nipple is alive.  I believe a portion of her right nipple is ischemic.    Is a very difficult situation.  I discussed this with the patient.  I have also consulted with mentors.  I will take her back to the operating room Monday, she has been scheduled at [facility].  I  will remove the tissue expander and any areas of necrotic skin.  I discussed we may need to recruit some abdominal skin for closure.   I discussed how this delayed her reconstruction.  After she has healed in several months we will plan on going back and replacing expander expanding her up ultimately for implant based reconstruction.  I am hopeful the salvage of nipples.  Photos were taken today.     The patient states her agreement and understanding.  I called her after she left to confirm the surgery time.  I will see her Monday in preop.      Her next follow-up appointment is Thursday with Dr. Sawyer.  I will also try and see her at that visit as well    .Luke Trinidad, DO  Plastic and Reconstructive Surgery

## 2022-10-14 ENCOUNTER — PATIENT MESSAGE (OUTPATIENT)
Dept: PRIMARY CARE CLINIC | Facility: CLINIC | Age: 47
End: 2022-10-14
Payer: MEDICAID

## 2022-10-14 NOTE — PRE ADMISSION SCREENING
Pre admit phone call completed.    Instructions given to patient about NPO status as follows:     The evening before surgery do not eat anything after 9 p.m. ( this includes hard candy, chewing gum and mints).  You may only have GATORADE, POWERADE AND WATER from 9 p.m. until you leave your home. DO NOT  DRINK ANY LIQUIDS ON THE WAY TO THE HOSPITAL.      Patient was also instructed on the below information:    Park in the Parking lot behind the hospital or in the [employer] [facility] across the street from the parking lot.  Parking is complimentary.  If you will be discharged the same day as your procedure, please arrange for a responsible adult to drive you home or  to accompany you if traveling by taxi.  YOU WILL NOT BE PERMITTED TO DRIVE OR TO LEAVE THE HOSPITAL ALONE AFTER SURGERY.  It is strongly recommended that you arrange for someone to remain with you for the first 24 hrs following your surgery.    Patient verbalized understanding of above instructions.

## 2022-10-17 ENCOUNTER — ANESTHESIA (OUTPATIENT)
Dept: SURGERY | Facility: OTHER | Age: 47
End: 2022-10-17
Payer: MEDICAID

## 2022-10-17 ENCOUNTER — ANESTHESIA EVENT (OUTPATIENT)
Dept: SURGERY | Facility: OTHER | Age: 47
End: 2022-10-17
Payer: MEDICAID

## 2022-10-17 ENCOUNTER — HOSPITAL ENCOUNTER (OUTPATIENT)
Facility: OTHER | Age: 47
Discharge: HOME | End: 2022-10-17
Attending: SURGERY | Admitting: SURGERY
Payer: MEDICAID

## 2022-10-17 VITALS
SYSTOLIC BLOOD PRESSURE: 137 MMHG | RESPIRATION RATE: 16 BRPM | TEMPERATURE: 98 F | WEIGHT: 114 LBS | HEART RATE: 90 BPM | HEIGHT: 60 IN | BODY MASS INDEX: 22.38 KG/M2 | DIASTOLIC BLOOD PRESSURE: 88 MMHG | OXYGEN SATURATION: 98 %

## 2022-10-17 DIAGNOSIS — D05.11 DUCTAL CARCINOMA IN SITU (DCIS) OF RIGHT BREAST: Primary | ICD-10-CM

## 2022-10-17 LAB
HCT VFR BLD AUTO: 36.7 % (ref 37–48.5)
HGB BLD-MCNC: 11.9 G/DL (ref 12–16)

## 2022-10-17 PROCEDURE — 37000008 HC ANESTHESIA 1ST 15 MINUTES: Performed by: SURGERY

## 2022-10-17 PROCEDURE — 71000015 HC POSTOP RECOV 1ST HR: Performed by: SURGERY

## 2022-10-17 PROCEDURE — 25000003 PHARM REV CODE 250: Performed by: SURGERY

## 2022-10-17 PROCEDURE — 11971 PR REMOVE TISSUE EXPANDER(S): ICD-10-PCS | Mod: 50,78,, | Performed by: SURGERY

## 2022-10-17 PROCEDURE — 85014 HEMATOCRIT: CPT | Performed by: SURGERY

## 2022-10-17 PROCEDURE — 36415 COLL VENOUS BLD VENIPUNCTURE: CPT | Performed by: SURGERY

## 2022-10-17 PROCEDURE — 63600175 PHARM REV CODE 636 W HCPCS: Performed by: ANESTHESIOLOGY

## 2022-10-17 PROCEDURE — 25000003 PHARM REV CODE 250

## 2022-10-17 PROCEDURE — 00400 ANES INTEGUMENTARY SYS NOS: CPT | Performed by: SURGERY

## 2022-10-17 PROCEDURE — 36000706: Performed by: SURGERY

## 2022-10-17 PROCEDURE — 25000003 PHARM REV CODE 250: Performed by: ANESTHESIOLOGY

## 2022-10-17 PROCEDURE — 11971 RMVL TIS XPNDR WO INSJ IMPLT: CPT | Mod: 50,78,, | Performed by: SURGERY

## 2022-10-17 PROCEDURE — 36000707: Performed by: SURGERY

## 2022-10-17 PROCEDURE — 88300 PR  SURG PATH,GROSS,LEVEL I: ICD-10-PCS | Mod: 26,59,, | Performed by: STUDENT IN AN ORGANIZED HEALTH CARE EDUCATION/TRAINING PROGRAM

## 2022-10-17 PROCEDURE — 71000033 HC RECOVERY, INTIAL HOUR: Performed by: SURGERY

## 2022-10-17 PROCEDURE — 88300 SURGICAL PATH GROSS: CPT | Performed by: STUDENT IN AN ORGANIZED HEALTH CARE EDUCATION/TRAINING PROGRAM

## 2022-10-17 PROCEDURE — 88305 TISSUE EXAM BY PATHOLOGIST: CPT | Performed by: STUDENT IN AN ORGANIZED HEALTH CARE EDUCATION/TRAINING PROGRAM

## 2022-10-17 PROCEDURE — 71000016 HC POSTOP RECOV ADDL HR: Performed by: SURGERY

## 2022-10-17 PROCEDURE — 88305 TISSUE EXAM BY PATHOLOGIST: CPT | Mod: 26,,, | Performed by: STUDENT IN AN ORGANIZED HEALTH CARE EDUCATION/TRAINING PROGRAM

## 2022-10-17 PROCEDURE — 63600175 PHARM REV CODE 636 W HCPCS

## 2022-10-17 PROCEDURE — 88300 SURGICAL PATH GROSS: CPT | Mod: 26,59,, | Performed by: STUDENT IN AN ORGANIZED HEALTH CARE EDUCATION/TRAINING PROGRAM

## 2022-10-17 PROCEDURE — 88305 TISSUE EXAM BY PATHOLOGIST: ICD-10-PCS | Mod: 26,,, | Performed by: STUDENT IN AN ORGANIZED HEALTH CARE EDUCATION/TRAINING PROGRAM

## 2022-10-17 PROCEDURE — 71000039 HC RECOVERY, EACH ADD'L HOUR: Performed by: SURGERY

## 2022-10-17 PROCEDURE — 37000009 HC ANESTHESIA EA ADD 15 MINS: Performed by: SURGERY

## 2022-10-17 PROCEDURE — 85018 HEMOGLOBIN: CPT | Performed by: SURGERY

## 2022-10-17 RX ORDER — LIDOCAINE HYDROCHLORIDE 10 MG/ML
1 INJECTION, SOLUTION EPIDURAL; INFILTRATION; INTRACAUDAL; PERINEURAL ONCE
Status: DISCONTINUED | OUTPATIENT
Start: 2022-10-17 | End: 2022-10-17 | Stop reason: HOSPADM

## 2022-10-17 RX ORDER — DIPHENHYDRAMINE HYDROCHLORIDE 50 MG/ML
25 INJECTION INTRAMUSCULAR; INTRAVENOUS EVERY 6 HOURS PRN
Status: DISCONTINUED | OUTPATIENT
Start: 2022-10-17 | End: 2022-10-17 | Stop reason: HOSPADM

## 2022-10-17 RX ORDER — FENTANYL CITRATE 50 UG/ML
INJECTION, SOLUTION INTRAMUSCULAR; INTRAVENOUS
Status: DISCONTINUED | OUTPATIENT
Start: 2022-10-17 | End: 2022-10-17

## 2022-10-17 RX ORDER — ROCURONIUM BROMIDE 10 MG/ML
INJECTION, SOLUTION INTRAVENOUS
Status: DISCONTINUED | OUTPATIENT
Start: 2022-10-17 | End: 2022-10-17

## 2022-10-17 RX ORDER — KETOROLAC TROMETHAMINE 30 MG/ML
INJECTION, SOLUTION INTRAMUSCULAR; INTRAVENOUS
Status: DISCONTINUED | OUTPATIENT
Start: 2022-10-17 | End: 2022-10-17

## 2022-10-17 RX ORDER — CLINDAMYCIN HYDROCHLORIDE 300 MG/1
300 CAPSULE ORAL EVERY 8 HOURS
Qty: 15 CAPSULE | Refills: 0 | Status: SHIPPED | OUTPATIENT
Start: 2022-10-17 | End: 2022-10-22

## 2022-10-17 RX ORDER — OXYCODONE HYDROCHLORIDE 5 MG/1
5 TABLET ORAL EVERY 4 HOURS PRN
Qty: 15 TABLET | Refills: 0 | Status: ON HOLD | OUTPATIENT
Start: 2022-10-17 | End: 2023-02-10 | Stop reason: HOSPADM

## 2022-10-17 RX ORDER — ONDANSETRON 2 MG/ML
INJECTION INTRAMUSCULAR; INTRAVENOUS
Status: DISCONTINUED | OUTPATIENT
Start: 2022-10-17 | End: 2022-10-17

## 2022-10-17 RX ORDER — MEPERIDINE HYDROCHLORIDE 25 MG/ML
12.5 INJECTION INTRAMUSCULAR; INTRAVENOUS; SUBCUTANEOUS ONCE AS NEEDED
Status: COMPLETED | OUTPATIENT
Start: 2022-10-17 | End: 2022-10-17

## 2022-10-17 RX ORDER — LABETALOL HYDROCHLORIDE 5 MG/ML
INJECTION, SOLUTION INTRAVENOUS
Status: DISCONTINUED | OUTPATIENT
Start: 2022-10-17 | End: 2022-10-17

## 2022-10-17 RX ORDER — LIDOCAINE HYDROCHLORIDE 20 MG/ML
INJECTION INTRAVENOUS
Status: DISCONTINUED | OUTPATIENT
Start: 2022-10-17 | End: 2022-10-17

## 2022-10-17 RX ORDER — PROPOFOL 10 MG/ML
VIAL (ML) INTRAVENOUS
Status: DISCONTINUED | OUTPATIENT
Start: 2022-10-17 | End: 2022-10-17

## 2022-10-17 RX ORDER — DEXAMETHASONE SODIUM PHOSPHATE 4 MG/ML
INJECTION, SOLUTION INTRA-ARTICULAR; INTRALESIONAL; INTRAMUSCULAR; INTRAVENOUS; SOFT TISSUE
Status: DISCONTINUED | OUTPATIENT
Start: 2022-10-17 | End: 2022-10-17

## 2022-10-17 RX ORDER — MIDAZOLAM HYDROCHLORIDE 1 MG/ML
INJECTION INTRAMUSCULAR; INTRAVENOUS
Status: DISCONTINUED | OUTPATIENT
Start: 2022-10-17 | End: 2022-10-17

## 2022-10-17 RX ORDER — SODIUM CHLORIDE 0.9 G/100ML
IRRIGANT IRRIGATION
Status: DISCONTINUED | OUTPATIENT
Start: 2022-10-17 | End: 2022-10-17 | Stop reason: HOSPADM

## 2022-10-17 RX ORDER — HYDROMORPHONE HYDROCHLORIDE 2 MG/ML
0.4 INJECTION, SOLUTION INTRAMUSCULAR; INTRAVENOUS; SUBCUTANEOUS EVERY 5 MIN PRN
Status: DISCONTINUED | OUTPATIENT
Start: 2022-10-17 | End: 2022-10-17 | Stop reason: HOSPADM

## 2022-10-17 RX ORDER — SODIUM CHLORIDE 0.9 % (FLUSH) 0.9 %
10 SYRINGE (ML) INJECTION
Status: DISCONTINUED | OUTPATIENT
Start: 2022-10-17 | End: 2022-10-17 | Stop reason: HOSPADM

## 2022-10-17 RX ORDER — CEFAZOLIN SODIUM 1 G/3ML
INJECTION, POWDER, FOR SOLUTION INTRAMUSCULAR; INTRAVENOUS
Status: DISCONTINUED | OUTPATIENT
Start: 2022-10-17 | End: 2022-10-17

## 2022-10-17 RX ORDER — OXYCODONE HYDROCHLORIDE 5 MG/1
5 TABLET ORAL
Status: DISCONTINUED | OUTPATIENT
Start: 2022-10-17 | End: 2022-10-17 | Stop reason: HOSPADM

## 2022-10-17 RX ORDER — SODIUM CHLORIDE 0.9 % (FLUSH) 0.9 %
3 SYRINGE (ML) INJECTION
Status: DISCONTINUED | OUTPATIENT
Start: 2022-10-17 | End: 2022-10-17 | Stop reason: HOSPADM

## 2022-10-17 RX ORDER — HYDROMORPHONE HYDROCHLORIDE 2 MG/ML
INJECTION, SOLUTION INTRAMUSCULAR; INTRAVENOUS; SUBCUTANEOUS
Status: DISCONTINUED | OUTPATIENT
Start: 2022-10-17 | End: 2022-10-17

## 2022-10-17 RX ORDER — PROCHLORPERAZINE EDISYLATE 5 MG/ML
5 INJECTION INTRAMUSCULAR; INTRAVENOUS EVERY 30 MIN PRN
Status: DISCONTINUED | OUTPATIENT
Start: 2022-10-17 | End: 2022-10-17 | Stop reason: HOSPADM

## 2022-10-17 RX ORDER — ACETAMINOPHEN 10 MG/ML
INJECTION, SOLUTION INTRAVENOUS
Status: DISCONTINUED | OUTPATIENT
Start: 2022-10-17 | End: 2022-10-17

## 2022-10-17 RX ADMIN — FENTANYL CITRATE 100 MCG: 50 INJECTION, SOLUTION INTRAMUSCULAR; INTRAVENOUS at 10:10

## 2022-10-17 RX ADMIN — GLYCOPYRROLATE 0.1 MG: 0.2 INJECTION, SOLUTION INTRAMUSCULAR; INTRAVITREAL at 10:10

## 2022-10-17 RX ADMIN — HYDROMORPHONE HYDROCHLORIDE 0.2 MG: 2 INJECTION INTRAMUSCULAR; INTRAVENOUS; SUBCUTANEOUS at 11:10

## 2022-10-17 RX ADMIN — CARBOXYMETHYLCELLULOSE SODIUM 2 DROP: 2.5 SOLUTION/ DROPS OPHTHALMIC at 10:10

## 2022-10-17 RX ADMIN — PROPOFOL 150 MG: 10 INJECTION, EMULSION INTRAVENOUS at 10:10

## 2022-10-17 RX ADMIN — ROCURONIUM BROMIDE 40 MG: 10 INJECTION, SOLUTION INTRAVENOUS at 10:10

## 2022-10-17 RX ADMIN — ACETAMINOPHEN 1000 MG: 10 INJECTION, SOLUTION INTRAVENOUS at 10:10

## 2022-10-17 RX ADMIN — SUGAMMADEX 200 MG: 100 INJECTION, SOLUTION INTRAVENOUS at 11:10

## 2022-10-17 RX ADMIN — LIDOCAINE HYDROCHLORIDE 50 MG: 20 INJECTION, SOLUTION INTRAVENOUS at 10:10

## 2022-10-17 RX ADMIN — HYDROMORPHONE HYDROCHLORIDE 0.4 MG: 2 INJECTION INTRAMUSCULAR; INTRAVENOUS; SUBCUTANEOUS at 12:10

## 2022-10-17 RX ADMIN — KETOROLAC TROMETHAMINE 30 MG: 30 INJECTION, SOLUTION INTRAMUSCULAR; INTRAVENOUS at 11:10

## 2022-10-17 RX ADMIN — DEXAMETHASONE SODIUM PHOSPHATE 8 MG: 4 INJECTION, SOLUTION INTRAMUSCULAR; INTRAVENOUS at 10:10

## 2022-10-17 RX ADMIN — MEPERIDINE HYDROCHLORIDE 12.5 MG: 25 INJECTION INTRAMUSCULAR; INTRAVENOUS; SUBCUTANEOUS at 12:10

## 2022-10-17 RX ADMIN — CEFAZOLIN 2 G: 330 INJECTION, POWDER, FOR SOLUTION INTRAMUSCULAR; INTRAVENOUS at 10:10

## 2022-10-17 RX ADMIN — ONDANSETRON HYDROCHLORIDE 4 MG: 2 INJECTION INTRAMUSCULAR; INTRAVENOUS at 11:10

## 2022-10-17 RX ADMIN — LABETALOL HYDROCHLORIDE 2.5 MG: 5 INJECTION INTRAVENOUS at 11:10

## 2022-10-17 RX ADMIN — HYDROMORPHONE HYDROCHLORIDE 0.4 MG: 2 INJECTION INTRAMUSCULAR; INTRAVENOUS; SUBCUTANEOUS at 11:10

## 2022-10-17 RX ADMIN — OXYCODONE 5 MG: 5 TABLET ORAL at 12:10

## 2022-10-17 RX ADMIN — SODIUM CHLORIDE, SODIUM LACTATE, POTASSIUM CHLORIDE, AND CALCIUM CHLORIDE: .6; .31; .03; .02 INJECTION, SOLUTION INTRAVENOUS at 09:10

## 2022-10-17 RX ADMIN — MIDAZOLAM HYDROCHLORIDE 2 MG: 1 INJECTION, SOLUTION INTRAMUSCULAR; INTRAVENOUS at 10:10

## 2022-10-17 NOTE — TRANSFER OF CARE
Anesthesia Transfer of Care Note    Patient: Dina Sosa    Procedure(s) Performed: Procedure(s) (LRB):  DEBRIDEMENT, WOUND - BL breast skin (Bilateral)  REMOVAL, TISSUE EXPANDER (Bilateral)    Patient location: PACU    Anesthesia Type: general    Transport from OR: Transported from OR on 2-3 L/min O2 by NC with adequate spontaneous ventilation    Post pain: adequate analgesia    Post assessment: no apparent anesthetic complications and tolerated procedure well    Post vital signs: stable    Level of consciousness: alert, awake and oriented    Nausea/Vomiting: no nausea/vomiting    Complications: none    Transfer of care protocol was followed      Last vitals:   Visit Vitals  /78   Pulse 85   Temp 36.9 °C (98.4 °F) (Oral)   Resp 18   Ht 5' (1.524 m)   Wt 51.7 kg (114 lb)   LMP 06/01/2001 (Approximate)   Breastfeeding No   BMI 22.26 kg/m²

## 2022-10-17 NOTE — PATIENT INSTRUCTIONS
Glue and tape can remain on incisions. Okay to shower in two days. Please take full course of your prescribed antibiotics.  Please engaged in only light activity with arms until follow up.   Monitor and record daily drain outputs.   Follow up with Dr. Trinidad.

## 2022-10-17 NOTE — ANESTHESIA PROCEDURE NOTES
Intubation    Date/Time: 10/17/2022 10:15 AM  Performed by: Aldo Drew CRNA  Authorized by: Julian Bhandari MD     Intubation:     Induction:  Intravenous    Intubated:  Postinduction    Mask Ventilation:  Easy mask    Attempts:  1    Attempted By:  CRNA    Method of Intubation:  Video laryngoscopy    Blade:  Gonzalez 3    Laryngeal View Grade: Grade I - full view of cords      Difficult Airway Encountered?: No      Complications:  None    Airway Device:  Oral endotracheal tube    Airway Device Size:  7.0    Style/Cuff Inflation:  Cuffed (inflated to minimal occlusive pressure)    Tube secured:  21    Placement Verified By:  Capnometry    Complicating Factors:  None    Findings Post-Intubation:  BS equal bilateral and atraumatic/condition of teeth unchanged

## 2022-10-17 NOTE — ANESTHESIA PREPROCEDURE EVALUATION
10/17/2022  Dina Sosa is a 47 y.o., female.      Pre-op Assessment    I have reviewed the Patient Summary Reports.     I have reviewed the Nursing Notes.    I have reviewed the Medications.     Review of Systems  Anesthesia Hx:  Denies Family Hx of Anesthesia complications.   Denies Personal Hx of Anesthesia complications.   Social:  Non-Smoker    Hematology/Oncology:  Hematology Normal   Oncology Normal     EENT/Dental:EENT/Dental Normal   Cardiovascular:  Cardiovascular Normal     Pulmonary:  Pulmonary Normal    Renal/:  Renal/ Normal     Hepatic/GI:   GERD    Musculoskeletal:  Musculoskeletal Normal    Neurological:   Neuromuscular Disease,    Endocrine:  Endocrine Normal    Dermatological:  Skin Normal    Psych:  Psychiatric Normal           Physical Exam    Airway:  Mallampati: III           Anesthesia Plan  Type of Anesthesia, risks & benefits discussed:    Anesthesia Type: Gen ETT  Intra-op Monitoring Plan: Standard ASA Monitors  Post Op Pain Control Plan: multimodal analgesia  Induction:  IV  Airway Plan: Video  Informed Consent: Informed consent signed with the Patient and all parties understand the risks and agree with anesthesia plan.  All questions answered.   ASA Score: 2    Ready For Surgery From Anesthesia Perspective.     .

## 2022-10-17 NOTE — ANESTHESIA POSTPROCEDURE EVALUATION
Anesthesia Post Evaluation    Patient: Dina Sosa    Procedure(s) Performed: Procedure(s) (LRB):  DEBRIDEMENT, WOUND - BL breast skin (Bilateral)  REMOVAL, TISSUE EXPANDER (Bilateral)    Final Anesthesia Type: general      Patient location during evaluation: PACU  Patient participation: Yes- Able to Participate  Level of consciousness: awake and alert  Post-procedure vital signs: reviewed and stable  Pain management: adequate  Airway patency: patent    PONV status at discharge: No PONV  Anesthetic complications: no      Cardiovascular status: blood pressure returned to baseline  Respiratory status: unassisted  Hydration status: euvolemic  Follow-up not needed.          Vitals Value Taken Time   /86 10/17/22 1237   Temp 36.4 °C (97.6 °F) 10/17/22 1235   Pulse 90 10/17/22 1245   Resp 17 10/17/22 1235   SpO2 96 % 10/17/22 1245   Vitals shown include unvalidated device data.      Event Time   Out of Recovery 12:47:00         Pain/Daria Score: Pain Rating Prior to Med Admin: 8 (10/17/2022 12:26 PM)  Pain Rating Post Med Admin: 4 (10/17/2022 12:32 PM)  Daria Score: 10 (10/17/2022 12:32 PM)

## 2022-10-17 NOTE — PLAN OF CARE
Dina Sosa has met all discharge criteria from Phase II. Vital Signs are stable, ambulating  without difficulty. Discharge instructions given, patient verbalized understanding. Discharged from facility via wheelchair in stable condition.

## 2022-10-17 NOTE — OP NOTE
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  She underwent bilateral mastectomy inferior dermal pedicle and Wise pattern skin excision.  Due to pressure necrosis she loss of right medial and left lateral skin of her mastectomy flaps.  A pressure necrosis also caused ischemia to her nipples.  Wounds were demarcated, she was now about 10 days postop, she was taken back to the operating room for debridement.  Preoperatively we discussed the possible need for removal of her nipples and also that we would remove her expanders to reduce tension.  A planning to replace tissue expanders in the new year after she heals.    Operative Findings (including complications, if any):  Well demarcated line of necrosis across both inferior dermal pedicles.  Right medial necrotic breast skin and left lateral necrotic skin.    Description of Procedures:  The patient was seen in the preoperative holding area.  Surgical consent signed.  Case was discussed all questions were answered.  Patient was taken to the operating room, general anesthesia was induced.  Both breasts SHAI drains and Prineo tape removed.  There was sloughing of the epidermis and clearly full-thickness necrosis of the skin.  Both breasts were prepped and draped in the usual sterile fashion    Began by opening up the inverted T incision and the areola closure on the right.  All area of skin necrosis was excised  using face-lift scissors.  The nipple did not appear to be viable.  There is no blood flow with a prick or scratch test, was also line across the inferior pedicle approximately 1/4 of its distance.  At using the Bovie the inferior dermal pedicle was removed.  Tissue expander was removed by cutting the PDS sutures holding the tabs in place.    Similarly on the left side the hip area lower vertical and lateral incisions were opened up.  The necrotic skin was excised using scissors.  There was a similar demarcated line across the pedicle.  The pedicle was removed using the Bovie.  Is nipple was also not viable.  The sutures were removed and the tissue expander was removed.  Both tissue expanders were sent for gross identification.  Necrotic skin of both breasts were sent for permanent pathology based on their respective sides.    Both wounds were irrigated and hemostasis was achieved.  Fifteen Iranian SHAI drains were placed through the previous drain holes.  2-0 Vicryl was used to set a new T point.  There was some undermining of the IMF on the right side to decrease tension on the skin closure.  After the new T points were set skin was closed with buried 3-0 Monocryl sutures.  Subcuticular layer was closed with 3-0 Monocryl Quill sutures.  Drains were put to suction.  Wound was dressed with Prineo tape ABD pads and a bra.    Significant Surgical Tasks Conducted by the Assistant(s), if Applicable: na    Estimated Blood Loss (EBL): 30cc           Implants: * No implants in log *    Specimens:   Specimen (24h ago, onward)       Start     Ordered    10/17/22 1035  Specimen to Pathology, Surgery Breast  Once        Comments: Pre-op Diagnosis: Ductal carcinoma in situ (DCIS) of right breast [D05.11]Procedure(s):DEBRIDEMENT, WOUND - BL breast skin Number of specimens: 4Name of specimens: 1. Rt breast tissue expander (gross id) 2. Rt breast skin & nipple 3. Lt breast tissue expander (gross id) 4. Lt breast skin & nipple      References:    Click here for ordering Quick Tip   Question Answer Comment   Procedure Type: Breast    Specimen Class: Routine/Screening    Which provider would you like to cc? ANTONELLA CARMONA    Release to patient Immediate        10/17/22 1110                            Condition: Good    Disposition: PACU - hemodynamically stable., DC home , will see her in follow up thursday    Attestation: I was present and scrubbed for the entire procedure.

## 2022-10-17 NOTE — BRIEF OP NOTE
[facility], [place], operated by [facility] ([place])  Brief Operative Note  And   Discharge Note     SUMMARY     Surgery Date: 10/17/2022     Surgeon(s) and Role:     * Antonella Trinidad DO - Primary    Assistant: Carole Urbina    Pre-op Diagnosis:  Ductal carcinoma in situ (DCIS) of right breast [D05.11]    Post-op Diagnosis:  Post-Op Diagnosis Codes:     * Ductal carcinoma in situ (DCIS) of right breast [D05.11]    Procedure(s) (LRB):  DEBRIDEMENT, WOUND - BL breast skin (Bilateral)  REMOVAL, TISSUE EXPANDER (Bilateral)    Anesthesia: General    Description of the findings of the procedure: b/l nipple and mastectomy skin necrosis    Findings/Key Components: See operative report    Estimated Blood Loss: * No values recorded between 10/17/2022 10:27 AM and 10/17/2022 11:49 AM *         Specimens:   Specimen (24h ago, onward)       Start     Ordered    10/17/22 1035  Specimen to Pathology, Surgery Breast  Once        Comments: Pre-op Diagnosis: Ductal carcinoma in situ (DCIS) of right breast [D05.11]Procedure(s):DEBRIDEMENT, WOUND - BL breast skin Number of specimens: 4Name of specimens: 1. Rt breast tissue expander (gross id) 2. Rt breast skin & nipple 3. Lt breast tissue expander (gross id) 4. Lt breast skin & nipple     References:    Click here for ordering Quick Tip   Question Answer Comment   Procedure Type: Breast    Specimen Class: Routine/Screening    Which provider would you like to cc? ANTONELLA TRINIDAD    Release to patient Immediate        10/17/22 1110                    Implants: * No implants in log *    Complications: None    Discharge Note    SUMMARY     Admit Date: 10/17/2022    Discharge Date and Time:  10/17/2022 11:56 AM    Hospital Course: Patient was placed in observation status for the above procedure.  See above.  Postoperatively was discharged home from the PACU once criteria was met.    Final Diagnosis: Post-Op Diagnosis Codes:     * Ductal carcinoma in situ (DCIS) of right breast [D05.11]    Disposition:  Home or Self Care    Follow Up/Patient Instructions:   Keep incisions clean and dry, No dressing needed, bra and pads for comfort, shower okay in 2 days  Follow up With Dr. Trinidad.    Medications:  Reconciled Home Medications:      Medication List        START taking these medications      clindamycin 300 MG capsule  Commonly known as: CLEOCIN  Take 1 capsule (300 mg total) by mouth every 8 (eight) hours. for 5 days            CONTINUE taking these medications      buPROPion 300 MG 24 hr tablet  Commonly known as: WELLBUTRIN XL  1 po qod x 30 days then as directed     CLARITIN ORAL  Take by mouth.     diazePAM 5 MG tablet  Commonly known as: VALIUM  One p.o. q.d. p.r.n. anxiety     methocarbamoL 750 MG Tab  Commonly known as: ROBAXIN  Take 1 tablet (750 mg total) by mouth 4 (four) times daily. for 10 days     oxyCODONE 5 MG immediate release tablet  Commonly known as: ROXICODONE  Take 1 tablet (5 mg total) by mouth every 4 (four) hours as needed for Pain.     pantoprazole 40 MG tablet  Commonly known as: PROTONIX  Take 1 tablet (40 mg total) by mouth once daily.     traZODone 100 MG tablet  Commonly known as: DESYREL  TAKE 1 TABLET(100 MG) BY MOUTH EVERY EVENING     VITAMIN C ORAL  Take by mouth.            STOP taking these medications      cephALEXin 500 MG capsule  Commonly known as: KEFLEX     gabapentin 300 MG capsule  Commonly known as: NEURONTIN     nitroGLYCERIN 2% TD oint 2 % ointment  Commonly known as: NITROGLYN            Discharge Procedure Orders   Teach SHAI drain care and provide sheet to record output

## 2022-10-17 NOTE — DISCHARGE INSTRUCTIONS

## 2022-10-18 LAB
FINAL PATHOLOGIC DIAGNOSIS: NORMAL
FROZEN SECTION DIAGNOSIS: NORMAL
FROZEN SECTION FOOTNOTE: NORMAL
GROSS: NORMAL
Lab: NORMAL

## 2022-10-20 ENCOUNTER — OFFICE VISIT (OUTPATIENT)
Dept: PLASTIC SURGERY | Facility: CLINIC | Age: 47
End: 2022-10-20
Payer: MEDICAID

## 2022-10-20 ENCOUNTER — OFFICE VISIT (OUTPATIENT)
Dept: SURGERY | Facility: CLINIC | Age: 47
End: 2022-10-20
Payer: MEDICAID

## 2022-10-20 VITALS
WEIGHT: 114 LBS | DIASTOLIC BLOOD PRESSURE: 81 MMHG | HEIGHT: 60 IN | SYSTOLIC BLOOD PRESSURE: 122 MMHG | BODY MASS INDEX: 22.38 KG/M2 | HEART RATE: 83 BPM

## 2022-10-20 DIAGNOSIS — D05.11 DUCTAL CARCINOMA IN SITU (DCIS) OF RIGHT BREAST: Primary | ICD-10-CM

## 2022-10-20 PROCEDURE — 3008F BODY MASS INDEX DOCD: CPT | Mod: CPTII,,, | Performed by: SURGERY

## 2022-10-20 PROCEDURE — 99024 PR POST-OP FOLLOW-UP VISIT: ICD-10-PCS | Mod: ,,, | Performed by: SURGERY

## 2022-10-20 PROCEDURE — 3079F DIAST BP 80-89 MM HG: CPT | Mod: CPTII,,, | Performed by: SURGERY

## 2022-10-20 PROCEDURE — 3008F PR BODY MASS INDEX (BMI) DOCUMENTED: ICD-10-PCS | Mod: CPTII,,, | Performed by: SURGERY

## 2022-10-20 PROCEDURE — 99499 NO LOS: ICD-10-PCS | Mod: S$PBB,,, | Performed by: SURGERY

## 2022-10-20 PROCEDURE — 3079F PR MOST RECENT DIASTOLIC BLOOD PRESSURE 80-89 MM HG: ICD-10-PCS | Mod: CPTII,,, | Performed by: SURGERY

## 2022-10-20 PROCEDURE — 99024 POSTOP FOLLOW-UP VISIT: CPT | Mod: ,,, | Performed by: SURGERY

## 2022-10-20 PROCEDURE — 99499 UNLISTED E&M SERVICE: CPT | Mod: S$PBB,,, | Performed by: SURGERY

## 2022-10-20 PROCEDURE — 99213 OFFICE O/P EST LOW 20 MIN: CPT | Mod: PBBFAC | Performed by: SURGERY

## 2022-10-20 PROCEDURE — 99999 PR PBB SHADOW E&M-EST. PATIENT-LVL III: ICD-10-PCS | Mod: PBBFAC,,, | Performed by: SURGERY

## 2022-10-20 PROCEDURE — 99999 PR PBB SHADOW E&M-EST. PATIENT-LVL III: CPT | Mod: PBBFAC,,, | Performed by: SURGERY

## 2022-10-20 PROCEDURE — 3074F SYST BP LT 130 MM HG: CPT | Mod: CPTII,,, | Performed by: SURGERY

## 2022-10-20 PROCEDURE — 3074F PR MOST RECENT SYSTOLIC BLOOD PRESSURE < 130 MM HG: ICD-10-PCS | Mod: CPTII,,, | Performed by: SURGERY

## 2022-10-20 NOTE — PROGRESS NOTES
Dina is 3 days status post debridement of mastectomy skin flap necrosis, inferior dermal pedicle and tissue expander removal.     Overall she is feeling well.  Not having much pain feels better after debriding the dead skin.    She has minimal serous drain output.      Both SHAI drains removed.  Incisions healing well.  No skin necrosis.  Prineo tape in place.  Will have her follow-up in 2 weeks for wound check and tape removal.    Discussed replacing tissue expanders in the new year.    Luke Trinidad, DO  Plastic and Reconstructive Surgery

## 2022-10-20 NOTE — PROGRESS NOTES
"[facility]       Post-Op        REFERRING PHYSICIAN:         Myke Torres MD    MEDICAL ONCOLOGIST:    Filipe Obrien MD  RADIATION ONCOLOGIST:   None Currently    DIAGNOSIS:    This is a 47 y.o. 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    TREATMENT SUMMARY:  The patient is status post bilateral  mastectomy and sentinel node biopsy on 10/07/2022. She had to return to the operating room for debridement following pressure necrosis around the nipple on 10/17/20 per plastic surgeries op note. Final pathology showed   1. " Right breast skin", resection:   - Benign skin   - No glandular breast parenchyma identified   - Negative for malignancy   2. " Left breast skin", resection:   - Benign skin   - No glandular breast parenchyma identified   - Negative for malignancy   3. Breast, right, mastectomy:   - Ductal carcinoma in situ, high nuclear grade with necrosis, solid and   comedo types, with associated calcifications   - Biopsy site changes and clip identified   - Fibrocystic changes including microcyst formation, apocrine metaplasia,   usual ductal hyperplasia   - Margins of resection are negative for malignancy   - Immunostain for p63 supports the above interpretation (controls appropriate)   - Please see SYNOPTIC REPORT below   4. Lymph node, right axillary, sentinel, excision:   - Two lymph node negative for malignancy (0/2)   - Immunostains were performed with appropriately reactive controls and the   lymph node is negative for AE1/AE3, WSK, and CAM5.2, supporting the above   interpretation.   5. Lymph node, right axillary, sentinel, excision:   - One lymph node negative for malignancy (0/1)   - Immunostains were performed with appropriately reactive controls and the   lymph node is negative for AE1/AE3, WSK, and CAM5.2, supporting the above   interpretation.   6. Breast, left, mastectomy:   - Benign breast tissue with cystic apocrine metaplasia with microcyst "   formation   - Focal fibroepithelial lesion, favor fibroadenoma   - Biopsy site changes and clip identified   - Margins of resection are negative for malignancy   - Negative for malignancy   SYNOPTIC REPORT   SPECIMEN, LATERALITY, PROCEDURE: Breast, right, mastectomy   HISTOLOGIC TYPE:  Ductal carcinoma in situ   NUCLEAR GRADE: Grade 3 (high nuclear grade)   NECROSIS:  Present, central/comedo type   ARCHITECTURAL PATTERNS:  Solid and comedo types   SIZE/EXTENT OF DCIS:  At least 9 mm ( on slide measurement); in situ   carcinoma is present in 4 of 32 slides examined   MARGINS: Uninvolved by in situ carcinoma       Ductal carcinoma in situ:  In-situ carcinoma is greater than 10 mm from   all margins of resection   LYMPH NODES:  Uninvolved by tumor        Total number of nodes examined: 3        Total number of sentinel nodes examined: 3        Total number of nodes involved by tumor: 0   TUMOR MARKERS:  Tumor markers were performed on the previous biopsy specimen   ([id number]) and per report the tumor cells are positive for estrogen   receptor (2-3+, 80%) and progesterone receptor (2+, 30%)   ADDITIONAL FINDINGS:  Biopsy site changes and clips identified   PATHOLOGIC STAGE: pTis (DCIS)     INTERVAL HISTORY:   Dina Sosa comes in for a post-op check.  She denies fever, chills, chest pain or shortness of breath.  Her pain is well controlled.      MEDICATIONS:  Current Outpatient Medications   Medication Sig Dispense Refill    ascorbic acid (VITAMIN C ORAL) Take by mouth.      buPROPion (WELLBUTRIN XL) 300 MG 24 hr tablet TAKE 1 TABLET BY MOUTH EVERY OTHER DAY FOR 30 DAYS THEN AS DIRECTED 90 tablet 3    clindamycin (CLEOCIN) 300 MG capsule Take 1 capsule (300 mg total) by mouth every 8 (eight) hours. for 5 days 15 capsule 0    diazePAM (VALIUM) 5 MG tablet One p.o. q.d. p.r.n. anxiety 30 tablet 2    loratadine (CLARITIN ORAL) Take by mouth.      oxyCODONE (ROXICODONE) 5 MG immediate release tablet Take 1  tablet (5 mg total) by mouth every 4 (four) hours as needed for Pain. 15 tablet 0    pantoprazole (PROTONIX) 40 MG tablet Take 1 tablet (40 mg total) by mouth once daily. 30 tablet 11    traZODone (DESYREL) 100 MG tablet TAKE 1 TABLET(100 MG) BY MOUTH EVERY EVENING 30 tablet 5     No current facility-administered medications for this visit.       ALLERGIES:   Review of patient's allergies indicates:   Allergen Reactions    Morphine Itching     Pt states just itching as side effect. Can have dilaudid, may need benedryl for itching with admin    Codeine Itching     Can take hydrocodone and oxycodone    Lexapro [escitalopram oxalate] Other (See Comments)     sedation       PHYSICAL EXAMINATION:   General:  This is a well appearing female with appropriate speech, affect and gait.     Breast:  Incision is clean, dry, and intact    IMPRESSION:   The patient has had an uneventful postoperative course.    PLAN:   1. return in 6 months for a follow up office visit and breast exam  2. Physical therapy recommended.   3. The patient is advised in continued exam of the breast chest wall and to report to this office sooner should she note any areas of abnormality or concern.   4.  She has been instructed to meet with med onc and rad onc for discussion of adjuvant treatment recommendations

## 2022-10-21 ENCOUNTER — DOCUMENTATION ONLY (OUTPATIENT)
Dept: HEMATOLOGY/ONCOLOGY | Facility: CLINIC | Age: 47
End: 2022-10-21
Payer: MEDICAID

## 2022-10-21 NOTE — NURSING
Met with pt during her post op appt with Dr Sawyer.  Pt to see Dr Trinidad with plastics today as well.  Pt with no surgical issues, no additional needs.  Pt to follow up with Dr Sawyer in 6 mos, pt verbalized understanding.  Oncology Navigation   Intake  Date of Diagnosis: 08/18/22  Cancer Type: Breast  Internal / External Referral: Internal  Referral Source: Dr. Torres  Date of Referral: 08/23/22  Initial Nurse Navigator Contact: 08/23/22  Referral to Initial Contact Timeline (days): 0  Date Worked: 10/21/22  First Appointment Available: 08/25/22  Appointment Date: 08/25/22  First Available Date vs. Scheduled Date (days): 0     Treatment  Current Status: Staging work-up    Surgery: Completed  Surgical Oncologist: Digna  Plastic Surgeon: Amos  Type of Surgery: bilateral mastectomy  Consult Date: 08/25/22  Surgery Schedule Date: 10/07/22          Procedures: Biopsy; MRI  Biopsy Schedule Date: 08/18/22  MRI Schedule Date: 08/29/22       ER: Positive  WV: Positive           Acuity  Treatment Tolerability: Has not started treatment yet/treatment fully completed and side effects resolved  Comorbidities in Medical History: 0  Hospitalization Within the Past Month: 0   Needed: 0  Support: 0  Verbalizes Financial Concerns: 0  Transportation: 0  History of noncompliance/frequent no shows and cancellations: 0  Verbalizes the need for more education: 0  Navigation Acuity: 0     Follow Up  No follow-ups on file.

## 2022-10-26 ENCOUNTER — PATIENT MESSAGE (OUTPATIENT)
Dept: PRIMARY CARE CLINIC | Facility: CLINIC | Age: 47
End: 2022-10-26

## 2022-10-26 ENCOUNTER — OFFICE VISIT (OUTPATIENT)
Dept: PRIMARY CARE CLINIC | Facility: CLINIC | Age: 47
End: 2022-10-26
Payer: MEDICAID

## 2022-10-26 DIAGNOSIS — Z98.890 HISTORY OF CERVICAL SPINAL SURGERY: ICD-10-CM

## 2022-10-26 DIAGNOSIS — Z90.13 HISTORY OF BILATERAL MASTECTOMY: ICD-10-CM

## 2022-10-26 DIAGNOSIS — D05.11 DUCTAL CARCINOMA IN SITU (DCIS) OF RIGHT BREAST: Primary | ICD-10-CM

## 2022-10-26 DIAGNOSIS — Z98.1 HISTORY OF FUSION OF CERVICAL SPINE: ICD-10-CM

## 2022-10-26 DIAGNOSIS — K21.9 GASTROESOPHAGEAL REFLUX DISEASE WITHOUT ESOPHAGITIS: ICD-10-CM

## 2022-10-26 DIAGNOSIS — F41.9 ANXIETY: ICD-10-CM

## 2022-10-26 DIAGNOSIS — G62.9 NEUROPATHY: ICD-10-CM

## 2022-10-26 DIAGNOSIS — F32.0 CURRENT MILD EPISODE OF MAJOR DEPRESSIVE DISORDER WITHOUT PRIOR EPISODE: ICD-10-CM

## 2022-10-26 DIAGNOSIS — Z98.890 HISTORY OF HIP SURGERY: ICD-10-CM

## 2022-10-26 DIAGNOSIS — M47.816 LUMBAR FACET ARTHROPATHY: ICD-10-CM

## 2022-10-26 PROCEDURE — 99214 OFFICE O/P EST MOD 30 MIN: CPT | Mod: 95,,, | Performed by: FAMILY MEDICINE

## 2022-10-26 PROCEDURE — 99214 PR OFFICE/OUTPT VISIT, EST, LEVL IV, 30-39 MIN: ICD-10-PCS | Mod: 95,,, | Performed by: FAMILY MEDICINE

## 2022-10-26 RX ORDER — BUPROPION HYDROCHLORIDE 300 MG/1
TABLET ORAL
Qty: 90 TABLET | Refills: 3 | Status: SHIPPED | OUTPATIENT
Start: 2022-10-26 | End: 2022-12-28 | Stop reason: SDUPTHER

## 2022-10-26 RX ORDER — DIAZEPAM 5 MG/1
TABLET ORAL
Qty: 30 TABLET | Refills: 2 | Status: SHIPPED | OUTPATIENT
Start: 2022-10-26 | End: 2023-04-24 | Stop reason: CLARIF
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
## 2022-11-02 ENCOUNTER — OFFICE VISIT (OUTPATIENT)
Dept: PLASTIC SURGERY | Facility: CLINIC | Age: 47
End: 2022-11-02
Payer: MEDICAID

## 2022-11-02 VITALS
WEIGHT: 114 LBS | BODY MASS INDEX: 22.38 KG/M2 | HEART RATE: 92 BPM | DIASTOLIC BLOOD PRESSURE: 75 MMHG | SYSTOLIC BLOOD PRESSURE: 114 MMHG | HEIGHT: 60 IN

## 2022-11-02 DIAGNOSIS — Z09 SURGERY FOLLOW-UP EXAMINATION: Primary | ICD-10-CM

## 2022-11-02 LAB
FINAL PATHOLOGIC DIAGNOSIS: NORMAL
GROSS: NORMAL
Lab: NORMAL

## 2022-11-02 PROCEDURE — 3074F SYST BP LT 130 MM HG: CPT | Mod: CPTII,,, | Performed by: SURGERY

## 2022-11-02 PROCEDURE — 99213 OFFICE O/P EST LOW 20 MIN: CPT | Mod: PBBFAC | Performed by: SURGERY

## 2022-11-02 PROCEDURE — 99024 POSTOP FOLLOW-UP VISIT: CPT | Mod: ,,, | Performed by: SURGERY

## 2022-11-02 PROCEDURE — 1160F RVW MEDS BY RX/DR IN RCRD: CPT | Mod: CPTII,,, | Performed by: SURGERY

## 2022-11-02 PROCEDURE — 3078F DIAST BP <80 MM HG: CPT | Mod: CPTII,,, | Performed by: SURGERY

## 2022-11-02 PROCEDURE — 99999 PR PBB SHADOW E&M-EST. PATIENT-LVL III: CPT | Mod: PBBFAC,,, | Performed by: SURGERY

## 2022-11-02 PROCEDURE — 3078F PR MOST RECENT DIASTOLIC BLOOD PRESSURE < 80 MM HG: ICD-10-PCS | Mod: CPTII,,, | Performed by: SURGERY

## 2022-11-02 PROCEDURE — 3008F PR BODY MASS INDEX (BMI) DOCUMENTED: ICD-10-PCS | Mod: CPTII,,, | Performed by: SURGERY

## 2022-11-02 PROCEDURE — 1159F MED LIST DOCD IN RCRD: CPT | Mod: CPTII,,, | Performed by: SURGERY

## 2022-11-02 PROCEDURE — 1160F PR REVIEW ALL MEDS BY PRESCRIBER/CLIN PHARMACIST DOCUMENTED: ICD-10-PCS | Mod: CPTII,,, | Performed by: SURGERY

## 2022-11-02 PROCEDURE — 99999 PR PBB SHADOW E&M-EST. PATIENT-LVL III: ICD-10-PCS | Mod: PBBFAC,,, | Performed by: SURGERY

## 2022-11-02 PROCEDURE — 1159F PR MEDICATION LIST DOCUMENTED IN MEDICAL RECORD: ICD-10-PCS | Mod: CPTII,,, | Performed by: SURGERY

## 2022-11-02 PROCEDURE — 3074F PR MOST RECENT SYSTOLIC BLOOD PRESSURE < 130 MM HG: ICD-10-PCS | Mod: CPTII,,, | Performed by: SURGERY

## 2022-11-02 PROCEDURE — 99024 PR POST-OP FOLLOW-UP VISIT: ICD-10-PCS | Mod: ,,, | Performed by: SURGERY

## 2022-11-02 PROCEDURE — 3008F BODY MASS INDEX DOCD: CPT | Mod: CPTII,,, | Performed by: SURGERY

## 2022-11-02 NOTE — PROGRESS NOTES
Plastic Surgery Clinic Postop Visit    Subjective:      Dina Sosa is a 47 y.o. year old female who presents to the Plastic Surgery Clinic on 11/02/2022 for follow up visit status post debridement of mastectomy skin flap necrosis, inferior dermal pedicle and tissue expander removal. Overall she feels well. Denies pain and denies fever, chills, nausea, vomiting, or other systemic signs of infection. She report she changes her dressings twice daily with  neosporin, has an open wound on medial portion of right breast incision.     Vitals:    11/02/22 1047   BP: 114/75   Pulse: 92        Review of patient's allergies indicates:   Allergen Reactions    Morphine Itching     Pt states just itching as side effect. Can have dilaudid, may need benedryl for itching with admin    Codeine Itching     Can take hydrocodone and oxycodone    Lexapro [escitalopram oxalate] Other (See Comments)     sedation       Current Outpatient Medications on File Prior to Visit   Medication Sig Dispense Refill    ascorbic acid (VITAMIN C ORAL) Take by mouth.      buPROPion (WELLBUTRIN XL) 300 MG 24 hr tablet TAKE 1 TABLET BY MOUTH EVERY OTHER DAY FOR 30 DAYS THEN AS DIRECTED 90 tablet 3    diazePAM (VALIUM) 5 MG tablet One p.o. q.d. p.r.n. anxiety 30 tablet 2    loratadine (CLARITIN ORAL) Take by mouth.      oxyCODONE (ROXICODONE) 5 MG immediate release tablet Take 1 tablet (5 mg total) by mouth every 4 (four) hours as needed for Pain. 15 tablet 0    pantoprazole (PROTONIX) 40 MG tablet Take 1 tablet (40 mg total) by mouth once daily. 30 tablet 11    traZODone (DESYREL) 100 MG tablet TAKE 1 TABLET(100 MG) BY MOUTH EVERY EVENING 30 tablet 5     No current facility-administered medications on file prior to visit.       Patient Active Problem List   Diagnosis    Anxiety    Environmental allergies    Cervical strain    Traumatic ecchymosis of left upper arm    Neuropathy    Lumbar facet arthropathy    Lumbosacral strain     Gastroesophageal reflux disease without esophagitis    History of fusion of cervical spine    Menopausal syndrome    Depression    History of cervical spinal surgery    History of hip surgery    Breast cancer    Ductal carcinoma in situ (DCIS) of right breast    History of bilateral mastectomy       Past Surgical History:   Procedure Laterality Date    BILATERAL MASTECTOMY Bilateral 10/07/2022    Procedure: MASTECTOMY, BILATERAL;  Surgeon: Leeann Sawyer MD;  Location: [facility];  Service: General;  Laterality: Bilateral;    CERVICAL DISC ARTHROPLASTY      C5-C6     SECTION      x2    HYSTERECTOMY  2002    ROGER, ovaries remain (AUB)    INJECTION FOR SENTINEL NODE IDENTIFICATION Right 10/07/2022    Procedure: INJECTION, FOR SENTINEL NODE IDENTIFICATION;  Surgeon: Leeann Sawyer MD;  Location: [facility];  Service: General;  Laterality: Right;    INSERTION OF BREAST TISSUE EXPANDER Bilateral 10/07/2022    Procedure: INSERTION, TISSUE EXPANDER, BREAST / BILATERAL;  Surgeon: Tae Trinidad DO;  Location: [facility];  Service: Plastics;  Laterality: Bilateral;    mobi c      disk replacement C5-C6    SENTINEL LYMPH NODE BIOPSY Right 10/07/2022    Procedure: BIOPSY, LYMPH NODE, SENTINEL;  Surgeon: Leeann Sawyer MD;  Location: [facility];  Service: General;  Laterality: Right;  2.5 HRS    si joint fusion      left hip    TISSUE EXPANDER REMOVAL Bilateral 10/17/2022    Procedure: REMOVAL, TISSUE EXPANDER;  Surgeon: Tae Trinidad DO;  Location: [facility];  Service: Plastics;  Laterality: Bilateral;    TONSILLECTOMY      TYMPANOSTOMY TUBE PLACEMENT      x6    WOUND DEBRIDEMENT Bilateral 10/17/2022    Procedure: DEBRIDEMENT, WOUND;  Surgeon: Tae Trinidad DO;  Location: [facility];  Service: Plastics;  Laterality: Bilateral;  1.5 HRS       Social History     Socioeconomic History    Marital status:    Tobacco Use    Smoking status: Former     Packs/day: 1.00     Years: 22.00     Pack years:  22.00     Types: Cigarettes     Quit date: 5/10/2016     Years since quittin.4    Smokeless tobacco: Never   Substance and Sexual Activity    Alcohol use: No    Drug use: No    Sexual activity: Yes     Partners: Male     Birth control/protection: None     Social Determinants of Health     Financial Resource Strain: Low Risk     Difficulty of Paying Living Expenses: Not hard at all   Food Insecurity: No Food Insecurity    Worried About Running Out of Food in the Last Year: Never true    Ran Out of Food in the Last Year: Never true   Transportation Needs: No Transportation Needs    Lack of Transportation (Medical): No    Lack of Transportation (Non-Medical): No   Physical Activity: Inactive    Days of Exercise per Week: 0 days    Minutes of Exercise per Session: 0 min   Stress: No Stress Concern Present    Feeling of Stress : Not at all   Social Connections: Moderately Isolated    Frequency of Communication with Friends and Family: Three times a week    Frequency of Social Gatherings with Friends and Family: Three times a week    Attends Spiritism Services: Never    Active Member of Clubs or Organizations: No    Attends Club or Organization Meetings: 1 to 4 times per year    Marital Status:    Housing Stability: High Risk    Unable to Pay for Housing in the Last Year: Yes    Number of Places Lived in the Last Year: 2    Unstable Housing in the Last Year: No           Review of Systems: negative except for HPI    Objective:     Physical Exam:  Vitals:    22 1047   BP: 114/75   Pulse: 92       WD WN NAD  VSS  Normal resp effort  Bilateral breast incisions. Right side open wound on medial end of IMF incision, medical tape in place, removed during visit                Assessment:       No diagnosis found.    Plan:   47 y.o. female status post debridement of mastectomy skin flap necrosis, inferior dermal pedicle and tissue expander removal.  - Doing well, no issues  - Wound tape removed, open wound  dressed  - Will follow up with Dr. Trinidad in clinic      All questions were answered. The patient was advised to call the clinic with any questions or concerns prior to their next visit.       Shanae Neal MD

## 2022-11-17 ENCOUNTER — PATIENT MESSAGE (OUTPATIENT)
Dept: HEMATOLOGY/ONCOLOGY | Facility: CLINIC | Age: 47
End: 2022-11-17
Payer: MEDICAID

## 2022-11-17 ENCOUNTER — DOCUMENTATION ONLY (OUTPATIENT)
Dept: HEMATOLOGY/ONCOLOGY | Facility: CLINIC | Age: 47
End: 2022-11-17
Payer: MEDICAID

## 2022-11-17 DIAGNOSIS — N95.1 MENOPAUSAL SYMPTOMS: ICD-10-CM

## 2022-11-17 DIAGNOSIS — C50.911 MALIGNANT NEOPLASM OF RIGHT BREAST IN FEMALE, ESTROGEN RECEPTOR POSITIVE, UNSPECIFIED SITE OF BREAST: Primary | ICD-10-CM

## 2022-11-17 DIAGNOSIS — Z17.0 MALIGNANT NEOPLASM OF RIGHT BREAST IN FEMALE, ESTROGEN RECEPTOR POSITIVE, UNSPECIFIED SITE OF BREAST: Primary | ICD-10-CM

## 2022-11-17 NOTE — PROGRESS NOTES
"Cancer Genetics  Hereditary and High-Risk Clinic  Department of Hematology and Oncology  [facility]      Germline Testing for Hereditary Cancer Susceptibility  Results Note and Letter to Patient    Patient Identification  Name: Dina Sosa ("Dina")  : 1975  MRN: [id number]        Dear Dina,    You recently underwent germline cancer genetic testing after meeting with me, Efe Xiong NP, with the [facility].  Below is important information pertaining to your genetic testing results as well as other aspects of your health care.  Please read the letter at your earliest convenience and reach out to me with any questions or concerns.      If you would like to have an in-person or a virtual appointment for post-test genetic counseling, please message me through your [facility] patient portal or call my office at [phone] to schedule an appointment.    Your Germline (Hereditary) Cancer Susceptibility Gene Testing    Test name:  [email or web address] Multi-Cancer + RNA Panel  Testing laboratory:  [facility]   Specimen type:  Blood  Number of genes tested:  84  Genes analyzed (DNA):  AIP, ALK, APC, CONY, AXIN2, BAP1, BARD1, BLM, BMPR1A, BRCA1, BRCA2, BRIP1, CASR, CDC73, CDH1, CDK4, CDKN1B, CDKN1C, CDKN2A (p14ARF), CDKN2A (u03GGP7y), CEBPA, CHEK2, CTNNA1, DICER1, DIS3L2, EGFR, EPCAM, FH, FLCN, GATA2, GPC3, GREM1, HOXB13, HRAS, KIT, MAX, MEN1, MET, MITF, MLH1, MSH2, MSH3, MSH6, MUTYH, NBN, NF1, NF2, NTHL1, PALB2, PDGFRA, PHOX2B, PMS2, POLD1, POLE, POT1, MPQSB8Z, PTCH1, PTEN, RAD50, RAD51C, RAD51D, RB1, RECQL4, RET, RUNX1, SDHA, SDHAF2, SDHB, SDHC, SDHD, SMAD4, SMARCA4, SMARCB1, SMARCE1, STK11, SUFU, TERC, TERT, [id number], TP53, TSC1, TSC2, VHL, WRN, WT1  Genes analyzed (RNA):  AIP, APC, CONY, AXIN2, BAP1, BARD1, BLM, BMPR1A, BRCA1, BRCA2, BRIP1, CDC73, CDH1, CDKN1B, CDKN1C, CHEK2, CTNNA1, DICER1, DIS3L2, FH, FLCN, GATA2, MAX, MEN1, MLH1, MSH2, " "MSH3, MSH6, MUTYH, NBN, NF1, NF2, NTHL1, PALB2, PMS2, POLD1, POLE, POT1, JCVJF8L, PTCH1, PTEN, RAD50, RAD51C, RAD51D, RB1, RUNX1, SDHA, SDHAF2, SDHB, SDHC, SDHD, SMAD4, SMARCA4, SMARCB1, SMARCE1, STK11, SUFU, FEAC661, TP53, TSC1, TSC2, VHL, WRN  Collection date:  09/01/2022  Results date:  09/11/2022 primary results of the 9-gene panel ([id number]); 09/23/2022 primary results of 84-gene panel ([id number]); 11/15/2022 amended results report ([id number])*  Results:  NEGATIVE     *          Upon request to do so, Laurel confirmed that, "upon review, there are no variants of uncertain significance in this patients supplemental report [[id number]]. Therefore, there were no variants of uncertain significance detected/reported in either the primary [[id number]] or supplemental [[id number]] report. A corrected report is in process and should be available within the next several business days."    No clinically significant mutations or variants of unknown significance were identified; in other words, your test came back negative (normal).     With regard to your own diagnosis of breast cancer, the negative (normal) results in the associated genes represent a true-negative (or an informative) result, meaning that your breast cancer was more likely to have been sporadic than hereditary; however, these genetic testing results reduce--but do not eliminate--the possibility of previously diagnosed cancers of yours having been hereditary or the possibility of your developing a hereditary cancer in the future, due to a number of reasons, including:  Evidence regarding specific genes' associations with specific cancers are evolving, so, for that reason, testing of additional hereditary cancer susceptibility genes may be recommended for you in the future.  Genetic testing technologies are evolving and improving, so, for that reason, updated hereditary cancer susceptibility gene testing may be recommended for you in the future.    Your " negative results for genes associated with cancers/other conditions in your relatives, with which you have not personally been affected, are uninformative.  In other words, your affected relatives' cancers/other conditions may have been hereditary or may have been sporadic, and without knowing that information, your negative results in the associated genes only tell us that you likely don't have a hereditary predisposition to those cancers/other conditions; however, you can still develop those cancers/other conditions and in fact may even have an increased risk for them based in part on your family history.  If the appropriate, affected relatives are found to carry a pathogenic (harmful) variant that explains their cancers/other conditions, and you do not carry that variant, your negative results would then be considered a true-negative.    A copy of this genetic testing results report will be in your [facility] record.  Your genetic testing results report has been released to you and is accessible by you through your [employer] patient portal; if you have any difficulty accessing this, please let our office know so we can mail you a hard copy.    Health Maintenance / Cancer Risks and Risk Management    Only a small percentage (approximately 5%-10%) of cancers are hereditary, meaning caused by an inherited gene mutation; rather, most cancers are sporadic.  Environmental factors, lifestyle factors, and even factors beyond our control play a significant role in the development of many cancers.  As such, an individual can develop cancer even with negative genetic testing.  Furthermore, even with negative genetic testing, you can still be at increased risk for certain cancers, as you may independently have risk factors for those cancers and/or you may share risk factors with your family members affected by cancer.  For these reasons, it is strongly recommended that you ensure that your healthcare providers are aware  of and up-to-date on your personal and family history so that your medical management including cancer screenings can be based in part off of this information.      It is recommended that you be established with a primary care provider (PCP) and a gynecology provider (GYN), both of whom you should see at least annually for routine health maintenance, including cancer screenings.  If you are not established with both of these types of providers, please contact me so I can refer you.    The following cancer screenings are currently recommended for you (please note that these recommendations can change based on updates to clinical guidelines and/or with changes to your medical or family history and are therefore only considered accurate as of the date on which this letter was composed; additional and/or alternative screenings may be recommended by your healthcare providers, and recommendations from your healthcare providers directly managing these risks supersede the below recommendations):  Breast cancer:    Ongoing breast awareness--Promptly report any changes/concerns to your breast cancer care team.  Clinical breast exam as directed by your breast cancer care team.  Gynecologic cancers:    Annual pelvic exam.    Pap smears as indicated by your gynecology provider.  Colorectal cancer:    Discuss with your PCP and/or gastroenterology (GI) provider which test is most appropriate for your screenings for colorectal cancer (CRC).  Remain up-to-date on your screenings for CRC as directed by your PCP or GI provider.  Please note that you may need colonoscopies and more frequent CRC screening if you become aware of a family history/diagnosis of CRC or if a first-degree relative (parent, sibling, or child) of yours has had colon polyps with any of the below characteristics:  Adenoma with high-grade dysplasia  Adenoma or sessile serrated polyp/adenoma 1 cm or larger in size  Villous or tubulovillous adenoma  Traditional  serrated adenoma (TSA)  Sessile serrated polyp/adenoma with any dysplasia  Cumulatively 10 or more colon polyps of any type  Skin cancer:    Annual total-body skin exam with a dermatology provider.    If you are not established with any of the above healthcare specialists, please let me know so I can refer you.    Some information regarding general cancer risk reduction:  It is recommended to avoid tobacco use; be physically active; maintain a healthy weight; eat a diet rich in fruits, vegetables, and whole grains and low in saturated/trans fat, red meat, and processed meat; limit alcohol consumption (zero is best); protect against sexually transmitted infections; protect against the sun, and avoid tanning beds; and get regular screenings for cancers as recommended by your healthcare team.    Regarding Your Relatives    Your relatives also may be at increased risk for certain cancers, depending upon their own personal and family history; therefore, it is important that they, too, ensure that their own healthcare providers are aware of and up-to-date on their personal and family history so that their medical management including cancer screenings can be based in part off of this information.      Additionally, it is possible for your relatives to have hereditary cancer susceptibility gene mutations even when you have negative genetic testing.      Your relatives should speak with a healthcare provider about their cancer risks and whether genetic counseling and potentially testing may be indicated for them.  Anyone interested in pursuing cancer genetic counseling/testing may contact the [facility] at [phone] to schedule an appointment or may visit [email or web address] to locate a genetic specialist near them.    Follow-up    Please continue to follow up with all healthcare providers as directed.    Moving forward, please update me with any changes to--or new information learned about--your personal or family  history and with any relative's genetic testing results.  Barring any such changes, please follow up with me in three (3) years for determination as to whether updated genetic testing is indicated for you at that time.    In the meantime, please don't hesitate to reach out with any questions or concerns.    Sincerely,        Efe Xiong, DNP, APRN, FNP-BC, AOCNP, CGRA  Nurse Practitioner, Hereditary and High-Risk Clinic  Department of Hematology and Oncology  [facility]  Phone:  [phone]          Results Disclosure:  The above letter has been sent to patient's [facility] portal.  The above letter has been copied to the referring provider, Leeann Sawyer MD [address] [place] [address]; as well as to Filipe Obrien MD, and Sana Sunshine NP.  Cancer Genetics Navigator to:  Phone patient with notification that the above letter has been sent, document said call, and CC referring provider and me on the telephone encounter note.  Ensure that the results report is available in patient's [facility] record.  Send patient an e-copy of the results report.

## 2022-11-17 NOTE — LETTER
November 17, 2022    Dina Sosa  [address]  [place] [address]         Dear Dina,    You recently underwent germline cancer genetic testing after meeting with me, Efe Xiong NP, with the [facility].  Below is important information pertaining to your genetic testing results as well as other aspects of your health care.  Please read the letter at your earliest convenience and reach out to me with any questions or concerns.      If you would like to have an in-person or a virtual appointment for post-test genetic counseling, please message me through your [facility] patient portal or call my office at [phone] to schedule an appointment.    Your Germline (Hereditary) Cancer Susceptibility Gene Testing    Test name:  [facility] Multi-Cancer + RNA Panel  [facility]   Specimen type:  Blood  Number of genes tested:  84  Genes analyzed (DNA):  AIP, ALK, APC, CONY, AXIN2, BAP1, BARD1, BLM, BMPR1A, BRCA1, BRCA2, BRIP1, CASR, CDC73, CDH1, CDK4, CDKN1B, CDKN1C, CDKN2A (p14ARF), CDKN2A (x46FOD1s), CEBPA, CHEK2, CTNNA1, DICER1, DIS3L2, EGFR, EPCAM, FH, FLCN, GATA2, GPC3, GREM1, HOXB13, HRAS, KIT, MAX, MEN1, MET, MITF, MLH1, MSH2, MSH3, MSH6, MUTYH, NBN, NF1, NF2, NTHL1, PALB2, PDGFRA, PHOX2B, PMS2, POLD1, POLE, POT1, FINTZ1R, PTCH1, PTEN, RAD50, RAD51C, RAD51D, RB1, RECQL4, RET, RUNX1, SDHA, SDHAF2, SDHB, SDHC, SDHD, SMAD4, SMARCA4, SMARCB1, SMARCE1, STK11, SUFU, TERC, TERT, RIDO204, TP53, TSC1, TSC2, VHL, WRN, WT1  Genes analyzed (RNA):  AIP, APC, CONY, AXIN2, BAP1, BARD1, BLM, BMPR1A, BRCA1, BRCA2, BRIP1, CDC73, CDH1, CDKN1B, CDKN1C, CHEK2, CTNNA1, DICER1, DIS3L2, FH, FLCN, GATA2, MAX, MEN1, MLH1, MSH2, MSH3, MSH6, MUTYH, NBN, NF1, NF2, NTHL1, PALB2, PMS2, POLD1, POLE, POT1, BSSKX1H, PTCH1, PTEN, RAD50, RAD51C, RAD51D, RB1, RUNX1, SDHA, SDHAF2, SDHB, SDHC, SDHD, SMAD4, SMARCA4, SMARCB1, SMARCE1, STK11, SUFU, JFIT853, TP53, TSC1, TSC2, VHL,  "WRN  Collection date:  09/01/2022  Results date:  09/11/2022 primary results of the 9-gene panel ([id number]); 09/23/2022 primary results of 84-gene panel ([id number]); 11/15/2022 amended results report ([id number])*  Results:  NEGATIVE     *          Upon request to do so, Laurel confirmed that, "upon review, there are no variants of uncertain significance in this patients supplemental report [[id number]]. Therefore, there were no variants of uncertain significance detected/reported in either the primary [[id number]] or supplemental [[id number]] report. A corrected report is in process and should be available within the next several business days."    No clinically significant mutations or variants of unknown significance were identified; in other words, your test came back negative (normal).     With regard to your own diagnosis of breast cancer, the negative (normal) results in the associated genes represent a true-negative (or an informative) result, meaning that your breast cancer was more likely to have been sporadic than hereditary; however, these genetic testing results reduce--but do not eliminate--the possibility of previously diagnosed cancers of yours having been hereditary or the possibility of your developing a hereditary cancer in the future, due to a number of reasons, including:  Evidence regarding specific genes' associations with specific cancers are evolving, so, for that reason, testing of additional hereditary cancer susceptibility genes may be recommended for you in the future.  Genetic testing technologies are evolving and improving, so, for that reason, updated hereditary cancer susceptibility gene testing may be recommended for you in the future.    Your negative results for genes associated with cancers/other conditions in your relatives, with which you have not personally been affected, are uninformative.  In other words, your affected relatives' cancers/other conditions may have been " hereditary or may have been sporadic, and without knowing that information, your negative results in the associated genes only tell us that you likely don't have a hereditary predisposition to those cancers/other conditions; however, you can still develop those cancers/other conditions and in fact may even have an increased risk for them based in part on your family history.  If the appropriate, affected relatives are found to carry a pathogenic (harmful) variant that explains their cancers/other conditions, and you do not carry that variant, your negative results would then be considered a true-negative.    A copy of this genetic testing results report will be in your [facility] record.  Your genetic testing results report has been released to you and is accessible by you through your [facility] patient portal; if you have any difficulty accessing this, please let our office know so we can mail you a hard copy.    Health Maintenance / Cancer Risks and Risk Management    Only a small percentage (approximately 5%-10%) of cancers are hereditary, meaning caused by an inherited gene mutation; rather, most cancers are sporadic.  Environmental factors, lifestyle factors, and even factors beyond our control play a significant role in the development of many cancers.  As such, an individual can develop cancer even with negative genetic testing.  Furthermore, even with negative genetic testing, you can still be at increased risk for certain cancers, as you may independently have risk factors for those cancers and/or you may share risk factors with your family members affected by cancer.  For these reasons, it is strongly recommended that you ensure that your healthcare providers are aware of and up-to-date on your personal and family history so that your medical management including cancer screenings can be based in part off of this information.      It is recommended that you be established with a primary care provider  (PCP) and a gynecology provider (GYN), both of whom you should see at least annually for routine health maintenance, including cancer screenings.  If you are not established with both of these types of providers, please contact me so I can refer you.    The following cancer screenings are currently recommended for you (please note that these recommendations can change based on updates to clinical guidelines and/or with changes to your medical or family history and are therefore only considered accurate as of the date on which this letter was composed; additional and/or alternative screenings may be recommended by your healthcare providers, and recommendations from your healthcare providers directly managing these risks supersede the below recommendations):  Breast cancer:    Ongoing breast awareness--Promptly report any changes/concerns to your breast cancer care team.  Clinical breast exam as directed by your breast cancer care team.  Gynecologic cancers:    Annual pelvic exam.    Pap smears as indicated by your gynecology provider.  Colorectal cancer:    Discuss with your PCP and/or gastroenterology (GI) provider which test is most appropriate for your screenings for colorectal cancer (CRC).  Remain up-to-date on your screenings for CRC as directed by your PCP or GI provider.  Please note that you may need colonoscopies and more frequent CRC screening if you become aware of a family history/diagnosis of CRC or if a first-degree relative (parent, sibling, or child) of yours has had colon polyps with any of the below characteristics:  Adenoma with high-grade dysplasia  Adenoma or sessile serrated polyp/adenoma 1 cm or larger in size  Villous or tubulovillous adenoma  Traditional serrated adenoma (TSA)  Sessile serrated polyp/adenoma with any dysplasia  Cumulatively 10 or more colon polyps of any type  Skin cancer:    Annual total-body skin exam with a dermatology provider.    If you are not established with any of  the above healthcare specialists, please let me know so I can refer you.    Some information regarding general cancer risk reduction:  It is recommended to avoid tobacco use; be physically active; maintain a healthy weight; eat a diet rich in fruits, vegetables, and whole grains and low in saturated/trans fat, red meat, and processed meat; limit alcohol consumption (zero is best); protect against sexually transmitted infections; protect against the sun, and avoid tanning beds; and get regular screenings for cancers as recommended by your healthcare team.    Regarding Your Relatives    Your relatives also may be at increased risk for certain cancers, depending upon their own personal and family history; therefore, it is important that they, too, ensure that their own healthcare providers are aware of and up-to-date on their personal and family history so that their medical management including cancer screenings can be based in part off of this information.      Additionally, it is possible for your relatives to have hereditary cancer susceptibility gene mutations even when you have negative genetic testing.      Your relatives should speak with a healthcare provider about their cancer risks and whether genetic counseling and potentially testing may be indicated for them.  Anyone interested in pursuing cancer genetic counseling/testing may contact the [facility] at [phone] to schedule an appointment or may visit [email or web address] to locate a genetic specialist near them.    Follow-up    Please continue to follow up with all healthcare providers as directed.    Moving forward, please update me with any changes to--or new information learned about--your personal or family history and with any relative's genetic testing results.  Barring any such changes, please follow up with me in three (3) years for determination as to whether updated genetic testing is indicated for you at that time.    In the meantime,  please don't hesitate to reach out with any questions or concerns.    Sincerely,        Efe Xiong, DOTTIE, APRN, FNP-BC, AOCNP, CGRA  Nurse Practitioner, Hereditary and High-Risk Clinic  Department of Hematology and Oncology  [facility]  Phone:  [phone]        CC:  Leeann Sawyer MD; Filipe Obrien MD; Sana Sunshine NP

## 2022-11-17 NOTE — PROGRESS NOTES
"Per [facility]:    " Efe Xiong 9/13/2022 5:43 PM  Hi! Patient has a mild form of von Willebrand disease just recently identified. Is von Willebrand disease known to impact accuracy of germline testing performed on blood/saliva?    Niesha Antonio 9/13/2022 7:10 PM  Hi Efe, Thank you for your message. Since Von Willebrand is a clotting disorder and not known to result in actively circulating tumor cells in blood, there are no special considerations in terms of specimen acceptance/success that we are aware of. I hope this is helpful and please let us know if you have any additional questions. Best regards, Niesha Antonio MS CGC [facility]"  "

## 2022-11-17 NOTE — PROGRESS NOTES
Today, reviewed the pertinent [facility] pathology reports and hematology provider notes and labs that occurred subsequent to the patient's virtual visit with me.

## 2022-11-17 NOTE — Clinical Note
Cancer Genetics Navigator to:  a. Phone patient with notification that the above letter has been sent, document said call, and CC referring provider and me on the telephone encounter note.  b. Ensure that the results report is available in patient's [facility] record.

## 2022-11-21 ENCOUNTER — PATIENT MESSAGE (OUTPATIENT)
Dept: HEMATOLOGY/ONCOLOGY | Facility: CLINIC | Age: 47
End: 2022-11-21
Payer: MEDICAID

## 2022-11-23 LAB
GENETIC COUNSELING?: YES
GENSO SPECIMEN TYPE: NORMAL
MISCELLANEOUS GENETIC TEST NAME: NORMAL
PARTENTAL OR SIBLING TESTING?: NO
REFERENCE LAB: NORMAL
TEST RESULT: NORMAL

## 2022-11-29 ENCOUNTER — PATIENT MESSAGE (OUTPATIENT)
Dept: HEMATOLOGY/ONCOLOGY | Facility: CLINIC | Age: 47
End: 2022-11-29
Payer: MEDICAID

## 2022-12-06 ENCOUNTER — PATIENT MESSAGE (OUTPATIENT)
Dept: HEMATOLOGY/ONCOLOGY | Facility: CLINIC | Age: 47
End: 2022-12-06
Payer: MEDICAID

## 2022-12-14 ENCOUNTER — PATIENT MESSAGE (OUTPATIENT)
Dept: PRIMARY CARE CLINIC | Facility: CLINIC | Age: 47
End: 2022-12-14
Payer: MEDICAID

## 2022-12-28 ENCOUNTER — PATIENT MESSAGE (OUTPATIENT)
Dept: PRIMARY CARE CLINIC | Facility: CLINIC | Age: 47
End: 2022-12-28
Payer: MEDICAID

## 2022-12-28 RX ORDER — BUPROPION HYDROCHLORIDE 300 MG/1
300 TABLET ORAL DAILY
Qty: 90 TABLET | Refills: 3 | Status: SHIPPED | OUTPATIENT
Start: 2022-12-28 | End: 2023-02-24 | Stop reason: SDUPTHER

## 2022-12-28 NOTE — TELEPHONE ENCOUNTER
No new care gaps identified.  [facility] Embedded Care Gaps. Reference number: [id number]. 12/28/2022   12:41:08 PM CST

## 2023-01-10 ENCOUNTER — TELEPHONE (OUTPATIENT)
Dept: FAMILY MEDICINE | Facility: CLINIC | Age: 48
End: 2023-01-10
Payer: MEDICAID

## 2023-01-10 ENCOUNTER — PATIENT MESSAGE (OUTPATIENT)
Dept: FAMILY MEDICINE | Facility: CLINIC | Age: 48
End: 2023-01-10
Payer: MEDICAID

## 2023-01-10 ENCOUNTER — PATIENT MESSAGE (OUTPATIENT)
Dept: PLASTIC SURGERY | Facility: CLINIC | Age: 48
End: 2023-01-10
Payer: MEDICAID

## 2023-01-19 ENCOUNTER — OFFICE VISIT (OUTPATIENT)
Dept: PLASTIC SURGERY | Facility: CLINIC | Age: 48
End: 2023-01-19
Payer: MEDICAID

## 2023-01-19 VITALS — HEART RATE: 89 BPM | DIASTOLIC BLOOD PRESSURE: 90 MMHG | SYSTOLIC BLOOD PRESSURE: 123 MMHG

## 2023-01-19 DIAGNOSIS — D05.11 DUCTAL CARCINOMA IN SITU (DCIS) OF RIGHT BREAST: Primary | ICD-10-CM

## 2023-01-19 PROCEDURE — 1159F MED LIST DOCD IN RCRD: CPT | Mod: CPTII,,, | Performed by: SURGERY

## 2023-01-19 PROCEDURE — 99213 OFFICE O/P EST LOW 20 MIN: CPT | Mod: S$PBB,,, | Performed by: SURGERY

## 2023-01-19 PROCEDURE — 3080F DIAST BP >= 90 MM HG: CPT | Mod: CPTII,,, | Performed by: SURGERY

## 2023-01-19 PROCEDURE — 99999 PR PBB SHADOW E&M-EST. PATIENT-LVL III: ICD-10-PCS | Mod: PBBFAC,,, | Performed by: SURGERY

## 2023-01-19 PROCEDURE — 99213 OFFICE O/P EST LOW 20 MIN: CPT | Mod: PBBFAC | Performed by: SURGERY

## 2023-01-19 PROCEDURE — 99213 PR OFFICE/OUTPT VISIT, EST, LEVL III, 20-29 MIN: ICD-10-PCS | Mod: S$PBB,,, | Performed by: SURGERY

## 2023-01-19 PROCEDURE — 1159F PR MEDICATION LIST DOCUMENTED IN MEDICAL RECORD: ICD-10-PCS | Mod: CPTII,,, | Performed by: SURGERY

## 2023-01-19 PROCEDURE — 3074F PR MOST RECENT SYSTOLIC BLOOD PRESSURE < 130 MM HG: ICD-10-PCS | Mod: CPTII,,, | Performed by: SURGERY

## 2023-01-19 PROCEDURE — 3074F SYST BP LT 130 MM HG: CPT | Mod: CPTII,,, | Performed by: SURGERY

## 2023-01-19 PROCEDURE — 3080F PR MOST RECENT DIASTOLIC BLOOD PRESSURE >= 90 MM HG: ICD-10-PCS | Mod: CPTII,,, | Performed by: SURGERY

## 2023-01-19 PROCEDURE — 99999 PR PBB SHADOW E&M-EST. PATIENT-LVL III: CPT | Mod: PBBFAC,,, | Performed by: SURGERY

## 2023-01-19 NOTE — H&P (VIEW-ONLY)
Subjective:      Dina Sosa is a 47 y.o. year old female with a PMH of DCIS s/p bilateral mastectomy with tissue expander placement on 10/7/22. She unfortunately developed mastectomy skin necrosis requiring removal of both tissue expanders on 10/17/22. She presents today to discuss delayed implant based reconstruction. Patient denies any changes since prior clinic visit. She had an area of right T point incision breadown that healed nicely. She is using a C+ cup prosthetic in her bra, noting bra size prior to mastectomy was 36DDD. She is interested in nipple reconstruction and has a friend who has offered to do her nipple tattoos once she's ready. She does note decreased sensation in the R>L axilla since surgery, but is otherwise without complaints. Denies fever, chills, nausea, vomiting, or other systemic signs of infection.    Vitals:    01/19/23 1332   BP: (!) 123/90   Pulse: 89        Review of patient's allergies indicates:   Allergen Reactions    Morphine Itching     Pt states just itching as side effect. Can have dilaudid, may need benedryl for itching with admin    Codeine Itching     Can take hydrocodone and oxycodone    Lexapro [escitalopram oxalate] Other (See Comments)     sedation       Current Outpatient Medications on File Prior to Visit   Medication Sig Dispense Refill    ascorbic acid (VITAMIN C ORAL) Take by mouth.      buPROPion (WELLBUTRIN XL) 300 MG 24 hr tablet Take 1 tablet (300 mg total) by mouth once daily. TAKE 1 TABLET BY MOUTH EVERY OTHER DAY FOR 30 DAYS THEN AS DIRECTED 90 tablet 3    diazePAM (VALIUM) 5 MG tablet One p.o. q.d. p.r.n. anxiety 30 tablet 2    loratadine (CLARITIN ORAL) Take by mouth.      pantoprazole (PROTONIX) 40 MG tablet Take 1 tablet (40 mg total) by mouth once daily. 30 tablet 11    traZODone (DESYREL) 100 MG tablet TAKE 1 TABLET(100 MG) BY MOUTH EVERY EVENING 30 tablet 5    oxyCODONE (ROXICODONE) 5 MG immediate release tablet Take 1 tablet (5 mg total) by  mouth every 4 (four) hours as needed for Pain. 15 tablet 0     No current facility-administered medications on file prior to visit.       Patient Active Problem List   Diagnosis    Anxiety    Environmental allergies    Cervical strain    Traumatic ecchymosis of left upper arm    Neuropathy    Lumbar facet arthropathy    Lumbosacral strain    Gastroesophageal reflux disease without esophagitis    History of fusion of cervical spine    Menopausal syndrome    Depression    History of cervical spinal surgery    History of hip surgery    Breast cancer    Ductal carcinoma in situ (DCIS) of right breast    History of bilateral mastectomy       Past Surgical History:   Procedure Laterality Date    BILATERAL MASTECTOMY Bilateral 10/07/2022    Procedure: MASTECTOMY, BILATERAL;  Surgeon: Leeann Sawyer MD;  Location: [facility];  Service: General;  Laterality: Bilateral;    CERVICAL DISC ARTHROPLASTY      C5-C6     SECTION      x2    HYSTERECTOMY  2002    ROGER, ovaries remain (AUB)    INJECTION FOR SENTINEL NODE IDENTIFICATION Right 10/07/2022    Procedure: INJECTION, FOR SENTINEL NODE IDENTIFICATION;  Surgeon: Leeann Sawyer MD;  Location: [facility];  Service: General;  Laterality: Right;    INSERTION OF BREAST TISSUE EXPANDER Bilateral 10/07/2022    Procedure: INSERTION, TISSUE EXPANDER, BREAST / BILATERAL;  Surgeon: Tae Trinidad DO;  Location: [facility];  Service: Plastics;  Laterality: Bilateral;    mobi c      disk replacement C5-C6    SENTINEL LYMPH NODE BIOPSY Right 10/07/2022    Procedure: BIOPSY, LYMPH NODE, SENTINEL;  Surgeon: Leeann Sawyer MD;  Location: [facility];  Service: General;  Laterality: Right;  2.5 HRS    si joint fusion      left hip    TISSUE EXPANDER REMOVAL Bilateral 10/17/2022    Procedure: REMOVAL, TISSUE EXPANDER;  Surgeon: Tae Trinidad DO;  Location: [facility];  Service: Plastics;  Laterality: Bilateral;    TONSILLECTOMY      TYMPANOSTOMY TUBE PLACEMENT      x6    WOUND  DEBRIDEMENT Bilateral 10/17/2022    Procedure: DEBRIDEMENT, WOUND;  Surgeon: Tae Trinidad DO;  Location: [facility];  Service: Plastics;  Laterality: Bilateral;  1.5 HRS       Social History     Socioeconomic History    Marital status:    Tobacco Use    Smoking status: Former     Packs/day: 1.00     Years: 22.00     Pack years: 22.00     Types: Cigarettes     Quit date: 5/10/2016     Years since quittin.6    Smokeless tobacco: Never   Substance and Sexual Activity    Alcohol use: No    Drug use: No    Sexual activity: Yes     Partners: Male     Birth control/protection: None     Social Determinants of Health     Financial Resource Strain: Low Risk     Difficulty of Paying Living Expenses: Not hard at all   Food Insecurity: No Food Insecurity    Worried About Running Out of Food in the Last Year: Never true    Ran Out of Food in the Last Year: Never true   Transportation Needs: No Transportation Needs    Lack of Transportation (Medical): No    Lack of Transportation (Non-Medical): No   Physical Activity: Inactive    Days of Exercise per Week: 0 days    Minutes of Exercise per Session: 0 min   Stress: No Stress Concern Present    Feeling of Stress : Not at all   Social Connections: Moderately Isolated    Frequency of Communication with Friends and Family: Three times a week    Frequency of Social Gatherings with Friends and Family: Three times a week    Attends Zoroastrian Services: Never    Active Member of Clubs or Organizations: No    Attends Club or Organization Meetings: 1 to 4 times per year    Marital Status:    Housing Stability: High Risk    Unable to Pay for Housing in the Last Year: Yes    Number of Places Lived in the Last Year: 2    Unstable Housing in the Last Year: No           Review of Systems: negative except as noted above    Objective:     Physical Exam:  Vitals:    23 1332   BP: (!) 123/90   Pulse: 89       WD WN NAD  VSS  Normal resp effort  Chest wall s/p bilateral  mastectomy. Incisions are well healed with dog ears noted at proximal ends of the anchor incision.        Assessment:       1. Ductal carcinoma in situ (DCIS) of right breast        Plan:   47 y.o. female s/p bilateral mastectomy presenting to discuss tissue expander replacement.  - Doing well, no issues  - Would like to proceed with tissue expander placement and implant based reconstruction  - Would like nipple reconstruction  - Will send for photos today and book tissue expander replacement  - Return to clinic 1 week post op       All questions were answered. The patient was advised to call the clinic with any questions or concerns prior to their next visit.

## 2023-01-19 NOTE — PROGRESS NOTES
Subjective:      Dina Sosa is a 47 y.o. year old female with a PMH of DCIS s/p bilateral mastectomy with tissue expander placement on 10/7/22. She unfortunately developed mastectomy skin necrosis requiring removal of both tissue expanders on 10/17/22. She presents today to discuss delayed implant based reconstruction. Patient denies any changes since prior clinic visit. She had an area of right T point incision breadown that healed nicely. She is using a C+ cup prosthetic in her bra, noting bra size prior to mastectomy was 36DDD. She is interested in nipple reconstruction and has a friend who has offered to do her nipple tattoos once she's ready. She does note decreased sensation in the R>L axilla since surgery, but is otherwise without complaints. Denies fever, chills, nausea, vomiting, or other systemic signs of infection.    Vitals:    01/19/23 1332   BP: (!) 123/90   Pulse: 89        Review of patient's allergies indicates:   Allergen Reactions    Morphine Itching     Pt states just itching as side effect. Can have dilaudid, may need benedryl for itching with admin    Codeine Itching     Can take hydrocodone and oxycodone    Lexapro [escitalopram oxalate] Other (See Comments)     sedation       Current Outpatient Medications on File Prior to Visit   Medication Sig Dispense Refill    ascorbic acid (VITAMIN C ORAL) Take by mouth.      buPROPion (WELLBUTRIN XL) 300 MG 24 hr tablet Take 1 tablet (300 mg total) by mouth once daily. TAKE 1 TABLET BY MOUTH EVERY OTHER DAY FOR 30 DAYS THEN AS DIRECTED 90 tablet 3    diazePAM (VALIUM) 5 MG tablet One p.o. q.d. p.r.n. anxiety 30 tablet 2    loratadine (CLARITIN ORAL) Take by mouth.      pantoprazole (PROTONIX) 40 MG tablet Take 1 tablet (40 mg total) by mouth once daily. 30 tablet 11    traZODone (DESYREL) 100 MG tablet TAKE 1 TABLET(100 MG) BY MOUTH EVERY EVENING 30 tablet 5    oxyCODONE (ROXICODONE) 5 MG immediate release tablet Take 1 tablet (5 mg total) by  mouth every 4 (four) hours as needed for Pain. 15 tablet 0     No current facility-administered medications on file prior to visit.       Patient Active Problem List   Diagnosis    Anxiety    Environmental allergies    Cervical strain    Traumatic ecchymosis of left upper arm    Neuropathy    Lumbar facet arthropathy    Lumbosacral strain    Gastroesophageal reflux disease without esophagitis    History of fusion of cervical spine    Menopausal syndrome    Depression    History of cervical spinal surgery    History of hip surgery    Breast cancer    Ductal carcinoma in situ (DCIS) of right breast    History of bilateral mastectomy       Past Surgical History:   Procedure Laterality Date    BILATERAL MASTECTOMY Bilateral 10/07/2022    Procedure: MASTECTOMY, BILATERAL;  Surgeon: Leeann Sawyer MD;  Location: [facility];  Service: General;  Laterality: Bilateral;    CERVICAL DISC ARTHROPLASTY      C5-C6     SECTION      x2    HYSTERECTOMY  2002    ROGER, ovaries remain (AUB)    INJECTION FOR SENTINEL NODE IDENTIFICATION Right 10/07/2022    Procedure: INJECTION, FOR SENTINEL NODE IDENTIFICATION;  Surgeon: Leeann Sawyer MD;  Location: [facility];  Service: General;  Laterality: Right;    INSERTION OF BREAST TISSUE EXPANDER Bilateral 10/07/2022    Procedure: INSERTION, TISSUE EXPANDER, BREAST / BILATERAL;  Surgeon: Tae Trinidad DO;  Location: [facility];  Service: Plastics;  Laterality: Bilateral;    mobi c      disk replacement C5-C6    SENTINEL LYMPH NODE BIOPSY Right 10/07/2022    Procedure: BIOPSY, LYMPH NODE, SENTINEL;  Surgeon: Leeann Sawyer MD;  Location: [facility];  Service: General;  Laterality: Right;  2.5 HRS    si joint fusion      left hip    TISSUE EXPANDER REMOVAL Bilateral 10/17/2022    Procedure: REMOVAL, TISSUE EXPANDER;  Surgeon: Tae Trinidad DO;  Location: [facility];  Service: Plastics;  Laterality: Bilateral;    TONSILLECTOMY      TYMPANOSTOMY TUBE PLACEMENT      x6    WOUND  DEBRIDEMENT Bilateral 10/17/2022    Procedure: DEBRIDEMENT, WOUND;  Surgeon: Tae Trinidad DO;  Location: [facility];  Service: Plastics;  Laterality: Bilateral;  1.5 HRS       Social History     Socioeconomic History    Marital status:    Tobacco Use    Smoking status: Former     Packs/day: 1.00     Years: 22.00     Pack years: 22.00     Types: Cigarettes     Quit date: 5/10/2016     Years since quittin.6    Smokeless tobacco: Never   Substance and Sexual Activity    Alcohol use: No    Drug use: No    Sexual activity: Yes     Partners: Male     Birth control/protection: None     Social Determinants of Health     Financial Resource Strain: Low Risk     Difficulty of Paying Living Expenses: Not hard at all   Food Insecurity: No Food Insecurity    Worried About Running Out of Food in the Last Year: Never true    Ran Out of Food in the Last Year: Never true   Transportation Needs: No Transportation Needs    Lack of Transportation (Medical): No    Lack of Transportation (Non-Medical): No   Physical Activity: Inactive    Days of Exercise per Week: 0 days    Minutes of Exercise per Session: 0 min   Stress: No Stress Concern Present    Feeling of Stress : Not at all   Social Connections: Moderately Isolated    Frequency of Communication with Friends and Family: Three times a week    Frequency of Social Gatherings with Friends and Family: Three times a week    Attends Scientologist Services: Never    Active Member of Clubs or Organizations: No    Attends Club or Organization Meetings: 1 to 4 times per year    Marital Status:    Housing Stability: High Risk    Unable to Pay for Housing in the Last Year: Yes    Number of Places Lived in the Last Year: 2    Unstable Housing in the Last Year: No           Review of Systems: negative except as noted above    Objective:     Physical Exam:  Vitals:    23 1332   BP: (!) 123/90   Pulse: 89       WD WN NAD  VSS  Normal resp effort  Chest wall s/p bilateral  mastectomy. Incisions are well healed with dog ears noted at proximal ends of the anchor incision.        Assessment:       1. Ductal carcinoma in situ (DCIS) of right breast        Plan:   47 y.o. female s/p bilateral mastectomy presenting to discuss tissue expander replacement.  - Doing well, no issues  - Would like to proceed with tissue expander placement and implant based reconstruction  - Would like nipple reconstruction  - Will send for photos today and book tissue expander replacement  - Return to clinic 1 week post op       All questions were answered. The patient was advised to call the clinic with any questions or concerns prior to their next visit.

## 2023-01-26 ENCOUNTER — TELEPHONE (OUTPATIENT)
Dept: PLASTIC SURGERY | Facility: CLINIC | Age: 48
End: 2023-01-26
Payer: MEDICAID

## 2023-01-26 DIAGNOSIS — Z90.13 HISTORY OF BILATERAL MASTECTOMY: ICD-10-CM

## 2023-01-26 DIAGNOSIS — D05.11 DUCTAL CARCINOMA IN SITU (DCIS) OF RIGHT BREAST: Primary | ICD-10-CM

## 2023-01-26 NOTE — TELEPHONE ENCOUNTER
Called pt to offer 02/10/23 sx date at [facility] location- Pt agreeable and details given - Pt advised to call [facility] at [facility] to set up an appt - direct number given - pt aware of the location -sx at [facility] in October 2022- All questions and concerns addressed at this time. Call Back number given should any questions arise.

## 2023-02-08 NOTE — PRE-PROCEDURE INSTRUCTIONS
Pre admit phone call completed.    Instructions given to patient about NPO status as follows:     The evening before surgery do not eat anything after 9 p.m. ( this includes hard candy, chewing gum and mints).  You may only have GATORADE, POWERADE AND WATER from 9 p.m. until you leave your home. DO NOT  DRINK ANY LIQUIDS ON THE WAY TO THE HOSPITAL.      Patient was also instructed on the below information:    Park in the Parking lot behind the hospital or in the [facility] across the street from the parking lot.  Parking is complimentary.  If you will be discharged the same day as your procedure, please arrange for a responsible adult to drive you home or  to accompany you if traveling by taxi.  YOU WILL NOT BE PERMITTED TO DRIVE OR TO LEAVE THE HOSPITAL ALONE AFTER SURGERY.  It is strongly recommended that you arrange for someone to remain with you for the first 24 hrs following your surgery.    Patient verbalized understanding of above instructions.

## 2023-02-09 ENCOUNTER — TELEPHONE (OUTPATIENT)
Dept: PLASTIC SURGERY | Facility: CLINIC | Age: 48
End: 2023-02-09
Payer: MEDICAID

## 2023-02-09 NOTE — TELEPHONE ENCOUNTER
Pre op complete .Advised pt of arrival time for surgery, pt advised to arrive to [facility] at 1130 for 1330 surgery, Npo status after MN reinforced - ride and care giver arranged

## 2023-02-10 ENCOUNTER — ANESTHESIA (OUTPATIENT)
Dept: SURGERY | Facility: OTHER | Age: 48
End: 2023-02-10
Payer: MEDICAID

## 2023-02-10 ENCOUNTER — HOSPITAL ENCOUNTER (OUTPATIENT)
Facility: OTHER | Age: 48
Discharge: HOME | End: 2023-02-10
Attending: SURGERY | Admitting: SURGERY
Payer: MEDICAID

## 2023-02-10 ENCOUNTER — ANESTHESIA EVENT (OUTPATIENT)
Dept: SURGERY | Facility: OTHER | Age: 48
End: 2023-02-10
Payer: MEDICAID

## 2023-02-10 DIAGNOSIS — Z85.3 PERSONAL HISTORY OF BREAST CANCER: ICD-10-CM

## 2023-02-10 DIAGNOSIS — Z90.13 HISTORY OF BILATERAL MASTECTOMY: Primary | ICD-10-CM

## 2023-02-10 PROCEDURE — C1729 CATH, DRAINAGE: HCPCS | Performed by: SURGERY

## 2023-02-10 PROCEDURE — 36000707: Performed by: SURGERY

## 2023-02-10 PROCEDURE — 37000009 HC ANESTHESIA EA ADD 15 MINS: Performed by: SURGERY

## 2023-02-10 PROCEDURE — 63600175 PHARM REV CODE 636 W HCPCS: Performed by: NURSE ANESTHETIST, CERTIFIED REGISTERED

## 2023-02-10 PROCEDURE — 71000015 HC POSTOP RECOV 1ST HR: Performed by: SURGERY

## 2023-02-10 PROCEDURE — 63600175 PHARM REV CODE 636 W HCPCS: Performed by: SURGERY

## 2023-02-10 PROCEDURE — 63600175 PHARM REV CODE 636 W HCPCS: Performed by: ANESTHESIOLOGY

## 2023-02-10 PROCEDURE — 71000039 HC RECOVERY, EACH ADD'L HOUR: Performed by: SURGERY

## 2023-02-10 PROCEDURE — 00402 ANES INTEG SYS RCNSTV BREAST: CPT | Performed by: SURGERY

## 2023-02-10 PROCEDURE — 27201423 OPTIME MED/SURG SUP & DEVICES STERILE SUPPLY: Performed by: SURGERY

## 2023-02-10 PROCEDURE — 71000033 HC RECOVERY, INTIAL HOUR: Performed by: SURGERY

## 2023-02-10 PROCEDURE — 25000003 PHARM REV CODE 250: Performed by: NURSE ANESTHETIST, CERTIFIED REGISTERED

## 2023-02-10 PROCEDURE — 19357 TISS XPNDR PLMT BRST RCNSTJ: CPT | Mod: 50,,, | Performed by: SURGERY

## 2023-02-10 PROCEDURE — C9290 INJ, BUPIVACAINE LIPOSOME: HCPCS | Performed by: SURGERY

## 2023-02-10 PROCEDURE — C1789 PROSTHESIS, BREAST, IMP: HCPCS | Performed by: SURGERY

## 2023-02-10 PROCEDURE — 63600175 PHARM REV CODE 636 W HCPCS: Performed by: PHYSICIAN ASSISTANT

## 2023-02-10 PROCEDURE — 37000008 HC ANESTHESIA 1ST 15 MINUTES: Performed by: SURGERY

## 2023-02-10 PROCEDURE — 25000003 PHARM REV CODE 250: Performed by: SURGERY

## 2023-02-10 PROCEDURE — 25000003 PHARM REV CODE 250: Performed by: ANESTHESIOLOGY

## 2023-02-10 PROCEDURE — 36000706: Performed by: SURGERY

## 2023-02-10 PROCEDURE — 19357 PR BREAST RECONSTRUC W TISS EXPANDR: ICD-10-PCS | Mod: 50,,, | Performed by: SURGERY

## 2023-02-10 PROCEDURE — 25000003 PHARM REV CODE 250: Performed by: PHYSICIAN ASSISTANT

## 2023-02-10 DEVICE — IMPLANTABLE DEVICE: Type: IMPLANTABLE DEVICE | Site: BREAST | Status: FUNCTIONAL

## 2023-02-10 RX ORDER — PROPOFOL 10 MG/ML
VIAL (ML) INTRAVENOUS
Status: DISCONTINUED | OUTPATIENT
Start: 2023-02-10 | End: 2023-02-10

## 2023-02-10 RX ORDER — CEFAZOLIN SODIUM 1 G/3ML
2 INJECTION, POWDER, FOR SOLUTION INTRAMUSCULAR; INTRAVENOUS
Status: COMPLETED | OUTPATIENT
Start: 2023-02-10 | End: 2023-02-10

## 2023-02-10 RX ORDER — DIPHENHYDRAMINE HYDROCHLORIDE 50 MG/ML
12.5 INJECTION INTRAMUSCULAR; INTRAVENOUS EVERY 30 MIN PRN
Status: DISCONTINUED | OUTPATIENT
Start: 2023-02-10 | End: 2023-02-10 | Stop reason: HOSPADM

## 2023-02-10 RX ORDER — BUPIVACAINE HYDROCHLORIDE 2.5 MG/ML
INJECTION, SOLUTION EPIDURAL; INFILTRATION; INTRACAUDAL
Status: DISCONTINUED | OUTPATIENT
Start: 2023-02-10 | End: 2023-02-10 | Stop reason: HOSPADM

## 2023-02-10 RX ORDER — HYDRALAZINE HYDROCHLORIDE 20 MG/ML
INJECTION INTRAMUSCULAR; INTRAVENOUS
Status: DISCONTINUED
Start: 2023-02-10 | End: 2023-02-10 | Stop reason: HOSPADM

## 2023-02-10 RX ORDER — MUPIROCIN 20 MG/G
OINTMENT TOPICAL
Status: DISCONTINUED | OUTPATIENT
Start: 2023-02-10 | End: 2023-02-10 | Stop reason: HOSPADM

## 2023-02-10 RX ORDER — MEPERIDINE HYDROCHLORIDE 25 MG/ML
12.5 INJECTION INTRAMUSCULAR; INTRAVENOUS; SUBCUTANEOUS ONCE AS NEEDED
Status: DISCONTINUED | OUTPATIENT
Start: 2023-02-10 | End: 2023-02-10 | Stop reason: HOSPADM

## 2023-02-10 RX ORDER — SODIUM CHLORIDE 0.9 % (FLUSH) 0.9 %
3 SYRINGE (ML) INJECTION
Status: DISCONTINUED | OUTPATIENT
Start: 2023-02-10 | End: 2023-02-10 | Stop reason: HOSPADM

## 2023-02-10 RX ORDER — PROCHLORPERAZINE EDISYLATE 5 MG/ML
5 INJECTION INTRAMUSCULAR; INTRAVENOUS EVERY 30 MIN PRN
Status: DISCONTINUED | OUTPATIENT
Start: 2023-02-10 | End: 2023-02-10 | Stop reason: HOSPADM

## 2023-02-10 RX ORDER — GABAPENTIN 100 MG/1
300 CAPSULE ORAL 3 TIMES DAILY
Qty: 63 CAPSULE | Refills: 0 | Status: SHIPPED | OUTPATIENT
Start: 2023-02-10 | End: 2023-02-24 | Stop reason: SDUPTHER

## 2023-02-10 RX ORDER — ROCURONIUM BROMIDE 10 MG/ML
INJECTION, SOLUTION INTRAVENOUS
Status: DISCONTINUED | OUTPATIENT
Start: 2023-02-10 | End: 2023-02-10

## 2023-02-10 RX ORDER — METHOCARBAMOL 500 MG/1
500-1000 TABLET, FILM COATED ORAL 3 TIMES DAILY PRN
Qty: 20 TABLET | Refills: 0 | Status: SHIPPED | OUTPATIENT
Start: 2023-02-10 | End: 2023-02-15

## 2023-02-10 RX ORDER — ACETAMINOPHEN 10 MG/ML
INJECTION, SOLUTION INTRAVENOUS
Status: DISCONTINUED | OUTPATIENT
Start: 2023-02-10 | End: 2023-02-10

## 2023-02-10 RX ORDER — DEXAMETHASONE SODIUM PHOSPHATE 4 MG/ML
INJECTION, SOLUTION INTRA-ARTICULAR; INTRALESIONAL; INTRAMUSCULAR; INTRAVENOUS; SOFT TISSUE
Status: DISCONTINUED | OUTPATIENT
Start: 2023-02-10 | End: 2023-02-10

## 2023-02-10 RX ORDER — LIDOCAINE HYDROCHLORIDE 10 MG/ML
1 INJECTION, SOLUTION EPIDURAL; INFILTRATION; INTRACAUDAL; PERINEURAL ONCE
Status: DISCONTINUED | OUTPATIENT
Start: 2023-02-10 | End: 2023-02-10 | Stop reason: HOSPADM

## 2023-02-10 RX ORDER — LIDOCAINE HYDROCHLORIDE 20 MG/ML
INJECTION INTRAVENOUS
Status: DISCONTINUED | OUTPATIENT
Start: 2023-02-10 | End: 2023-02-10

## 2023-02-10 RX ORDER — CEPHALEXIN 500 MG/1
500 CAPSULE ORAL EVERY 12 HOURS
Qty: 28 CAPSULE | Refills: 0 | Status: SHIPPED | OUTPATIENT
Start: 2023-02-10 | End: 2023-02-24

## 2023-02-10 RX ORDER — ONDANSETRON 2 MG/ML
INJECTION INTRAMUSCULAR; INTRAVENOUS
Status: DISCONTINUED | OUTPATIENT
Start: 2023-02-10 | End: 2023-02-10

## 2023-02-10 RX ORDER — CYCLOBENZAPRINE HCL 5 MG
10 TABLET ORAL ONCE
Status: COMPLETED | OUTPATIENT
Start: 2023-02-10 | End: 2023-02-10

## 2023-02-10 RX ORDER — PROPOFOL 10 MG/ML
VIAL (ML) INTRAVENOUS CONTINUOUS PRN
Status: DISCONTINUED | OUTPATIENT
Start: 2023-02-10 | End: 2023-02-10

## 2023-02-10 RX ORDER — HYDROMORPHONE HYDROCHLORIDE 2 MG/ML
0.4 INJECTION, SOLUTION INTRAMUSCULAR; INTRAVENOUS; SUBCUTANEOUS EVERY 5 MIN PRN
Status: DISCONTINUED | OUTPATIENT
Start: 2023-02-10 | End: 2023-02-10 | Stop reason: HOSPADM

## 2023-02-10 RX ORDER — OXYCODONE HYDROCHLORIDE 5 MG/1
5 TABLET ORAL
Status: DISCONTINUED | OUTPATIENT
Start: 2023-02-10 | End: 2023-02-10 | Stop reason: HOSPADM

## 2023-02-10 RX ORDER — FENTANYL CITRATE 50 UG/ML
INJECTION, SOLUTION INTRAMUSCULAR; INTRAVENOUS
Status: DISCONTINUED | OUTPATIENT
Start: 2023-02-10 | End: 2023-02-10

## 2023-02-10 RX ORDER — OXYCODONE HYDROCHLORIDE 5 MG/1
5 TABLET ORAL EVERY 4 HOURS PRN
Qty: 10 TABLET | Refills: 0 | Status: SHIPPED | OUTPATIENT
Start: 2023-02-10 | End: 2023-04-24 | Stop reason: CLARIF

## 2023-02-10 RX ORDER — IBUPROFEN 800 MG/1
800 TABLET ORAL EVERY 6 HOURS
Qty: 28 TABLET | Refills: 0
Start: 2023-02-10 | End: 2023-02-17

## 2023-02-10 RX ORDER — HYDRALAZINE HYDROCHLORIDE 20 MG/ML
10 INJECTION INTRAMUSCULAR; INTRAVENOUS ONCE
Status: COMPLETED | OUTPATIENT
Start: 2023-02-10 | End: 2023-02-10

## 2023-02-10 RX ORDER — SODIUM CHLORIDE 0.9 % (FLUSH) 0.9 %
10 SYRINGE (ML) INJECTION
Status: DISCONTINUED | OUTPATIENT
Start: 2023-02-10 | End: 2023-02-10 | Stop reason: HOSPADM

## 2023-02-10 RX ORDER — ACETAMINOPHEN 500 MG
1000 TABLET ORAL EVERY 8 HOURS
Qty: 42 TABLET | Refills: 0
Start: 2023-02-10 | End: 2023-02-17

## 2023-02-10 RX ADMIN — HYDROMORPHONE HYDROCHLORIDE 0.4 MG: 2 INJECTION, SOLUTION INTRAMUSCULAR; INTRAVENOUS; SUBCUTANEOUS at 05:02

## 2023-02-10 RX ADMIN — SUGAMMADEX 400 MG: 100 INJECTION, SOLUTION INTRAVENOUS at 04:02

## 2023-02-10 RX ADMIN — SODIUM CHLORIDE, SODIUM LACTATE, POTASSIUM CHLORIDE, AND CALCIUM CHLORIDE: .6; .31; .03; .02 INJECTION, SOLUTION INTRAVENOUS at 02:02

## 2023-02-10 RX ADMIN — PROPOFOL 75 MCG/KG/MIN: 10 INJECTION, EMULSION INTRAVENOUS at 02:02

## 2023-02-10 RX ADMIN — ROCURONIUM BROMIDE 20 MG: 10 SOLUTION INTRAVENOUS at 03:02

## 2023-02-10 RX ADMIN — MUPIROCIN: 20 OINTMENT TOPICAL at 12:02

## 2023-02-10 RX ADMIN — ROCURONIUM BROMIDE 15 MG: 10 SOLUTION INTRAVENOUS at 04:02

## 2023-02-10 RX ADMIN — FENTANYL CITRATE 100 MCG: 50 INJECTION, SOLUTION INTRAMUSCULAR; INTRAVENOUS at 02:02

## 2023-02-10 RX ADMIN — ACETAMINOPHEN 1000 MG: 10 INJECTION, SOLUTION INTRAVENOUS at 03:02

## 2023-02-10 RX ADMIN — FENTANYL CITRATE 100 MCG: 50 INJECTION, SOLUTION INTRAMUSCULAR; INTRAVENOUS at 03:02

## 2023-02-10 RX ADMIN — OXYCODONE 5 MG: 5 TABLET ORAL at 05:02

## 2023-02-10 RX ADMIN — CYCLOBENZAPRINE HYDROCHLORIDE 10 MG: 5 TABLET, FILM COATED ORAL at 05:02

## 2023-02-10 RX ADMIN — PROPOFOL 200 MG: 10 INJECTION, EMULSION INTRAVENOUS at 02:02

## 2023-02-10 RX ADMIN — LIDOCAINE HYDROCHLORIDE 50 MG: 20 INJECTION, SOLUTION INTRAVENOUS at 02:02

## 2023-02-10 RX ADMIN — GLYCOPYRROLATE 0.2 MG: 0.2 INJECTION, SOLUTION INTRAMUSCULAR; INTRAVITREAL at 02:02

## 2023-02-10 RX ADMIN — DEXAMETHASONE SODIUM PHOSPHATE 8 MG: 4 INJECTION, SOLUTION INTRAMUSCULAR; INTRAVENOUS at 03:02

## 2023-02-10 RX ADMIN — CEFAZOLIN 2 G: 330 INJECTION, POWDER, FOR SOLUTION INTRAMUSCULAR; INTRAVENOUS at 02:02

## 2023-02-10 RX ADMIN — ONDANSETRON HYDROCHLORIDE 4 MG: 2 INJECTION INTRAMUSCULAR; INTRAVENOUS at 03:02

## 2023-02-10 RX ADMIN — HYDRALAZINE HYDROCHLORIDE 10 MG: 20 INJECTION INTRAMUSCULAR; INTRAVENOUS at 06:02

## 2023-02-10 RX ADMIN — ROCURONIUM BROMIDE 50 MG: 10 SOLUTION INTRAVENOUS at 02:02

## 2023-02-10 NOTE — PATIENT INSTRUCTIONS
Plastic Surgery Discharge Instructions  Luke Trinidad DO    Wound Care  1. Shower daily beginning 2 days after surgery  2. Okay to let warm soapy water run over the wound at that time  3. Do not submerge wounds in the bath  4. Leave any skin glue or mesh tape in place    Drain Care  If you have drains, strip tubing and record output when drain bulb becomes half full, or at least twice daily  Record output for each drain and bring to our follow up appointment    Diet  Regular Diet    Activity  Light activity only - no lifting greater than 10 lbs  Avoid strenuous activity that would cause you to get hot or sweaty    Medications  You have been given a prescription for antibiotics, narcotic pain medication, anti-inflammatory pain, muscle relaxer, and nerve pain  Please take medications as prescribed     What to call for:  1. Sustained fever > 101.0  2. Changes in color, sensation, temperature or pain at surgical site  3. Redness and/or drainage from the surgical site  4. Any reaction to prescribed medications  5. Continuous bloody drainage from surgical drains  6. Wound vac malfunction  7. Questions related to the procedure    Follow-up  1. Please call [phone] to schedule or confirm your follow up visit at the [facility] clinic on [facility]  2. Call with any questions or concerns.  2. After hours call [phone] - ask to speak with the Plastic Surgery fellow on call

## 2023-02-10 NOTE — ANESTHESIA PREPROCEDURE EVALUATION
02/10/2023  Dina Sosa is a 47 y.o., female.      Pre-op Assessment    I have reviewed the Patient Summary Reports.     I have reviewed the Nursing Notes. I have reviewed the NPO Status.   I have reviewed the Medications.     Review of Systems  Anesthesia Hx:  Denies Family Hx of Anesthesia complications.   Denies Personal Hx of Anesthesia complications.   Social:  Non-Smoker    Hematology/Oncology:  Hematology Normal   Oncology Normal     EENT/Dental:EENT/Dental Normal   Cardiovascular:  Cardiovascular Normal     Pulmonary:  Pulmonary Normal    Renal/:  Renal/ Normal     Hepatic/GI:   GERD    Musculoskeletal:  Musculoskeletal Normal    Neurological:   Neuromuscular Disease,    Endocrine:  Endocrine Normal    Dermatological:  Skin Normal    Psych:  Psychiatric Normal           Physical Exam    Airway:  Mallampati: III           Anesthesia Plan  Type of Anesthesia, risks & benefits discussed:    Anesthesia Type: Gen ETT  Intra-op Monitoring Plan: Standard ASA Monitors  Post Op Pain Control Plan: multimodal analgesia  Induction:  IV  Airway Plan: Video  Informed Consent: Informed consent signed with the Patient and all parties understand the risks and agree with anesthesia plan.  All questions answered.   ASA Score: 2  Anesthesia Plan Notes: Had GETA 10/2022 - [facility]; denies any changes in health    Ready For Surgery From Anesthesia Perspective.     .

## 2023-02-10 NOTE — OP NOTE
[facility] ([facility]  Plastic Surgery  Operative Note    SUMMARY     Date of Procedure: 2/10/2023     Location:  [facility]    Procedure(s):   Delayed insertion of right breast tissue expander  2.   Delayed insertion of left breast tissue expander    Surgeon(s) and Role:     * Tae Trinidad, DO - Primary    Assistant: BRYSON Wilson and Efe Cedillo MD, Fellow    Pre-Operative Diagnosis: Ductal carcinoma in situ (DCIS) of right breast [D05.11]  History of bilateral mastectomy [Z90.13]    Post-Operative Diagnosis: same    Anesthesia: General    Indications for Procedure:  This is a 47-year-old woman who underwent bilateral skin sparing mastectomy with immediate tissue expander placement in October.  She subsequently suffered mastectomy skin flap necrosis had to go back to the operating room to be debrided.  The skin was too tight to be closed with any tissue expander in place.  She is now about 4 months out from her initial operation wounds are well healed.  We planned on the replacement of tissue expanders.  We discussed that we would place this in a pre-pec plane but a submuscular plane 1for more soft tissue coverage and  not to disturb the healed thin mastectomy skin flaps    Operative Findings (including complications, if any):  Bilateral submuscular placement of Artoura high-profile 475 cc expanders    Description of Procedures:  Patient was seen in the preoperative holding area.  All questions were answered surgical consent was signed.  Her breast footprint and the borders for previous mastectomy were marked as were her horizontal incision laterally of our planned access to the sub Bey plane.    Patient was taken the operating room.  General anesthesia was induced.  Both breasts were prepped and draped in the usual sterile fashion.      Beginning on the right side an incision was made from her T point laterally excising her old scar.  Dissected straight down to the chest wall and  encountered the edge of the pectoralis muscle.  Using a lighted retractor a subpectoral plane was developed using the Bovie in the areolar plane.  Several small chest wall perforators were cauterized.  Some of the muscle attachments were taken down laterally so we had a 13 cm wide pocket which was the base width of the tissue expander.  Inferiorly the origins of the pectoralis muscle were released.  The wound was irrigated and checked for hemostasis.  Intercostal and Pec 1 block was done using mixture of 0.25% Marcaine and Exparel.  Fifteen Syriac SHAI drain was placed and brought out in the old site at the ant was taken to recovery in stable condition erior axillary line On the back table a 475 cc expander was opened and allowed to soak in double antibiotic solution.  Hemostasis was confirmed the pocket was irrigated with Va skin closure.  she solution.  The expander was then placed in the pocket.  It was in good position.  The 3 bottom tabs were sewn to the chest wall using a mattress 2-0 PDS suture and the tab.  The deep fascial layer was sewn along to the IMF using buried 3-0 PDS suture.  Additional Vashe was placed into the pocket before The skin was closed with 3-0 Monocryl and a running 3-0 Stratafix.    On the left side a sub Pec pocket was developed several fashion incision was placed from the T point to the lateral margin of the incision.  Once the pocket was dry the same size expander was placed.  It was also confirmed to be in good position positioned and symmetric to the other side.  Exparel was also used for intercostal and Pec 1 blocks. .  Fifteen Syriac SHAI drain was placed.  The 6 oclock and lower lateral tabs were sewn to the chest wall.  The wound was closed in the same fashion.      No saline or air was placed in the expanders.  They were deflated before placing the chest.  The incision was dressed with Prineo tape.  Photos were taken during the case.  The patient tolerated  the procedure well and  was taken to recovery in stable condition    Significant Surgical Tasks Conducted by the Assistant(s), if Applicable: The indicated resident served as first assistant for this procedure.    Estimated Blood Loss (EBL): 30mL           Implants:   Implant Name Type Inv. Item Serial No.  Lot No. LRB No. Used Action   [place] Artoura Plus Smooth High Profile   [id number] MENT[employer] KEL [id number] Left 1 Implanted   [place] Artoura Plus Smooth High Profile   [id number] [employer] KEL [id number] Right 1 Implanted       Specimens:   Specimen (24h ago, onward)      None                    Condition: Good    Disposition: PACU - hemodynamically stable., DC home    Attestation: I was present and scrubbed for the entire procedure.

## 2023-02-10 NOTE — TRANSFER OF CARE
Anesthesia Transfer of Care Note    Patient: Dina Sosa    Procedure(s) Performed: Procedure(s) (LRB):  INSERTION, TISSUE EXPANDER, BREAST (Bilateral)    Patient location: PACU    Anesthesia Type: general    Transport from OR: Transported from OR on 2-3 L/min O2 by NC with adequate spontaneous ventilation    Post pain: adequate analgesia    Post assessment: no apparent anesthetic complications    Post vital signs: stable    Level of consciousness: awake    Nausea/Vomiting: no nausea/vomiting    Complications: none    Transfer of care protocol was followed      Last vitals:   Visit Vitals  BP (!) 135/92   Pulse 84   Temp 36.2 °C (97.1 °F) (Temporal)   Resp 16   Ht 5' (1.524 m)   Wt 52.2 kg (115 lb)   LMP 06/01/2001 (Approximate)   SpO2 100%   Breastfeeding No   BMI 22.46 kg/m²

## 2023-02-10 NOTE — INTERVAL H&P NOTE
The patient has been examined and the H&P has been reviewed:    I concur with the findings and no changes have occurred since H&P was written.    Surgery risks, benefits and alternative options discussed and understood by patient/family.    Marked in pre-op.  All questions answered  Will plan for submuscular TE placement through lateral incision      There are no hospital problems to display for this patient.

## 2023-02-10 NOTE — BRIEF OP NOTE
[facility] ([place])  Brief Operative Note    Surgery Date: 2/10/2023     Surgeon(s) and Role:     * Tae Trinidad DO - Primary    Assisting Surgeon: None    Pre-op Diagnosis:  Ductal carcinoma in situ (DCIS) of right breast [D05.11]  History of bilateral mastectomy [Z90.13]    Post-op Diagnosis:  Post-Op Diagnosis Codes:     * Ductal carcinoma in situ (DCIS) of right breast [D05.11]     * History of bilateral mastectomy [Z90.13]    Procedure(s) (LRB):  INSERTION, TISSUE EXPANDER, BREAST (Bilateral)    Anesthesia: General    Operative Findings: minimal     Estimated Blood Loss: * No values recorded between 2/10/2023  3:09 PM and 2/10/2023  4:53 PM *         Specimens:   Specimen (24h ago, onward)      None              Discharge Note    OUTCOME: Patient tolerated treatment/procedure well without complication and is now ready for discharge.    DISPOSITION: Home or Self Care    FINAL DIAGNOSIS:  <principal problem not specified>    FOLLOWUP: In clinic    DISCHARGE INSTRUCTIONS:  No discharge procedures on file.

## 2023-02-11 NOTE — ANESTHESIA POSTPROCEDURE EVALUATION
Anesthesia Post Evaluation    Patient: Dina Sosa    Procedure(s) Performed: Procedure(s) (LRB):  INSERTION, TISSUE EXPANDER, BREAST (Bilateral)    Final Anesthesia Type: general      Patient location during evaluation: PACU  Patient participation: Yes- Able to Participate  Level of consciousness: awake and alert  Post-procedure vital signs: reviewed and stable  Pain management: adequate  Airway patency: patent    PONV status at discharge: No PONV  Anesthetic complications: no      Cardiovascular status: blood pressure returned to baseline  Respiratory status: unassisted  Hydration status: euvolemic  Follow-up not needed.          Vitals Value Taken Time   /102 02/10/23 1805   Temp 36.2 °C (97.1 °F) 02/10/23 1657   Pulse 93 02/10/23 1805   Resp 16 02/10/23 1805   SpO2 94 % 02/10/23 1805   Vitals shown include unvalidated device data.      No case tracking events are documented in the log.      Pain/Daria Score: Pain Rating Prior to Med Admin: 7 (2/10/2023  5:52 PM)  Daria Score: 10 (2/10/2023  5:57 PM)

## 2023-02-11 NOTE — DISCHARGE INSTRUCTIONS
"  Caring for a Closed Suction Drainage Tube  A drainage tube removes fluid from around an incision. This helps prevent infection and promotes healing. The collection bulb at the end of the tube is squeezed and plugged to create suction. The bulb should be emptied and reset when half full to maintain adequate suction. You need to empty the bulb and clean the skin around the drain as often as your healthcare provider tells you to. Follow the steps below.     What youll need  Have the following items ready:  Disposable gloves  Measuring cup  Record sheet  Gauze or paper towel  Sterile cotton swabs or 4" x 4" gauze pads  Sterile saline or soap and water       Step 1. Empty the bulb  Wash your hands and put on a new pair of disposable gloves.  Point the top of the bulb away from you and remove the stopper.  Turn the bulb upside down over a measuring cup. Squeeze the fluid into the cup. Make sure the bulb is totally empty.  Put the cup to one side. You can record the volume of liquid in the cup after you clean and reconnect the bulb in step 2.    Step 2. Clean and reconnect the bulb  Clean the top of the bulb with clean gauze or a paper towel, if needed.  Squeeze the bulb tight, and put the stopper back on the top.  Record the amount of fluid in the cup. Then, empty the cup as directed.       When to call your healthcare provider  Call your healthcare provider if you notice any of these changes:  The amount of fluid increases or decreases suddenly  Large amount of blood or a clot in drainage  Color, odor, or thickness of the fluid changes  Tube falls out or the incision opens  Skin around the drain is red, swollen, painful, or seeping pus  You have a fever of 100.4°F (38°C) or higher, or as directed by your healthcare provider     If the tube isn't draining  Here are tips to drain the tube:  Uncurl any kinks in the tube.  With one hand, firmly hold the base of the tube between your thumb and index finger. Do not touch " "the incision.  Put the thumb and index finger of your other hand on the tube, next to the first hand. Pinch your fingers together. Then pull them along the tube toward the bag. This will help push any clogged fluid through the tube. This is called "stripping the tube." You may find it helpful to hold an alcohol swab between your fingers and the tube to lubricate the tubing.  If the tube still does not drain, call your healthcare provider.   Date Last Reviewed: 12/1/2016 © 2000-2017 [employer]. [address] [place] [address]. All rights reserved. This information is not intended as a substitute for professional medical care. Always follow your healthcare professional's instructions.     Your Surgeon Gave You EXPAREL® (bupivacaine liposome injectable suspension)    To help control your pain after surgery, your surgeon injected EXPAREL into your surgical incision just before the end of the procedure.   EXPAREL is a local analgesic that contains the local anesthetic bupivacaine. Local anesthetics provide pain relief by numbing the tissue  around the surgical site.   EXPAREL is specifically designed to release pain medication over time and can control pain for up to 72 hours.   In addition to EXPAREL, your surgeon may provide other pain medications to control your pain.   Each patient is different and responds differently to painmedication. Depending on how you respond to EXPAREL, you may require less  additional pain medication during your recovery.    Possible Side Effects    Side effects can occur with any medication and it is important not to ignore anything you may be experiencing. Some patients who  received EXPAREL experienced nausea, vomiting, or constipation. Rarely, patients who receive bupivacaine (the active ingredient in  EXPAREL) have experienced numbness and tingling in their mouth or lips, lightheadedness, or anxiety. Speak with your doctor right  away if you think you may be " experiencing any of these sensations, or if you have other questions regarding possible side effects.    Your Recovery    When your pain is under control, your body can better focus on healing. This is not the time to test your pain tolerance, or grin and  bear it.Work with your surgeon and nurse to make your recovery as speedy and pain-free as possible.   Follow the post-op orders your nurse gave you.   Eat a healthy diet and drink plenty of water. Surgery stresses your body; your body responds by needing more energy to heal.      Important Information About EXPAREL  Products that contain bupivacaine, like EXPAREL, may cause a temporary loss of  sensation or the ability to move in the area where bupivacaine was injected.    Date Administered: 2-10-23  Time Administered: 1614    Other Forms of Bupivicaine should not be administered within 96hrs following administration of EXPAREL.

## 2023-02-11 NOTE — PLAN OF CARE
Dina Sosa has met all discharge criteria from Phase II. Vital Signs are stable, ambulating  without difficulty.Pain is now under control and tolerable for the pt. Pain score 5 at this time.  Discharge instructions given, patient verbalized understanding. Discharged from facility via wheelchair in stable condition.

## 2023-02-13 VITALS
WEIGHT: 115 LBS | HEART RATE: 110 BPM | RESPIRATION RATE: 16 BRPM | SYSTOLIC BLOOD PRESSURE: 148 MMHG | OXYGEN SATURATION: 99 % | HEIGHT: 60 IN | DIASTOLIC BLOOD PRESSURE: 98 MMHG | BODY MASS INDEX: 22.58 KG/M2 | TEMPERATURE: 98 F

## 2023-02-16 ENCOUNTER — OFFICE VISIT (OUTPATIENT)
Dept: PLASTIC SURGERY | Facility: CLINIC | Age: 48
End: 2023-02-16
Payer: MEDICAID

## 2023-02-16 VITALS
WEIGHT: 116.19 LBS | SYSTOLIC BLOOD PRESSURE: 143 MMHG | DIASTOLIC BLOOD PRESSURE: 93 MMHG | BODY MASS INDEX: 22.81 KG/M2 | HEART RATE: 100 BPM | HEIGHT: 60 IN | OXYGEN SATURATION: 98 %

## 2023-02-16 DIAGNOSIS — Z90.13 HISTORY OF BILATERAL MASTECTOMY: Primary | ICD-10-CM

## 2023-02-16 PROCEDURE — 3080F PR MOST RECENT DIASTOLIC BLOOD PRESSURE >= 90 MM HG: ICD-10-PCS | Mod: CPTII,,, | Performed by: SURGERY

## 2023-02-16 PROCEDURE — 3080F DIAST BP >= 90 MM HG: CPT | Mod: CPTII,,, | Performed by: SURGERY

## 2023-02-16 PROCEDURE — 3077F PR MOST RECENT SYSTOLIC BLOOD PRESSURE >= 140 MM HG: ICD-10-PCS | Mod: CPTII,,, | Performed by: SURGERY

## 2023-02-16 PROCEDURE — 99024 POSTOP FOLLOW-UP VISIT: CPT | Mod: ,,, | Performed by: SURGERY

## 2023-02-16 PROCEDURE — 3077F SYST BP >= 140 MM HG: CPT | Mod: CPTII,,, | Performed by: SURGERY

## 2023-02-16 PROCEDURE — 99213 OFFICE O/P EST LOW 20 MIN: CPT | Mod: PBBFAC | Performed by: SURGERY

## 2023-02-16 PROCEDURE — 3008F PR BODY MASS INDEX (BMI) DOCUMENTED: ICD-10-PCS | Mod: CPTII,,, | Performed by: SURGERY

## 2023-02-16 PROCEDURE — 99024 PR POST-OP FOLLOW-UP VISIT: ICD-10-PCS | Mod: ,,, | Performed by: SURGERY

## 2023-02-16 PROCEDURE — 1159F PR MEDICATION LIST DOCUMENTED IN MEDICAL RECORD: ICD-10-PCS | Mod: CPTII,,, | Performed by: SURGERY

## 2023-02-16 PROCEDURE — 3008F BODY MASS INDEX DOCD: CPT | Mod: CPTII,,, | Performed by: SURGERY

## 2023-02-16 PROCEDURE — 1159F MED LIST DOCD IN RCRD: CPT | Mod: CPTII,,, | Performed by: SURGERY

## 2023-02-16 PROCEDURE — 99999 PR PBB SHADOW E&M-EST. PATIENT-LVL III: CPT | Mod: PBBFAC,,, | Performed by: SURGERY

## 2023-02-16 PROCEDURE — 99999 PR PBB SHADOW E&M-EST. PATIENT-LVL III: ICD-10-PCS | Mod: PBBFAC,,, | Performed by: SURGERY

## 2023-02-16 NOTE — PROGRESS NOTES
Dina Sosa presents to [facility] for a follow up visit status post bilateral tissue expander placement on 2/10/23. She is doing well today with no issues since surgery. Here today with stepdaughter. Her pain is well controlled. She is tolerating a normal diet. She has not had to use the narcotic containing pain medications but is using the robaxin as directed. She documented bilateral SHAI drain output averaging 45ccs daily of serosanguinous drainage. She denies fever, chills, nausea, vomiting or other systemic signs of infection.       ROS - negative, other than stated above    PHYSICAL EXAMINATION  Vitals:    02/16/23 1443   BP: (!) 143/93   Pulse: 100     WD WN NAD  VSS  Normal respiratory effort  R breast - incision CDI, no erythema or drainage, nipple absent  L breast - incision CDI, no erythema or drainage, nipple absent  Incisions are healing well with no issues, prineo tape present on both IMF incisions      The fill ports on the right and left tissue expander was marked using the magnet and a pen.  The skin was then prepped using chloroprep.  Using sterile technique the right port was accessed with a 21G butterfly needle.Sterile injectable saline was added to the expander.  The skin was checked for tension and cap refill.  The same was done on the contralateral breast    Left volume added: 100 cc                 Right volume added: 100 cc  Left total volume: 100 cc                    Right total volume: 100 cc         ASSESSMENT/PLAN  47 y.o. F s/p bilateral TE placement  - Doing well, no issues.   - Bilateral breast drains remain in place  - RTC x 1 week, appointment scheduled.      All questions were answered. The patient was advised to contact the clinic with any questions or concerns prior to their next visit.       Doreen Garcia PA-C  Plastic and Reconstructive Surgery

## 2023-02-23 ENCOUNTER — OFFICE VISIT (OUTPATIENT)
Dept: PLASTIC SURGERY | Facility: CLINIC | Age: 48
End: 2023-02-23
Payer: MEDICAID

## 2023-02-23 DIAGNOSIS — Z90.13 HISTORY OF BILATERAL MASTECTOMY: Primary | ICD-10-CM

## 2023-02-23 DIAGNOSIS — D05.11 DUCTAL CARCINOMA IN SITU (DCIS) OF RIGHT BREAST: ICD-10-CM

## 2023-02-23 PROCEDURE — 99024 POSTOP FOLLOW-UP VISIT: CPT | Mod: ,,, | Performed by: SURGERY

## 2023-02-23 PROCEDURE — 99024 PR POST-OP FOLLOW-UP VISIT: ICD-10-PCS | Mod: ,,, | Performed by: SURGERY

## 2023-02-23 PROCEDURE — 99999 PR PBB SHADOW E&M-EST. PATIENT-LVL II: ICD-10-PCS | Mod: PBBFAC,,, | Performed by: SURGERY

## 2023-02-23 PROCEDURE — 1159F PR MEDICATION LIST DOCUMENTED IN MEDICAL RECORD: ICD-10-PCS | Mod: CPTII,,, | Performed by: SURGERY

## 2023-02-23 PROCEDURE — 99212 OFFICE O/P EST SF 10 MIN: CPT | Mod: PBBFAC | Performed by: SURGERY

## 2023-02-23 PROCEDURE — 99999 PR PBB SHADOW E&M-EST. PATIENT-LVL II: CPT | Mod: PBBFAC,,, | Performed by: SURGERY

## 2023-02-23 PROCEDURE — 1159F MED LIST DOCD IN RCRD: CPT | Mod: CPTII,,, | Performed by: SURGERY

## 2023-02-23 NOTE — PROGRESS NOTES
Dina Sosa presents to [facility] for a follow up visit status post bilateral tissue expander placement on 2/10/23. She is doing well today with no issues since her last visit. SHAI drain output has been less than 20ccs serosanguinous drainage daily for the last 3 consecutive days and she is ready for them to be removed. She tolerated the tissue expander fill well without discomfort. She denies fever, chills, nausea, vomiting or other systemic signs of infection.       ROS - negative, other than stated above    PHYSICAL EXAMINATION  There were no vitals filed for this visit.  WD WN NAD  VSS  Normal respiratory effort  R breast - incision CDI, no erythema or drainage, nipple absent - sensation  L breast - incision CDI, no erythema or drainage, nipple absent - sensation  Incisions are healing well with no issues    The fill ports on the right and left tissue expander was marked using the magnet and a pen.  The skin was then prepped using chloroprep.  Using sterile technique the right port was accessed with a 21G butterfly needle.Sterile injectable saline was added to the expander.  The skin was checked for tension and cap refill.  The same was done on the contralateral breast    Left volume added: 100 cc                 Right volume added: 100 cc  Left total volume: 200 cc                    Right total volume: 200 cc        ASSESSMENT/PLAN  47 y.o. F s/p delayed subpectoral bilateral tissue expander placement  - Doing well, no issues.   - Bilateral breast drains removed today  - Counseled on incisional care  - Expanders filled in clinic today   - RTC x 2 week(s), appointment scheduled.      All questions were answered. The patient was advised to contact the clinic with any questions or concerns prior to their next visit.       Doreen Garcia PA-C  Plastic and Reconstructive Surgery

## 2023-02-24 ENCOUNTER — OFFICE VISIT (OUTPATIENT)
Dept: FAMILY MEDICINE | Facility: CLINIC | Age: 48
End: 2023-02-24
Payer: MEDICAID

## 2023-02-24 VITALS
OXYGEN SATURATION: 98 % | WEIGHT: 114.88 LBS | BODY MASS INDEX: 22.43 KG/M2 | HEART RATE: 102 BPM | SYSTOLIC BLOOD PRESSURE: 118 MMHG | DIASTOLIC BLOOD PRESSURE: 86 MMHG

## 2023-02-24 DIAGNOSIS — Z85.3 HISTORY OF BREAST CANCER: ICD-10-CM

## 2023-02-24 DIAGNOSIS — K21.9 GASTROESOPHAGEAL REFLUX DISEASE WITHOUT ESOPHAGITIS: ICD-10-CM

## 2023-02-24 DIAGNOSIS — M54.12 CERVICAL RADICULOPATHY: ICD-10-CM

## 2023-02-24 DIAGNOSIS — F41.9 ANXIETY: Primary | ICD-10-CM

## 2023-02-24 DIAGNOSIS — R25.1 TREMOR OF BOTH HANDS: ICD-10-CM

## 2023-02-24 DIAGNOSIS — E78.2 MIXED HYPERLIPIDEMIA: ICD-10-CM

## 2023-02-24 PROCEDURE — 99214 PR OFFICE/OUTPT VISIT, EST, LEVL IV, 30-39 MIN: ICD-10-PCS | Mod: S$PBB,,, | Performed by: INTERNAL MEDICINE

## 2023-02-24 PROCEDURE — 99999 PR PBB SHADOW E&M-EST. PATIENT-LVL III: ICD-10-PCS | Mod: PBBFAC,,, | Performed by: INTERNAL MEDICINE

## 2023-02-24 PROCEDURE — 1159F MED LIST DOCD IN RCRD: CPT | Mod: CPTII,,, | Performed by: INTERNAL MEDICINE

## 2023-02-24 PROCEDURE — 3074F PR MOST RECENT SYSTOLIC BLOOD PRESSURE < 130 MM HG: ICD-10-PCS | Mod: CPTII,,, | Performed by: INTERNAL MEDICINE

## 2023-02-24 PROCEDURE — 99999 PR PBB SHADOW E&M-EST. PATIENT-LVL III: CPT | Mod: PBBFAC,,, | Performed by: INTERNAL MEDICINE

## 2023-02-24 PROCEDURE — 99214 OFFICE O/P EST MOD 30 MIN: CPT | Mod: S$PBB,,, | Performed by: INTERNAL MEDICINE

## 2023-02-24 PROCEDURE — 99213 OFFICE O/P EST LOW 20 MIN: CPT | Mod: PBBFAC,PO | Performed by: INTERNAL MEDICINE

## 2023-02-24 PROCEDURE — 90677 PCV20 VACCINE IM: CPT | Mod: PBBFAC,PO

## 2023-02-24 PROCEDURE — 1159F PR MEDICATION LIST DOCUMENTED IN MEDICAL RECORD: ICD-10-PCS | Mod: CPTII,,, | Performed by: INTERNAL MEDICINE

## 2023-02-24 PROCEDURE — 3079F PR MOST RECENT DIASTOLIC BLOOD PRESSURE 80-89 MM HG: ICD-10-PCS | Mod: CPTII,,, | Performed by: INTERNAL MEDICINE

## 2023-02-24 PROCEDURE — 1160F PR REVIEW ALL MEDS BY PRESCRIBER/CLIN PHARMACIST DOCUMENTED: ICD-10-PCS | Mod: CPTII,,, | Performed by: INTERNAL MEDICINE

## 2023-02-24 PROCEDURE — 3079F DIAST BP 80-89 MM HG: CPT | Mod: CPTII,,, | Performed by: INTERNAL MEDICINE

## 2023-02-24 PROCEDURE — 1160F RVW MEDS BY RX/DR IN RCRD: CPT | Mod: CPTII,,, | Performed by: INTERNAL MEDICINE

## 2023-02-24 PROCEDURE — 3074F SYST BP LT 130 MM HG: CPT | Mod: CPTII,,, | Performed by: INTERNAL MEDICINE

## 2023-02-24 PROCEDURE — 3008F BODY MASS INDEX DOCD: CPT | Mod: CPTII,,, | Performed by: INTERNAL MEDICINE

## 2023-02-24 PROCEDURE — 3008F PR BODY MASS INDEX (BMI) DOCUMENTED: ICD-10-PCS | Mod: CPTII,,, | Performed by: INTERNAL MEDICINE

## 2023-02-24 RX ORDER — BUPROPION HYDROCHLORIDE 300 MG/1
300 TABLET ORAL DAILY
Qty: 90 TABLET | Refills: 3 | Status: SHIPPED | OUTPATIENT
Start: 2023-02-24 | End: 2024-01-08

## 2023-02-24 RX ORDER — GABAPENTIN 300 MG/1
300 CAPSULE ORAL 2 TIMES DAILY
Qty: 180 CAPSULE | Refills: 3 | Status: SHIPPED | OUTPATIENT
Start: 2023-02-24 | End: 2023-05-24

## 2023-02-24 RX ORDER — PANTOPRAZOLE SODIUM 40 MG/1
40 TABLET, DELAYED RELEASE ORAL DAILY
Qty: 90 TABLET | Refills: 3 | Status: SHIPPED | OUTPATIENT
Start: 2023-02-24 | End: 2023-12-07

## 2023-02-24 RX ORDER — ORPHENADRINE CITRATE 100 MG/1
100 TABLET, EXTENDED RELEASE ORAL 2 TIMES DAILY
COMMUNITY
End: 2023-02-24 | Stop reason: SDUPTHER

## 2023-02-24 RX ORDER — ORPHENADRINE CITRATE 100 MG/1
100 TABLET, EXTENDED RELEASE ORAL 2 TIMES DAILY
Qty: 180 TABLET | Refills: 3 | Status: SHIPPED | OUTPATIENT
Start: 2023-02-24 | End: 2024-03-10

## 2023-02-24 RX ORDER — TRAZODONE HYDROCHLORIDE 100 MG/1
100 TABLET ORAL NIGHTLY
Qty: 90 TABLET | Refills: 3 | Status: SHIPPED | OUTPATIENT
Start: 2023-02-24 | End: 2024-03-22 | Stop reason: SDUPTHER

## 2023-02-24 RX ORDER — PRIMIDONE 50 MG/1
50 TABLET ORAL NIGHTLY
Qty: 30 TABLET | Refills: 11 | Status: SHIPPED | OUTPATIENT
Start: 2023-02-24 | End: 2024-02-25

## 2023-02-24 NOTE — PROGRESS NOTES
Patient ID: Dina Sosa     Chief Complaint:   Chief Complaint   Patient presents with    Establish Care        HPI:  New patient here to establish care with me.  Overall I think she is doing well.  Her most significant medical problems are a fall where she needed her pelvis around her left hip stabilized with 3 pins but that has been doing well.  She was diagnosed with right breast cancer of the invasive ductal carcinoma variety last year and thankfully it is in remission and she is currently going through the breast reconstruction process and I do wish her well.  She does need some refills on medicines that control her anxiety and her cervical radiculopathy will leftover from her neck surgery.  I have sent the appropriate refills into the pharmacy.  She seems to have done better with the Wellbutrin 300 mg once daily than Cymbalta so we we will continue that.  She does still have a mild tremor in both of her hands since her surgery and I wonder if primidone may help calm her hands.  I did review her labs and everything looks good except her cholesterol is much higher than I like.  I am going to repeat some labs at a later date after she lowers the amount of red and her diet has I do not want to commit her to a statin if I can help it.  She does consent to a Prevnar 20 today and might consider getting a another COVID booster in the future.  Vital signs do look good.    Review of Systems   Constitutional: Negative.    HENT: Negative.     Eyes: Negative.    Respiratory: Negative.     Cardiovascular: Negative.    Gastrointestinal: Negative.    Endocrine: Negative.    Genitourinary: Negative.    Musculoskeletal: Negative.    Skin: Negative.    Allergic/Immunologic: Negative.    Neurological:  Positive for tremors.   Hematological: Negative.    Psychiatric/Behavioral: Negative.          Objective:      Physical Exam   Physical Exam  Vitals and nursing note reviewed.   Constitutional:       Appearance: Normal  appearance. She is well-developed.   HENT:      Head: Normocephalic and atraumatic.      Nose: Nose normal.      Mouth/Throat:      Mouth: Mucous membranes are moist.   Eyes:      Extraocular Movements: Extraocular movements intact.      Conjunctiva/sclera: Conjunctivae normal.      Pupils: Pupils are equal, round, and reactive to light.   Cardiovascular:      Rate and Rhythm: Normal rate and regular rhythm.      Pulses: Normal pulses.      Heart sounds: Normal heart sounds.   Pulmonary:      Effort: Pulmonary effort is normal.      Breath sounds: Normal breath sounds.   Abdominal:      General: Bowel sounds are normal.      Palpations: Abdomen is soft.   Musculoskeletal:         General: Normal range of motion.      Cervical back: Normal range of motion and neck supple.   Skin:     General: Skin is warm and dry.      Capillary Refill: Capillary refill takes less than 2 seconds.   Neurological:      General: No focal deficit present.      Mental Status: She is alert and oriented to person, place, and time.   Psychiatric:         Mood and Affect: Mood normal.         Behavior: Behavior normal.         Thought Content: Thought content normal.         Judgment: Judgment normal.          Vitals:   Vitals:    02/24/23 1358   BP: 118/86   Pulse: 102   SpO2: 98%   Weight: 52.1 kg (114 lb 13.8 oz)          Current Outpatient Medications:     ascorbic acid (VITAMIN C ORAL), Take by mouth Daily., Disp: , Rfl:     loratadine (CLARITIN ORAL), Take by mouth Daily., Disp: , Rfl:     oxyCODONE (ROXICODONE) 5 MG immediate release tablet, Take 1 tablet (5 mg total) by mouth every 4 (four) hours as needed for Pain., Disp: 10 tablet, Rfl: 0    buPROPion (WELLBUTRIN XL) 300 MG 24 hr tablet, Take 1 tablet (300 mg total) by mouth once daily., Disp: 90 tablet, Rfl: 3    cephALEXin (KEFLEX) 500 MG capsule, Take 1 capsule (500 mg total) by mouth every 12 (twelve) hours. for 14 days (Patient not taking: Reported on 2/24/2023), Disp: 28  capsule, Rfl: 0    diazePAM (VALIUM) 5 MG tablet, One p.o. q.d. p.r.n. anxiety (Patient not taking: Reported on 2/24/2023), Disp: 30 tablet, Rfl: 2    gabapentin (NEURONTIN) 300 MG capsule, Take 1 capsule (300 mg total) by mouth 2 (two) times daily., Disp: 180 capsule, Rfl: 3    orphenadrine (NORFLEX) 100 mg tablet, Take 1 tablet (100 mg total) by mouth 2 (two) times daily., Disp: 180 tablet, Rfl: 3    pantoprazole (PROTONIX) 40 MG tablet, Take 1 tablet (40 mg total) by mouth once daily., Disp: 90 tablet, Rfl: 3    primidone (MYSOLINE) 50 MG Tab, Take 1 tablet (50 mg total) by mouth every evening., Disp: 30 tablet, Rfl: 11    traZODone (DESYREL) 100 MG tablet, Take 1 tablet (100 mg total) by mouth every evening., Disp: 90 tablet, Rfl: 3  No current facility-administered medications for this visit.    Facility-Administered Medications Ordered in Other Visits:     ceFAZolin 1 g, gentamicin 80 mg in sodium chloride 0.9% 500 mL irrigation, , Irrigation, On Call Procedure, Tae Trinidad,     ceFAZolin 1 g, gentamicin 80 mg in sodium chloride 0.9% 500 mL irrigation, , Irrigation, On Call Procedure, Tae Trinidad DO   Assessment:       Patient Active Problem List    Diagnosis Date Noted    History of breast cancer 02/24/2023    Tremor of both hands 02/24/2023    Mixed hyperlipidemia 02/24/2023    Cervical radiculopathy 02/24/2023    History of bilateral mastectomy 10/26/2022    Breast cancer 08/25/2022    Ductal carcinoma in situ (DCIS) of right breast 08/25/2022    History of hip surgery 07/19/2022    History of cervical spinal surgery 04/21/2022    Depression 10/21/2021    History of fusion of cervical spine 04/21/2021    Menopausal syndrome 04/21/2021    Gastroesophageal reflux disease without esophagitis 08/23/2019    Traumatic ecchymosis of left upper arm 05/09/2019    Neuropathy 05/09/2019    Lumbar facet arthropathy 05/09/2019    Lumbosacral strain 05/09/2019    Cervical strain 08/21/2018    Anxiety  05/30/2014    Environmental allergies 05/30/2014          Plan:       Dina Sosa  was seen today for follow-up and may need lab work.    Diagnoses and all orders for this visit:    Dina was seen today for establish care.    Diagnoses and all orders for this visit:    Anxiety  -     traZODone (DESYREL) 100 MG tablet; Take 1 tablet (100 mg total) by mouth every evening.  -     buPROPion (WELLBUTRIN XL) 300 MG 24 hr tablet; Take 1 tablet (300 mg total) by mouth once daily.  Controlled with med     History of breast cancer  In remission     Cervical radiculopathy  -     gabapentin (NEURONTIN) 300 MG capsule; Take 1 capsule (300 mg total) by mouth 2 (two) times daily.  -     orphenadrine (NORFLEX) 100 mg tablet; Take 1 tablet (100 mg total) by mouth 2 (two) times daily.  Controlled with med     Mixed hyperlipidemia  -     CBC Auto Differential; Future  -     Comprehensive Metabolic Panel; Future  -     Lipid Panel; Future  Monitor    Tremor of both hands  -     primidone (MYSOLINE) 50 MG Tab; Take 1 tablet (50 mg total) by mouth every evening.    Gastroesophageal reflux disease without esophagitis  -     pantoprazole (PROTONIX) 40 MG tablet; Take 1 tablet (40 mg total) by mouth once daily.  Controlled with med     Other orders  -     (In Office Administered) Pneumococcal Conjugate Vaccine (20 Valent) (IM)

## 2023-02-27 ENCOUNTER — TELEPHONE (OUTPATIENT)
Dept: FAMILY MEDICINE | Facility: CLINIC | Age: 48
End: 2023-02-27
Payer: MEDICAID

## 2023-03-09 ENCOUNTER — OFFICE VISIT (OUTPATIENT)
Dept: PLASTIC SURGERY | Facility: CLINIC | Age: 48
End: 2023-03-09
Payer: MEDICAID

## 2023-03-09 VITALS
WEIGHT: 114.88 LBS | OXYGEN SATURATION: 100 % | HEART RATE: 96 BPM | HEIGHT: 60 IN | BODY MASS INDEX: 22.55 KG/M2 | SYSTOLIC BLOOD PRESSURE: 126 MMHG | DIASTOLIC BLOOD PRESSURE: 84 MMHG

## 2023-03-09 DIAGNOSIS — Z90.13 HISTORY OF BILATERAL MASTECTOMY: Primary | ICD-10-CM

## 2023-03-09 DIAGNOSIS — D05.11 DUCTAL CARCINOMA IN SITU (DCIS) OF RIGHT BREAST: ICD-10-CM

## 2023-03-09 PROCEDURE — 3079F PR MOST RECENT DIASTOLIC BLOOD PRESSURE 80-89 MM HG: ICD-10-PCS | Mod: CPTII,,, | Performed by: SURGERY

## 2023-03-09 PROCEDURE — 3008F BODY MASS INDEX DOCD: CPT | Mod: CPTII,,, | Performed by: SURGERY

## 2023-03-09 PROCEDURE — 99999 PR PBB SHADOW E&M-EST. PATIENT-LVL III: ICD-10-PCS | Mod: PBBFAC,,, | Performed by: SURGERY

## 2023-03-09 PROCEDURE — 3079F DIAST BP 80-89 MM HG: CPT | Mod: CPTII,,, | Performed by: SURGERY

## 2023-03-09 PROCEDURE — 1159F MED LIST DOCD IN RCRD: CPT | Mod: CPTII,,, | Performed by: SURGERY

## 2023-03-09 PROCEDURE — 3008F PR BODY MASS INDEX (BMI) DOCUMENTED: ICD-10-PCS | Mod: CPTII,,, | Performed by: SURGERY

## 2023-03-09 PROCEDURE — 99999 PR PBB SHADOW E&M-EST. PATIENT-LVL III: CPT | Mod: PBBFAC,,, | Performed by: SURGERY

## 2023-03-09 PROCEDURE — 1159F PR MEDICATION LIST DOCUMENTED IN MEDICAL RECORD: ICD-10-PCS | Mod: CPTII,,, | Performed by: SURGERY

## 2023-03-09 PROCEDURE — 99213 OFFICE O/P EST LOW 20 MIN: CPT | Mod: PBBFAC | Performed by: SURGERY

## 2023-03-09 PROCEDURE — 3074F PR MOST RECENT SYSTOLIC BLOOD PRESSURE < 130 MM HG: ICD-10-PCS | Mod: CPTII,,, | Performed by: SURGERY

## 2023-03-09 PROCEDURE — 99024 POSTOP FOLLOW-UP VISIT: CPT | Mod: ,,, | Performed by: SURGERY

## 2023-03-09 PROCEDURE — 99024 PR POST-OP FOLLOW-UP VISIT: ICD-10-PCS | Mod: ,,, | Performed by: SURGERY

## 2023-03-09 PROCEDURE — 3074F SYST BP LT 130 MM HG: CPT | Mod: CPTII,,, | Performed by: SURGERY

## 2023-03-09 NOTE — PROGRESS NOTES
Dina Sosa presents to [facility] for a follow up visit status post bilateral tissue expander placement on 2/10/23. She is doing well today with no issues since her last fill. Has some expected pain at the edge of the expander, manageable. She denies fever, chills, nausea, vomiting or other systemic signs of infection.       ROS - negative, other than stated above    PHYSICAL EXAMINATION  Vitals:    03/09/23 1612   BP: 126/84   Pulse: 96     WD WN NAD  VSS  Normal respiratory effort  R breast - incision CDI, no erythema or drainage,  nipples absent  L breast - incision CDI, no erythema or drainage, nipple absent      The fill ports on the right and left tissue expander was marked using the magnet and a pen.  The skin was then prepped using chloroprep.  Using sterile technique the right port was accessed with a 21G butterfly needle.Sterile injectable saline was added to the expander.  The skin was checked for tension and cap refill.  The same was done on the contralateral breast    Left volume added: 100 cc                 Right volume added: 100 cc  Left total volume: 300 cc                    Right total volume: 300 cc        ASSESSMENT/PLAN  48 y.o. F s/p bilateral tissue expander placement  - Doing well, no issues.   -  Expanders filled today   - RTC x 2 week(s), appointment scheduled.      All questions were answered. The patient was advised to contact the clinic with any questions or concerns prior to their next visit.       Doreen Garcia PA-C  Plastic and Reconstructive Surgery

## 2023-03-23 ENCOUNTER — OFFICE VISIT (OUTPATIENT)
Dept: PLASTIC SURGERY | Facility: CLINIC | Age: 48
End: 2023-03-23
Payer: MEDICAID

## 2023-03-23 VITALS — SYSTOLIC BLOOD PRESSURE: 113 MMHG | DIASTOLIC BLOOD PRESSURE: 70 MMHG | HEART RATE: 101 BPM

## 2023-03-23 DIAGNOSIS — Z90.13 HISTORY OF BILATERAL MASTECTOMY: Primary | ICD-10-CM

## 2023-03-23 PROCEDURE — 3074F SYST BP LT 130 MM HG: CPT | Mod: CPTII,,, | Performed by: SURGERY

## 2023-03-23 PROCEDURE — 99999 PR PBB SHADOW E&M-EST. PATIENT-LVL III: CPT | Mod: PBBFAC,,, | Performed by: SURGERY

## 2023-03-23 PROCEDURE — 3078F DIAST BP <80 MM HG: CPT | Mod: CPTII,,, | Performed by: SURGERY

## 2023-03-23 PROCEDURE — 3074F PR MOST RECENT SYSTOLIC BLOOD PRESSURE < 130 MM HG: ICD-10-PCS | Mod: CPTII,,, | Performed by: SURGERY

## 2023-03-23 PROCEDURE — 99024 PR POST-OP FOLLOW-UP VISIT: ICD-10-PCS | Mod: ,,, | Performed by: SURGERY

## 2023-03-23 PROCEDURE — 99213 OFFICE O/P EST LOW 20 MIN: CPT | Mod: PBBFAC | Performed by: SURGERY

## 2023-03-23 PROCEDURE — 1159F MED LIST DOCD IN RCRD: CPT | Mod: CPTII,,, | Performed by: SURGERY

## 2023-03-23 PROCEDURE — 99024 POSTOP FOLLOW-UP VISIT: CPT | Mod: ,,, | Performed by: SURGERY

## 2023-03-23 PROCEDURE — 99999 PR PBB SHADOW E&M-EST. PATIENT-LVL III: ICD-10-PCS | Mod: PBBFAC,,, | Performed by: SURGERY

## 2023-03-23 PROCEDURE — 1159F PR MEDICATION LIST DOCUMENTED IN MEDICAL RECORD: ICD-10-PCS | Mod: CPTII,,, | Performed by: SURGERY

## 2023-03-23 PROCEDURE — 3078F PR MOST RECENT DIASTOLIC BLOOD PRESSURE < 80 MM HG: ICD-10-PCS | Mod: CPTII,,, | Performed by: SURGERY

## 2023-03-24 ENCOUNTER — PATIENT MESSAGE (OUTPATIENT)
Dept: SURGERY | Facility: CLINIC | Age: 48
End: 2023-03-24
Payer: MEDICAID

## 2023-03-24 NOTE — PROGRESS NOTES
Dina Sosa presents to [facility] for a follow up visit status post bilateral tissue expander placement on 2/10/23. She is doing well today with no issues since her last visit. She gets some soreness after the expansion that lasts about 2 days. She's been thinking about second stage reconstruction and would like to be a full D cup. She denies fever, chills, nausea, vomiting or other systemic signs of infection.       ROS - negative, other than stated above    PHYSICAL EXAMINATION  Vitals:    03/23/23 1411   BP: 113/70   Pulse: 101     WD WN NAD  VSS  Normal respiratory effort  R breast - incision CDI, no erythema or drainage, nipple absent, tissue expander partially filled with good position  L breast - incision CDI, no erythema or drainage, nipple absent, tissue expander partially filled with good position      The fill ports on the right and left tissue expander was marked using the magnet and a pen.  The skin was then prepped using chloroprep.  Using sterile technique the right port was accessed with a 21G butterfly needle.Sterile injectable saline was added to the expander.  The skin was checked for tension and cap refill.  The same was done on the contralateral breast    Left volume added: 120 cc                 Right volume added: 60 cc  Left total volume: 420 cc                    Right total volume: 360 cc        ASSESSMENT/PLAN  48 y.o. F s/p bilateral tissue expanders  - Doing well, no issues.   - Expanders filled today  - RTC x 2 weeks for another fill, appointment scheduled.      All questions were answered. The patient was advised to contact the clinic with any questions or concerns prior to their next visit.       Doreen Garcia PA-C  Plastic and Reconstructive Surgery

## 2023-04-06 ENCOUNTER — OFFICE VISIT (OUTPATIENT)
Dept: PLASTIC SURGERY | Facility: CLINIC | Age: 48
End: 2023-04-06
Payer: MEDICAID

## 2023-04-06 VITALS — DIASTOLIC BLOOD PRESSURE: 91 MMHG | SYSTOLIC BLOOD PRESSURE: 133 MMHG | HEART RATE: 93 BPM

## 2023-04-06 DIAGNOSIS — Z90.13 HISTORY OF BILATERAL MASTECTOMY: Primary | ICD-10-CM

## 2023-04-06 PROCEDURE — 1159F PR MEDICATION LIST DOCUMENTED IN MEDICAL RECORD: ICD-10-PCS | Mod: CPTII,,, | Performed by: SURGERY

## 2023-04-06 PROCEDURE — 99999 PR PBB SHADOW E&M-EST. PATIENT-LVL III: ICD-10-PCS | Mod: PBBFAC,,, | Performed by: SURGERY

## 2023-04-06 PROCEDURE — 99024 PR POST-OP FOLLOW-UP VISIT: ICD-10-PCS | Mod: ,,, | Performed by: SURGERY

## 2023-04-06 PROCEDURE — 3075F PR MOST RECENT SYSTOLIC BLOOD PRESS GE 130-139MM HG: ICD-10-PCS | Mod: CPTII,,, | Performed by: SURGERY

## 2023-04-06 PROCEDURE — 1159F MED LIST DOCD IN RCRD: CPT | Mod: CPTII,,, | Performed by: SURGERY

## 2023-04-06 PROCEDURE — 3075F SYST BP GE 130 - 139MM HG: CPT | Mod: CPTII,,, | Performed by: SURGERY

## 2023-04-06 PROCEDURE — 3080F DIAST BP >= 90 MM HG: CPT | Mod: CPTII,,, | Performed by: SURGERY

## 2023-04-06 PROCEDURE — 99024 POSTOP FOLLOW-UP VISIT: CPT | Mod: ,,, | Performed by: SURGERY

## 2023-04-06 PROCEDURE — 99999 PR PBB SHADOW E&M-EST. PATIENT-LVL III: CPT | Mod: PBBFAC,,, | Performed by: SURGERY

## 2023-04-06 PROCEDURE — 99213 OFFICE O/P EST LOW 20 MIN: CPT | Mod: PBBFAC | Performed by: SURGERY

## 2023-04-06 PROCEDURE — 3080F PR MOST RECENT DIASTOLIC BLOOD PRESSURE >= 90 MM HG: ICD-10-PCS | Mod: CPTII,,, | Performed by: SURGERY

## 2023-04-06 NOTE — PROGRESS NOTES
Dina Sosa presents to [facility] for a follow up visit status post bilateral tissue expander placement on 2/10/23. She is doing well today with no issues since her last visit. She feels like the right expander looks a little larger than the left, whereas last time the left side looked larger. She currently has 420ccs in the left tissue expander and 360ccs in the right expander. She'd like to be a D cup, a little larger than she is now. She finds the expanders hard and uncomfortable. She denies fever, chills, nausea, vomiting or other systemic signs of infection.       ROS - negative, other than stated above    PHYSICAL EXAMINATION  Vitals:    04/06/23 1353   BP: (!) 133/91   Pulse: 93     WD WN NAD  VSS  Normal respiratory effort  R breast - incision CDI, no erythema or drainage, nipple absent, tissue expander partially filled with good position  L breast - incision CDI, no erythema or drainage, nipple absent, tissue expander partially filled with good position    ASSESSMENT/PLAN  48 y.o. F s/p bilateral tissue expanders  - Doing well, no issues  - The skin is draped well over her bilateral exapnders today and would not recommend any further expansion at this time. Will look for surgery dates in 2-3 months and bring her back for a preop visit once she is booked. We discussed using a boost implant for higher projection.        All questions were answered. The patient was advised to contact the clinic with any questions or concerns prior to their next visit.       Doreen Garcia PA-C  Plastic and Reconstructive Surgery

## 2023-04-14 DIAGNOSIS — Z90.13 HISTORY OF BILATERAL MASTECTOMY: ICD-10-CM

## 2023-04-14 DIAGNOSIS — D05.11 DUCTAL CARCINOMA IN SITU (DCIS) OF RIGHT BREAST: Primary | ICD-10-CM

## 2023-04-21 ENCOUNTER — PATIENT MESSAGE (OUTPATIENT)
Dept: OBSTETRICS AND GYNECOLOGY | Facility: CLINIC | Age: 48
End: 2023-04-21
Payer: MEDICAID

## 2023-04-24 ENCOUNTER — HOSPITAL ENCOUNTER (OUTPATIENT)
Dept: PREADMISSION TESTING | Facility: OTHER | Age: 48
Discharge: HOME | End: 2023-04-24
Attending: SURGERY
Payer: MEDICAID

## 2023-04-24 ENCOUNTER — OFFICE VISIT (OUTPATIENT)
Dept: OBSTETRICS AND GYNECOLOGY | Facility: CLINIC | Age: 48
End: 2023-04-24
Payer: MEDICAID

## 2023-04-24 ENCOUNTER — ANESTHESIA EVENT (OUTPATIENT)
Dept: SURGERY | Facility: OTHER | Age: 48
End: 2023-04-24
Payer: MEDICAID

## 2023-04-24 VITALS
RESPIRATION RATE: 16 BRPM | TEMPERATURE: 98 F | SYSTOLIC BLOOD PRESSURE: 139 MMHG | OXYGEN SATURATION: 100 % | HEIGHT: 60 IN | BODY MASS INDEX: 22.38 KG/M2 | WEIGHT: 114 LBS | DIASTOLIC BLOOD PRESSURE: 77 MMHG | HEART RATE: 84 BPM

## 2023-04-24 VITALS
SYSTOLIC BLOOD PRESSURE: 133 MMHG | BODY MASS INDEX: 22.85 KG/M2 | HEIGHT: 60 IN | WEIGHT: 116.38 LBS | DIASTOLIC BLOOD PRESSURE: 89 MMHG

## 2023-04-24 DIAGNOSIS — N39.3 STRESS INCONTINENCE: Primary | ICD-10-CM

## 2023-04-24 DIAGNOSIS — N90.60 LABIA MINORA HYPERTROPHY: ICD-10-CM

## 2023-04-24 PROCEDURE — 3075F PR MOST RECENT SYSTOLIC BLOOD PRESS GE 130-139MM HG: ICD-10-PCS | Mod: CPTII,,, | Performed by: NURSE PRACTITIONER

## 2023-04-24 PROCEDURE — 3075F SYST BP GE 130 - 139MM HG: CPT | Mod: CPTII,,, | Performed by: NURSE PRACTITIONER

## 2023-04-24 PROCEDURE — 99214 OFFICE O/P EST MOD 30 MIN: CPT | Mod: PBBFAC,PO | Performed by: NURSE PRACTITIONER

## 2023-04-24 PROCEDURE — 3079F DIAST BP 80-89 MM HG: CPT | Mod: CPTII,,, | Performed by: NURSE PRACTITIONER

## 2023-04-24 PROCEDURE — 1159F MED LIST DOCD IN RCRD: CPT | Mod: CPTII,,, | Performed by: NURSE PRACTITIONER

## 2023-04-24 PROCEDURE — 3079F PR MOST RECENT DIASTOLIC BLOOD PRESSURE 80-89 MM HG: ICD-10-PCS | Mod: CPTII,,, | Performed by: NURSE PRACTITIONER

## 2023-04-24 PROCEDURE — 99203 OFFICE O/P NEW LOW 30 MIN: CPT | Mod: S$PBB,,, | Performed by: NURSE PRACTITIONER

## 2023-04-24 PROCEDURE — 99203 PR OFFICE/OUTPT VISIT, NEW, LEVL III, 30-44 MIN: ICD-10-PCS | Mod: S$PBB,,, | Performed by: NURSE PRACTITIONER

## 2023-04-24 PROCEDURE — 3008F PR BODY MASS INDEX (BMI) DOCUMENTED: ICD-10-PCS | Mod: CPTII,,, | Performed by: NURSE PRACTITIONER

## 2023-04-24 PROCEDURE — 99999 PR PBB SHADOW E&M-EST. PATIENT-LVL IV: ICD-10-PCS | Mod: PBBFAC,,, | Performed by: NURSE PRACTITIONER

## 2023-04-24 PROCEDURE — 1159F PR MEDICATION LIST DOCUMENTED IN MEDICAL RECORD: ICD-10-PCS | Mod: CPTII,,, | Performed by: NURSE PRACTITIONER

## 2023-04-24 PROCEDURE — 3008F BODY MASS INDEX DOCD: CPT | Mod: CPTII,,, | Performed by: NURSE PRACTITIONER

## 2023-04-24 PROCEDURE — 99999 PR PBB SHADOW E&M-EST. PATIENT-LVL IV: CPT | Mod: PBBFAC,,, | Performed by: NURSE PRACTITIONER

## 2023-04-24 PROCEDURE — 1160F RVW MEDS BY RX/DR IN RCRD: CPT | Mod: CPTII,,, | Performed by: NURSE PRACTITIONER

## 2023-04-24 PROCEDURE — 87086 URINE CULTURE/COLONY COUNT: CPT | Performed by: NURSE PRACTITIONER

## 2023-04-24 PROCEDURE — 1160F PR REVIEW ALL MEDS BY PRESCRIBER/CLIN PHARMACIST DOCUMENTED: ICD-10-PCS | Mod: CPTII,,, | Performed by: NURSE PRACTITIONER

## 2023-04-24 RX ORDER — SODIUM CHLORIDE 0.9 % (FLUSH) 0.9 %
3 SYRINGE (ML) INJECTION
Status: DISCONTINUED | OUTPATIENT
Start: 2023-04-24 | End: 2023-04-25 | Stop reason: HOSPADM

## 2023-04-24 RX ORDER — DIPHENHYDRAMINE HYDROCHLORIDE 50 MG/ML
25 INJECTION INTRAMUSCULAR; INTRAVENOUS EVERY 6 HOURS PRN
Status: CANCELLED | OUTPATIENT
Start: 2023-04-24

## 2023-04-24 RX ORDER — LIDOCAINE HYDROCHLORIDE 10 MG/ML
0.5 INJECTION, SOLUTION EPIDURAL; INFILTRATION; INTRACAUDAL; PERINEURAL ONCE
Status: CANCELLED | OUTPATIENT
Start: 2023-04-24 | End: 2023-04-24

## 2023-04-24 RX ORDER — MEPERIDINE HYDROCHLORIDE 25 MG/ML
12.5 INJECTION INTRAMUSCULAR; INTRAVENOUS; SUBCUTANEOUS ONCE AS NEEDED
Status: CANCELLED | OUTPATIENT
Start: 2023-04-24 | End: 2023-04-25

## 2023-04-24 RX ORDER — HYDROMORPHONE HYDROCHLORIDE 2 MG/ML
0.4 INJECTION, SOLUTION INTRAMUSCULAR; INTRAVENOUS; SUBCUTANEOUS EVERY 5 MIN PRN
Status: CANCELLED | OUTPATIENT
Start: 2023-04-24

## 2023-04-24 RX ORDER — OXYCODONE HYDROCHLORIDE 5 MG/1
5 TABLET ORAL
Status: CANCELLED | OUTPATIENT
Start: 2023-04-24

## 2023-04-24 RX ORDER — PROCHLORPERAZINE EDISYLATE 5 MG/ML
5 INJECTION INTRAMUSCULAR; INTRAVENOUS EVERY 30 MIN PRN
Status: CANCELLED | OUTPATIENT
Start: 2023-04-24

## 2023-04-24 RX ORDER — ACETAMINOPHEN 500 MG
1000 TABLET ORAL
Status: CANCELLED | OUTPATIENT
Start: 2023-04-24 | End: 2023-04-24

## 2023-04-24 RX ORDER — SODIUM CHLORIDE, SODIUM LACTATE, POTASSIUM CHLORIDE, CALCIUM CHLORIDE 600; 310; 30; 20 MG/100ML; MG/100ML; MG/100ML; MG/100ML
INJECTION, SOLUTION INTRAVENOUS CONTINUOUS
Status: CANCELLED | OUTPATIENT
Start: 2023-04-24

## 2023-04-24 NOTE — DISCHARGE INSTRUCTIONS
Information to Prepare you for your Surgery    PRE-ADMIT TESTING -  [phone]    [address] [facility]          Your surgery has been scheduled at [facility]. We are pleased to have the opportunity to serve you. For Further Information please call [phone].    On the day of surgery please report to the Information Desk on the 1st floor.    CONTACT YOUR PHYSICIAN'S OFFICE THE DAY PRIOR TO YOUR SURGERY TO OBTAIN YOUR ARRIVAL TIME.     The evening before surgery do not eat anything after 9 p.m. ( this includes hard candy, chewing gum and mints).  You may only have GATORADE, POWERADE AND WATER  from 9 p.m. until you leave your home.   DO NOT DRINK ANY LIQUIDS ON THE WAY TO THE HOSPITAL.      Why does your anesthesiologist allow you to drink Gatorade/Powerade before surgery?  Gatorade/Powerade helps to increase your comfort before surgery and to decrease your nausea after surgery. The carbohydrates in Gatorade/Powerade help reduce your body's stress response to surgery.  If you are a diabetic-drink only water prior to surgery.       Patients may have 2 visitors pre and post procedure. Only 2 visitors will be allowed in the Surgical building with the patient. No one under the age of 12 will be allowed into the facility.    SPECIAL MEDICATION INSTRUCTIONS: TAKE medications checked off by the Anesthesiologist on your Medication List.    Angiogram Patients: Take medications as instructed by your physician, including aspirin.     Surgery Patients:    If you take ASPIRIN - Your PHYSICIAN/SURGEON will need to inform you IF/OR when you need to stop taking aspirin prior to your surgery.     The week prior to surgery do not ot take any medications containing IBUPROFEN or NSAIDS ( Advil, Motrin, Goodys, BC, Aleve, Naproxen etc) If you are not sure if you should take a medicine please call your surgeon's office.  Ok to take Tylenol    Do Not Wear any make-up  (especially eye make-up) to surgery. Please remove any false eyelashes or eyelash extensions. If you arrive the day of surgery with makeup/eyelashes on you will be required to remove prior to surgery. (There is a risk of corneal abrasions if eye makeup/eyelash extensions are not removed)      Leave all valuables at home.   Do Not wear any jewelry or watches, including any metal in body piercings. Jewelry must be removed prior to coming to the hospital.  There is a possibility that rings that are unable to be removed may be cut off if they are on the surgical extremity.    Please remove all hair extensions, wigs, clips and any other metal accessories/ ornaments from your hair.  These items may pose a flammable/fire risk in Surgery and must be removed.    Do not shave your surgical area at least 5 days prior to your surgery. The surgical prep will be performed at the hospital according to Infection Control regulations.    Contact Lens must be removed before surgery. Either do not wear the contact lens or bring a case and solution for storage.  Please bring a container for eyeglasses or dentures as required.  Bring any paperwork your physician has provided, such as consent forms,  history and physicals, doctor's orders, etc.   Bring comfortable clothes that are loose fitting to wear upon discharge. Take into consideration the type of surgery being performed.  Maintain your diet as advised per your physician the day prior to surgery.      Adequate rest the night before surgery is advised.   Park in the Parking lot behind the hospital or in the [facility] across the street from the parking lot. Parking is complimentary.  If you will be discharged the same day as your procedure, please arrange for a responsible adult to drive you home or to accompany you if traveling by taxi.   YOU WILL NOT BE PERMITTED TO DRIVE OR TO LEAVE THE HOSPITAL ALONE AFTER SURGERY.   If you are being discharged the same day, it is  strongly recommended that you arrange for someone to remain with you for the first 24 hrs following your surgery.    The Surgeon will speak to your family/visitor after your surgery regarding the outcome of your surgery and post op care.  The Surgeon may speak to you after your surgery, but there is a possibility you may not remember the details.  Please check with your family members regarding the conversation with the Surgeon.    We strongly recommend whoever is bringing you home be present for discharge instructions.  This will ensure a thorough understanding for your post op home care.    ALL CHILDREN MUST ALWAYS BE ACCOMPANIED BY AN ADULT.    Visitors-Refer to current Visitor policy handouts.    Thank you for your cooperation.  The Staff of [facility].            Bathing Instructions with Hibiclens    Shower the evening before and morning of your procedure with Chlorhexidine (Hibiclens)  do not use Chlorhexidine on your face or genitals. Do not get in your eyes.  Wash your face with water and your regular face wash/soap  Use your regular shampoo  Apply Chlorhexidine (Hibiclens) directly on your skin or on a wet washcloth and wash gently. When showering: Move away from the shower stream when applying Chlorhexidine (Hibiclens) to avoid rinsing off too soon.  Rinse thoroughly with warm water  Do not dilute Chlorhexidine (Hibiclens)   Dry off as usual, do not use any deodorant, powder, body lotions, perfume, after shave or cologne.

## 2023-04-24 NOTE — ANESTHESIA PREPROCEDURE EVALUATION
04/24/2023  Dina Sosa is a 48 y.o., female.      Pre-op Assessment    I have reviewed the Patient Summary Reports.     I have reviewed the Nursing Notes. I have reviewed the NPO Status.   I have reviewed the Medications.     Review of Systems  Anesthesia Hx:  Denies Family Hx of Anesthesia complications.   Denies Personal Hx of Anesthesia complications.   Social:  Former Smoker    Hematology/Oncology:         -- Anemia: Current/Recent Cancer. Breast right and bilateral no axillary node dissection no lymphedema   EENT/Dental:EENT/Dental Normal   Cardiovascular:   hyperlipidemia    Pulmonary:  Pulmonary Normal    Renal/:  Renal/ Normal     Hepatic/GI:   GERD    Musculoskeletal:  Musculoskeletal Normal S/p cervical disc surgery   Neurological:  Neurology Normal    Endocrine:  Endocrine Normal    Dermatological:  Skin Normal    Psych:  Psychiatric Normal           Physical Exam  General: Well nourished, Cooperative, Alert and Oriented    Airway:  Mallampati: II   Mouth Opening: Normal  TM Distance: Normal  Tongue: Normal  Neck ROM: Extension Decreased    Dental:  Intact        Anesthesia Plan  Type of Anesthesia, risks & benefits discussed:    Anesthesia Type: Gen ETT, Gen Supraglottic Airway  Intra-op Monitoring Plan: Standard ASA Monitors  Post Op Pain Control Plan: multimodal analgesia  Induction:  IV  Airway Plan: Video  Informed Consent: Informed consent signed with the Patient and all parties understand the risks and agree with anesthesia plan.  All questions answered.   ASA Score: 2    Ready For Surgery From Anesthesia Perspective.     .

## 2023-04-24 NOTE — PROGRESS NOTES
CC: Stress incontinence     HPI: Pt is a 48 y.o.  female  who presents with urinary incontinence for 1 year. Symptoms are worse with coughing, sneezing etc. She reports a history of organ prolapse and abnormal uterine bleeding after vaginal delivery of 9 lb daughter, has had hysterectomy and prolapse surgery as treatment  in [place]. She's unsure which organ was prolapsed. She denies dysuria, urine frequency, abnormal vaginal discharge or abnormal vaginal bleeding.     She reports a history enlarged labia minora, over the past year has started to affect ADLs, sexual intimacy and snagging on clothing. Would like to discuss surgical repair.     Past Medical History:   Diagnosis Date    Anxiety     History of breast cancer     Right breast. Invasive Ductal.       Past Surgical History:   Procedure Laterality Date    BILATERAL MASTECTOMY Bilateral 10/07/2022    Procedure: MASTECTOMY, BILATERAL;  Surgeon: Leeann Sawyer MD;  Location: [facility];  Service: General;  Laterality: Bilateral;    CERVICAL DISC ARTHROPLASTY      C5-C6     SECTION      x2    HIP SURGERY Left     fusion of pelvis around hip    HYSTERECTOMY  2002    ROGER, ovaries remain (AUB)    INJECTION FOR SENTINEL NODE IDENTIFICATION Right 10/07/2022    Procedure: INJECTION, FOR SENTINEL NODE IDENTIFICATION;  Surgeon: Leeann Sawyer MD;  Location: [facility];  Service: General;  Laterality: Right;    INSERTION OF BREAST TISSUE EXPANDER Bilateral 10/07/2022    Procedure: INSERTION, TISSUE EXPANDER, BREAST / BILATERAL;  Surgeon: Tae Trinidad DO;  Location: [facility];  Service: Plastics;  Laterality: Bilateral;    INSERTION OF BREAST TISSUE EXPANDER Bilateral 02/10/2023    Procedure: INSERTION, TISSUE EXPANDER, BREAST;  Surgeon: Tae Trinidad DO;  Location: [facility];  Service: Plastics;  Laterality: Bilateral;  Bilateral tissue expanders/ 2 HOURS    mobi c      disk replacement C5-C6    SENTINEL LYMPH NODE BIOPSY Right  10/07/2022    Procedure: BIOPSY, LYMPH NODE, SENTINEL;  Surgeon: Leeann Sawyer MD;  Location: [facility] OR;  Service: General;  Laterality: Right;  2.5 HRS    si joint fusion      left hip    TISSUE EXPANDER REMOVAL Bilateral 10/17/2022    Procedure: REMOVAL, TISSUE EXPANDER;  Surgeon: Tae Trinidad DO;  Location: [facility] OR;  Service: Plastics;  Laterality: Bilateral;    TONSILLECTOMY      TYMPANOSTOMY TUBE PLACEMENT      x6    WOUND DEBRIDEMENT Bilateral 10/17/2022    Procedure: DEBRIDEMENT, WOUND;  Surgeon: Tae Trinidad DO;  Location: [facility] OR;  Service: Plastics;  Laterality: Bilateral;  1.5 HRS       OB History    Para Term  AB Living   2 2 2     2   SAB IAB Ectopic Multiple Live Births                  # Outcome Date GA Lbr Brandon/2nd Weight Sex Delivery Anes PTL Lv   2 Term            1 Term                Family History   Problem Relation Age of Onset    Cancer Maternal Grandmother 83        origin? brain (mets?)    Lung cancer Maternal Grandfather         long-term smoker    Hepatitis Father         cirrhosis    Hypertension Father     Genetic Disorder Mother         JAK2+ ET    Lung cancer Mother 63        ~30-yr smoker    Skin cancer Mother 67        mole nasal bridge (type?); maybe also one on hip    Hypertension Mother     Esophageal cancer Mother     Hodgkin's lymphoma Brother     Esophageal cancer Brother     Hypertension Sister     Von Willebrand disease Sister     Hemophilia Daughter         hemophilia A    Von Willebrand disease Daughter     Fibrocystic breast disease Maternal Aunt     Lung cancer Maternal Aunt         believes was smoker       Social History     Socioeconomic History    Marital status:    Tobacco Use    Smoking status: Former     Packs/day: 0.50     Years: 22.00     Pack years: 11.00     Types: Cigarettes     Quit date: 5/10/2016     Years since quittin.9    Smokeless tobacco: Never   Substance and Sexual Activity    Alcohol use: No    Drug use: No     Sexual activity: Yes     Partners: Male     Birth control/protection: None     Social Determinants of Health     Financial Resource Strain: Low Risk     Difficulty of Paying Living Expenses: Not very hard   Food Insecurity: Unknown    Worried About Running Out of Food in the Last Year: Patient refused    Ran Out of Food in the Last Year: Patient refused   Transportation Needs: No Transportation Needs    Lack of Transportation (Medical): No    Lack of Transportation (Non-Medical): No   Physical Activity: Insufficiently Active    Days of Exercise per Week: 3 days    Minutes of Exercise per Session: 30 min   Stress: Stress Concern Present    Feeling of Stress : To some extent   Social Connections: Moderately Isolated    Frequency of Communication with Friends and Family: More than three times a week    Frequency of Social Gatherings with Friends and Family: Twice a week    Attends Tenriism Services: Never    Active Member of Clubs or Organizations: No    Attends Club or Organization Meetings: Never    Marital Status: Living with partner   Housing Stability: Unknown    Unable to Pay for Housing in the Last Year: Patient refused    Number of Places Lived in the Last Year: 1    Unstable Housing in the Last Year: No       /89 (BP Location: Left arm, Patient Position: Sitting)   Ht 5' (1.524 m)   Wt 52.8 kg (116 lb 6.5 oz)   LMP 06/01/2001 (Approximate)   BMI 22.73 kg/m²     ROS:  GENERAL: No fever, chills, fatigability or weight loss.  VULVAR: No pain, no lesions and no itching.  VAGINAL: No relaxation, no itching, no discharge, no abnormal bleeding and no lesions.  ABDOMEN: No abdominal pain. Denies nausea. Denies vomiting. No diarrhea. No constipation  BREAST: Denies pain. No lumps. No discharge.  URINARY: + incontinence, no nocturia, no frequency and no dysuria.  CARDIOVASCULAR: No chest pain. No shortness of breath. No leg cramps.  NEUROLOGICAL: No headaches. No vision changes.    PE:   APPEARANCE: Well  nourished, well developed, in no acute distress.  AFFECT: Alert and oriented x 3  SKIN: Warm, dry, & intact. No acne or hirsutism.  NECK: Neck symmetric  NODES: No inguinal or femoral lymph node enlargement  CHEST:  Easy, even breaths.  ABDOMEN: Soft.  Nontender, nondistended.  PELVIC: Normal external genitalia without lesions, bilateral labia minora enlarged.  Normal hair distribution.  Adequate perineal body, normal urethral meatus. Vagina moist and well rugated without lesions or discharge. Bimanual exam shows cervix and uterus to be surgically absent.  Adnexa without masses or tenderness.   Anus Perineum:No lesions, no relaxation, no external hemorrhoids.  EXTREMITIES: No edema.    ASSESSMENT AND PLAN  1. Stress incontinence  Urine culture    Ambulatory referral/consult to Urogynecology      2. Labia minora hypertrophy  Ambulatory referral/consult to Urogynecology        Discussed:  -Stress incontinence: causes and treatment, urine cx to r/o UTI, due to history of organ prolapse surgical repair recommend follow up with uro/gyn  -Uro/gyn can possibly repair labia minora, if they do not she will let me know, will refer to one of our doctors here Dr. Bella.     Follow-up: PRN

## 2023-04-25 LAB — BACTERIA UR CULT: NO GROWTH

## 2023-04-27 ENCOUNTER — TELEPHONE (OUTPATIENT)
Dept: PLASTIC SURGERY | Facility: CLINIC | Age: 48
End: 2023-04-27
Payer: MEDICAID

## 2023-04-27 ENCOUNTER — OFFICE VISIT (OUTPATIENT)
Dept: PLASTIC SURGERY | Facility: CLINIC | Age: 48
End: 2023-04-27
Payer: MEDICAID

## 2023-04-27 DIAGNOSIS — D05.11 DUCTAL CARCINOMA IN SITU (DCIS) OF RIGHT BREAST: ICD-10-CM

## 2023-04-27 PROCEDURE — 99024 POSTOP FOLLOW-UP VISIT: CPT | Mod: ,,, | Performed by: SURGERY

## 2023-04-27 PROCEDURE — 99024 PR POST-OP FOLLOW-UP VISIT: ICD-10-PCS | Mod: ,,, | Performed by: SURGERY

## 2023-04-27 NOTE — TELEPHONE ENCOUNTER
Spoke with patient regarding surgery arrival time for Friday 4/28/23 Pt to arrive at [facility] at 0500.   NPO status reinforced.   All questions answered at this time.

## 2023-04-28 ENCOUNTER — HOSPITAL ENCOUNTER (OUTPATIENT)
Facility: OTHER | Age: 48
Discharge: HOME | End: 2023-04-28
Attending: SURGERY | Admitting: SURGERY
Payer: MEDICAID

## 2023-04-28 ENCOUNTER — ANESTHESIA (OUTPATIENT)
Dept: SURGERY | Facility: OTHER | Age: 48
End: 2023-04-28
Payer: MEDICAID

## 2023-04-28 DIAGNOSIS — Z85.3 PERSONAL HISTORY OF BREAST CANCER: ICD-10-CM

## 2023-04-28 DIAGNOSIS — D05.11 DUCTAL CARCINOMA IN SITU (DCIS) OF RIGHT BREAST: Primary | ICD-10-CM

## 2023-04-28 PROCEDURE — 19370 PR SURGERY OF BREAST CAPSULE: ICD-10-PCS | Mod: 50,58,, | Performed by: SURGERY

## 2023-04-28 PROCEDURE — 11970 RPLCMT TISS XPNDR PERM IMPLT: CPT | Mod: 59,50,58, | Performed by: SURGERY

## 2023-04-28 PROCEDURE — 88300 SURGICAL PATH GROSS: CPT | Mod: 26,,, | Performed by: PATHOLOGY

## 2023-04-28 PROCEDURE — 63600175 PHARM REV CODE 636 W HCPCS: Performed by: PHYSICIAN ASSISTANT

## 2023-04-28 PROCEDURE — 63600175 PHARM REV CODE 636 W HCPCS: Performed by: ANESTHESIOLOGY

## 2023-04-28 PROCEDURE — 25000003 PHARM REV CODE 250: Performed by: PHYSICIAN ASSISTANT

## 2023-04-28 PROCEDURE — 71000015 HC POSTOP RECOV 1ST HR: Performed by: SURGERY

## 2023-04-28 PROCEDURE — 36000707: Performed by: SURGERY

## 2023-04-28 PROCEDURE — 36000706: Performed by: SURGERY

## 2023-04-28 PROCEDURE — 00402 ANES INTEG SYS RCNSTV BREAST: CPT | Performed by: SURGERY

## 2023-04-28 PROCEDURE — 15771 PR GRAFTING, FAT, AUTO, LIPO, TRUNK/EXTR, <= 50 CC: ICD-10-PCS | Mod: 51,58,, | Performed by: SURGERY

## 2023-04-28 PROCEDURE — 71000039 HC RECOVERY, EACH ADD'L HOUR: Performed by: SURGERY

## 2023-04-28 PROCEDURE — 11970 PR REPLACE TISSUE EXPANDER: ICD-10-PCS | Mod: 59,50,58, | Performed by: SURGERY

## 2023-04-28 PROCEDURE — 71000033 HC RECOVERY, INTIAL HOUR: Performed by: SURGERY

## 2023-04-28 PROCEDURE — D9220A PRA ANESTHESIA: ICD-10-PCS | Mod: ANES,,, | Performed by: ANESTHESIOLOGY

## 2023-04-28 PROCEDURE — D9220A PRA ANESTHESIA: Mod: ANES,,, | Performed by: ANESTHESIOLOGY

## 2023-04-28 PROCEDURE — D9220A PRA ANESTHESIA: Mod: CRNA,,,

## 2023-04-28 PROCEDURE — D9220A PRA ANESTHESIA: ICD-10-PCS | Mod: CRNA,,,

## 2023-04-28 PROCEDURE — 15771 GRFG AUTOL FAT LIPO 50 CC/<: CPT | Mod: 51,58,, | Performed by: SURGERY

## 2023-04-28 PROCEDURE — 25000003 PHARM REV CODE 250: Performed by: ANESTHESIOLOGY

## 2023-04-28 PROCEDURE — 88300 PR  SURG PATH,GROSS,LEVEL I: ICD-10-PCS | Mod: 26,,, | Performed by: PATHOLOGY

## 2023-04-28 PROCEDURE — 37000008 HC ANESTHESIA 1ST 15 MINUTES: Performed by: SURGERY

## 2023-04-28 PROCEDURE — 71000016 HC POSTOP RECOV ADDL HR: Performed by: SURGERY

## 2023-04-28 PROCEDURE — 25000003 PHARM REV CODE 250

## 2023-04-28 PROCEDURE — 25000003 PHARM REV CODE 250: Performed by: SURGERY

## 2023-04-28 PROCEDURE — 19370 REVJ PERI-IMPLT CAPSULE BRST: CPT | Mod: 50,58,, | Performed by: SURGERY

## 2023-04-28 PROCEDURE — 15772 PR GRAFTING, FAT, AUTO, LIPO, TRUNK/EXTR, EA ADDTL 50 CC: ICD-10-PCS | Mod: ,,, | Performed by: SURGERY

## 2023-04-28 PROCEDURE — 88300 SURGICAL PATH GROSS: CPT | Mod: 59 | Performed by: PATHOLOGY

## 2023-04-28 PROCEDURE — 63600175 PHARM REV CODE 636 W HCPCS: Performed by: SURGERY

## 2023-04-28 PROCEDURE — 37000009 HC ANESTHESIA EA ADD 15 MINS: Performed by: SURGERY

## 2023-04-28 PROCEDURE — 15772 GRFG AUTOL FAT LIPO EA ADDL: CPT | Mod: ,,, | Performed by: SURGERY

## 2023-04-28 PROCEDURE — C1789 PROSTHESIS, BREAST, IMP: HCPCS | Performed by: SURGERY

## 2023-04-28 PROCEDURE — 63600175 PHARM REV CODE 636 W HCPCS

## 2023-04-28 PROCEDURE — 27201423 OPTIME MED/SURG SUP & DEVICES STERILE SUPPLY: Performed by: SURGERY

## 2023-04-28 RX ORDER — OXYCODONE HYDROCHLORIDE 5 MG/1
5 TABLET ORAL EVERY 4 HOURS PRN
Qty: 10 TABLET | Refills: 0 | Status: SHIPPED | OUTPATIENT
Start: 2023-04-28 | End: 2023-05-24

## 2023-04-28 RX ORDER — PROCHLORPERAZINE EDISYLATE 5 MG/ML
5 INJECTION INTRAMUSCULAR; INTRAVENOUS EVERY 30 MIN PRN
Status: DISCONTINUED | OUTPATIENT
Start: 2023-04-28 | End: 2023-04-28 | Stop reason: HOSPADM

## 2023-04-28 RX ORDER — LIDOCAINE HYDROCHLORIDE 20 MG/ML
INJECTION INTRAVENOUS
Status: DISCONTINUED | OUTPATIENT
Start: 2023-04-28 | End: 2023-04-28

## 2023-04-28 RX ORDER — PHENYLEPHRINE HYDROCHLORIDE 10 MG/ML
INJECTION INTRAVENOUS
Status: DISCONTINUED | OUTPATIENT
Start: 2023-04-28 | End: 2023-04-28

## 2023-04-28 RX ORDER — ONDANSETRON 2 MG/ML
INJECTION INTRAMUSCULAR; INTRAVENOUS
Status: DISCONTINUED | OUTPATIENT
Start: 2023-04-28 | End: 2023-04-28

## 2023-04-28 RX ORDER — MEPERIDINE HYDROCHLORIDE 25 MG/ML
12.5 INJECTION INTRAMUSCULAR; INTRAVENOUS; SUBCUTANEOUS ONCE AS NEEDED
Status: DISCONTINUED | OUTPATIENT
Start: 2023-04-28 | End: 2023-04-28 | Stop reason: HOSPADM

## 2023-04-28 RX ORDER — GABAPENTIN 100 MG/1
300 CAPSULE ORAL 3 TIMES DAILY
Qty: 63 CAPSULE | Refills: 0 | Status: SHIPPED | OUTPATIENT
Start: 2023-04-28 | End: 2023-05-05

## 2023-04-28 RX ORDER — ACETAMINOPHEN 500 MG
1000 TABLET ORAL EVERY 8 HOURS
Qty: 42 TABLET | Refills: 0
Start: 2023-04-28 | End: 2023-05-05

## 2023-04-28 RX ORDER — MUPIROCIN 20 MG/G
OINTMENT TOPICAL
Status: DISPENSED | OUTPATIENT
Start: 2023-04-28

## 2023-04-28 RX ORDER — IBUPROFEN 600 MG/1
600 TABLET ORAL EVERY 6 HOURS
Qty: 28 TABLET | Refills: 0 | Status: SHIPPED | OUTPATIENT
Start: 2023-04-28 | End: 2023-05-05

## 2023-04-28 RX ORDER — DEXAMETHASONE SODIUM PHOSPHATE 4 MG/ML
INJECTION, SOLUTION INTRA-ARTICULAR; INTRALESIONAL; INTRAMUSCULAR; INTRAVENOUS; SOFT TISSUE
Status: DISCONTINUED | OUTPATIENT
Start: 2023-04-28 | End: 2023-04-28

## 2023-04-28 RX ORDER — KETOROLAC TROMETHAMINE 30 MG/ML
INJECTION, SOLUTION INTRAMUSCULAR; INTRAVENOUS
Status: DISCONTINUED | OUTPATIENT
Start: 2023-04-28 | End: 2023-04-28

## 2023-04-28 RX ORDER — ACETAMINOPHEN 500 MG
1000 TABLET ORAL
Status: COMPLETED | OUTPATIENT
Start: 2023-04-28 | End: 2023-04-28

## 2023-04-28 RX ORDER — SODIUM CHLORIDE, SODIUM LACTATE, POTASSIUM CHLORIDE, CALCIUM CHLORIDE 600; 310; 30; 20 MG/100ML; MG/100ML; MG/100ML; MG/100ML
INJECTION, SOLUTION INTRAVENOUS CONTINUOUS
Status: DISCONTINUED | OUTPATIENT
Start: 2023-04-28 | End: 2023-04-28 | Stop reason: HOSPADM

## 2023-04-28 RX ORDER — DIPHENHYDRAMINE HYDROCHLORIDE 50 MG/ML
25 INJECTION INTRAMUSCULAR; INTRAVENOUS EVERY 6 HOURS PRN
Status: DISCONTINUED | OUTPATIENT
Start: 2023-04-28 | End: 2023-04-28 | Stop reason: HOSPADM

## 2023-04-28 RX ORDER — HYDROMORPHONE HYDROCHLORIDE 2 MG/ML
0.4 INJECTION, SOLUTION INTRAMUSCULAR; INTRAVENOUS; SUBCUTANEOUS EVERY 5 MIN PRN
Status: DISCONTINUED | OUTPATIENT
Start: 2023-04-28 | End: 2023-04-28 | Stop reason: HOSPADM

## 2023-04-28 RX ORDER — BACITRACIN ZINC 500 UNIT/G
OINTMENT (GRAM) TOPICAL
Status: DISCONTINUED | OUTPATIENT
Start: 2023-04-28 | End: 2023-04-28 | Stop reason: HOSPADM

## 2023-04-28 RX ORDER — PROPOFOL 10 MG/ML
VIAL (ML) INTRAVENOUS
Status: DISCONTINUED | OUTPATIENT
Start: 2023-04-28 | End: 2023-04-28

## 2023-04-28 RX ORDER — LIDOCAINE HYDROCHLORIDE 10 MG/ML
0.5 INJECTION, SOLUTION EPIDURAL; INFILTRATION; INTRACAUDAL; PERINEURAL ONCE
Status: DISCONTINUED | OUTPATIENT
Start: 2023-04-28 | End: 2023-04-28 | Stop reason: HOSPADM

## 2023-04-28 RX ORDER — FENTANYL CITRATE 50 UG/ML
INJECTION, SOLUTION INTRAMUSCULAR; INTRAVENOUS
Status: DISCONTINUED | OUTPATIENT
Start: 2023-04-28 | End: 2023-04-28

## 2023-04-28 RX ORDER — SODIUM CHLORIDE 0.9 % (FLUSH) 0.9 %
10 SYRINGE (ML) INJECTION
Status: ACTIVE | OUTPATIENT
Start: 2023-04-28

## 2023-04-28 RX ORDER — OXYCODONE HYDROCHLORIDE 5 MG/1
5 TABLET ORAL
Status: DISCONTINUED | OUTPATIENT
Start: 2023-04-28 | End: 2023-04-28 | Stop reason: HOSPADM

## 2023-04-28 RX ORDER — CEFAZOLIN SODIUM 1 G/3ML
2 INJECTION, POWDER, FOR SOLUTION INTRAMUSCULAR; INTRAVENOUS
Status: COMPLETED | OUTPATIENT
Start: 2023-04-28 | End: 2023-04-28

## 2023-04-28 RX ORDER — LIDOCAINE HYDROCHLORIDE 10 MG/ML
1 INJECTION, SOLUTION EPIDURAL; INFILTRATION; INTRACAUDAL; PERINEURAL ONCE
Status: ACTIVE | OUTPATIENT
Start: 2023-04-28

## 2023-04-28 RX ORDER — OXYCODONE HYDROCHLORIDE 5 MG/1
5 TABLET ORAL ONCE
Status: COMPLETED | OUTPATIENT
Start: 2023-04-28 | End: 2023-04-28

## 2023-04-28 RX ORDER — METHOCARBAMOL 500 MG/1
500 TABLET, FILM COATED ORAL 3 TIMES DAILY
Qty: 20 TABLET | Refills: 0 | Status: SHIPPED | OUTPATIENT
Start: 2023-04-28 | End: 2023-05-05

## 2023-04-28 RX ORDER — ROCURONIUM BROMIDE 10 MG/ML
INJECTION, SOLUTION INTRAVENOUS
Status: DISCONTINUED | OUTPATIENT
Start: 2023-04-28 | End: 2023-04-28

## 2023-04-28 RX ADMIN — CARBOXYMETHYLCELLULOSE SODIUM 2 DROP: 2.5 SOLUTION/ DROPS OPHTHALMIC at 07:04

## 2023-04-28 RX ADMIN — SUGAMMADEX 200 MG: 100 INJECTION, SOLUTION INTRAVENOUS at 09:04

## 2023-04-28 RX ADMIN — PHENYLEPHRINE HYDROCHLORIDE 100 MCG: 10 INJECTION INTRAVENOUS at 08:04

## 2023-04-28 RX ADMIN — FENTANYL CITRATE 100 MCG: 50 INJECTION, SOLUTION INTRAMUSCULAR; INTRAVENOUS at 07:04

## 2023-04-28 RX ADMIN — KETOROLAC TROMETHAMINE 30 MG: 30 INJECTION, SOLUTION INTRAMUSCULAR; INTRAVENOUS at 09:04

## 2023-04-28 RX ADMIN — GLYCOPYRROLATE 0.2 MG: 0.2 INJECTION, SOLUTION INTRAMUSCULAR; INTRAVITREAL at 07:04

## 2023-04-28 RX ADMIN — PHENYLEPHRINE HYDROCHLORIDE 100 MCG: 10 INJECTION INTRAVENOUS at 07:04

## 2023-04-28 RX ADMIN — LIDOCAINE HYDROCHLORIDE 50 MG: 20 INJECTION, SOLUTION INTRAVENOUS at 07:04

## 2023-04-28 RX ADMIN — ACETAMINOPHEN 1000 MG: 500 TABLET, FILM COATED ORAL at 06:04

## 2023-04-28 RX ADMIN — SODIUM CHLORIDE, SODIUM LACTATE, POTASSIUM CHLORIDE, AND CALCIUM CHLORIDE: 600; 310; 30; 20 INJECTION, SOLUTION INTRAVENOUS at 09:04

## 2023-04-28 RX ADMIN — OXYCODONE HYDROCHLORIDE 5 MG: 5 TABLET ORAL at 10:04

## 2023-04-28 RX ADMIN — SODIUM CHLORIDE, SODIUM LACTATE, POTASSIUM CHLORIDE, AND CALCIUM CHLORIDE: 600; 310; 30; 20 INJECTION, SOLUTION INTRAVENOUS at 06:04

## 2023-04-28 RX ADMIN — DEXAMETHASONE SODIUM PHOSPHATE 8 MG: 4 INJECTION, SOLUTION INTRAMUSCULAR; INTRAVENOUS at 07:04

## 2023-04-28 RX ADMIN — HYDROMORPHONE HYDROCHLORIDE 0.4 MG: 2 INJECTION INTRAMUSCULAR; INTRAVENOUS; SUBCUTANEOUS at 10:04

## 2023-04-28 RX ADMIN — ROCURONIUM BROMIDE 10 MG: 10 INJECTION INTRAVENOUS at 08:04

## 2023-04-28 RX ADMIN — FENTANYL CITRATE 50 MCG: 50 INJECTION, SOLUTION INTRAMUSCULAR; INTRAVENOUS at 08:04

## 2023-04-28 RX ADMIN — ROCURONIUM BROMIDE 50 MG: 10 INJECTION INTRAVENOUS at 07:04

## 2023-04-28 RX ADMIN — ONDANSETRON HYDROCHLORIDE 4 MG: 2 INJECTION INTRAMUSCULAR; INTRAVENOUS at 09:04

## 2023-04-28 RX ADMIN — OXYCODONE HYDROCHLORIDE 5 MG: 5 TABLET ORAL at 11:04

## 2023-04-28 RX ADMIN — PROPOFOL 200 MG: 10 INJECTION, EMULSION INTRAVENOUS at 07:04

## 2023-04-28 RX ADMIN — CEFAZOLIN 2 G: 330 INJECTION, POWDER, FOR SOLUTION INTRAMUSCULAR; INTRAVENOUS at 07:04

## 2023-04-28 RX ADMIN — MUPIROCIN: 20 OINTMENT TOPICAL at 06:04

## 2023-04-28 NOTE — BRIEF OP NOTE
Certification of Assistant at Surgery       Surgery Date: 4/28/2023     Participating Surgeons:  Surgeon(s) and Role:     * Tae Trinidad, DO - Primary    Procedures:  Procedure(s) (LRB):  REMOVAL, TISSUE EXPANDER (Bilateral)  INSERTION, BREAST IMPLANT (Bilateral)  INJECTION, FAT GRAFT (Bilateral)  CAPSULECTOMY, BREAST (Bilateral)    Assistant Surgeon's Certification of Necessity:  I understand that section 1842 (b) (6) (d) of the Social Security Act generally prohibits Medicare Part B reasonable charge payment for the services of assistants at surgery in [facility] hospitals when qualified residents are available to furnish such services. I certify that the services for which payment is claimed were medically necessary, and that no qualified resident was available to perform the services. I further understand that these services are subject to post-payment review by the Medicare carrier.      Doreen Garcia PA-C    04/28/2023  9:52 AM    [facility]  Brief Operative Note     SUMMARY     Surgery Date: 4/28/2023     Surgeon(s) and Role:     * Tae Trinidad, DO - Primary    Assistant: Doreen Garcia PA-C    Pre-op Diagnosis:  Ductal carcinoma in situ (DCIS) of right breast [D05.11]  History of bilateral mastectomy [Z90.13]    Post-op Diagnosis:  Post-Op Diagnosis Codes:     * Ductal carcinoma in situ (DCIS) of right breast [D05.11]     * History of bilateral mastectomy [Z90.13]    Procedure(s) (LRB):  REMOVAL, TISSUE EXPANDER (Bilateral)  INSERTION, BREAST IMPLANT (Bilateral)  INJECTION, FAT GRAFT (Bilateral)  CAPSULECTOMY, BREAST (Bilateral)    Anesthesia: General    Description of the findings of the procedure: bilateral implant exchange, liposuction with fat grafting to the breasts from abdomen and flanks    Findings/Key Components: See operative report    Estimated Blood Loss: * No values recorded between 4/28/2023  7:25 AM and 4/28/2023  9:50 AM *         Specimens:   Specimen  (24h ago, onward)       Start     Ordered    04/28/23 0827  Specimen to Pathology, Surgery Breast  Once        Comments: Pre-op Diagnosis: Ductal carcinoma in situ (DCIS) of right breast [D05.11]History of bilateral mastectomy [Z90.13]Procedure(s):REMOVAL, TISSUE EXPANDERINSERTION, BREAST IMPLANTINJECTION, FAT GRAFTCAPSULECTOMY, BREAST Number of specimens: 2Name of specimens: 1. Right tissue expander - GROSS ID2. Left tissue expander - GROSS ID     References:    Click here for ordering Quick Tip   Question Answer Comment   Procedure Type: Breast    Specimen Class: Routine/Screening    Which provider would you like to cc? ANTONELLA CARMONA    Release to patient Immediate        04/28/23 0827                    Implants:   Implant Name Type Inv. Item Serial No.  Lot No. LRB No. Used Action   MENTOR MEMORYGEL BOOST BREAST IMPLANT     [id number] Right 1 Implanted   MENTOR MEMORYGEL BOOST BREAST IMPLANT     [id number] Left 1 Implanted       Complications: None    Discharge Note    SUMMARY     Admit Date: 4/28/2023    Discharge Date and Time:  04/28/2023 9:52 AM    Hospital Course: Patient was placed in observation status for the above procedure.  See above.  Postoperatively was discharged home from the PACU once criteria was met.    Final Diagnosis: Post-Op Diagnosis Codes:     * Ductal carcinoma in situ (DCIS) of right breast [D05.11]     * History of bilateral mastectomy [Z90.13]    Disposition: Home or Self Care    Follow Up/Patient Instructions:     Medications:  Reconciled Home Medications:      Medication List        ASK your doctor about these medications      buPROPion 300 MG 24 hr tablet  Commonly known as: WELLBUTRIN XL  Take 1 tablet (300 mg total) by mouth once daily.     CLARITIN ORAL  Take by mouth Daily.     gabapentin 300 MG capsule  Commonly known as: NEURONTIN  Take 1 capsule (300 mg total) by mouth 2 (two) times daily.     orphenadrine 100 mg tablet  Commonly known as: NORFLEX  Take 1 tablet  (100 mg total) by mouth 2 (two) times daily.     pantoprazole 40 MG tablet  Commonly known as: PROTONIX  Take 1 tablet (40 mg total) by mouth once daily.     primidone 50 MG Tab  Commonly known as: MYSOLINE  Take 1 tablet (50 mg total) by mouth every evening.     traZODone 100 MG tablet  Commonly known as: DESYREL  Take 1 tablet (100 mg total) by mouth every evening.     VITAMIN C ORAL  Take by mouth Daily.            No discharge procedures on file.

## 2023-04-28 NOTE — ANESTHESIA PROCEDURE NOTES
Intubation    Date/Time: 4/28/2023 7:07 AM  Performed by: Aldo Drew CRNA  Authorized by: Nestor Dugan MD     Intubation:     Induction:  Intravenous    Intubated:  Postinduction    Mask Ventilation:  Easy mask    Attempts:  1    Attempted By:  CRNA    Method of Intubation:  Video laryngoscopy    Blade:  Gonzalez 3    Laryngeal View Grade: Grade I - full view of cords      Difficult Airway Encountered?: No      Complications:  None    Airway Device:  Oral endotracheal tube    Airway Device Size:  7.0    Inflation Amount (mL):  5    Tube secured:  21    Secured at:  The lips    Placement Verified By:  Capnometry    Complicating Factors:  None    Findings Post-Intubation:  BS equal bilateral and atraumatic/condition of teeth unchanged

## 2023-04-28 NOTE — PLAN OF CARE
Patient c/o pain, 7/10;   contacted with order noted for Oxycocone 5mg one time dose.  Patient up to bathroom to void without difficulty.

## 2023-04-28 NOTE — OR NURSING
VSS on RA. Pain is tolerable per pt. Dressing and PIV CDI. Meets PACU discharge criteria. Ready for transfer to phase II. Family notified.

## 2023-04-28 NOTE — TRANSFER OF CARE
Anesthesia Transfer of Care Note    Patient: Dina Sosa    Procedure(s) Performed: Procedure(s) (LRB):  REMOVAL, TISSUE EXPANDER (Bilateral)  INSERTION, BREAST IMPLANT (Bilateral)  INJECTION, FAT GRAFT (Bilateral)  CAPSULECTOMY, BREAST (Bilateral)    Patient location: PACU    Anesthesia Type: general    Transport from OR: Transported from OR on 2-3 L/min O2 by NC with adequate spontaneous ventilation    Post pain: adequate analgesia    Post assessment: no apparent anesthetic complications and tolerated procedure well    Post vital signs: stable    Level of consciousness: awake and alert    Nausea/Vomiting: no nausea/vomiting    Complications: none    Transfer of care protocol was followed      Last vitals:   Visit Vitals  BP (!) 131/93   Pulse 60   Temp 36.7 °C (98 °F) (Oral)   Resp 16   LMP 06/01/2001 (Approximate)   SpO2 97%   Breastfeeding No

## 2023-04-28 NOTE — ANESTHESIA POSTPROCEDURE EVALUATION
Anesthesia Post Evaluation    Patient: Dina Sosa    Procedure(s) Performed: Procedure(s) (LRB):  REMOVAL, TISSUE EXPANDER (Bilateral)  INSERTION, BREAST IMPLANT (Bilateral)  INJECTION, FAT GRAFT (Bilateral)  CAPSULECTOMY, BREAST (Bilateral)    Final Anesthesia Type: general      Patient location during evaluation: PACU  Patient participation: Yes- Able to Participate  Level of consciousness: awake and alert  Post-procedure vital signs: reviewed and stable  Pain management: adequate  Airway patency: patent  CANDY mitigation strategies: Extubation while patient is awake  PONV status at discharge: No PONV  Anesthetic complications: no      Cardiovascular status: hemodynamically stable  Respiratory status: unassisted  Hydration status: euvolemic  Follow-up not needed.          Vitals Value Taken Time   /96 04/28/23 1025   Temp 37 °C (98.6 °F) 04/28/23 0952   Pulse 92 04/28/23 1031   Resp 16 04/28/23 1022   SpO2 100 % 04/28/23 1031   Vitals shown include unvalidated device data.      No case tracking events are documented in the log.      Pain/Daria Score: Pain Rating Prior to Med Admin: 7 (4/28/2023 10:20 AM)  Daria Score: 10 (4/28/2023 10:07 AM)

## 2023-04-28 NOTE — PLAN OF CARE
Dina Sosa has met all discharge criteria from Phase II. Vital Signs are stable, ambulating  without difficulty.Pain is now under control and tolerable for the pt. Pain score 6 at this time.  Discharge instructions given, patient verbalized understanding. Discharged from facility via wheelchair in stable condition.

## 2023-04-28 NOTE — OP NOTE
[facility] ([facility]  Plastic Surgery  Operative Note    SUMMARY     Date of Procedure: 4/28/2023     Location:  [facility]    Procedure(s):   Bilateral removal of tissue expander with replacement of permanent breast implant  Right superior, medial, and inferior capsulotomy with lowering of the IMF  Left superior and medial capsulotomy  Fat grafting to the right breast 35 cc  Fat Grafting to the left breast 35 cc    Surgeon(s) and Role:     * Tae Trinidad, DO - Primary    Assistant: BRYSON Wilson    Pre-Operative Diagnosis: Ductal carcinoma in situ (DCIS) of right breast [D05.11]  History of bilateral mastectomy [Z90.13]    Post-Operative Diagnosis: same    Anesthesia: General    Indications for Procedure:  48-year-old woman with a history of breast cancer.  She initially underwent prepack tissue expander placement but had mastectomy flap necrosis and had to be debrided.  She healed without expander.  She later underwent submuscular tissue expander placement.  She was total muscle coverage.  She was well healed and expanded.  Expansion was uneventful.  She has a well-positioned implant on the left.  The IMF was a little high on the right.  She presents today for implant exchange and fat grafting    Operative Findings (including complications, if any):   Bilateral placement of 520  high-profile boost implants  35 cc of fat grafting per breast  800 cc of tumescent  200 cc total lipoaspirate, with 60 cc of usable fat    Description of Procedures:  The patient was seen in the preoperative holding area.  Surgical consents an implant checklist was signed at her preoperative appointment.  Any additional questions were answered.  Her new position of her IMF was marked on the right side.  The abdomen flanks and lateral chest wall were marked for liposuction.      Patient was taken the operating room general anesthesia was induced.  Both breasts and the abdomen was prepped and draped in the usual  sterile fashion.  The patient had a previous abdominoplasty.  I made small stab incisions at the umbilicus the lateral abdominoplasty incisions and also the very lateral edges of both breast incisions.  Tumescent solution was injected into the lateral chest wall, upper lower abdomen, and both lower flanks.      Next about 6 cm incisions were made along the previous IMF incision on the right breast.  The old scar was excised.  Dissected down through the muscle into the breast capsule.  The tissue expander was ruptured and removed the capsule was inspected.  It was soft.  The IMF needed to be lowered.  Using a Bovie an inferior capsulotomy was performed and undermined to the point of the marking.  The IMF was then reinforced with a running 3-0 PDS suture.  Next a similar incision was made on the left side.  The old scar was excised.  We dissected in to the breast capsule.  The expander was ruptured and removed.  Both expanders were sent for gross identification.  The capsule was in good position.  It was hemostatic.    Next we selected a couple different implant sizers.  Looked at 530 cc moderate high and a 520 cc high-profile implants.  They were placed in both pockets.  I felt the projection and shape of the high-profile implant was consistent with the patient's desired outcome and look nice.  The implant was little bit constricted.  At this point superior and medial capsulotomy were performed in both breasts.  Hemostasis was assured.  We again looked at both implants in both pockets and felt the high-profile implant was the correct choice.  The 520 cc high-profile boost implant was selected.    Next liposuction was performed.  It had been least 30 minutes since tumescent was infiltrated.  Liposuction was performed 1st with a 3 mm and then with a 4 mm cannula of the lateral chest walls, upper abdomen, lower abdomen, both flanks.  Patient had previous abdominoplasty and liposuction.  There was not a huge yield of  lipoaspirate.  Because of her submuscular implant position we also did not have a huge need for fat for fat grafting.  Once liposuction was complete and the abdominal contour was even the fat was rinsed with the revolve system.  1 L of warm lactated Ringer's solution was used.  The fat was harvested on the back table and there was 60 cc total of usable fat.    Using 14 gauge Jelco needle small stab incisions were made on the upper inner quadrant of the breast.  14 gauge tip cannula was used and 35 cc of fat was injected in the medial cleavage line and upper pole breast.      Finally the sizers were removed.  Both pockets were irrigated with dilute Betadine solution followed by Vashe.  Hemostasis was checked.  I then changed my gloves.  Using a no-touch technique and a Corcoran funnel the 520 cc high-profile implants were placed into each pocket.  The capsule was closed with a running 3-0 PDS.  3-0 Monocryl was used for the deep dermal and a 3-0 Stratafix was used for the subcuticular closure.  The Lipo sites were closed with interrupted 5 0 nylon suture as was the fat grafting site.    The patient was placed in a postoperative bra as well as a girdle and padded with ABD pads.  She tolerated procedure well was taken recovery in stable condition.  Photographs were taken during the case.    Significant Surgical Tasks Conducted by the Assistant(s), if Applicable: The presence of Doreen Garcia PA-C as first assistant was required due to the complexity of the procedure relative to any available residents. I certify that no resident was available who was qualified to serve as first assistant. Duties performed by the assistant included assisting the primary surgeon    Estimated Blood Loss (EBL): 20mL           Implants:   Implant Name Type Inv. Item Serial No.  Lot No. LRB No. 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 Right tissue expander - GROSS ID2. Left tissue expander - GROSS ID     References:    Click here for ordering Quick Tip   Question Answer Comment   Procedure Type: Breast    Specimen Class: Routine/Screening    Which provider would you like to cc? ANTONELLA CARMONA    Release to patient Immediate        04/28/23 0827                            Condition: Good    Disposition: PACU - hemodynamically stable., DC home    Attestation: I was present and scrubbed for the entire procedure.

## 2023-04-28 NOTE — PATIENT INSTRUCTIONS
Plastic Surgery Discharge Instructions  Luke Trinidad, DO    Wound Care  1. Shower daily beginning 2 days after surgery  2. Okay to let warm soapy water run over the wound at that time  3. Do not submerge wounds in the bath  4. Leave any skin glue or mesh tape in place    Drain Care  If you have drains, strip tubing and record output when drain bulb becomes half full, or at least twice daily  Record output for each drain and bring to our follow up appointment    Diet  Regular Diet    Activity  Light activity only - no lifting greater than 10 lbs  Avoid strenuous activity that would cause you to get hot or sweaty    Medications  You have been given a prescription for narcotic pain medication, anti-inflammatory pain, muscle relaxer, and nerve pain  Unless otherwise contraindicated by your doctor, take 2 extra strength Tylenol and 600mg of ibuprofen every 8 hours  Please take medications as prescribed     What to call for:  1. Sustained fever > 101.0  2. Changes in color, sensation, temperature or pain at surgical site  3. Redness and/or drainage from the surgical site  4. Any reaction to prescribed medications  5. Continuous bloody drainage from surgical drains  6. Wound vac malfunction  7. Questions related to the procedure    Follow-up  1. Please call [phone] to schedule or confirm your follow up visit at the [facility] clinic on [facility]  2. Call with any questions or concerns.  2. After hours call [phone] - ask to speak with the Plastic Surgery fellow on call

## 2023-04-29 VITALS
DIASTOLIC BLOOD PRESSURE: 95 MMHG | RESPIRATION RATE: 18 BRPM | SYSTOLIC BLOOD PRESSURE: 129 MMHG | OXYGEN SATURATION: 100 % | TEMPERATURE: 98 F | HEART RATE: 98 BPM

## 2023-05-01 NOTE — ASSESSMENT & PLAN NOTE
Dina Jenni Sosa was seen preoperatively today for implant exchange and liposuction of the abdomen and flanks with fat grafting to the breasts.    Discussed details of surgical procedure and reviewed risks of surgery including bleeding, infection, poor cosmetic outcome, need for reoperation, changes to sensation, fat necrosis. Surgical consents obtained. Counseled on SHAI drain use in the event drains are needed. Advised patient to wear post operative garment as instructed.     Reviewed multimodal pain control regimen used in the post operative period.  All questions were answered. The patient was advised to contact the clinic with any questions or concerns before surgery.

## 2023-05-01 NOTE — PROGRESS NOTES
Plastic Surgery History & Physical    SUBJECTIVE:   Chief complaint: breast reconstruction    History of Present Illness:  48 y.o. female with a PMH of DCIS of the right breast s/p bilateral mastectomy and bilateral submuscular tissue expanders, complicated by mastectomy skin necrosis requiring debridement presenting for preoperative visit in preparation for implant exchange. Patient currently has 475cc Artoura high profile expanders filled with 360ccs saline on the right and 420ccs on the left, symmetrical in appearance. She would like to stay on the larger side. She has tolerated expansion well. She has a prior surgical history of abdominoplasty. She is ready for surgery. She mentioned that she has a lot going on at home with the recent diagnosis of recurrent lung cancer in her mother and gastric cancer in her brother who was just place on home hospice. She is staying positive as much as she can. She is without additional acute complaints or concerns.     Past Medical History:   Diagnosis Date    Anxiety     History of breast cancer     Right breast. Invasive Ductal.       Past Surgical History:   Procedure Laterality Date    BILATERAL MASTECTOMY Bilateral 10/07/2022    Procedure: MASTECTOMY, BILATERAL;  Surgeon: Leeann Sawyer MD;  Location: [facility] OR;  Service: General;  Laterality: Bilateral;    BREAST CAPSULECTOMY Bilateral 2023    Procedure: CAPSULECTOMY, BREAST;  Surgeon: Tae Trinidad DO;  Location: [facility] OR;  Service: Plastics;  Laterality: Bilateral;  Bilat CAPSULOTOMY    CERVICAL DISC ARTHROPLASTY      C5-C6     SECTION      x2    FAT GRAFTING, OTHER Bilateral 2023    Procedure: INJECTION, FAT GRAFT;  Surgeon: Tae Trinidad DO;  Location: [facility] OR;  Service: Plastics;  Laterality: Bilateral;    HIP SURGERY Left     fusion of pelvis around hip    HYSTERECTOMY  2002    ROGER, ovaries remain (AUB)    INJECTION FOR SENTINEL NODE IDENTIFICATION Right 10/07/2022     Procedure: INJECTION, FOR SENTINEL NODE IDENTIFICATION;  Surgeon: Leeann Sawyer MD;  Location: [facility];  Service: General;  Laterality: Right;    INSERTION OF BREAST IMPLANT Bilateral 4/28/2023    Procedure: INSERTION, BREAST IMPLANT;  Surgeon: Tae Trinidad DO;  Location: [facility];  Service: Plastics;  Laterality: Bilateral;  2 hours    INSERTION OF BREAST TISSUE EXPANDER Bilateral 10/07/2022    Procedure: INSERTION, TISSUE EXPANDER, BREAST / BILATERAL;  Surgeon: Tae Trinidad DO;  Location: [facility];  Service: Plastics;  Laterality: Bilateral;    INSERTION OF BREAST TISSUE EXPANDER Bilateral 02/10/2023    Procedure: INSERTION, TISSUE EXPANDER, BREAST;  Surgeon: Tae Trinidad DO;  Location: [facility];  Service: Plastics;  Laterality: Bilateral;  Bilateral tissue expanders/ 2 HOURS    mobi c      disk replacement C5-C6    SENTINEL LYMPH NODE BIOPSY Right 10/07/2022    Procedure: BIOPSY, LYMPH NODE, SENTINEL;  Surgeon: Leeann Sawyer MD;  Location: [facility];  Service: General;  Laterality: Right;  2.5 HRS    si joint fusion      left hip    TISSUE EXPANDER REMOVAL Bilateral 10/17/2022    Procedure: REMOVAL, TISSUE EXPANDER;  Surgeon: Tae Trinidad DO;  Location: [facility];  Service: Plastics;  Laterality: Bilateral;    TISSUE EXPANDER REMOVAL Bilateral 4/28/2023    Procedure: REMOVAL, TISSUE EXPANDER;  Surgeon: Tae Trinidad DO;  Location: [facility];  Service: Plastics;  Laterality: Bilateral;  Bilat TE removal - Implant placement    TONSILLECTOMY      TYMPANOSTOMY TUBE PLACEMENT      x6    WOUND DEBRIDEMENT Bilateral 10/17/2022    Procedure: DEBRIDEMENT, WOUND;  Surgeon: Tea Trinidad DO;  Location: [facility];  Service: Plastics;  Laterality: Bilateral;  1.5 HRS       Family History   Problem Relation Age of Onset    Cancer Maternal Grandmother 83        origin? brain (mets?)    Lung cancer Maternal Grandfather         long-term smoker    Hepatitis Father         cirrhosis     Hypertension Father     Genetic Disorder Mother         JAK2+ ET    Lung cancer Mother 63        ~30-yr smoker    Skin cancer Mother 67        mole nasal bridge (type?); maybe also one on hip    Hypertension Mother     Esophageal cancer Mother     Hodgkin's lymphoma Brother     Esophageal cancer Brother     Hypertension Sister     Von Willebrand disease Sister     Hemophilia Daughter         hemophilia A    Von Willebrand disease Daughter     Fibrocystic breast disease Maternal Aunt     Lung cancer Maternal Aunt         believes was smoker       Social History     Socioeconomic History    Marital status:    Tobacco Use    Smoking status: Former     Packs/day: 0.50     Years: 22.00     Pack years: 11.00     Types: Cigarettes     Quit date: 5/10/2016     Years since quittin.9    Smokeless tobacco: Never   Substance and Sexual Activity    Alcohol use: No    Drug use: No    Sexual activity: Yes     Partners: Male     Birth control/protection: None     Social Determinants of Health     Financial Resource Strain: Low Risk     Difficulty of Paying Living Expenses: Not very hard   Food Insecurity: Unknown    Worried About Running Out of Food in the Last Year: Patient refused    Ran Out of Food in the Last Year: Patient refused   Transportation Needs: No Transportation Needs    Lack of Transportation (Medical): No    Lack of Transportation (Non-Medical): No   Physical Activity: Insufficiently Active    Days of Exercise per Week: 3 days    Minutes of Exercise per Session: 30 min   Stress: Stress Concern Present    Feeling of Stress : To some extent   Social Connections: Moderately Isolated    Frequency of Communication with Friends and Family: More than three times a week    Frequency of Social Gatherings with Friends and Family: Twice a week    Attends Buddhist Services: Never    Active Member of Clubs or Organizations: No    Attends Club or Organization Meetings: Never    Marital Status: Living with partner    Housing Stability: Unknown    Unable to Pay for Housing in the Last Year: Patient refused    Number of Places Lived in the Last Year: 1    Unstable Housing in the Last Year: No       Current Outpatient Medications   Medication Sig Dispense Refill    acetaminophen (TYLENOL) 500 MG tablet Take 2 tablets (1,000 mg total) by mouth every 8 (eight) hours. for 7 days 42 tablet 0    ascorbic acid (VITAMIN C ORAL) Take by mouth Daily.      buPROPion (WELLBUTRIN XL) 300 MG 24 hr tablet Take 1 tablet (300 mg total) by mouth once daily. 90 tablet 3    gabapentin (NEURONTIN) 100 MG capsule Take 3 capsules (300 mg total) by mouth 3 (three) times daily. for 7 days 63 capsule 0    gabapentin (NEURONTIN) 300 MG capsule Take 1 capsule (300 mg total) by mouth 2 (two) times daily. 180 capsule 3    ibuprofen (ADVIL,MOTRIN) 600 MG tablet Take 1 tablet (600 mg total) by mouth every 6 (six) hours. for 7 days 28 tablet 0    loratadine (CLARITIN ORAL) Take by mouth Daily.      methocarbamoL (ROBAXIN) 500 MG Tab Take 1 tablet (500 mg total) by mouth 3 (three) times daily. for 7 days 20 tablet 0    orphenadrine (NORFLEX) 100 mg tablet Take 1 tablet (100 mg total) by mouth 2 (two) times daily. 180 tablet 3    oxyCODONE (ROXICODONE) 5 MG immediate release tablet Take 1 tablet (5 mg total) by mouth every 4 (four) hours as needed for Pain. 10 tablet 0    pantoprazole (PROTONIX) 40 MG tablet Take 1 tablet (40 mg total) by mouth once daily. 90 tablet 3    primidone (MYSOLINE) 50 MG Tab Take 1 tablet (50 mg total) by mouth every evening. 30 tablet 11    traZODone (DESYREL) 100 MG tablet Take 1 tablet (100 mg total) by mouth every evening. 90 tablet 3     No current facility-administered medications for this visit.     Facility-Administered Medications Ordered in Other Visits   Medication Dose Route Frequency Provider Last Rate Last Admin    ceFAZolin 1 g, gentamicin 80 mg in sodium chloride 0.9% 500 mL irrigation   Irrigation On Call Procedure  Tae Trinidad, DO        ceFAZolin 1 g, gentamicin 80 mg in sodium chloride 0.9% 500 mL irrigation   Irrigation On Call Procedure Tae Trinidad, DO        ceFAZolin 1 g, gentamicin 80 mg in sodium chloride 0.9% 500 mL irrigation   Irrigation On Call Procedure Tae Trinidad, DO        ceFAZolin 1 g, gentamicin 80 mg in sodium chloride 0.9% 500 mL irrigation   Irrigation On Call Procedure Tae Trinidad DO        LIDOcaine (PF) 10 mg/ml (1%) injection 10 mg  1 mL Intradermal Once Doreen Garcia PA-C        LIDOcaine HCL 10 mg/ml (1%) 50 mL, EPINEPHrine 1 mg in lactated Ringers 1,000 mL irrigation   Irrigation On Call Procedure Tae Trinidad DO        LIDOcaine HCL 10 mg/ml (1%) 50 mL, EPINEPHrine 1 mg in lactated Ringers 1,000 mL irrigation   Irrigation On Call Procedure Tae Trinidad DO        mupirocin 2 % ointment   Nasal On Call Procedure Doreen Garcia PA-C   Given at 04/28/23 0609    sodium chloride 0.9% flush 10 mL  10 mL Intravenous PRN Doreen Garcia PA-C           Review of patient's allergies indicates:   Allergen Reactions    Morphine Itching     Pt states just itching as side effect. Can have dilaudid, may need benedryl for itching with admin    Codeine Itching     Can take hydrocodone and oxycodone    Lexapro [escitalopram oxalate] Other (See Comments)     sedation         Review of Systems:  Review of Systems        OBJECTIVE:     LMP 06/01/2001 (Approximate)       Physical Exam:  Gen: NAD, appears stated age  Neuro: normal without focal findings, mental status and speech normal  HEENT: NCAT, neck supple, PEERL  CV: RRR  Pulm: Breathing non-labored, chest wall movement equal bilaterally   Breast: not examined, medical photography reviewed  Abdomen: soft, nontender, no guarding  Gu: genitalia not examined  Extremity:normal strength, no cyanosis or edema  Skin: Skin color, texture, turgor normal. No rashes or lesions  Psych: oriented to time, place and  person          ASSESSMENT/PLAN:     Ductal carcinoma in situ (DCIS) of right breast  Dina Sosa was seen preoperatively today for implant exchange and liposuction of the abdomen and flanks with fat grafting to the breasts.    Discussed details of surgical procedure and reviewed risks of surgery including bleeding, infection, poor cosmetic outcome, need for reoperation, changes to sensation, fat necrosis. Surgical consents obtained. Counseled on SHAI drain use in the event drains are needed. Advised patient to wear post operative garment as instructed.     Reviewed multimodal pain control regimen used in the post operative period.  All questions were answered. The patient was advised to contact the clinic with any questions or concerns before surgery.      Doreen Garcia PA-C  Plastic and Reconstructive Surgery    This includes face to face time and non-face to face time preparing to see the patient (eg, review of tests), obtaining and/or reviewing separately obtained history, documenting clinical information in the electronic or other health record, independently interpreting results and communicating results to the patient/family/caregiver, or care coordinator.

## 2023-05-04 ENCOUNTER — OFFICE VISIT (OUTPATIENT)
Dept: PLASTIC SURGERY | Facility: CLINIC | Age: 48
End: 2023-05-04
Payer: MEDICAID

## 2023-05-04 VITALS — DIASTOLIC BLOOD PRESSURE: 95 MMHG | HEART RATE: 88 BPM | SYSTOLIC BLOOD PRESSURE: 132 MMHG

## 2023-05-04 DIAGNOSIS — D05.11 DUCTAL CARCINOMA IN SITU (DCIS) OF RIGHT BREAST: Primary | ICD-10-CM

## 2023-05-04 LAB
FINAL PATHOLOGIC DIAGNOSIS: NORMAL
GROSS: NORMAL
Lab: NORMAL

## 2023-05-04 PROCEDURE — 3075F PR MOST RECENT SYSTOLIC BLOOD PRESS GE 130-139MM HG: ICD-10-PCS | Mod: CPTII,,, | Performed by: SURGERY

## 2023-05-04 PROCEDURE — 99999 PR PBB SHADOW E&M-EST. PATIENT-LVL III: CPT | Mod: PBBFAC,,, | Performed by: SURGERY

## 2023-05-04 PROCEDURE — 99024 POSTOP FOLLOW-UP VISIT: CPT | Mod: ,,, | Performed by: SURGERY

## 2023-05-04 PROCEDURE — 3075F SYST BP GE 130 - 139MM HG: CPT | Mod: CPTII,,, | Performed by: SURGERY

## 2023-05-04 PROCEDURE — 3080F DIAST BP >= 90 MM HG: CPT | Mod: CPTII,,, | Performed by: SURGERY

## 2023-05-04 PROCEDURE — 99213 OFFICE O/P EST LOW 20 MIN: CPT | Mod: PBBFAC | Performed by: SURGERY

## 2023-05-04 PROCEDURE — 1159F MED LIST DOCD IN RCRD: CPT | Mod: CPTII,,, | Performed by: SURGERY

## 2023-05-04 PROCEDURE — 3080F PR MOST RECENT DIASTOLIC BLOOD PRESSURE >= 90 MM HG: ICD-10-PCS | Mod: CPTII,,, | Performed by: SURGERY

## 2023-05-04 PROCEDURE — 1159F PR MEDICATION LIST DOCUMENTED IN MEDICAL RECORD: ICD-10-PCS | Mod: CPTII,,, | Performed by: SURGERY

## 2023-05-04 PROCEDURE — 99024 PR POST-OP FOLLOW-UP VISIT: ICD-10-PCS | Mod: ,,, | Performed by: SURGERY

## 2023-05-04 PROCEDURE — 99999 PR PBB SHADOW E&M-EST. PATIENT-LVL III: ICD-10-PCS | Mod: PBBFAC,,, | Performed by: SURGERY

## 2023-05-04 RX ORDER — CEPHALEXIN 500 MG/1
500 CAPSULE ORAL EVERY 12 HOURS
Qty: 14 CAPSULE | Refills: 0 | Status: SHIPPED | OUTPATIENT
Start: 2023-05-04 | End: 2023-05-11

## 2023-05-04 NOTE — PROGRESS NOTES
Dina Sosa presents to Plastic Surgery Clinic for a follow up visit status post bilateral implant exchange and liposuction of the abdomen with fat grafting to the breasts on 4/28/23. She is doing well today with no issues since surgery. Pain is well controlled. We placed 520cc subpectoral high profile boost implants. She is happy with size. She denies fever, chills, nausea, vomiting or other systemic signs of infection.       ROS - negative, other than stated above    PHYSICAL EXAMINATION  Vitals:    05/04/23 1342   BP: (!) 132/95   Pulse: 88     WD WN NAD  VSS  Normal respiratory effort  R breast - incision CDI, no erythema or drainage, nipple absent, bruising over upper poles s/p fat grafting  L breast - incision CDI, no erythema or drainage, nipple absent, bruising over upper poles s/p fat grafting  Abdominal contour improved, scant bruising, lipo sites CDI with sutures in place    ASSESSMENT/PLAN  48 y.o. F s/p implant exchange with fat grafting  - Doing well, no issues.   - Lipo site sutures removed  - Continue abdominal compression and soft bra  - RTC x 2 week(s), appointment scheduled.      All questions were answered. The patient was advised to contact the clinic with any questions or concerns prior to their next visit.       Doreen Garcia PA-C  Plastic and Reconstructive Surgery

## 2023-05-09 ENCOUNTER — OFFICE VISIT (OUTPATIENT)
Dept: SURGERY | Facility: CLINIC | Age: 48
End: 2023-05-09
Payer: MEDICAID

## 2023-05-09 VITALS
WEIGHT: 114.63 LBS | HEIGHT: 60 IN | HEART RATE: 88 BPM | BODY MASS INDEX: 22.51 KG/M2 | SYSTOLIC BLOOD PRESSURE: 137 MMHG | DIASTOLIC BLOOD PRESSURE: 89 MMHG

## 2023-05-09 DIAGNOSIS — D05.11 DUCTAL CARCINOMA IN SITU (DCIS) OF RIGHT BREAST: Primary | ICD-10-CM

## 2023-05-09 PROCEDURE — 1159F PR MEDICATION LIST DOCUMENTED IN MEDICAL RECORD: ICD-10-PCS | Mod: CPTII,,, | Performed by: SURGERY

## 2023-05-09 PROCEDURE — 99213 PR OFFICE/OUTPT VISIT, EST, LEVL III, 20-29 MIN: ICD-10-PCS | Mod: S$PBB,,, | Performed by: SURGERY

## 2023-05-09 PROCEDURE — 1159F MED LIST DOCD IN RCRD: CPT | Mod: CPTII,,, | Performed by: SURGERY

## 2023-05-09 PROCEDURE — 99213 OFFICE O/P EST LOW 20 MIN: CPT | Mod: PBBFAC | Performed by: SURGERY

## 2023-05-09 PROCEDURE — 3079F PR MOST RECENT DIASTOLIC BLOOD PRESSURE 80-89 MM HG: ICD-10-PCS | Mod: CPTII,,, | Performed by: SURGERY

## 2023-05-09 PROCEDURE — 3079F DIAST BP 80-89 MM HG: CPT | Mod: CPTII,,, | Performed by: SURGERY

## 2023-05-09 PROCEDURE — 99999 PR PBB SHADOW E&M-EST. PATIENT-LVL III: ICD-10-PCS | Mod: PBBFAC,,, | Performed by: SURGERY

## 2023-05-09 PROCEDURE — 3008F BODY MASS INDEX DOCD: CPT | Mod: CPTII,,, | Performed by: SURGERY

## 2023-05-09 PROCEDURE — 3008F PR BODY MASS INDEX (BMI) DOCUMENTED: ICD-10-PCS | Mod: CPTII,,, | Performed by: SURGERY

## 2023-05-09 PROCEDURE — 3075F PR MOST RECENT SYSTOLIC BLOOD PRESS GE 130-139MM HG: ICD-10-PCS | Mod: CPTII,,, | Performed by: SURGERY

## 2023-05-09 PROCEDURE — 99213 OFFICE O/P EST LOW 20 MIN: CPT | Mod: S$PBB,,, | Performed by: SURGERY

## 2023-05-09 PROCEDURE — 3075F SYST BP GE 130 - 139MM HG: CPT | Mod: CPTII,,, | Performed by: SURGERY

## 2023-05-09 PROCEDURE — 99999 PR PBB SHADOW E&M-EST. PATIENT-LVL III: CPT | Mod: PBBFAC,,, | Performed by: SURGERY

## 2023-05-09 NOTE — PROGRESS NOTES
[facility]  Department of Surgery    REFERRING PROVIDER: No referring provider defined for this encounter. Jb Pastor MD  CHIEF COMPLAINT:   Chief Complaint   Patient presents with    Follow-up     6 mo f/u       DIAGNOSIS:   This is a 48 y.o. female with a history of stage stage pTis N0 grade 3 ER + [place] + DCIS of the right breast    TREATMENT:   1. bilateral mastectomy right with sentinel node biopsy on 10/7/2022 SHIELA Sawyer M.D. Surgical Oncology. Final pathology revealed 9 mm DCIS with clear margins.0/3 lymph nodes were negative for carcinoma.  Left breast was benign. She had to return to the operating room for debridement following pressure necrosis around the nipple on 10/17/20 per plastic surgeries op note.  2. No further adjuvant treatment was recommended    HISTORY OF PRESENT ILLNESS:   Dina Sosa is a 48 y.o. female comes in for oncological follow up.  She denies change in her breast self-exam specifically denying new masses, skin or nipple changes, or nipple discharge. Recently underwent implant insertion with Dr. Trinidad on 4/28/2023. There have been no changes to family history. The patient denies constitutional symptoms of night sweats, chills, weight loss, new headaches, visual changes, new back or bony pain, chest pain, or shortness of breath.        Review of Systems: See HPI/Interval History for other systems reviewed.     IMAGING:   No recent imaging obtained for review during this visit      MEDICATIONS/ALLERGIES:     Current Outpatient Medications   Medication Sig    ascorbic acid (VITAMIN C ORAL) Take by mouth Daily.    buPROPion (WELLBUTRIN XL) 300 MG 24 hr tablet Take 1 tablet (300 mg total) by mouth once daily.    cephALEXin (KEFLEX) 500 MG capsule Take 1 capsule (500 mg total) by mouth every 12 (twelve) hours. for 7 days    gabapentin (NEURONTIN) 300 MG capsule Take 1 capsule (300 mg total) by mouth 2 (two) times daily.    loratadine (CLARITIN ORAL) Take by mouth Daily.     orphenadrine (NORFLEX) 100 mg tablet Take 1 tablet (100 mg total) by mouth 2 (two) times daily.    oxyCODONE (ROXICODONE) 5 MG immediate release tablet Take 1 tablet (5 mg total) by mouth every 4 (four) hours as needed for Pain.    pantoprazole (PROTONIX) 40 MG tablet Take 1 tablet (40 mg total) by mouth once daily.    primidone (MYSOLINE) 50 MG Tab Take 1 tablet (50 mg total) by mouth every evening.    traZODone (DESYREL) 100 MG tablet Take 1 tablet (100 mg total) by mouth every evening.     No current facility-administered medications for this visit.     Facility-Administered Medications Ordered in Other Visits   Medication    ceFAZolin 1 g, gentamicin 80 mg in sodium chloride 0.9% 500 mL irrigation    ceFAZolin 1 g, gentamicin 80 mg in sodium chloride 0.9% 500 mL irrigation    ceFAZolin 1 g, gentamicin 80 mg in sodium chloride 0.9% 500 mL irrigation    ceFAZolin 1 g, gentamicin 80 mg in sodium chloride 0.9% 500 mL irrigation    LIDOcaine (PF) 10 mg/ml (1%) injection 10 mg    LIDOcaine HCL 10 mg/ml (1%) 50 mL, EPINEPHrine 1 mg in lactated Ringers 1,000 mL irrigation    LIDOcaine HCL 10 mg/ml (1%) 50 mL, EPINEPHrine 1 mg in lactated Ringers 1,000 mL irrigation    mupirocin 2 % ointment    sodium chloride 0.9% flush 10 mL      Review of patient's allergies indicates:   Allergen Reactions    Morphine Itching     Pt states just itching as side effect. Can have dilaudid, may need benedryl for itching with admin    Codeine Itching     Can take hydrocodone and oxycodone    Lexapro [escitalopram oxalate] Other (See Comments)     sedation       PHYSICAL EXAM:   /89 (BP Location: Left arm, Patient Position: Sitting, BP Method: Medium (Automatic))   Pulse 88   Ht 5' (1.524 m)   Wt 52 kg (114 lb 10.2 oz)   LMP 06/01/2001 (Approximate)   BMI 22.39 kg/m²     Physical Exam   Vitals reviewed.  Constitutional: She is oriented to person, place, and time.   Eyes: Right eye exhibits no discharge. Left eye exhibits no  discharge.   Cardiovascular:  Normal rate.            Pulmonary/Chest: Effort normal. No respiratory distress. She has no wheezes. Right breast exhibits no mass, no skin change and no tenderness. Left breast exhibits no mass, no skin change and no tenderness.       Abdominal: Normal appearance.   Musculoskeletal: Normal range of motion. Lymphadenopathy:      Cervical: No cervical adenopathy.     Neurological: She is alert and oriented to person, place, and time.   Skin: Skin is warm and dry. No erythema. No pallor.       ASSESSMENT:   This is a 48 y.o. female without evidence of recurrence by exam, history or imaging.       PLAN:   1. Patient healing well from implant insertion on 4/28/2023 with Dr. Trinidad; f/u appointment on 4/18/2023  2. Continue monthly self breast exams and call the clinic with any changes or problems.  3. Mammogram not indicated in the setting of bilateral mastectomy   4. Return to clinic in 1 year .    The patient is in agreement with the plan. Questions were encouraged and answered to patient's satisfaction. Dina will call our office with any questions or concerns.     25 minutes were spent on this encounter, 15 of which was face to face counseling and 10 minutes were spent on chart review and coordination of care.

## 2023-05-17 ENCOUNTER — LAB VISIT (OUTPATIENT)
Dept: LAB | Facility: HOSPITAL | Age: 48
End: 2023-05-17
Attending: INTERNAL MEDICINE
Payer: MEDICAID

## 2023-05-17 DIAGNOSIS — E78.2 MIXED HYPERLIPIDEMIA: ICD-10-CM

## 2023-05-17 LAB
ALBUMIN SERPL BCP-MCNC: 4.2 G/DL (ref 3.5–5.2)
ALP SERPL-CCNC: 77 U/L (ref 55–135)
ALT SERPL W/O P-5'-P-CCNC: 16 U/L (ref 10–44)
ANION GAP SERPL CALC-SCNC: 8 MMOL/L (ref 8–16)
AST SERPL-CCNC: 17 U/L (ref 10–40)
BASOPHILS # BLD AUTO: 0.06 K/UL (ref 0–0.2)
BASOPHILS NFR BLD: 0.7 % (ref 0–1.9)
BILIRUB SERPL-MCNC: 0.2 MG/DL (ref 0.1–1)
BUN SERPL-MCNC: 9 MG/DL (ref 6–20)
CALCIUM SERPL-MCNC: 9.9 MG/DL (ref 8.7–10.5)
CHLORIDE SERPL-SCNC: 102 MMOL/L (ref 95–110)
CHOLEST SERPL-MCNC: 231 MG/DL (ref 120–199)
CHOLEST/HDLC SERPL: 4.5 {RATIO} (ref 2–5)
CO2 SERPL-SCNC: 28 MMOL/L (ref 23–29)
CREAT SERPL-MCNC: 0.8 MG/DL (ref 0.5–1.4)
DIFFERENTIAL METHOD: ABNORMAL
EOSINOPHIL # BLD AUTO: 0.1 K/UL (ref 0–0.5)
EOSINOPHIL NFR BLD: 1.5 % (ref 0–8)
ERYTHROCYTE [DISTWIDTH] IN BLOOD BY AUTOMATED COUNT: 13.2 % (ref 11.5–14.5)
EST. GFR  (NO RACE VARIABLE): >60 ML/MIN/1.73 M^2
GLUCOSE SERPL-MCNC: 104 MG/DL (ref 70–110)
HCT VFR BLD AUTO: 43.5 % (ref 37–48.5)
HDLC SERPL-MCNC: 51 MG/DL (ref 40–75)
HDLC SERPL: 22.1 % (ref 20–50)
HGB BLD-MCNC: 13.2 G/DL (ref 12–16)
IMM GRANULOCYTES # BLD AUTO: 0.04 K/UL (ref 0–0.04)
IMM GRANULOCYTES NFR BLD AUTO: 0.5 % (ref 0–0.5)
LDLC SERPL CALC-MCNC: 141.4 MG/DL (ref 63–159)
LYMPHOCYTES # BLD AUTO: 1.9 K/UL (ref 1–4.8)
LYMPHOCYTES NFR BLD: 22.5 % (ref 18–48)
MCH RBC QN AUTO: 27 PG (ref 27–31)
MCHC RBC AUTO-ENTMCNC: 30.3 G/DL (ref 32–36)
MCV RBC AUTO: 89 FL (ref 82–98)
MONOCYTES # BLD AUTO: 0.5 K/UL (ref 0.3–1)
MONOCYTES NFR BLD: 5.7 % (ref 4–15)
NEUTROPHILS # BLD AUTO: 5.9 K/UL (ref 1.8–7.7)
NEUTROPHILS NFR BLD: 69.1 % (ref 38–73)
NONHDLC SERPL-MCNC: 180 MG/DL
NRBC BLD-RTO: 0 /100 WBC
PLATELET # BLD AUTO: 485 K/UL (ref 150–450)
PMV BLD AUTO: 9.6 FL (ref 9.2–12.9)
POTASSIUM SERPL-SCNC: 4.7 MMOL/L (ref 3.5–5.1)
PROT SERPL-MCNC: 7.1 G/DL (ref 6–8.4)
RBC # BLD AUTO: 4.89 M/UL (ref 4–5.4)
SODIUM SERPL-SCNC: 138 MMOL/L (ref 136–145)
TRIGL SERPL-MCNC: 193 MG/DL (ref 30–150)
WBC # BLD AUTO: 8.58 K/UL (ref 3.9–12.7)

## 2023-05-17 PROCEDURE — 85025 COMPLETE CBC W/AUTO DIFF WBC: CPT | Performed by: INTERNAL MEDICINE

## 2023-05-17 PROCEDURE — 80061 LIPID PANEL: CPT | Performed by: INTERNAL MEDICINE

## 2023-05-17 PROCEDURE — 36415 COLL VENOUS BLD VENIPUNCTURE: CPT | Mod: PO | Performed by: INTERNAL MEDICINE

## 2023-05-17 PROCEDURE — 80053 COMPREHEN METABOLIC PANEL: CPT | Performed by: INTERNAL MEDICINE

## 2023-05-18 ENCOUNTER — OFFICE VISIT (OUTPATIENT)
Dept: PLASTIC SURGERY | Facility: CLINIC | Age: 48
End: 2023-05-18
Payer: MEDICAID

## 2023-05-18 VITALS — HEART RATE: 88 BPM | SYSTOLIC BLOOD PRESSURE: 128 MMHG | DIASTOLIC BLOOD PRESSURE: 84 MMHG

## 2023-05-18 DIAGNOSIS — D05.11 DUCTAL CARCINOMA IN SITU (DCIS) OF RIGHT BREAST: Primary | ICD-10-CM

## 2023-05-18 PROCEDURE — 3074F PR MOST RECENT SYSTOLIC BLOOD PRESSURE < 130 MM HG: ICD-10-PCS | Mod: CPTII,,, | Performed by: SURGERY

## 2023-05-18 PROCEDURE — 3079F PR MOST RECENT DIASTOLIC BLOOD PRESSURE 80-89 MM HG: ICD-10-PCS | Mod: CPTII,,, | Performed by: SURGERY

## 2023-05-18 PROCEDURE — 3079F DIAST BP 80-89 MM HG: CPT | Mod: CPTII,,, | Performed by: SURGERY

## 2023-05-18 PROCEDURE — 99212 OFFICE O/P EST SF 10 MIN: CPT | Mod: PBBFAC | Performed by: SURGERY

## 2023-05-18 PROCEDURE — 99999 PR PBB SHADOW E&M-EST. PATIENT-LVL II: CPT | Mod: PBBFAC,,, | Performed by: SURGERY

## 2023-05-18 PROCEDURE — 99024 POSTOP FOLLOW-UP VISIT: CPT | Mod: ,,, | Performed by: SURGERY

## 2023-05-18 PROCEDURE — 3074F SYST BP LT 130 MM HG: CPT | Mod: CPTII,,, | Performed by: SURGERY

## 2023-05-18 PROCEDURE — 99024 PR POST-OP FOLLOW-UP VISIT: ICD-10-PCS | Mod: ,,, | Performed by: SURGERY

## 2023-05-18 PROCEDURE — 99999 PR PBB SHADOW E&M-EST. PATIENT-LVL II: ICD-10-PCS | Mod: PBBFAC,,, | Performed by: SURGERY

## 2023-05-18 PROCEDURE — 1159F PR MEDICATION LIST DOCUMENTED IN MEDICAL RECORD: ICD-10-PCS | Mod: CPTII,,, | Performed by: SURGERY

## 2023-05-18 PROCEDURE — 1159F MED LIST DOCD IN RCRD: CPT | Mod: CPTII,,, | Performed by: SURGERY

## 2023-05-24 ENCOUNTER — PATIENT MESSAGE (OUTPATIENT)
Dept: FAMILY MEDICINE | Facility: CLINIC | Age: 48
End: 2023-05-24

## 2023-05-24 ENCOUNTER — PATIENT MESSAGE (OUTPATIENT)
Dept: OBSTETRICS AND GYNECOLOGY | Facility: CLINIC | Age: 48
End: 2023-05-24
Payer: MEDICAID

## 2023-05-24 ENCOUNTER — TELEPHONE (OUTPATIENT)
Dept: NEUROLOGY | Facility: CLINIC | Age: 48
End: 2023-05-24
Payer: MEDICAID

## 2023-05-24 ENCOUNTER — OFFICE VISIT (OUTPATIENT)
Dept: FAMILY MEDICINE | Facility: CLINIC | Age: 48
End: 2023-05-24
Payer: MEDICAID

## 2023-05-24 VITALS
HEART RATE: 77 BPM | OXYGEN SATURATION: 100 % | DIASTOLIC BLOOD PRESSURE: 80 MMHG | HEIGHT: 60 IN | BODY MASS INDEX: 22.25 KG/M2 | SYSTOLIC BLOOD PRESSURE: 125 MMHG | WEIGHT: 113.31 LBS

## 2023-05-24 DIAGNOSIS — Z85.3 HISTORY OF BREAST CANCER: ICD-10-CM

## 2023-05-24 DIAGNOSIS — R25.1 TREMOR OF BOTH HANDS: ICD-10-CM

## 2023-05-24 DIAGNOSIS — G43.009 MIGRAINE WITHOUT AURA AND WITHOUT STATUS MIGRAINOSUS, NOT INTRACTABLE: ICD-10-CM

## 2023-05-24 DIAGNOSIS — E78.2 MIXED HYPERLIPIDEMIA: Primary | ICD-10-CM

## 2023-05-24 DIAGNOSIS — F41.9 ANXIETY: ICD-10-CM

## 2023-05-24 PROCEDURE — 1160F RVW MEDS BY RX/DR IN RCRD: CPT | Mod: CPTII,,, | Performed by: INTERNAL MEDICINE

## 2023-05-24 PROCEDURE — 99999 PR PBB SHADOW E&M-EST. PATIENT-LVL IV: ICD-10-PCS | Mod: PBBFAC,,, | Performed by: INTERNAL MEDICINE

## 2023-05-24 PROCEDURE — 99999 PR PBB SHADOW E&M-EST. PATIENT-LVL IV: CPT | Mod: PBBFAC,,, | Performed by: INTERNAL MEDICINE

## 2023-05-24 PROCEDURE — 3074F PR MOST RECENT SYSTOLIC BLOOD PRESSURE < 130 MM HG: ICD-10-PCS | Mod: CPTII,,, | Performed by: INTERNAL MEDICINE

## 2023-05-24 PROCEDURE — 3008F BODY MASS INDEX DOCD: CPT | Mod: CPTII,,, | Performed by: INTERNAL MEDICINE

## 2023-05-24 PROCEDURE — 3079F DIAST BP 80-89 MM HG: CPT | Mod: CPTII,,, | Performed by: INTERNAL MEDICINE

## 2023-05-24 PROCEDURE — 3079F PR MOST RECENT DIASTOLIC BLOOD PRESSURE 80-89 MM HG: ICD-10-PCS | Mod: CPTII,,, | Performed by: INTERNAL MEDICINE

## 2023-05-24 PROCEDURE — 99214 PR OFFICE/OUTPT VISIT, EST, LEVL IV, 30-39 MIN: ICD-10-PCS | Mod: S$PBB,,, | Performed by: INTERNAL MEDICINE

## 2023-05-24 PROCEDURE — 1159F MED LIST DOCD IN RCRD: CPT | Mod: CPTII,,, | Performed by: INTERNAL MEDICINE

## 2023-05-24 PROCEDURE — 99214 OFFICE O/P EST MOD 30 MIN: CPT | Mod: PBBFAC,PO | Performed by: INTERNAL MEDICINE

## 2023-05-24 PROCEDURE — 1160F PR REVIEW ALL MEDS BY PRESCRIBER/CLIN PHARMACIST DOCUMENTED: ICD-10-PCS | Mod: CPTII,,, | Performed by: INTERNAL MEDICINE

## 2023-05-24 PROCEDURE — 1159F PR MEDICATION LIST DOCUMENTED IN MEDICAL RECORD: ICD-10-PCS | Mod: CPTII,,, | Performed by: INTERNAL MEDICINE

## 2023-05-24 PROCEDURE — 3074F SYST BP LT 130 MM HG: CPT | Mod: CPTII,,, | Performed by: INTERNAL MEDICINE

## 2023-05-24 PROCEDURE — 3008F PR BODY MASS INDEX (BMI) DOCUMENTED: ICD-10-PCS | Mod: CPTII,,, | Performed by: INTERNAL MEDICINE

## 2023-05-24 PROCEDURE — 99214 OFFICE O/P EST MOD 30 MIN: CPT | Mod: S$PBB,,, | Performed by: INTERNAL MEDICINE

## 2023-05-24 RX ORDER — PRAVASTATIN SODIUM 20 MG/1
20 TABLET ORAL DAILY
Qty: 90 TABLET | Refills: 3 | Status: SHIPPED | OUTPATIENT
Start: 2023-05-24 | End: 2024-05-23

## 2023-05-24 NOTE — PROGRESS NOTES
Ms Sosa is now 3 weeks status post tissue expander removal with implant placement and fat grafting to the upper pole.  She notices that her implants are running a little bit high.      Her submuscular implants are soft and full.  They are just a little bit high on the chest wall.  Her bruising from her fat grafting has all resolved as well as on her Lipo donor site.      I recommended for best contour that she could wear her compression garments for 1 more month.  I do think that her breast should continue to settle with some skin stretch and relaxation.    We also talked that if this remains bothersome we could lower them a little bit later.  I will see her back in 3 months    Luke Trinidad, DO  Plastic and Reconstructive Surgery

## 2023-05-24 NOTE — PROGRESS NOTES
Patient ID: Dina Sosa     Chief Complaint:   Chief Complaint   Patient presents with    Follow-up     3 months        HPI:  Routine follow-up for labs which redemonstrated that she has an uncontrolled cholesterol-specifically her LDL is 147.  Thankfully her HDL is pretty good.  I do think it is time for a low-dose statin, so we will start pravastatin 20 mg each night and I will check a CMP and CPK in a few weeks.  Since our last office visit, she has completed her breast reconstruction and I am very happy about that.  She does admit to uncontrolled migraine headaches that have been present ever since she had a fall at work 4 years ago.  She states that she gets a headache at least 4 days per week and additional 2 days gets severe migraines where she has to lay down in a dark room for many hours.  This is all despite trazodone and the only thing that takes the edge off is Excedrin migraine.  I would like to limit her exposure to aspirin at this time so I do think a referral to our headache clinic is in order.  Thankfully the primidone that I gave her for the tremors has helped out a lot.    Review of Systems       Migraine Headache     Objective:      Physical Exam   Physical Exam       Normal    Vitals:   Vitals:    05/24/23 1119   BP: 125/80   BP Location: Left arm   Patient Position: Sitting   BP Method: Medium (Manual)   Pulse: 77   SpO2: 100%   Weight: 51.4 kg (113 lb 5.1 oz)   Height: 5' (1.524 m)          Current Outpatient Medications:     ascorbic acid (VITAMIN C ORAL), Take by mouth Daily., Disp: , Rfl:     buPROPion (WELLBUTRIN XL) 300 MG 24 hr tablet, Take 1 tablet (300 mg total) by mouth once daily., Disp: 90 tablet, Rfl: 3    loratadine (CLARITIN ORAL), Take by mouth Daily., Disp: , Rfl:     orphenadrine (NORFLEX) 100 mg tablet, Take 1 tablet (100 mg total) by mouth 2 (two) times daily., Disp: 180 tablet, Rfl: 3    pantoprazole (PROTONIX) 40 MG tablet, Take 1 tablet (40 mg total) by mouth once  daily., Disp: 90 tablet, Rfl: 3    primidone (MYSOLINE) 50 MG Tab, Take 1 tablet (50 mg total) by mouth every evening., Disp: 30 tablet, Rfl: 11    traZODone (DESYREL) 100 MG tablet, Take 1 tablet (100 mg total) by mouth every evening., Disp: 90 tablet, Rfl: 3    gabapentin (NEURONTIN) 300 MG capsule, Take 1 capsule (300 mg total) by mouth 2 (two) times daily., Disp: 180 capsule, Rfl: 3    oxyCODONE (ROXICODONE) 5 MG immediate release tablet, Take 1 tablet (5 mg total) by mouth every 4 (four) hours as needed for Pain., Disp: 10 tablet, Rfl: 0    pravastatin (PRAVACHOL) 20 MG tablet, Take 1 tablet (20 mg total) by mouth once daily., Disp: 90 tablet, Rfl: 3  No current facility-administered medications for this visit.    Facility-Administered Medications Ordered in Other Visits:     ceFAZolin 1 g, gentamicin 80 mg in sodium chloride 0.9% 500 mL irrigation, , Irrigation, On Call Procedure, Tae Trinidad, DO    ceFAZolin 1 g, gentamicin 80 mg in sodium chloride 0.9% 500 mL irrigation, , Irrigation, On Call Procedure, Tae Trinidad, DO    ceFAZolin 1 g, gentamicin 80 mg in sodium chloride 0.9% 500 mL irrigation, , Irrigation, On Call Procedure, Tae Trinidad, DO    ceFAZolin 1 g, gentamicin 80 mg in sodium chloride 0.9% 500 mL irrigation, , Irrigation, On Call Procedure, Tae Trinidad DO    LIDOcaine (PF) 10 mg/ml (1%) injection 10 mg, 1 mL, Intradermal, Once, Doreen Garcia PA-C    LIDOcaine HCL 10 mg/ml (1%) 50 mL, EPINEPHrine 1 mg in lactated Ringers 1,000 mL irrigation, , Irrigation, On Call Procedure, Tae Trinidad DO    LIDOcaine HCL 10 mg/ml (1%) 50 mL, EPINEPHrine 1 mg in lactated Ringers 1,000 mL irrigation, , Irrigation, On Call Procedure, Tae Trinidad, DO    mupirocin 2 % ointment, , Nasal, On Call Procedure, Doreen Garcia PA-C, Given at 04/28/23 0609    sodium chloride 0.9% flush 10 mL, 10 mL, Intravenous, PRN, Doreen Garcia PA-C   Assessment:        Patient Active Problem List    Diagnosis Date Noted    Migraine without aura and without status migrainosus, not intractable 05/24/2023    History of breast cancer 02/24/2023    Tremor of both hands 02/24/2023    Mixed hyperlipidemia 02/24/2023    Cervical radiculopathy 02/24/2023    History of bilateral mastectomy 10/26/2022    Breast cancer 08/25/2022    Ductal carcinoma in situ (DCIS) of right breast 08/25/2022    History of hip surgery 07/19/2022    History of cervical spinal surgery 04/21/2022    Depression 10/21/2021    History of fusion of cervical spine 04/21/2021    Menopausal syndrome 04/21/2021    Gastroesophageal reflux disease without esophagitis 08/23/2019    Traumatic ecchymosis of left upper arm 05/09/2019    Neuropathy 05/09/2019    Lumbar facet arthropathy 05/09/2019    Lumbosacral strain 05/09/2019    Cervical strain 08/21/2018    Anxiety 05/30/2014    Environmental allergies 05/30/2014          Plan:       Dina Jenni Sosa  was seen today for follow-up and may need lab work.    Diagnoses and all orders for this visit:    Dina was seen today for follow-up.    Diagnoses and all orders for this visit:    Mixed hyperlipidemia  -     pravastatin (PRAVACHOL) 20 MG tablet; Take 1 tablet (20 mg total) by mouth once daily.  -     Comprehensive Metabolic Panel; Future  -     CK; Future  Monitor on new med     Migraine without aura and without status migrainosus, not intractable  -     Ambulatory referral/consult to Neurology; Future  Uncontrolled - needs better prophylactic therapy     Tremor of both hands  Much improved with Primidone     History of breast cancer  Resolved     Anxiety  Controlled with med

## 2023-05-26 ENCOUNTER — PATIENT MESSAGE (OUTPATIENT)
Dept: FAMILY MEDICINE | Facility: CLINIC | Age: 48
End: 2023-05-26
Payer: MEDICAID

## 2023-05-26 ENCOUNTER — PATIENT MESSAGE (OUTPATIENT)
Dept: NEUROLOGY | Facility: CLINIC | Age: 48
End: 2023-05-26
Payer: MEDICAID

## 2023-05-26 DIAGNOSIS — M54.12 CERVICAL RADICULOPATHY: Primary | ICD-10-CM

## 2023-05-26 RX ORDER — GABAPENTIN 100 MG/1
300 CAPSULE ORAL 3 TIMES DAILY
Qty: 63 CAPSULE | Refills: 0 | OUTPATIENT
Start: 2023-05-26 | End: 2023-06-02

## 2023-05-26 RX ORDER — GABAPENTIN 300 MG/1
300 CAPSULE ORAL 2 TIMES DAILY
Qty: 60 CAPSULE | Refills: 11 | Status: SHIPPED | OUTPATIENT
Start: 2023-05-26 | End: 2024-05-25

## 2023-05-26 NOTE — TELEPHONE ENCOUNTER
No care due was identified.  [facility] Embedded Care Due Messages. Reference number: [id number].   5/26/2023 10:16:23 AM CDT

## 2023-06-14 ENCOUNTER — LAB VISIT (OUTPATIENT)
Dept: LAB | Facility: HOSPITAL | Age: 48
End: 2023-06-14
Attending: INTERNAL MEDICINE
Payer: MEDICAID

## 2023-06-14 DIAGNOSIS — E78.2 MIXED HYPERLIPIDEMIA: ICD-10-CM

## 2023-06-14 LAB
ALBUMIN SERPL BCP-MCNC: 3.9 G/DL (ref 3.5–5.2)
ALP SERPL-CCNC: 67 U/L (ref 55–135)
ALT SERPL W/O P-5'-P-CCNC: 20 U/L (ref 10–44)
ANION GAP SERPL CALC-SCNC: 9 MMOL/L (ref 8–16)
AST SERPL-CCNC: 22 U/L (ref 10–40)
BILIRUB SERPL-MCNC: 0.1 MG/DL (ref 0.1–1)
BUN SERPL-MCNC: 9 MG/DL (ref 6–20)
CALCIUM SERPL-MCNC: 9 MG/DL (ref 8.7–10.5)
CHLORIDE SERPL-SCNC: 105 MMOL/L (ref 95–110)
CK SERPL-CCNC: 76 U/L (ref 20–180)
CO2 SERPL-SCNC: 25 MMOL/L (ref 23–29)
CREAT SERPL-MCNC: 0.7 MG/DL (ref 0.5–1.4)
EST. GFR  (NO RACE VARIABLE): >60 ML/MIN/1.73 M^2
GLUCOSE SERPL-MCNC: 82 MG/DL (ref 70–110)
POTASSIUM SERPL-SCNC: 3.8 MMOL/L (ref 3.5–5.1)
PROT SERPL-MCNC: 6.3 G/DL (ref 6–8.4)
SODIUM SERPL-SCNC: 139 MMOL/L (ref 136–145)

## 2023-06-14 PROCEDURE — 36415 COLL VENOUS BLD VENIPUNCTURE: CPT | Mod: PO | Performed by: INTERNAL MEDICINE

## 2023-06-14 PROCEDURE — 82550 ASSAY OF CK (CPK): CPT | Performed by: INTERNAL MEDICINE

## 2023-06-14 PROCEDURE — 80053 COMPREHEN METABOLIC PANEL: CPT | Performed by: INTERNAL MEDICINE

## 2023-07-14 ENCOUNTER — OFFICE VISIT (OUTPATIENT)
Dept: UROGYNECOLOGY | Facility: CLINIC | Age: 48
End: 2023-07-14
Payer: MEDICAID

## 2023-07-14 ENCOUNTER — TELEPHONE (OUTPATIENT)
Dept: UROGYNECOLOGY | Facility: CLINIC | Age: 48
End: 2023-07-14
Payer: MEDICAID

## 2023-07-14 VITALS
DIASTOLIC BLOOD PRESSURE: 80 MMHG | WEIGHT: 115.5 LBS | BODY MASS INDEX: 22.56 KG/M2 | SYSTOLIC BLOOD PRESSURE: 125 MMHG | HEART RATE: 87 BPM

## 2023-07-14 DIAGNOSIS — N90.60 LABIAL HYPERTROPHY: Primary | ICD-10-CM

## 2023-07-14 DIAGNOSIS — N39.3 STRESS INCONTINENCE: ICD-10-CM

## 2023-07-14 DIAGNOSIS — N90.60 LABIA MINORA HYPERTROPHY: ICD-10-CM

## 2023-07-14 DIAGNOSIS — N99.3 PROLAPSE OF VAGINAL VAULT AFTER HYSTERECTOMY: Primary | ICD-10-CM

## 2023-07-14 PROCEDURE — 51701 PR INSERTION OF NON-INDWELLING BLADDER CATHETERIZATION FOR RESIDUAL UR: ICD-10-PCS | Mod: S$PBB,,, | Performed by: OBSTETRICS & GYNECOLOGY

## 2023-07-14 PROCEDURE — 99214 OFFICE O/P EST MOD 30 MIN: CPT | Mod: 25,S$PBB,, | Performed by: OBSTETRICS & GYNECOLOGY

## 2023-07-14 PROCEDURE — 3079F PR MOST RECENT DIASTOLIC BLOOD PRESSURE 80-89 MM HG: ICD-10-PCS | Mod: CPTII,,, | Performed by: OBSTETRICS & GYNECOLOGY

## 2023-07-14 PROCEDURE — 99999 PR PBB SHADOW E&M-EST. PATIENT-LVL IV: CPT | Mod: PBBFAC,,, | Performed by: OBSTETRICS & GYNECOLOGY

## 2023-07-14 PROCEDURE — 87086 URINE CULTURE/COLONY COUNT: CPT | Performed by: OBSTETRICS & GYNECOLOGY

## 2023-07-14 PROCEDURE — 99214 PR OFFICE/OUTPT VISIT, EST, LEVL IV, 30-39 MIN: ICD-10-PCS | Mod: 25,S$PBB,, | Performed by: OBSTETRICS & GYNECOLOGY

## 2023-07-14 PROCEDURE — 1160F RVW MEDS BY RX/DR IN RCRD: CPT | Mod: CPTII,,, | Performed by: OBSTETRICS & GYNECOLOGY

## 2023-07-14 PROCEDURE — 99211 OFF/OP EST MAY X REQ PHY/QHP: CPT | Mod: PBBFAC | Performed by: OBSTETRICS & GYNECOLOGY

## 2023-07-14 PROCEDURE — 99999 PR PBB SHADOW E&M-EST. PATIENT-LVL I: ICD-10-PCS | Mod: PBBFAC,,, | Performed by: OBSTETRICS & GYNECOLOGY

## 2023-07-14 PROCEDURE — 99214 OFFICE O/P EST MOD 30 MIN: CPT | Mod: PBBFAC,27 | Performed by: OBSTETRICS & GYNECOLOGY

## 2023-07-14 PROCEDURE — 3074F SYST BP LT 130 MM HG: CPT | Mod: CPTII,,, | Performed by: OBSTETRICS & GYNECOLOGY

## 2023-07-14 PROCEDURE — 51701 INSERT BLADDER CATHETER: CPT | Mod: PBBFAC | Performed by: OBSTETRICS & GYNECOLOGY

## 2023-07-14 PROCEDURE — 99999 PR PBB SHADOW E&M-EST. PATIENT-LVL I: CPT | Mod: PBBFAC,,, | Performed by: OBSTETRICS & GYNECOLOGY

## 2023-07-14 PROCEDURE — 99214 OFFICE O/P EST MOD 30 MIN: CPT | Mod: S$PBB,,, | Performed by: OBSTETRICS & GYNECOLOGY

## 2023-07-14 PROCEDURE — 99999 PR PBB SHADOW E&M-EST. PATIENT-LVL IV: ICD-10-PCS | Mod: PBBFAC,,, | Performed by: OBSTETRICS & GYNECOLOGY

## 2023-07-14 PROCEDURE — 99214 PR OFFICE/OUTPT VISIT, EST, LEVL IV, 30-39 MIN: ICD-10-PCS | Mod: S$PBB,,, | Performed by: OBSTETRICS & GYNECOLOGY

## 2023-07-14 PROCEDURE — 3079F DIAST BP 80-89 MM HG: CPT | Mod: CPTII,,, | Performed by: OBSTETRICS & GYNECOLOGY

## 2023-07-14 PROCEDURE — 3008F PR BODY MASS INDEX (BMI) DOCUMENTED: ICD-10-PCS | Mod: CPTII,,, | Performed by: OBSTETRICS & GYNECOLOGY

## 2023-07-14 PROCEDURE — 51701 INSERT BLADDER CATHETER: CPT | Mod: S$PBB,,, | Performed by: OBSTETRICS & GYNECOLOGY

## 2023-07-14 PROCEDURE — 3008F BODY MASS INDEX DOCD: CPT | Mod: CPTII,,, | Performed by: OBSTETRICS & GYNECOLOGY

## 2023-07-14 PROCEDURE — 3074F PR MOST RECENT SYSTOLIC BLOOD PRESSURE < 130 MM HG: ICD-10-PCS | Mod: CPTII,,, | Performed by: OBSTETRICS & GYNECOLOGY

## 2023-07-14 PROCEDURE — 1160F PR REVIEW ALL MEDS BY PRESCRIBER/CLIN PHARMACIST DOCUMENTED: ICD-10-PCS | Mod: CPTII,,, | Performed by: OBSTETRICS & GYNECOLOGY

## 2023-07-14 PROCEDURE — 1159F MED LIST DOCD IN RCRD: CPT | Mod: CPTII,,, | Performed by: OBSTETRICS & GYNECOLOGY

## 2023-07-14 PROCEDURE — 1159F PR MEDICATION LIST DOCUMENTED IN MEDICAL RECORD: ICD-10-PCS | Mod: CPTII,,, | Performed by: OBSTETRICS & GYNECOLOGY

## 2023-07-14 NOTE — TELEPHONE ENCOUNTER
Spoke to pt. And discussed all appts. And instructions. Pt. Verbalized understanding and appreciation

## 2023-07-14 NOTE — TELEPHONE ENCOUNTER
----- Message from Wu Skaggs MD sent at 7/14/2023 11:05 AM CDT -----  [phone]    Were I am going to be doing a robotic sacral colpopexy on this kind patient on September 8th.    Please reach out to coordinate the the preop visit I have already consented her it will be scanned in.    What is different about this cases it is going to be a combined case with myself and Dr. Eason Do will be performing a labioplasty at the same time this will be September 8th 1st case thank you so much can you set her up for anesthesia visit.  Dr. Cerrato is her internist and he will provide medical clearance

## 2023-07-14 NOTE — PROGRESS NOTES
Chief Complaint   Patient presents with    Labial Elongation        HPI: Patient is a 48 y.o. female  who presents today with c/o difficulty with pain/ discomfort due to her labia. States that they have always been a little long but then for the last 3-4 years seemed to have increase din size. States that she has discomfort with walking or bicycling where the labia pinch or rub and cause discomfort. Also notes discomfort when she crossed her legs. More recently she in the last 7 months finds that during sexual intercourse both labia are pulled inside which causes pain.   Also feels that she has seen an increase in BV and yeast infections.     Review of Systems   All other systems reviewed and are negative.   Per HPI.     Past Medical History:   Diagnosis Date    Anxiety     History of breast cancer     Right breast. Invasive Ductal.       Past Surgical History:   Procedure Laterality Date    BILATERAL MASTECTOMY Bilateral 10/07/2022    Procedure: MASTECTOMY, BILATERAL;  Surgeon: Leeann Sawyer MD;  Location: [facility];  Service: General;  Laterality: Bilateral;    BREAST CAPSULECTOMY Bilateral 2023    Procedure: CAPSULECTOMY, BREAST;  Surgeon: Tae Trinidad DO;  Location: [facility];  Service: Plastics;  Laterality: Bilateral;  Bilat CAPSULOTOMY    CERVICAL DISC ARTHROPLASTY      C5-C6     SECTION      x2    FAT GRAFTING, OTHER Bilateral 2023    Procedure: INJECTION, FAT GRAFT;  Surgeon: Tae Trinidad DO;  Location: [facility];  Service: Plastics;  Laterality: Bilateral;    HIP SURGERY Left     fusion of pelvis around hip    HYSTERECTOMY  2002    ROGER, ovaries remain (AUB)    INJECTION FOR SENTINEL NODE IDENTIFICATION Right 10/07/2022    Procedure: INJECTION, FOR SENTINEL NODE IDENTIFICATION;  Surgeon: Leeann Sawyer MD;  Location: [facility];  Service: General;  Laterality: Right;    INSERTION OF BREAST IMPLANT Bilateral 2023    Procedure: INSERTION, BREAST IMPLANT;   Surgeon: Tae Trinidad DO;  Location: [facility];  Service: Plastics;  Laterality: Bilateral;  2 hours    INSERTION OF BREAST TISSUE EXPANDER Bilateral 10/07/2022    Procedure: INSERTION, TISSUE EXPANDER, BREAST / BILATERAL;  Surgeon: Tae Trinidad DO;  Location: [facility];  Service: Plastics;  Laterality: Bilateral;    INSERTION OF BREAST TISSUE EXPANDER Bilateral 02/10/2023    Procedure: INSERTION, TISSUE EXPANDER, BREAST;  Surgeon: Tae Trinidad DO;  Location: [facility] OR;  Service: Plastics;  Laterality: Bilateral;  Bilateral tissue expanders/ 2 HOURS    mobi c      disk replacement C5-C6    SENTINEL LYMPH NODE BIOPSY Right 10/07/2022    Procedure: BIOPSY, LYMPH NODE, SENTINEL;  Surgeon: Leeann Sawyer MD;  Location: [facility];  Service: General;  Laterality: Right;  2.5 HRS    si joint fusion      left hip    TISSUE EXPANDER REMOVAL Bilateral 10/17/2022    Procedure: REMOVAL, TISSUE EXPANDER;  Surgeon: Tae Trinidad DO;  Location: [facility];  Service: Plastics;  Laterality: Bilateral;    TISSUE EXPANDER REMOVAL Bilateral 4/28/2023    Procedure: REMOVAL, TISSUE EXPANDER;  Surgeon: Tae Trinidad DO;  Location: [facility] OR;  Service: Plastics;  Laterality: Bilateral;  Bilat TE removal - Implant placement    TONSILLECTOMY      TYMPANOSTOMY TUBE PLACEMENT      x6    WOUND DEBRIDEMENT Bilateral 10/17/2022    Procedure: DEBRIDEMENT, WOUND;  Surgeon: Tae Trinidad DO;  Location: [facility];  Service: Plastics;  Laterality: Bilateral;  1.5 HRS         Current Outpatient Medications:     ascorbic acid (VITAMIN C ORAL), Take by mouth Daily., Disp: , Rfl:     buPROPion (WELLBUTRIN XL) 300 MG 24 hr tablet, Take 1 tablet (300 mg total) by mouth once daily., Disp: 90 tablet, Rfl: 3    gabapentin (NEURONTIN) 300 MG capsule, Take 1 capsule (300 mg total) by mouth 2 (two) times daily., Disp: 60 capsule, Rfl: 11    loratadine (CLARITIN ORAL), Take by mouth Daily., Disp: , Rfl:     orphenadrine  (NORFLEX) 100 mg tablet, Take 1 tablet (100 mg total) by mouth 2 (two) times daily., Disp: 180 tablet, Rfl: 3    pantoprazole (PROTONIX) 40 MG tablet, Take 1 tablet (40 mg total) by mouth once daily., Disp: 90 tablet, Rfl: 3    pravastatin (PRAVACHOL) 20 MG tablet, Take 1 tablet (20 mg total) by mouth once daily., Disp: 90 tablet, Rfl: 3    primidone (MYSOLINE) 50 MG Tab, Take 1 tablet (50 mg total) by mouth every evening., Disp: 30 tablet, Rfl: 11    traZODone (DESYREL) 100 MG tablet, Take 1 tablet (100 mg total) by mouth every evening., Disp: 90 tablet, Rfl: 3  No current facility-administered medications for this visit.    Facility-Administered Medications Ordered in Other Visits:     ceFAZolin 1 g, gentamicin 80 mg in sodium chloride 0.9% 500 mL irrigation, , Irrigation, On Call Procedure, Tae Trinidad, DO    ceFAZolin 1 g, gentamicin 80 mg in sodium chloride 0.9% 500 mL irrigation, , Irrigation, On Call Procedure, Tae Trinidad DO    ceFAZolin 1 g, gentamicin 80 mg in sodium chloride 0.9% 500 mL irrigation, , Irrigation, On Call Procedure, Tae Trinidad, DO    ceFAZolin 1 g, gentamicin 80 mg in sodium chloride 0.9% 500 mL irrigation, , Irrigation, On Call Procedure, Tae Trinidad, DO    LIDOcaine (PF) 10 mg/ml (1%) injection 10 mg, 1 mL, Intradermal, Once, Doreen Garcia PA-C    LIDOcaine HCL 10 mg/ml (1%) 50 mL, EPINEPHrine 1 mg in lactated Ringers 1,000 mL irrigation, , Irrigation, On Call Procedure, Tae Trinidad DO    LIDOcaine HCL 10 mg/ml (1%) 50 mL, EPINEPHrine 1 mg in lactated Ringers 1,000 mL irrigation, , Irrigation, On Call Procedure, Tae Trinidad DO    mupirocin 2 % ointment, , Nasal, On Call Procedure, Doreen Garcia PA-C, Given at 04/28/23 0609    sodium chloride 0.9% flush 10 mL, 10 mL, Intravenous, PRN, Doreen Garcia PA-C    Review of patient's allergies indicates:   Allergen Reactions    Morphine Itching     Pt states just itching as side  effect. Can have dilaudid, may need benedryl for itching with admin    Codeine Itching     Can take hydrocodone and oxycodone    Lexapro [escitalopram oxalate] Other (See Comments)     sedation       Family History   Problem Relation Age of Onset    Cancer Maternal Grandmother 83        origin? brain (mets?)    Lung cancer Maternal Grandfather         long-term smoker    Hepatitis Father         cirrhosis    Hypertension Father     Genetic Disorder Mother         JAK2+ ET    Lung cancer Mother 63        ~30-yr smoker    Skin cancer Mother 67        mole nasal bridge (type?); maybe also one on hip    Hypertension Mother     Esophageal cancer Mother     Hodgkin's lymphoma Brother     Esophageal cancer Brother     Hypertension Sister     Von Willebrand disease Sister     Hemophilia Daughter         hemophilia A    Von Willebrand disease Daughter     Fibrocystic breast disease Maternal Aunt     Lung cancer Maternal Aunt         believes was smoker       Social History     Socioeconomic History    Marital status:    Tobacco Use    Smoking status: Former     Packs/day: 0.50     Years: 22.00     Pack years: 11.00     Types: Cigarettes     Quit date: 5/10/2016     Years since quittin.1    Smokeless tobacco: Never   Substance and Sexual Activity    Alcohol use: No    Drug use: No    Sexual activity: Yes     Partners: Male     Birth control/protection: None     Social Determinants of Health     Financial Resource Strain: Low Risk     Difficulty of Paying Living Expenses: Not very hard   Food Insecurity: Unknown    Worried About Running Out of Food in the Last Year: Never true    Ran Out of Food in the Last Year: Patient refused   Transportation Needs: No Transportation Needs    Lack of Transportation (Medical): No    Lack of Transportation (Non-Medical): No   Physical Activity: Insufficiently Active    Days of Exercise per Week: 2 days    Minutes of Exercise per Session: 30 min   Stress: Stress Concern Present     Feeling of Stress : Rather much   Social Connections: Moderately Isolated    Frequency of Communication with Friends and Family: More than three times a week    Frequency of Social Gatherings with Friends and Family: Once a week    Attends Alevism Services: Never    Active Member of Clubs or Organizations: No    Attends Club or Organization Meetings: 1 to 4 times per year    Marital Status:    Housing Stability: Low Risk     Unable to Pay for Housing in the Last Year: No    Number of Places Lived in the Last Year: 1    Unstable Housing in the Last Year: No       OB History          2    Para   2    Term   2            AB        Living   2         SAB        IAB        Ectopic        Multiple        Live Births                     INITIAL PHYSICAL EXAMINATION    General: Healthy in appearance, Well nourished, Affect Normal, NAD.  Skin: Normal temperature, No atypical lesions or rash.      Genitourinary-  Vulva: no lesions, masses, atrophy or pain; bilateral elongation of the labia minora: right 3 cm and left 5 cm    No visits with results within 1 Day(s) from this visit.   Latest known visit with results is:   Lab Visit on 2023   Component Date Value Ref Range Status    Sodium 2023 139  136 - 145 mmol/L Final    Potassium 2023 3.8  3.5 - 5.1 mmol/L Final    Chloride 2023 105  95 - 110 mmol/L Final    CO2 2023 25  23 - 29 mmol/L Final    Glucose 2023 82  70 - 110 mg/dL Final    BUN 2023 9  6 - 20 mg/dL Final    Creatinine 2023 0.7  0.5 - 1.4 mg/dL Final    Calcium 2023 9.0  8.7 - 10.5 mg/dL Final    Total Protein 2023 6.3  6.0 - 8.4 g/dL Final    Albumin 2023 3.9  3.5 - 5.2 g/dL Final    Total Bilirubin 2023 0.1  0.1 - 1.0 mg/dL Final    Alkaline Phosphatase 2023 67  55 - 135 U/L Final    AST 2023 22  10 - 40 U/L Final    ALT 2023 20  10 - 44 U/L Final    Anion Gap 2023 9  8 - 16 mmol/L Final    eGFR  06/14/2023 >60.0  >60 mL/min/1.73 m^2 Final    CPK 06/14/2023 76  20 - 180 U/L Final      ASSESSMENT & PLAN:    Labial hypertrophy  Comments:  bilateral       49 yo with bilateral labial hypertrophy with L>R presented today for discussion of labioplasty. Discussed that she does have hypertrophy bilaterally and we would be able to perform a wedge resection bilaterally to correct the issue. Discussed that an alternate procedure is to perform an entire labial excisional procedure but my preference is to do a wedge resection to try to minimize scar tissue and maintain the current anatomical appearance just smaller. Risks and benefits of a bilateral labioplasty were reviewed including but not limited to bleeding, hematoma, infection, wound separation, pain, decreased sensation, nerve injury, pain with intercourse, failure to remove enough tissue, too much tissue being removed, need for additional surgery for complications/ revision. Consent was signed today and copy provided to the patient. Surgery currently scheduled for 9/8 with Dr. Skaggs who will perform the Robotic hysterectomy and sacrocolpopexy portion fo the procedure.  All questions were answered today. The patient was encouraged to contact the office as needed with any additional questions or concerns.     Total time spent on visit was 30 minutes.  This includes face to face time and non-face to face time preparing to see the patient (eg, review of tests), Obtaining and/or reviewing separately obtained history, Documenting clinical information in the electronic or other health record, Independently interpreting resultsand communicating results to the patient/family/caregiver, or Care coordination.    Kalyn Rick MD

## 2023-07-14 NOTE — PROGRESS NOTES
Subjective:      Patient ID: Dina Sosa is a 48 y.o. female.    Chief Complaint:  Vaginal Prolapse, Urinary Frequency, Urinary Incontinence, and Urinary Urgency      History of Present Illness  48-year-old para  1st baby 6-1/2 lb 2nd baby 9-1/2 lb.    Patient recently underwent complex breast reconstruction surgery with revision.    Patient is here secondary to complaint of urinary incontinence she can no longer go to the gym she does state that after the birth of her 2nd baby there was significant prolapse which was addressed at the time of her hysterectomy in  and  [facility].        Patient is unable to remember the exact name of the physician.    But she does believe that graft was used          Past Medical History:   Diagnosis Date    Anxiety     History of breast cancer     Right breast. Invasive Ductal.       Past Surgical History:   Procedure Laterality Date    BILATERAL MASTECTOMY Bilateral 10/07/2022    Procedure: MASTECTOMY, BILATERAL;  Surgeon: Leeann Sawyer MD;  Location: [facility];  Service: General;  Laterality: Bilateral;    BREAST CAPSULECTOMY Bilateral 2023    Procedure: CAPSULECTOMY, BREAST;  Surgeon: Tae Trinidad DO;  Location: [facility];  Service: Plastics;  Laterality: Bilateral;  Bilat CAPSULOTOMY    CERVICAL DISC ARTHROPLASTY      C5-C6     SECTION      x2    FAT GRAFTING, OTHER Bilateral 2023    Procedure: INJECTION, FAT GRAFT;  Surgeon: Tae Trinidad DO;  Location: [facility];  Service: Plastics;  Laterality: Bilateral;    HIP SURGERY Left     fusion of pelvis around hip    HYSTERECTOMY  2002    ROGER, ovaries remain (AUB)    INJECTION FOR SENTINEL NODE IDENTIFICATION Right 10/07/2022    Procedure: INJECTION, FOR SENTINEL NODE IDENTIFICATION;  Surgeon: Leeann Sawyer MD;  Location: [facility];  Service: General;  Laterality: Right;    INSERTION OF BREAST IMPLANT Bilateral 2023    Procedure: INSERTION, BREAST IMPLANT;  Surgeon:  Tae Trinidad DO;  Location: [facility];  Service: Plastics;  Laterality: Bilateral;  2 hours    INSERTION OF BREAST TISSUE EXPANDER Bilateral 10/07/2022    Procedure: INSERTION, TISSUE EXPANDER, BREAST / BILATERAL;  Surgeon: Tae Trinidad DO;  Location: [facility];  Service: Plastics;  Laterality: Bilateral;    INSERTION OF BREAST TISSUE EXPANDER Bilateral 02/10/2023    Procedure: INSERTION, TISSUE EXPANDER, BREAST;  Surgeon: Tae Trinidad DO;  Location: [facility];  Service: Plastics;  Laterality: Bilateral;  Bilateral tissue expanders/ 2 HOURS    mobi c      disk replacement C5-C6    SENTINEL LYMPH NODE BIOPSY Right 10/07/2022    Procedure: BIOPSY, LYMPH NODE, SENTINEL;  Surgeon: Leeann Sawyer MD;  Location: [facility];  Service: General;  Laterality: Right;  2.5 HRS    si joint fusion      left hip    TISSUE EXPANDER REMOVAL Bilateral 10/17/2022    Procedure: REMOVAL, TISSUE EXPANDER;  Surgeon: Tae Trinidad DO;  Location: [facility];  Service: Plastics;  Laterality: Bilateral;    TISSUE EXPANDER REMOVAL Bilateral 2023    Procedure: REMOVAL, TISSUE EXPANDER;  Surgeon: Tae Trinidad DO;  Location: [facility];  Service: Plastics;  Laterality: Bilateral;  Bilat TE removal - Implant placement    TONSILLECTOMY      TYMPANOSTOMY TUBE PLACEMENT      x6    WOUND DEBRIDEMENT Bilateral 10/17/2022    Procedure: DEBRIDEMENT, WOUND;  Surgeon: Tae Trinidad DO;  Location: [facility];  Service: Plastics;  Laterality: Bilateral;  1.5 HRS       GYN & OB History  Patient's last menstrual period was 2001 (approximate).   Date of Last Pap: No result found    OB History    Para Term  AB Living   2 2 2     2   SAB IAB Ectopic Multiple Live Births                  # Outcome Date GA Lbr Brandon/2nd Weight Sex Delivery Anes PTL Lv   2 Term            1 Term                Health Maintenance         Date Due Completion Date    TETANUS VACCINE Never done ---    COVID-19 Vaccine (4 - Moderna  series) 2022    Colorectal Cancer Screening 2023    Influenza Vaccine (1) 2023 10/21/2021    Lipid Panel 2024            Family History   Problem Relation Age of Onset    Cancer Maternal Grandmother 83        origin? brain (mets?)    Lung cancer Maternal Grandfather         long-term smoker    Hepatitis Father         cirrhosis    Hypertension Father     Genetic Disorder Mother         JAK2+ ET    Lung cancer Mother 63        ~30-yr smoker    Skin cancer Mother 67        mole nasal bridge (type?); maybe also one on hip    Hypertension Mother     Esophageal cancer Mother     Hodgkin's lymphoma Brother     Esophageal cancer Brother     Hypertension Sister     Von Willebrand disease Sister     Hemophilia Daughter         hemophilia A    Von Willebrand disease Daughter     Fibrocystic breast disease Maternal Aunt     Lung cancer Maternal Aunt         believes was smoker       Social History     Socioeconomic History    Marital status:    Tobacco Use    Smoking status: Former     Packs/day: 0.50     Years: 22.00     Pack years: 11.00     Types: Cigarettes     Quit date: 5/10/2016     Years since quittin.1    Smokeless tobacco: Never   Substance and Sexual Activity    Alcohol use: No    Drug use: No    Sexual activity: Yes     Partners: Male     Birth control/protection: None     Social Determinants of Health     Financial Resource Strain: Low Risk     Difficulty of Paying Living Expenses: Not very hard   Food Insecurity: Unknown    Worried About Running Out of Food in the Last Year: Never true    Ran Out of Food in the Last Year: Patient refused   Transportation Needs: No Transportation Needs    Lack of Transportation (Medical): No    Lack of Transportation (Non-Medical): No   Physical Activity: Insufficiently Active    Days of Exercise per Week: 2 days    Minutes of Exercise per Session: 30 min   Stress: Stress Concern Present    Feeling of Stress : Rather  much   Social Connections: Moderately Isolated    Frequency of Communication with Friends and Family: More than three times a week    Frequency of Social Gatherings with Friends and Family: Once a week    Attends Zoroastrian Services: Never    Active Member of Clubs or Organizations: No    Attends Club or Organization Meetings: 1 to 4 times per year    Marital Status:    Housing Stability: Low Risk     Unable to Pay for Housing in the Last Year: No    Number of Places Lived in the Last Year: 1    Unstable Housing in the Last Year: No       Review of Systems  Review of Systems  Urinary incontinence and prolapse urinary incontinence and prolapse     Objective:   /80 (BP Location: Right arm, Patient Position: Sitting, BP Method: Medium (Automatic))   Pulse 87   Wt 52.4 kg (115 lb 8.3 oz)   LMP 06/01/2001 (Approximate)   BMI 22.56 kg/m²     Physical Exam   Stage II anterior defect with UV hypermobility  Left lab via minora qualifies for hypertrophy I believe I am going to have a colleague verify this.    I did discuss this with Dr. Sethi please see her note    Patient complains of urinary frequency I did secondary to the level of the anterior compartment defect move forward with straight catheterization after void I did not capture the initial void but postvoid residual was less than 20 cc  Assessment:     1. Stress incontinence    2. Labia minora hypertrophy            Plan:     1. Stress incontinence    2. Labia minora hypertrophy      After examination had patient presented my office for we reviewed her anatomy utilizing [employer] interactive web site.    I did discuss this with her compare that with normal anatomy.    From there we discussed options I do believe that this will have to be a robotic approach sacral colpopexy the anterior compartment is with significant UV mobility needs to be stabilized 1st I do think there might be a graft on the posterior aspect which I will perform a  union on between sacral colpopexy and posterior graft.    She patient is well aware that we will be doing her incontinence procedure separately once she heals from this surgery.    I will go ahead and order urodynamics 6 weeks from September 8th to facilitate that.    Additionally I do believe that Dr. Sethi will assist in addressing lab via minora hypertrophy please see the her note regarding this.        All risks benefits of surgery discussed will move forward with this for September 8th  There are no Patient Instructions on file for this visit.    Expectant Management:  Patient understands we will continue to monitor her level of anatomic distortion without any type of active intervention until a time where symptomatology can no longer be tolerated by patient and she wishes to have active management.    Conservative Therapy:  Patient understands she will be utilizing a a supportive device within the vagina which will need maintenance.  The clinic will support her in her maintenance of the pessary.  Patient understands this is an active process which will need her input to maintain the pessary properly.     Surgical Management:

## 2023-07-16 LAB — BACTERIA UR CULT: NO GROWTH

## 2023-08-12 ENCOUNTER — PATIENT MESSAGE (OUTPATIENT)
Dept: FAMILY MEDICINE | Facility: CLINIC | Age: 48
End: 2023-08-12
Payer: MEDICAID

## 2023-08-16 ENCOUNTER — TELEPHONE (OUTPATIENT)
Dept: FAMILY MEDICINE | Facility: CLINIC | Age: 48
End: 2023-08-16
Payer: MEDICAID

## 2023-08-16 NOTE — TELEPHONE ENCOUNTER
Spoke with patient I regards to scheduling an appointment for surgery clearance. Patient was scheduled for 8/29 at 10:00 am with Dr. Pastor.

## 2023-08-16 NOTE — TELEPHONE ENCOUNTER
----- Message from Leeann Baker sent at 8/16/2023  9:00 AM CDT -----  Contact: [phone] Patient  No blue slot available to schedule an appointment for the patient.  Patient is established with which PCP: Dr Jb Pastor  Reason for the visit:  Need medical release for upcoming surgery on 9/8/23  Would the patient like a call back, or a response through their [facility] portal?:  Call Back Patient Please. Thank you

## 2023-08-17 ENCOUNTER — OFFICE VISIT (OUTPATIENT)
Dept: PLASTIC SURGERY | Facility: CLINIC | Age: 48
End: 2023-08-17
Payer: MEDICAID

## 2023-08-17 VITALS — SYSTOLIC BLOOD PRESSURE: 125 MMHG | TEMPERATURE: 92 F | DIASTOLIC BLOOD PRESSURE: 87 MMHG

## 2023-08-17 DIAGNOSIS — Z09 SURGERY FOLLOW-UP EXAMINATION: Primary | ICD-10-CM

## 2023-08-17 PROCEDURE — 99999 PR PBB SHADOW E&M-EST. PATIENT-LVL III: ICD-10-PCS | Mod: PBBFAC,,, | Performed by: SURGERY

## 2023-08-17 PROCEDURE — 1159F PR MEDICATION LIST DOCUMENTED IN MEDICAL RECORD: ICD-10-PCS | Mod: CPTII,,, | Performed by: SURGERY

## 2023-08-17 PROCEDURE — 99212 PR OFFICE/OUTPT VISIT, EST, LEVL II, 10-19 MIN: ICD-10-PCS | Mod: S$PBB,,, | Performed by: SURGERY

## 2023-08-17 PROCEDURE — 3074F PR MOST RECENT SYSTOLIC BLOOD PRESSURE < 130 MM HG: ICD-10-PCS | Mod: CPTII,,, | Performed by: SURGERY

## 2023-08-17 PROCEDURE — 99999 PR PBB SHADOW E&M-EST. PATIENT-LVL III: CPT | Mod: PBBFAC,,, | Performed by: SURGERY

## 2023-08-17 PROCEDURE — 99212 OFFICE O/P EST SF 10 MIN: CPT | Mod: S$PBB,,, | Performed by: SURGERY

## 2023-08-17 PROCEDURE — 3079F DIAST BP 80-89 MM HG: CPT | Mod: CPTII,,, | Performed by: SURGERY

## 2023-08-17 PROCEDURE — 3074F SYST BP LT 130 MM HG: CPT | Mod: CPTII,,, | Performed by: SURGERY

## 2023-08-17 PROCEDURE — 1159F MED LIST DOCD IN RCRD: CPT | Mod: CPTII,,, | Performed by: SURGERY

## 2023-08-17 PROCEDURE — 99213 OFFICE O/P EST LOW 20 MIN: CPT | Mod: PBBFAC | Performed by: SURGERY

## 2023-08-17 PROCEDURE — 3079F PR MOST RECENT DIASTOLIC BLOOD PRESSURE 80-89 MM HG: ICD-10-PCS | Mod: CPTII,,, | Performed by: SURGERY

## 2023-08-17 NOTE — PROGRESS NOTES
Dina Sosa presents to Plastic Surgery Clinic for a follow up visit status post bilateral submuscular implant exchange with liposuction and fat grafting to the breasts on 4/28/23. She has 520cc high profile boost implants. She is doing well today with no issues since her last visit. She is happy with the size of her implants. She has vertical incision dog ears that she'd like to address. She is otherwise happy. She is planning to have elective surgery for pelvic floor dysfunction 9/8. She denies fever, chills, nausea, vomiting or other systemic signs of infection.     ROS - negative, other than stated above    PHYSICAL EXAMINATION  R breast - incision well healed, soft supple implant, nipple absent  L breast - incision well healed, soft supple implant, nipple absent    ASSESSMENT/PLAN  48 y.o. F s/p bilateral implant exchange  - Doing well, no issues.   - Will book minor procedure for dog ear excision  - Discussed additional options for revision of her reconstruction including placement of larger implants and skin tailoring, but she is happy and not interested in revision at this time  - Follow up 3 months      All questions were answered. The patient was advised to contact the clinic with any questions or concerns prior to their next visit.       Doreen Garcia PA-C  Plastic and Reconstructive Surgery

## 2023-08-23 ENCOUNTER — HOSPITAL ENCOUNTER (OUTPATIENT)
Dept: PREADMISSION TESTING | Facility: OTHER | Age: 48
Discharge: HOME | End: 2023-08-23
Attending: OBSTETRICS & GYNECOLOGY | Admitting: OBSTETRICS & GYNECOLOGY
Payer: MEDICAID

## 2023-08-23 ENCOUNTER — ANESTHESIA EVENT (OUTPATIENT)
Dept: SURGERY | Facility: OTHER | Age: 48
End: 2023-08-23
Payer: MEDICAID

## 2023-08-23 VITALS
HEART RATE: 92 BPM | WEIGHT: 113 LBS | HEIGHT: 60 IN | SYSTOLIC BLOOD PRESSURE: 124 MMHG | BODY MASS INDEX: 22.19 KG/M2 | OXYGEN SATURATION: 99 % | DIASTOLIC BLOOD PRESSURE: 86 MMHG

## 2023-08-23 RX ORDER — SODIUM CHLORIDE, SODIUM LACTATE, POTASSIUM CHLORIDE, CALCIUM CHLORIDE 600; 310; 30; 20 MG/100ML; MG/100ML; MG/100ML; MG/100ML
INJECTION, SOLUTION INTRAVENOUS CONTINUOUS
Status: CANCELLED | OUTPATIENT
Start: 2023-08-23

## 2023-08-23 RX ORDER — LIDOCAINE HYDROCHLORIDE 10 MG/ML
0.5 INJECTION, SOLUTION EPIDURAL; INFILTRATION; INTRACAUDAL; PERINEURAL ONCE
Status: CANCELLED | OUTPATIENT
Start: 2023-08-23 | End: 2023-08-23

## 2023-08-23 RX ORDER — ACETAMINOPHEN 500 MG
1000 TABLET ORAL
Status: CANCELLED | OUTPATIENT
Start: 2023-08-23 | End: 2023-08-23

## 2023-08-23 NOTE — DISCHARGE INSTRUCTIONS
Information to Prepare you for your Surgery    PRE-ADMIT TESTING -  [phone]    [address] [facility]          Your surgery has been scheduled at [facility]. We are pleased to have the opportunity to serve you. For Further Information please call [phone].    On the day of surgery please report to the Information Desk on the 1st floor.    CONTACT YOUR PHYSICIAN'S OFFICE THE DAY PRIOR TO YOUR SURGERY TO OBTAIN YOUR ARRIVAL TIME.     The evening before surgery do not eat anything after 9 p.m. ( this includes hard candy, chewing gum and mints).  You may only have GATORADE, POWERADE AND WATER  from 9 p.m. until you leave your home.   DO NOT DRINK ANY LIQUIDS ON THE WAY TO THE HOSPITAL.      Why does your anesthesiologist allow you to drink Gatorade/Powerade before surgery?  Gatorade/Powerade helps to increase your comfort before surgery and to decrease your nausea after surgery. The carbohydrates in Gatorade/Powerade help reduce your body's stress response to surgery.  If you are a diabetic-drink only water prior to surgery.       Patients may have 2 visitors pre and post procedure. Only 2 visitors will be allowed in the Surgical building with the patient. No one under the age of 12 will be allowed into the facility.    SPECIAL MEDICATION INSTRUCTIONS: TAKE medications checked off by the Anesthesiologist on your Medication List.    Angiogram Patients: Take medications as instructed by your physician, including aspirin.     Surgery Patients:    If you take ASPIRIN - Your PHYSICIAN/SURGEON will need to inform you IF/OR when you need to stop taking aspirin prior to your surgery.     The week prior to surgery do not ot take any medications containing IBUPROFEN or NSAIDS ( Advil, Motrin, Goodys, BC, Aleve, Naproxen etc) If you are not sure if you should take a medicine please call your surgeon's office.  Ok to take Tylenol    Do Not Wear any make-up  (especially eye make-up) to surgery. Please remove any false eyelashes or eyelash extensions. If you arrive the day of surgery with makeup/eyelashes on you will be required to remove prior to surgery. (There is a risk of corneal abrasions if eye makeup/eyelash extensions are not removed)      Leave all valuables at home.   Do Not wear any jewelry or watches, including any metal in body piercings. Jewelry must be removed prior to coming to the hospital.  There is a possibility that rings that are unable to be removed may be cut off if they are on the surgical extremity.    Please remove all hair extensions, wigs, clips and any other metal accessories/ ornaments from your hair.  These items may pose a flammable/fire risk in Surgery and must be removed.    Do not shave your surgical area at least 5 days prior to your surgery. The surgical prep will be performed at the hospital according to Infection Control regulations.    Contact Lens must be removed before surgery. Either do not wear the contact lens or bring a case and solution for storage.  Please bring a container for eyeglasses or dentures as required.  Bring any paperwork your physician has provided, such as consent forms,  history and physicals, doctor's orders, etc.   Bring comfortable clothes that are loose fitting to wear upon discharge. Take into consideration the type of surgery being performed.  Maintain your diet as advised per your physician the day prior to surgery.      Adequate rest the night before surgery is advised.   Park in the Parking lot behind the hospital or in the [facility] across the street from the parking lot. Parking is complimentary.  If you will be discharged the same day as your procedure, please arrange for a responsible adult to drive you home or to accompany you if traveling by taxi.   YOU WILL NOT BE PERMITTED TO DRIVE OR TO LEAVE THE HOSPITAL ALONE AFTER SURGERY.   If you are being discharged the same day, it is  strongly recommended that you arrange for someone to remain with you for the first 24 hrs following your surgery.    The Surgeon will speak to your family/visitor after your surgery regarding the outcome of your surgery and post op care.  The Surgeon may speak to you after your surgery, but there is a possibility you may not remember the details.  Please check with your family members regarding the conversation with the Surgeon.    We strongly recommend whoever is bringing you home be present for discharge instructions.  This will ensure a thorough understanding for your post op home care.    ALL CHILDREN MUST ALWAYS BE ACCOMPANIED BY AN ADULT.    Visitors-Refer to current Visitor policy handouts.    Thank you for your cooperation.  The Staff of [facility].            Bathing Instructions with Hibiclens    Shower the evening before and morning of your procedure with Chlorhexidine (Hibiclens)  do not use Chlorhexidine on your face or genitals. Do not get in your eyes.  Wash your face with water and your regular face wash/soap  Use your regular shampoo  Apply Chlorhexidine (Hibiclens) directly on your skin or on a wet washcloth and wash gently. When showering: Move away from the shower stream when applying Chlorhexidine (Hibiclens) to avoid rinsing off too soon.  Rinse thoroughly with warm water  Do not dilute Chlorhexidine (Hibiclens)   Dry off as usual, do not use any deodorant, powder, body lotions, perfume, after shave or cologne.

## 2023-08-23 NOTE — ANESTHESIA PREPROCEDURE EVALUATION
08/23/2023  Dina Sosa is a 48 y.o., female.      Pre-op Assessment    I have reviewed the Patient Summary Reports.     I have reviewed the Nursing Notes. I have reviewed the NPO Status.   I have reviewed the Medications.     Review of Systems  Anesthesia Hx:  Denies Family Hx of Anesthesia complications.   Denies Personal Hx of Anesthesia complications.   Social:  Former Smoker    Hematology/Oncology:         -- Anemia: Current/Recent Cancer. Breast right and bilateral no axillary node dissection no lymphedema   EENT/Dental:EENT/Dental Normal   Cardiovascular:   hyperlipidemia    Pulmonary:  Pulmonary Normal    Renal/:  Renal/ Normal     Hepatic/GI:   GERD    Musculoskeletal:   S/p cervical disc surgery Spine Disorders: cervical    Neurological:   Neuromuscular Disease, Headaches   Chronic Pain Syndrome   Endocrine:  Endocrine Normal    Dermatological:  Skin Normal    Psych:   Psychiatric History anxiety depression          Physical Exam  General: Well nourished, Cooperative, Alert and Oriented    Airway:  Mallampati: II   Mouth Opening: Normal  TM Distance: Normal  Tongue: Normal  Neck ROM: Extension Decreased    Dental:  Intact        Anesthesia Plan  Type of Anesthesia, risks & benefits discussed:    Anesthesia Type: Gen ETT  Intra-op Monitoring Plan: Standard ASA Monitors  Post Op Pain Control Plan: multimodal analgesia  Induction:  IV  Airway Plan: Video, Post-Induction  Informed Consent: Informed consent signed with the Patient and all parties understand the risks and agree with anesthesia plan.  All questions answered.   ASA Score: 2  Anesthesia Plan Notes: CBC, BMP, T&S    Labs normal    Ready For Surgery From Anesthesia Perspective.     .

## 2023-08-29 ENCOUNTER — OFFICE VISIT (OUTPATIENT)
Dept: FAMILY MEDICINE | Facility: CLINIC | Age: 48
End: 2023-08-29
Payer: MEDICAID

## 2023-08-29 VITALS
HEIGHT: 60 IN | HEART RATE: 80 BPM | SYSTOLIC BLOOD PRESSURE: 126 MMHG | WEIGHT: 115.94 LBS | OXYGEN SATURATION: 99 % | DIASTOLIC BLOOD PRESSURE: 84 MMHG | BODY MASS INDEX: 22.76 KG/M2

## 2023-08-29 DIAGNOSIS — Z12.11 COLON CANCER SCREENING: ICD-10-CM

## 2023-08-29 DIAGNOSIS — Z01.818 PRE-OP EVALUATION: Primary | ICD-10-CM

## 2023-08-29 DIAGNOSIS — E78.2 MIXED HYPERLIPIDEMIA: ICD-10-CM

## 2023-08-29 DIAGNOSIS — F41.9 ANXIETY: ICD-10-CM

## 2023-08-29 PROCEDURE — 3074F SYST BP LT 130 MM HG: CPT | Mod: CPTII,,, | Performed by: INTERNAL MEDICINE

## 2023-08-29 PROCEDURE — 99214 OFFICE O/P EST MOD 30 MIN: CPT | Mod: S$PBB,,, | Performed by: INTERNAL MEDICINE

## 2023-08-29 PROCEDURE — 99214 PR OFFICE/OUTPT VISIT, EST, LEVL IV, 30-39 MIN: ICD-10-PCS | Mod: S$PBB,,, | Performed by: INTERNAL MEDICINE

## 2023-08-29 PROCEDURE — 99213 OFFICE O/P EST LOW 20 MIN: CPT | Mod: PBBFAC,PO | Performed by: INTERNAL MEDICINE

## 2023-08-29 PROCEDURE — 3074F PR MOST RECENT SYSTOLIC BLOOD PRESSURE < 130 MM HG: ICD-10-PCS | Mod: CPTII,,, | Performed by: INTERNAL MEDICINE

## 2023-08-29 PROCEDURE — 1159F MED LIST DOCD IN RCRD: CPT | Mod: CPTII,,, | Performed by: INTERNAL MEDICINE

## 2023-08-29 PROCEDURE — 1159F PR MEDICATION LIST DOCUMENTED IN MEDICAL RECORD: ICD-10-PCS | Mod: CPTII,,, | Performed by: INTERNAL MEDICINE

## 2023-08-29 PROCEDURE — 3008F PR BODY MASS INDEX (BMI) DOCUMENTED: ICD-10-PCS | Mod: CPTII,,, | Performed by: INTERNAL MEDICINE

## 2023-08-29 PROCEDURE — 1160F RVW MEDS BY RX/DR IN RCRD: CPT | Mod: CPTII,,, | Performed by: INTERNAL MEDICINE

## 2023-08-29 PROCEDURE — 3079F PR MOST RECENT DIASTOLIC BLOOD PRESSURE 80-89 MM HG: ICD-10-PCS | Mod: CPTII,,, | Performed by: INTERNAL MEDICINE

## 2023-08-29 PROCEDURE — 1160F PR REVIEW ALL MEDS BY PRESCRIBER/CLIN PHARMACIST DOCUMENTED: ICD-10-PCS | Mod: CPTII,,, | Performed by: INTERNAL MEDICINE

## 2023-08-29 PROCEDURE — 99999 PR PBB SHADOW E&M-EST. PATIENT-LVL III: CPT | Mod: PBBFAC,,, | Performed by: INTERNAL MEDICINE

## 2023-08-29 PROCEDURE — 3008F BODY MASS INDEX DOCD: CPT | Mod: CPTII,,, | Performed by: INTERNAL MEDICINE

## 2023-08-29 PROCEDURE — 3079F DIAST BP 80-89 MM HG: CPT | Mod: CPTII,,, | Performed by: INTERNAL MEDICINE

## 2023-08-29 PROCEDURE — 99999 PR PBB SHADOW E&M-EST. PATIENT-LVL III: ICD-10-PCS | Mod: PBBFAC,,, | Performed by: INTERNAL MEDICINE

## 2023-08-29 NOTE — PROGRESS NOTES
[facility]  Primary Care   [address] [facility] [address]       Patient ID: Dina Sosa     Chief Complaint:   Chief Complaint   Patient presents with    Follow-up     Surgery clearance.         HPI: Pre-op evaluation in advance of a pelvic floor prolapse surgery by Dr. Skaggs in a few weeks. Overall, she is healthy and is without Chest Pain or recent illnesses. She is cleared for surgery.   Fit Kit for Colon cancer screening.   Will Recheck lipids in a few weeks.     Review of Systems       Urinary incontinence     Objective:      Physical Exam   Physical Exam       Normal     Vitals:   Vitals:    08/29/23 1018   BP: 126/84   Pulse: 80   SpO2: 99%   Weight: 52.6 kg (115 lb 15.4 oz)   Height: 5' (1.524 m)        Assessment:           Plan:       Dina Sosa  was seen today for follow-up and may need lab work.    Diagnoses and all orders for this visit:    Dina was seen today for follow-up.    Diagnoses and all orders for this visit:    Pre-op evaluation    Colon cancer screening  -     Fecal Immunochemical Test (iFOBT); Future    Mixed hyperlipidemia  Recheck labs in a few weeks     Anxiety  Controlled with david Pastor MD

## 2023-08-29 NOTE — LETTER
August 29, 2023        Wu Skaggs MD  [address]  [address]  [facility] [address]             [facility]  [address] [place] [address]  Phone: [phone]  Fax: [phone]   Patient: Dina Sosa   MR Number: [id number]   YOB: 1975   Date of Visit: 8/29/2023         Dear Dr. Skaggs,    Thank you for referring Dina Sosa to me for pre-op evaluation. She is overall healthy and I have no problem clearing her for surgery. I leave it to you to get whatever pre-op labs / images you see fit.      If you have questions, please do not hesitate to call me. I look forward to following Dina along with you.    Sincerely,      Jb Pastor MD           CC    No Recipients

## 2023-09-04 ENCOUNTER — PATIENT MESSAGE (OUTPATIENT)
Dept: SURGERY | Facility: OTHER | Age: 48
End: 2023-09-04
Payer: MEDICAID

## 2023-09-07 DIAGNOSIS — N99.3 PROLAPSE OF VAGINAL VAULT AFTER HYSTERECTOMY: Primary | ICD-10-CM

## 2023-09-07 DIAGNOSIS — N99.3 PROLAPSE OF VAGINAL CUFF AFTER HYSTERECTOMY: ICD-10-CM

## 2023-09-08 ENCOUNTER — ANESTHESIA (OUTPATIENT)
Dept: SURGERY | Facility: OTHER | Age: 48
End: 2023-09-08
Payer: MEDICAID

## 2023-09-08 ENCOUNTER — HOSPITAL ENCOUNTER (OUTPATIENT)
Facility: OTHER | Age: 48
Discharge: HOME | End: 2023-09-09
Attending: OBSTETRICS & GYNECOLOGY | Admitting: OBSTETRICS & GYNECOLOGY
Payer: MEDICAID

## 2023-09-08 DIAGNOSIS — N99.3 PROLAPSE OF VAGINAL CUFF AFTER HYSTERECTOMY: ICD-10-CM

## 2023-09-08 DIAGNOSIS — Z98.890 S/P SACROCOLPOPEXY: Primary | ICD-10-CM

## 2023-09-08 DIAGNOSIS — N99.3 PROLAPSE OF VAGINAL VAULT AFTER HYSTERECTOMY: ICD-10-CM

## 2023-09-08 DIAGNOSIS — N90.60 LABIA MINORA HYPERTROPHY: ICD-10-CM

## 2023-09-08 LAB
ABO + RH BLD: NORMAL
ANION GAP SERPL CALC-SCNC: 7 MMOL/L (ref 8–16)
BASOPHILS # BLD AUTO: 0.07 K/UL (ref 0–0.2)
BASOPHILS NFR BLD: 0.8 % (ref 0–1.9)
BLD GP AB SCN CELLS X3 SERPL QL: NORMAL
BUN SERPL-MCNC: 13 MG/DL (ref 6–20)
CALCIUM SERPL-MCNC: 9.5 MG/DL (ref 8.7–10.5)
CHLORIDE SERPL-SCNC: 103 MMOL/L (ref 95–110)
CO2 SERPL-SCNC: 27 MMOL/L (ref 23–29)
CREAT SERPL-MCNC: 0.8 MG/DL (ref 0.5–1.4)
DIFFERENTIAL METHOD: ABNORMAL
EOSINOPHIL # BLD AUTO: 0.1 K/UL (ref 0–0.5)
EOSINOPHIL NFR BLD: 1.2 % (ref 0–8)
ERYTHROCYTE [DISTWIDTH] IN BLOOD BY AUTOMATED COUNT: 13.2 % (ref 11.5–14.5)
EST. GFR  (NO RACE VARIABLE): >60 ML/MIN/1.73 M^2
GLUCOSE SERPL-MCNC: 85 MG/DL (ref 70–110)
HCT VFR BLD AUTO: 44.1 % (ref 37–48.5)
HGB BLD-MCNC: 13.9 G/DL (ref 12–16)
IMM GRANULOCYTES # BLD AUTO: 0.04 K/UL (ref 0–0.04)
IMM GRANULOCYTES NFR BLD AUTO: 0.5 % (ref 0–0.5)
LYMPHOCYTES # BLD AUTO: 2.4 K/UL (ref 1–4.8)
LYMPHOCYTES NFR BLD: 27.9 % (ref 18–48)
MCH RBC QN AUTO: 27.5 PG (ref 27–31)
MCHC RBC AUTO-ENTMCNC: 31.5 G/DL (ref 32–36)
MCV RBC AUTO: 87 FL (ref 82–98)
MONOCYTES # BLD AUTO: 0.6 K/UL (ref 0.3–1)
MONOCYTES NFR BLD: 6.7 % (ref 4–15)
NEUTROPHILS # BLD AUTO: 5.3 K/UL (ref 1.8–7.7)
NEUTROPHILS NFR BLD: 62.9 % (ref 38–73)
NRBC BLD-RTO: 0 /100 WBC
PLATELET # BLD AUTO: 366 K/UL (ref 150–450)
PMV BLD AUTO: 8.9 FL (ref 9.2–12.9)
POTASSIUM SERPL-SCNC: 3.9 MMOL/L (ref 3.5–5.1)
RBC # BLD AUTO: 5.06 M/UL (ref 4–5.4)
SODIUM SERPL-SCNC: 137 MMOL/L (ref 136–145)
SPECIMEN OUTDATE: NORMAL
WBC # BLD AUTO: 8.46 K/UL (ref 3.9–12.7)

## 2023-09-08 PROCEDURE — 25000003 PHARM REV CODE 250: Performed by: ANESTHESIOLOGY

## 2023-09-08 PROCEDURE — 50949 PR URETEROLYSIS W/RETROPERITONEAL FIBROSIS: ICD-10-PCS | Mod: ,,, | Performed by: OBSTETRICS & GYNECOLOGY

## 2023-09-08 PROCEDURE — 63600175 PHARM REV CODE 636 W HCPCS

## 2023-09-08 PROCEDURE — C9290 INJ, BUPIVACAINE LIPOSOME: HCPCS | Performed by: OBSTETRICS & GYNECOLOGY

## 2023-09-08 PROCEDURE — 94799 UNLISTED PULMONARY SVC/PX: CPT

## 2023-09-08 PROCEDURE — 36000713 HC OR TIME LEV V EA ADD 15 MIN: Performed by: OBSTETRICS & GYNECOLOGY

## 2023-09-08 PROCEDURE — 56620 PR PART SIMPLE REMV VULVA: ICD-10-PCS | Mod: ,,, | Performed by: OBSTETRICS & GYNECOLOGY

## 2023-09-08 PROCEDURE — D9220A PRA ANESTHESIA: Mod: ANES,,, | Performed by: ANESTHESIOLOGY

## 2023-09-08 PROCEDURE — 56620 VULVECTOMY SIMPLE PARTIAL: CPT | Mod: ,,, | Performed by: OBSTETRICS & GYNECOLOGY

## 2023-09-08 PROCEDURE — 88305 TISSUE EXAM BY PATHOLOGIST: CPT | Mod: 26,,, | Performed by: PATHOLOGY

## 2023-09-08 PROCEDURE — 71000033 HC RECOVERY, INTIAL HOUR: Performed by: OBSTETRICS & GYNECOLOGY

## 2023-09-08 PROCEDURE — 88305 TISSUE EXAM BY PATHOLOGIST: ICD-10-PCS | Mod: 26,,, | Performed by: PATHOLOGY

## 2023-09-08 PROCEDURE — 63600175 PHARM REV CODE 636 W HCPCS: Performed by: NURSE ANESTHETIST, CERTIFIED REGISTERED

## 2023-09-08 PROCEDURE — 63600175 PHARM REV CODE 636 W HCPCS: Performed by: ANESTHESIOLOGY

## 2023-09-08 PROCEDURE — 88302 PR  SURG PATH,LEVEL II: ICD-10-PCS | Mod: 26,,, | Performed by: PATHOLOGY

## 2023-09-08 PROCEDURE — 25000003 PHARM REV CODE 250

## 2023-09-08 PROCEDURE — 50949 UNLISTED LAPS PX URETER: CPT | Mod: ,,, | Performed by: OBSTETRICS & GYNECOLOGY

## 2023-09-08 PROCEDURE — 88302 TISSUE EXAM BY PATHOLOGIST: CPT | Mod: 26,,, | Performed by: PATHOLOGY

## 2023-09-08 PROCEDURE — 36415 COLL VENOUS BLD VENIPUNCTURE: CPT | Performed by: OBSTETRICS & GYNECOLOGY

## 2023-09-08 PROCEDURE — D9220A PRA ANESTHESIA: ICD-10-PCS | Mod: CRNA,,, | Performed by: NURSE ANESTHETIST, CERTIFIED REGISTERED

## 2023-09-08 PROCEDURE — C1781 MESH (IMPLANTABLE): HCPCS | Performed by: OBSTETRICS & GYNECOLOGY

## 2023-09-08 PROCEDURE — 25000003 PHARM REV CODE 250: Performed by: NURSE ANESTHETIST, CERTIFIED REGISTERED

## 2023-09-08 PROCEDURE — 58661 PR LAP,RMV  ADNEXAL STRUCTURE: ICD-10-PCS | Mod: 50,51,, | Performed by: OBSTETRICS & GYNECOLOGY

## 2023-09-08 PROCEDURE — 27201423 OPTIME MED/SURG SUP & DEVICES STERILE SUPPLY: Performed by: OBSTETRICS & GYNECOLOGY

## 2023-09-08 PROCEDURE — 88305 TISSUE EXAM BY PATHOLOGIST: CPT | Performed by: PATHOLOGY

## 2023-09-08 PROCEDURE — 63600175 PHARM REV CODE 636 W HCPCS: Performed by: OBSTETRICS & GYNECOLOGY

## 2023-09-08 PROCEDURE — D9220A PRA ANESTHESIA: Mod: CRNA,,, | Performed by: NURSE ANESTHETIST, CERTIFIED REGISTERED

## 2023-09-08 PROCEDURE — 94761 N-INVAS EAR/PLS OXIMETRY MLT: CPT

## 2023-09-08 PROCEDURE — 99900035 HC TECH TIME PER 15 MIN (STAT)

## 2023-09-08 PROCEDURE — 80048 BASIC METABOLIC PNL TOTAL CA: CPT | Performed by: OBSTETRICS & GYNECOLOGY

## 2023-09-08 PROCEDURE — 25000003 PHARM REV CODE 250: Performed by: OBSTETRICS & GYNECOLOGY

## 2023-09-08 PROCEDURE — 57426 REVISE PROSTH VAG GRAFT LAP: CPT | Mod: 51,,, | Performed by: OBSTETRICS & GYNECOLOGY

## 2023-09-08 PROCEDURE — 37000009 HC ANESTHESIA EA ADD 15 MINS: Performed by: OBSTETRICS & GYNECOLOGY

## 2023-09-08 PROCEDURE — 85025 COMPLETE CBC W/AUTO DIFF WBC: CPT | Performed by: OBSTETRICS & GYNECOLOGY

## 2023-09-08 PROCEDURE — 86850 RBC ANTIBODY SCREEN: CPT | Performed by: OBSTETRICS & GYNECOLOGY

## 2023-09-08 PROCEDURE — 57425 PR LAPAROSCOPY, SURG, COLPOPEXY: ICD-10-PCS | Mod: ,,, | Performed by: OBSTETRICS & GYNECOLOGY

## 2023-09-08 PROCEDURE — 36000712 HC OR TIME LEV V 1ST 15 MIN: Performed by: OBSTETRICS & GYNECOLOGY

## 2023-09-08 PROCEDURE — 57426 PR REVISION PROSTHETIC VAGINAL GRAFT LAPAROSCOPIC: ICD-10-PCS | Mod: 51,,, | Performed by: OBSTETRICS & GYNECOLOGY

## 2023-09-08 PROCEDURE — 88302 TISSUE EXAM BY PATHOLOGIST: CPT | Performed by: PATHOLOGY

## 2023-09-08 PROCEDURE — 71000039 HC RECOVERY, EACH ADD'L HOUR: Performed by: OBSTETRICS & GYNECOLOGY

## 2023-09-08 PROCEDURE — 37000008 HC ANESTHESIA 1ST 15 MINUTES: Performed by: OBSTETRICS & GYNECOLOGY

## 2023-09-08 PROCEDURE — D9220A PRA ANESTHESIA: ICD-10-PCS | Mod: ANES,,, | Performed by: ANESTHESIOLOGY

## 2023-09-08 PROCEDURE — 57425 LAPAROSCOPY SURG COLPOPEXY: CPT | Mod: ,,, | Performed by: OBSTETRICS & GYNECOLOGY

## 2023-09-08 PROCEDURE — 58661 LAPAROSCOPY REMOVE ADNEXA: CPT | Mod: 50,51,, | Performed by: OBSTETRICS & GYNECOLOGY

## 2023-09-08 DEVICE — MESH RESTORELLE Y 24X4CM: Type: IMPLANTABLE DEVICE | Site: ABDOMEN | Status: FUNCTIONAL

## 2023-09-08 RX ORDER — OXYCODONE HYDROCHLORIDE 5 MG/1
5 TABLET ORAL EVERY 4 HOURS PRN
Status: DISCONTINUED | OUTPATIENT
Start: 2023-09-08 | End: 2023-09-09 | Stop reason: HOSPADM

## 2023-09-08 RX ORDER — LIDOCAINE HYDROCHLORIDE 20 MG/ML
INJECTION INTRAVENOUS
Status: DISCONTINUED | OUTPATIENT
Start: 2023-09-08 | End: 2023-09-08

## 2023-09-08 RX ORDER — PROPOFOL 10 MG/ML
VIAL (ML) INTRAVENOUS
Status: DISCONTINUED | OUTPATIENT
Start: 2023-09-08 | End: 2023-09-08

## 2023-09-08 RX ORDER — ONDANSETRON 2 MG/ML
4 INJECTION INTRAMUSCULAR; INTRAVENOUS EVERY 6 HOURS PRN
Status: DISCONTINUED | OUTPATIENT
Start: 2023-09-08 | End: 2023-09-09 | Stop reason: HOSPADM

## 2023-09-08 RX ORDER — SODIUM CHLORIDE, SODIUM LACTATE, POTASSIUM CHLORIDE, CALCIUM CHLORIDE 600; 310; 30; 20 MG/100ML; MG/100ML; MG/100ML; MG/100ML
INJECTION, SOLUTION INTRAVENOUS CONTINUOUS
Status: DISCONTINUED | OUTPATIENT
Start: 2023-09-08 | End: 2023-09-08

## 2023-09-08 RX ORDER — SODIUM CHLORIDE, SODIUM LACTATE, POTASSIUM CHLORIDE, CALCIUM CHLORIDE 600; 310; 30; 20 MG/100ML; MG/100ML; MG/100ML; MG/100ML
INJECTION, SOLUTION INTRAVENOUS CONTINUOUS
Status: DISCONTINUED | OUTPATIENT
Start: 2023-09-08 | End: 2023-09-09 | Stop reason: HOSPADM

## 2023-09-08 RX ORDER — FENTANYL CITRATE 50 UG/ML
INJECTION, SOLUTION INTRAMUSCULAR; INTRAVENOUS
Status: DISCONTINUED | OUTPATIENT
Start: 2023-09-08 | End: 2023-09-08

## 2023-09-08 RX ORDER — OXYCODONE HYDROCHLORIDE 5 MG/1
5 TABLET ORAL
Status: DISCONTINUED | OUTPATIENT
Start: 2023-09-08 | End: 2023-09-08 | Stop reason: HOSPADM

## 2023-09-08 RX ORDER — NALOXONE HCL 0.4 MG/ML
0.02 VIAL (ML) INJECTION
Status: DISCONTINUED | OUTPATIENT
Start: 2023-09-08 | End: 2023-09-09 | Stop reason: HOSPADM

## 2023-09-08 RX ORDER — LIDOCAINE HYDROCHLORIDE AND EPINEPHRINE 10; 10 MG/ML; UG/ML
INJECTION, SOLUTION INFILTRATION; PERINEURAL
Status: DISCONTINUED | OUTPATIENT
Start: 2023-09-08 | End: 2023-09-08 | Stop reason: HOSPADM

## 2023-09-08 RX ORDER — LIDOCAINE HYDROCHLORIDE 10 MG/ML
0.5 INJECTION, SOLUTION EPIDURAL; INFILTRATION; INTRACAUDAL; PERINEURAL ONCE
Status: DISCONTINUED | OUTPATIENT
Start: 2023-09-08 | End: 2023-09-08 | Stop reason: HOSPADM

## 2023-09-08 RX ORDER — PRIMIDONE 50 MG/1
50 TABLET ORAL NIGHTLY
Status: DISCONTINUED | OUTPATIENT
Start: 2023-09-08 | End: 2023-09-09 | Stop reason: HOSPADM

## 2023-09-08 RX ORDER — BUPROPION HYDROCHLORIDE 150 MG/1
300 TABLET ORAL DAILY
Status: DISCONTINUED | OUTPATIENT
Start: 2023-09-09 | End: 2023-09-09 | Stop reason: HOSPADM

## 2023-09-08 RX ORDER — ONDANSETRON 2 MG/ML
INJECTION INTRAMUSCULAR; INTRAVENOUS
Status: DISCONTINUED | OUTPATIENT
Start: 2023-09-08 | End: 2023-09-08

## 2023-09-08 RX ORDER — ACETAMINOPHEN 500 MG
1000 TABLET ORAL
Status: COMPLETED | OUTPATIENT
Start: 2023-09-08 | End: 2023-09-08

## 2023-09-08 RX ORDER — HYDROMORPHONE HYDROCHLORIDE 2 MG/ML
0.4 INJECTION, SOLUTION INTRAMUSCULAR; INTRAVENOUS; SUBCUTANEOUS EVERY 5 MIN PRN
Status: DISCONTINUED | OUTPATIENT
Start: 2023-09-08 | End: 2023-09-08 | Stop reason: HOSPADM

## 2023-09-08 RX ORDER — PRAVASTATIN SODIUM 20 MG/1
20 TABLET ORAL DAILY
Status: DISCONTINUED | OUTPATIENT
Start: 2023-09-09 | End: 2023-09-09 | Stop reason: HOSPADM

## 2023-09-08 RX ORDER — PANTOPRAZOLE SODIUM 40 MG/1
40 TABLET, DELAYED RELEASE ORAL DAILY
Status: DISCONTINUED | OUTPATIENT
Start: 2023-09-09 | End: 2023-09-09 | Stop reason: HOSPADM

## 2023-09-08 RX ORDER — CETIRIZINE HYDROCHLORIDE 5 MG/1
5 TABLET ORAL DAILY PRN
Status: DISCONTINUED | OUTPATIENT
Start: 2023-09-08 | End: 2023-09-09 | Stop reason: HOSPADM

## 2023-09-08 RX ORDER — ADHESIVE BANDAGE
30 BANDAGE TOPICAL 2 TIMES DAILY
Status: DISCONTINUED | OUTPATIENT
Start: 2023-09-08 | End: 2023-09-09 | Stop reason: HOSPADM

## 2023-09-08 RX ORDER — DOCUSATE SODIUM 100 MG/1
100 CAPSULE, LIQUID FILLED ORAL 2 TIMES DAILY
Status: DISCONTINUED | OUTPATIENT
Start: 2023-09-08 | End: 2023-09-09 | Stop reason: HOSPADM

## 2023-09-08 RX ORDER — TRAZODONE HYDROCHLORIDE 100 MG/1
100 TABLET ORAL NIGHTLY
Status: DISCONTINUED | OUTPATIENT
Start: 2023-09-08 | End: 2023-09-09 | Stop reason: HOSPADM

## 2023-09-08 RX ORDER — HYDROMORPHONE HYDROCHLORIDE 1 MG/ML
0.4 INJECTION, SOLUTION INTRAMUSCULAR; INTRAVENOUS; SUBCUTANEOUS
Status: DISCONTINUED | OUTPATIENT
Start: 2023-09-08 | End: 2023-09-09 | Stop reason: HOSPADM

## 2023-09-08 RX ORDER — OXYCODONE HYDROCHLORIDE 5 MG/1
10 TABLET ORAL EVERY 4 HOURS PRN
Status: DISCONTINUED | OUTPATIENT
Start: 2023-09-08 | End: 2023-09-09 | Stop reason: HOSPADM

## 2023-09-08 RX ORDER — PROCHLORPERAZINE EDISYLATE 5 MG/ML
5 INJECTION INTRAMUSCULAR; INTRAVENOUS EVERY 6 HOURS PRN
Status: DISCONTINUED | OUTPATIENT
Start: 2023-09-08 | End: 2023-09-09 | Stop reason: HOSPADM

## 2023-09-08 RX ORDER — ONDANSETRON 2 MG/ML
4 INJECTION INTRAMUSCULAR; INTRAVENOUS DAILY PRN
Status: DISCONTINUED | OUTPATIENT
Start: 2023-09-08 | End: 2023-09-08 | Stop reason: HOSPADM

## 2023-09-08 RX ORDER — CEFAZOLIN SODIUM 1 G/3ML
INJECTION, POWDER, FOR SOLUTION INTRAMUSCULAR; INTRAVENOUS
Status: DISCONTINUED | OUTPATIENT
Start: 2023-09-08 | End: 2023-09-08

## 2023-09-08 RX ORDER — KETOROLAC TROMETHAMINE 30 MG/ML
30 INJECTION, SOLUTION INTRAMUSCULAR; INTRAVENOUS ONCE
Status: COMPLETED | OUTPATIENT
Start: 2023-09-08 | End: 2023-09-08

## 2023-09-08 RX ORDER — MUPIROCIN 20 MG/G
OINTMENT TOPICAL 2 TIMES DAILY
Status: DISCONTINUED | OUTPATIENT
Start: 2023-09-08 | End: 2023-09-09 | Stop reason: HOSPADM

## 2023-09-08 RX ORDER — MEPERIDINE HYDROCHLORIDE 25 MG/ML
12.5 INJECTION INTRAMUSCULAR; INTRAVENOUS; SUBCUTANEOUS ONCE AS NEEDED
Status: DISCONTINUED | OUTPATIENT
Start: 2023-09-08 | End: 2023-09-08 | Stop reason: HOSPADM

## 2023-09-08 RX ORDER — DEXAMETHASONE SODIUM PHOSPHATE 4 MG/ML
INJECTION, SOLUTION INTRA-ARTICULAR; INTRALESIONAL; INTRAMUSCULAR; INTRAVENOUS; SOFT TISSUE
Status: DISCONTINUED | OUTPATIENT
Start: 2023-09-08 | End: 2023-09-08

## 2023-09-08 RX ORDER — IBUPROFEN 200 MG
200 TABLET ORAL EVERY 6 HOURS
Status: DISCONTINUED | OUTPATIENT
Start: 2023-09-09 | End: 2023-09-09 | Stop reason: HOSPADM

## 2023-09-08 RX ORDER — MIDAZOLAM HYDROCHLORIDE 1 MG/ML
INJECTION INTRAMUSCULAR; INTRAVENOUS
Status: DISCONTINUED | OUTPATIENT
Start: 2023-09-08 | End: 2023-09-08

## 2023-09-08 RX ORDER — SENNOSIDES 8.6 MG/1
8.6 TABLET ORAL 2 TIMES DAILY
Status: DISCONTINUED | OUTPATIENT
Start: 2023-09-08 | End: 2023-09-09 | Stop reason: HOSPADM

## 2023-09-08 RX ORDER — KETOROLAC TROMETHAMINE 30 MG/ML
15 INJECTION, SOLUTION INTRAMUSCULAR; INTRAVENOUS EVERY 6 HOURS
Status: COMPLETED | OUTPATIENT
Start: 2023-09-08 | End: 2023-09-09

## 2023-09-08 RX ORDER — GABAPENTIN 300 MG/1
300 CAPSULE ORAL 2 TIMES DAILY
Status: DISCONTINUED | OUTPATIENT
Start: 2023-09-08 | End: 2023-09-09 | Stop reason: HOSPADM

## 2023-09-08 RX ORDER — BUPIVACAINE HYDROCHLORIDE 5 MG/ML
INJECTION, SOLUTION EPIDURAL; INTRACAUDAL
Status: DISCONTINUED | OUTPATIENT
Start: 2023-09-08 | End: 2023-09-08 | Stop reason: HOSPADM

## 2023-09-08 RX ORDER — KETAMINE HCL IN 0.9 % NACL 50 MG/5 ML
SYRINGE (ML) INTRAVENOUS
Status: DISCONTINUED | OUTPATIENT
Start: 2023-09-08 | End: 2023-09-08

## 2023-09-08 RX ORDER — ROCURONIUM BROMIDE 10 MG/ML
INJECTION, SOLUTION INTRAVENOUS
Status: DISCONTINUED | OUTPATIENT
Start: 2023-09-08 | End: 2023-09-08

## 2023-09-08 RX ORDER — ACETAMINOPHEN 500 MG
1000 TABLET ORAL EVERY 6 HOURS
Status: DISCONTINUED | OUTPATIENT
Start: 2023-09-08 | End: 2023-09-09 | Stop reason: HOSPADM

## 2023-09-08 RX ORDER — SODIUM CHLORIDE 0.9 % (FLUSH) 0.9 %
3 SYRINGE (ML) INJECTION
Status: DISCONTINUED | OUTPATIENT
Start: 2023-09-08 | End: 2023-09-08 | Stop reason: HOSPADM

## 2023-09-08 RX ORDER — ZOLPIDEM TARTRATE 5 MG/1
5 TABLET ORAL NIGHTLY PRN
Status: DISCONTINUED | OUTPATIENT
Start: 2023-09-08 | End: 2023-09-09 | Stop reason: HOSPADM

## 2023-09-08 RX ADMIN — PRIMIDONE 50 MG: 50 TABLET ORAL at 08:09

## 2023-09-08 RX ADMIN — KETOROLAC TROMETHAMINE 15 MG: 30 INJECTION, SOLUTION INTRAMUSCULAR; INTRAVENOUS at 05:09

## 2023-09-08 RX ADMIN — KETOROLAC TROMETHAMINE 15 MG: 30 INJECTION, SOLUTION INTRAMUSCULAR; INTRAVENOUS at 11:09

## 2023-09-08 RX ADMIN — HYDROMORPHONE HYDROCHLORIDE 0.4 MG: 2 INJECTION INTRAMUSCULAR; INTRAVENOUS; SUBCUTANEOUS at 03:09

## 2023-09-08 RX ADMIN — LIDOCAINE HYDROCHLORIDE 100 MG: 20 INJECTION, SOLUTION INTRAVENOUS at 10:09

## 2023-09-08 RX ADMIN — SENNOSIDES 8.6 MG: 8.6 TABLET, FILM COATED ORAL at 08:09

## 2023-09-08 RX ADMIN — SUGAMMADEX 200 MG: 100 INJECTION, SOLUTION INTRAVENOUS at 03:09

## 2023-09-08 RX ADMIN — CEFAZOLIN 2 G: 1 INJECTION, POWDER, FOR SOLUTION INTRAMUSCULAR; INTRAVENOUS at 10:09

## 2023-09-08 RX ADMIN — SODIUM CHLORIDE, SODIUM LACTATE, POTASSIUM CHLORIDE, AND CALCIUM CHLORIDE: .6; .31; .03; .02 INJECTION, SOLUTION INTRAVENOUS at 10:09

## 2023-09-08 RX ADMIN — ROCURONIUM BROMIDE 50 MG: 10 INJECTION INTRAVENOUS at 10:09

## 2023-09-08 RX ADMIN — KETOROLAC TROMETHAMINE 30 MG: 30 INJECTION, SOLUTION INTRAMUSCULAR; INTRAVENOUS at 04:09

## 2023-09-08 RX ADMIN — ACETAMINOPHEN 1000 MG: 500 TABLET ORAL at 05:09

## 2023-09-08 RX ADMIN — TRAZODONE HYDROCHLORIDE 100 MG: 100 TABLET ORAL at 08:09

## 2023-09-08 RX ADMIN — CEFAZOLIN 2 G: 1 INJECTION, POWDER, FOR SOLUTION INTRAMUSCULAR; INTRAVENOUS at 02:09

## 2023-09-08 RX ADMIN — ACETAMINOPHEN 1000 MG: 500 TABLET ORAL at 08:09

## 2023-09-08 RX ADMIN — SODIUM CHLORIDE, POTASSIUM CHLORIDE, SODIUM LACTATE AND CALCIUM CHLORIDE: 600; 310; 30; 20 INJECTION, SOLUTION INTRAVENOUS at 05:09

## 2023-09-08 RX ADMIN — ONDANSETRON HYDROCHLORIDE 4 MG: 2 INJECTION INTRAMUSCULAR; INTRAVENOUS at 01:09

## 2023-09-08 RX ADMIN — FENTANYL CITRATE 50 MCG: 0.05 INJECTION, SOLUTION INTRAMUSCULAR; INTRAVENOUS at 12:09

## 2023-09-08 RX ADMIN — MAGNESIUM HYDROXIDE 2400 MG: 400 SUSPENSION ORAL at 08:09

## 2023-09-08 RX ADMIN — Medication 10 MG: at 12:09

## 2023-09-08 RX ADMIN — DEXAMETHASONE SODIUM PHOSPHATE 6 MG: 4 INJECTION INTRA-ARTICULAR; INTRALESIONAL; INTRAMUSCULAR; INTRAVENOUS; SOFT TISSUE at 10:09

## 2023-09-08 RX ADMIN — ROCURONIUM BROMIDE 20 MG: 10 INJECTION INTRAVENOUS at 11:09

## 2023-09-08 RX ADMIN — ROCURONIUM BROMIDE 30 MG: 10 INJECTION INTRAVENOUS at 12:09

## 2023-09-08 RX ADMIN — FENTANYL CITRATE 50 MCG: 0.05 INJECTION, SOLUTION INTRAMUSCULAR; INTRAVENOUS at 02:09

## 2023-09-08 RX ADMIN — Medication 25 MG: at 11:09

## 2023-09-08 RX ADMIN — DOCUSATE SODIUM 100 MG: 100 CAPSULE, LIQUID FILLED ORAL at 08:09

## 2023-09-08 RX ADMIN — SODIUM CHLORIDE, SODIUM LACTATE, POTASSIUM CHLORIDE, AND CALCIUM CHLORIDE: .6; .31; .03; .02 INJECTION, SOLUTION INTRAVENOUS at 11:09

## 2023-09-08 RX ADMIN — GABAPENTIN 300 MG: 300 CAPSULE ORAL at 08:09

## 2023-09-08 RX ADMIN — OXYCODONE HYDROCHLORIDE 10 MG: 5 TABLET ORAL at 08:09

## 2023-09-08 RX ADMIN — OXYCODONE HYDROCHLORIDE 5 MG: 5 TABLET ORAL at 03:09

## 2023-09-08 RX ADMIN — MIDAZOLAM HYDROCHLORIDE 2 MG: 1 INJECTION, SOLUTION INTRAMUSCULAR; INTRAVENOUS at 10:09

## 2023-09-08 RX ADMIN — GLYCOPYRROLATE 0.1 MG: 0.2 INJECTION, SOLUTION INTRAMUSCULAR; INTRAVITREAL at 10:09

## 2023-09-08 RX ADMIN — FENTANYL CITRATE 100 MCG: 0.05 INJECTION, SOLUTION INTRAMUSCULAR; INTRAVENOUS at 10:09

## 2023-09-08 RX ADMIN — PROPOFOL 160 MG: 10 INJECTION, EMULSION INTRAVENOUS at 10:09

## 2023-09-08 RX ADMIN — MUPIROCIN: 20 OINTMENT TOPICAL at 08:09

## 2023-09-08 RX ADMIN — ACETAMINOPHEN 1000 MG: 500 TABLET ORAL at 11:09

## 2023-09-08 NOTE — PROGRESS NOTES
Progress Note  Gynecology    Admit Date: 2023  LOS: 0    Reason for Admission:  S/P sacrocolpopexy    SUBJECTIVE:     Dina Sosa is a 48 y.o.  who is POD#1 s/p RA-SCP/LSO/R salpingectomy/Bilateral labiaplasty for the treatment of vaginal prolapse and urinary incontinence.  Pt doing well this morning. Pain is well controlled. She is tolerating a regular diet without N/V. She has not yet ambulated. Voiding without difficulty via reeder. Passing flatus. She is not yet having bowel movements.    OBJECTIVE:     Vital Signs   Temp:  [97.3 °F (36.3 °C)-99 °F (37.2 °C)] 97.6 °F (36.4 °C)  Pulse:  [] 82  Resp:  [17-20] 18  SpO2:  [90 %-100 %] 98 %  BP: (111-158)/() 111/68      Intake/Output Summary (Last 24 hours) at 2023 0659  Last data filed at 2023 0654  Gross per 24 hour   Intake 2380 ml   Output 2450 ml   Net -70 ml       Physical Exam:  Gen: A&Ox3, NAD  CV: Regular rate  Pulm: Normal respiratory effort  Abd: Soft, non-distended, non-tender to palpation without rebound or guarding  Inc: Port site incisions clean, dry, intact with band-aids and steri strips overlying  Ext: PPP, no peripheral edema, TEDs/SCDs in place  : Reeder in place draining clear yellow urine     Laboratory:  Recent Results (from the past 24 hour(s))   Type & Screen    Collection Time: 23  8:51 AM   Result Value Ref Range    Group & Rh O POS     Indirect Elliott NEG     Specimen Outdate 2023 23:59    Basic Metabolic Panel    Collection Time: 23  8:51 AM   Result Value Ref Range    Sodium 137 136 - 145 mmol/L    Potassium 3.9 3.5 - 5.1 mmol/L    Chloride 103 95 - 110 mmol/L    CO2 27 23 - 29 mmol/L    Glucose 85 70 - 110 mg/dL    BUN 13 6 - 20 mg/dL    Creatinine 0.8 0.5 - 1.4 mg/dL    Calcium 9.5 8.7 - 10.5 mg/dL    Anion Gap 7 (L) 8 - 16 mmol/L    eGFR >60 >60 mL/min/1.73 m^2   CBC Auto Differential    Collection Time: 23  8:51 AM   Result Value Ref Range    WBC 8.46 3.90 - 12.70 K/uL     RBC 5.06 4.00 - 5.40 M/uL    Hemoglobin 13.9 12.0 - 16.0 g/dL    Hematocrit 44.1 37.0 - 48.5 %    MCV 87 82 - 98 fL    MCH 27.5 27.0 - 31.0 pg    MCHC 31.5 (L) 32.0 - 36.0 g/dL    RDW 13.2 11.5 - 14.5 %    Platelets 366 150 - 450 K/uL    MPV 8.9 (L) 9.2 - 12.9 fL    Immature Granulocytes 0.5 0.0 - 0.5 %    Gran # (ANC) 5.3 1.8 - 7.7 K/uL    Immature Grans (Abs) 0.04 0.00 - 0.04 K/uL    Lymph # 2.4 1.0 - 4.8 K/uL    Mono # 0.6 0.3 - 1.0 K/uL    Eos # 0.1 0.0 - 0.5 K/uL    Baso # 0.07 0.00 - 0.20 K/uL    nRBC 0 0 /100 WBC    Gran % 62.9 38.0 - 73.0 %    Lymph % 27.9 18.0 - 48.0 %    Mono % 6.7 4.0 - 15.0 %    Eosinophil % 1.2 0.0 - 8.0 %    Basophil % 0.8 0.0 - 1.9 %    Differential Method Automated    Basic metabolic panel    Collection Time: 09/09/23  4:05 AM   Result Value Ref Range    Sodium 136 136 - 145 mmol/L    Potassium 3.9 3.5 - 5.1 mmol/L    Chloride 102 95 - 110 mmol/L    CO2 26 23 - 29 mmol/L    Glucose 109 70 - 110 mg/dL    BUN 7 6 - 20 mg/dL    Creatinine 0.7 0.5 - 1.4 mg/dL    Calcium 8.8 8.7 - 10.5 mg/dL    Anion Gap 8 8 - 16 mmol/L    eGFR >60 >60 mL/min/1.73 m^2   CBC auto differential    Collection Time: 09/09/23  4:05 AM   Result Value Ref Range    WBC 12.15 3.90 - 12.70 K/uL    RBC 4.31 4.00 - 5.40 M/uL    Hemoglobin 11.9 (L) 12.0 - 16.0 g/dL    Hematocrit 37.2 37.0 - 48.5 %    MCV 86 82 - 98 fL    MCH 27.6 27.0 - 31.0 pg    MCHC 32.0 32.0 - 36.0 g/dL    RDW 13.2 11.5 - 14.5 %    Platelets 325 150 - 450 K/uL    MPV 9.2 9.2 - 12.9 fL    Immature Granulocytes 0.5 0.0 - 0.5 %    Gran # (ANC) 8.5 (H) 1.8 - 7.7 K/uL    Immature Grans (Abs) 0.06 (H) 0.00 - 0.04 K/uL    Lymph # 2.5 1.0 - 4.8 K/uL    Mono # 0.9 0.3 - 1.0 K/uL    Eos # 0.1 0.0 - 0.5 K/uL    Baso # 0.03 0.00 - 0.20 K/uL    nRBC 0 0 /100 WBC    Gran % 70.2 38.0 - 73.0 %    Lymph % 20.8 18.0 - 48.0 %    Mono % 7.7 4.0 - 15.0 %    Eosinophil % 0.6 0.0 - 8.0 %    Basophil % 0.2 0.0 - 1.9 %    Differential Method Automated           ASSESSMENT/PLAN:     Active Hospital Problems    Diagnosis  POA    *S/P RA-sacrocolpopexy/LSO/Right salpingectomy/bilateral labiaplasty 23 [Z98.890]  Not Applicable      Resolved Hospital Problems   No resolved problems to display.       Assessment: 48 y.o.  POD#1 s/p RA-SCP/LSO/R salpingectomy/Bilateral labiaplasty, continuing to meet post op milestones    Plan:   1. Post-op   - Routine post-op advances  - Continue PRN pain medications  - D/C reeder and perform passive voiding trial, completed this AM at 0630  - Encourage ambulation   - Encourage IS  - CBC stable   - UOP adequate. 1200cc overnight   - Continue IVF until tolerating regular diet.  - Antiemetics prn nausea/vomiting.    2. Anxiety  -continued home wellbutrin    3. H/o breast cancer  -h/o ductal carcinoma in situ  -status post bilateral implant exchange and liposuction of the abdomen with fat grafting to the breasts on 23    4. HLD  -continued home pravastatin      Dispo: As patient meets appropriate post-op milestones, plan for discharge to home.    Dia Mckeon MD  OBGYN PGY-3

## 2023-09-08 NOTE — OPERATIVE NOTE ADDENDUM
Certification of Assistant at Surgery       Surgery Date: 9/8/2023     Participating Surgeons:  Surgeon(s) and Role:  Panel 1:     * Wu Skaggs MD - Primary     * Dia Mckeon MD - Resident - Assisting  Panel 2:     * Kalyn Rick MD - Primary    Procedures:  Procedure(s) (LRB):  LABIAPLASTY, VULVA (Bilateral)  ROBOTIC REMOVAL, MESH, VAGINA (N/A)  ROBOTIC SACROCOLPOPEXY, WITH CYSTOSCOPY (N/A)  SALPINGO-OOPHORECTOMY, Left  (Left)  ROBOTIC URETEROLYSIS  XI ROBOTIC SALPINGECTOMY, RIGHT (Right)    Assistant Surgeon's Certification of Necessity:  I understand that section 1842 (b) (6) (d) of the Social Security Act generally prohibits Medicare Part B reasonable charge payment for the services of assistants at surgery in teaching hospitals when qualified residents are available to furnish such services. I certify that the services for which payment is claimed were medically necessary, and that no qualified resident was available to perform the services. I further understand that these services are subject to post-payment review by the Medicare carrier.      Odin Manuel PA-C    09/08/2023  1:51 PM

## 2023-09-08 NOTE — ANESTHESIA POSTPROCEDURE EVALUATION
Anesthesia Post Evaluation    Patient: Dina Sosa    Procedure(s) Performed: Procedure(s) (LRB):  LABIAPLASTY, VULVA (Bilateral)  ROBOTIC REMOVAL, MESH, VAGINA (N/A)  ROBOTIC SACROCOLPOPEXY, WITH CYSTOSCOPY (N/A)  SALPINGO-OOPHORECTOMY, Left  (Left)  ROBOTIC URETEROLYSIS  XI ROBOTIC SALPINGECTOMY, RIGHT (Right)    Final Anesthesia Type: general      Patient location during evaluation: PACU  Patient participation: Yes- Able to Participate  Level of consciousness: awake and alert  Post-procedure vital signs: reviewed and stable  Pain management: adequate  Airway patency: patent    PONV status at discharge: No PONV  Anesthetic complications: no      Cardiovascular status: blood pressure returned to baseline  Respiratory status: unassisted and spontaneous ventilation  Hydration status: euvolemic  Follow-up not needed.          Vitals Value Taken Time   /84 09/08/23 1659   Temp 36.5 °C (97.7 °F) 09/08/23 1659   Pulse 96 09/08/23 1659   Resp 20 09/08/23 1659   SpO2 100 % 09/08/23 1659         No case tracking events are documented in the log.      Pain/Daria Score: Pain Rating Prior to Med Admin: 6 (9/8/2023  4:10 PM)  Pain Rating Post Med Admin: 2 (9/8/2023  4:14 PM)  Daria Score: 10 (9/8/2023  4:14 PM)

## 2023-09-08 NOTE — TRANSFER OF CARE
Anesthesia Transfer of Care Note    Patient: Dina Sosa    Procedure(s) Performed: Procedure(s) (LRB):  LABIAPLASTY, VULVA (Bilateral)  ROBOTIC REMOVAL, MESH, VAGINA (N/A)  ROBOTIC SACROCOLPOPEXY, WITH CYSTOSCOPY (N/A)  SALPINGO-OOPHORECTOMY, Left  (Left)  ROBOTIC URETEROLYSIS  XI ROBOTIC SALPINGECTOMY, RIGHT (Right)    Patient location: PACU    Anesthesia Type: general    Transport from OR: Transported from OR on 6-10 L/min O2 by face mask with adequate spontaneous ventilation    Post pain: adequate analgesia    Post assessment: no apparent anesthetic complications and tolerated procedure well    Post vital signs: stable    Level of consciousness: awake, alert and oriented    Nausea/Vomiting: no nausea/vomiting    Complications: none    Transfer of care protocol was followed      Last vitals:   Visit Vitals  BP (!) 158/101 (BP Location: Left arm, Patient Position: Lying)   Pulse 103   Temp 36.3 °C (97.3 °F) (Temporal)   Resp 18   LMP 06/01/2001 (Approximate)   SpO2 100%   Breastfeeding No

## 2023-09-08 NOTE — H&P
Patient ID: Dina Sosa is a 48 y.o. female.     Chief Complaint:  Vaginal Prolapse, Urinary Frequency, Urinary Incontinence, and Urinary Urgency        History of Present Illness  48-year-old para  1st baby 6-1/2 lb 2nd baby 9-1/2 lb.    Patient recently underwent complex breast reconstruction surgery with revision.    Patient is here secondary to complaint of urinary incontinence she can no longer go to the gym she does state that after the birth of her 2nd baby there was significant prolapse which was addressed at the time of her hysterectomy in  and  [facility].          Patient is unable to remember the exact name of the physician.    But she does believe that graft was used                   Past Medical History:   Diagnosis Date    Anxiety      History of breast cancer       Right breast. Invasive Ductal.               Past Surgical History:   Procedure Laterality Date    BILATERAL MASTECTOMY Bilateral 10/07/2022     Procedure: MASTECTOMY, BILATERAL;  Surgeon: Leeann Sawyer MD;  Location: [facility];  Service: General;  Laterality: Bilateral;    BREAST CAPSULECTOMY Bilateral 2023     Procedure: CAPSULECTOMY, BREAST;  Surgeon: Tae Trinidad DO;  Location: [facility];  Service: Plastics;  Laterality: Bilateral;  Bilat CAPSULOTOMY    CERVICAL DISC ARTHROPLASTY        C5-C6     SECTION         x2    FAT GRAFTING, OTHER Bilateral 2023     Procedure: INJECTION, FAT GRAFT;  Surgeon: Tae Trinidad DO;  Location: [facility];  Service: Plastics;  Laterality: Bilateral;    HIP SURGERY Left       fusion of pelvis around hip    HYSTERECTOMY   2002     ROGER, ovaries remain (AUB)    INJECTION FOR SENTINEL NODE IDENTIFICATION Right 10/07/2022     Procedure: INJECTION, FOR SENTINEL NODE IDENTIFICATION;  Surgeon: Leeann Sawyer MD;  Location: [facility];  Service: General;  Laterality: Right;    INSERTION OF BREAST IMPLANT Bilateral 2023     Procedure: INSERTION,  BREAST IMPLANT;  Surgeon: Tae Trinidad DO;  Location: [facility];  Service: Plastics;  Laterality: Bilateral;  2 hours    INSERTION OF BREAST TISSUE EXPANDER Bilateral 10/07/2022     Procedure: INSERTION, TISSUE EXPANDER, BREAST / BILATERAL;  Surgeon: Tae Trinidad DO;  Location: [facility];  Service: Plastics;  Laterality: Bilateral;    INSERTION OF BREAST TISSUE EXPANDER Bilateral 02/10/2023     Procedure: INSERTION, TISSUE EXPANDER, BREAST;  Surgeon: Tae Trinidad DO;  Location: [facility];  Service: Plastics;  Laterality: Bilateral;  Bilateral tissue expanders/ 2 HOURS    mobi c         disk replacement C5-C6    SENTINEL LYMPH NODE BIOPSY Right 10/07/2022     Procedure: BIOPSY, LYMPH NODE, SENTINEL;  Surgeon: Leeann Sawyer MD;  Location: [facility];  Service: General;  Laterality: Right;  2.5 HRS    si joint fusion         left hip    TISSUE EXPANDER REMOVAL Bilateral 10/17/2022     Procedure: REMOVAL, TISSUE EXPANDER;  Surgeon: Tae Trinidad DO;  Location: [facility];  Service: Plastics;  Laterality: Bilateral;    TISSUE EXPANDER REMOVAL Bilateral 2023     Procedure: REMOVAL, TISSUE EXPANDER;  Surgeon: Tae Trinidad DO;  Location: [facility];  Service: Plastics;  Laterality: Bilateral;  Bilat TE removal - Implant placement    TONSILLECTOMY        TYMPANOSTOMY TUBE PLACEMENT         x6    WOUND DEBRIDEMENT Bilateral 10/17/2022     Procedure: DEBRIDEMENT, WOUND;  Surgeon: Tae Trinidad DO;  Location: [facility];  Service: Plastics;  Laterality: Bilateral;  1.5 HRS         GYN & OB History  Patient's last menstrual period was 2001 (approximate).   Date of Last Pap: No result found                      OB History    Para Term  AB Living   2 2 2     2   SAB IAB Ectopic Multiple Live Births                         # Outcome Date GA Lbr Brandon/2nd Weight Sex Delivery Anes PTL Lv   2 Term                     1 Term                           Health Maintenance            Date Due Completion Date     TETANUS VACCINE Never done ---     COVID-19 Vaccine (4 - Moderna series) 2022     Colorectal Cancer Screening 2023     Influenza Vaccine (1) 2023 10/21/2021     Lipid Panel 2024                      Family History   Problem Relation Age of Onset    Cancer Maternal Grandmother 83         origin? brain (mets?)    Lung cancer Maternal Grandfather           long-term smoker    Hepatitis Father           cirrhosis    Hypertension Father      Genetic Disorder Mother           JAK2+ ET    Lung cancer Mother 63         ~30-yr smoker    Skin cancer Mother 67         mole nasal bridge (type?); maybe also one on hip    Hypertension Mother      Esophageal cancer Mother      Hodgkin's lymphoma Brother      Esophageal cancer Brother      Hypertension Sister      Von Willebrand disease Sister      Hemophilia Daughter           hemophilia A    Von Willebrand disease Daughter      Fibrocystic breast disease Maternal Aunt      Lung cancer Maternal Aunt           believes was smoker         Social History            Socioeconomic History    Marital status:    Tobacco Use    Smoking status: Former       Packs/day: 0.50       Years: 22.00       Pack years: 11.00       Types: Cigarettes       Quit date: 5/10/2016       Years since quittin.1    Smokeless tobacco: Never   Substance and Sexual Activity    Alcohol use: No    Drug use: No    Sexual activity: Yes       Partners: Male       Birth control/protection: None      Social Determinants of Health          Financial Resource Strain: Low Risk     Difficulty of Paying Living Expenses: Not very hard   Food Insecurity: Unknown    Worried About Running Out of Food in the Last Year: Never true    Ran Out of Food in the Last Year: Patient refused   Transportation Needs: No Transportation Needs    Lack of Transportation (Medical): No    Lack of Transportation (Non-Medical): No   Physical Activity:  Insufficiently Active    Days of Exercise per Week: 2 days    Minutes of Exercise per Session: 30 min   Stress: Stress Concern Present    Feeling of Stress : Rather much   Social Connections: Moderately Isolated    Frequency of Communication with Friends and Family: More than three times a week    Frequency of Social Gatherings with Friends and Family: Once a week    Attends Yarsani Services: Never    Active Member of Clubs or Organizations: No    Attends Club or Organization Meetings: 1 to 4 times per year    Marital Status:    Housing Stability: Low Risk     Unable to Pay for Housing in the Last Year: No    Number of Places Lived in the Last Year: 1    Unstable Housing in the Last Year: No         Review of Systems  Review of Systems  Urinary incontinence and prolapse urinary incontinence and prolapse     Objective:   /80 (BP Location: Right arm, Patient Position: Sitting, BP Method: Medium (Automatic))   Pulse 87   Wt 52.4 kg (115 lb 8.3 oz)   LMP 06/01/2001 (Approximate)   BMI 22.56 kg/m²      Physical Exam   Stage II anterior defect with UV hypermobility  Left lab via minora qualifies for hypertrophy I believe I am going to have a colleague verify this.    I did discuss this with Dr. Sethi please see her note     Patient complains of urinary frequency I did secondary to the level of the anterior compartment defect move forward with straight catheterization after void I did not capture the initial void but postvoid residual was less than 20 cc  Assessment:      1. Stress incontinence    2. Labia minora hypertrophy             Plan:      1. Stress incontinence    2. Labia minora hypertrophy       After examination had patient presented my office for we reviewed her anatomy utilizing [employer] interactive web site.    I did discuss this with her compare that with normal anatomy.    From there we discussed options I do believe that this will have to be a robotic approach sacral  colpopexy the anterior compartment is with significant UV mobility needs to be stabilized 1st I do think there might be a graft on the posterior aspect which I will perform a union on between sacral colpopexy and posterior graft.    She patient is well aware that we will be doing her incontinence procedure separately once she heals from this surgery.    I will go ahead and order urodynamics 6 weeks from September 8th to facilitate that.    Additionally I do believe that Dr. Sethi will assist in addressing lab via minora hypertrophy please see the her note regarding this.          All risks benefits of surgery discussed will move forward with this for September 8th

## 2023-09-08 NOTE — ANESTHESIA PROCEDURE NOTES
Intubation    Date/Time: 9/8/2023 10:49 AM    Performed by: Tete Gustafson CRNA  Authorized by: Neil Kendall MD    Intubation:     Induction:  Intravenous    Intubated:  Postinduction    Mask Ventilation:  Easy mask    Attempts:  1    Attempted By:  CRNA    Method of Intubation:  Video laryngoscopy    Blade:  Gonzalez 3    Laryngeal View Grade: Grade I - full view of cords      Difficult Airway Encountered?: No      Complications:  None    Airway Device:  Oral endotracheal tube    Airway Device Size:  7.0    Style/Cuff Inflation:  Cuffed (inflated to minimal occlusive pressure)    Inflation Amount (mL):  5    Tube secured:  20    Secured at:  The lips    Placement Verified By:  Capnometry    Complicating Factors:  None    Findings Post-Intubation:  Atraumatic/condition of teeth unchanged and BS equal bilateral  Notes:      Neck remained neutral

## 2023-09-08 NOTE — NURSING
Nurses Note -- 4 Eyes      9/8/2023   5:27 PM      Skin assessed during: Transfer      [x] No Altered Skin Integrity Present    []Prevention Measures Documented      [] Yes- Altered Skin Integrity Present or Discovered   [] LDA Added if Not in Epic (Describe Wound)   [] New Altered Skin Integrity was Present on Admit and Documented in LDA   [] Wound Image Taken    Wound Care Consulted? No    Attending Nurse:  Niesha Vines RN/Staff Member:   GAIL Horta

## 2023-09-08 NOTE — OP NOTE
[facility]  Urogynecology  Operative Note    SUMMARY     Date of Procedure: 9/8/2023     Procedure: Procedure(s) (LRB):  LABIAPLASTY, VULVA (Bilateral)  ROBOTIC REMOVAL, MESH, VAGINA (N/A)  ROBOTIC SACROCOLPOPEXY, WITH CYSTOSCOPY (N/A)  SALPINGO-OOPHORECTOMY, Left  (Left)  ROBOTIC URETEROLYSIS  XI ROBOTIC SALPINGECTOMY, RIGHT (Right)       Surgeon(s) and Role:  Panel 1:     * Wu Skaggs MD - Primary     * Dia Mckeon MD - Resident - Assisting  Panel 2:     * Kalyn Rick MD - Primary    Pre-Operative Diagnosis: Labia minora hypertrophy [N90.60]  Prolapse of vaginal vault after hysterectomy [N99.3]    Post-Operative Diagnosis: Post-Op Diagnosis Codes:     * 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 She was already placed in the dorsal lithotomy position using yellow fin stirrups. Care was taken to avoid joint hyperflexion or hyperextension, and all extremity surfaces were carefully padded so as to minimize the risk of neurologic injury. Sequential compression devices were applied to the patient's lower extremities preoperatively.  The patient's perineum and vagina were already sterilely prepped and draped prior to the onset of this portion of the procedure and a reeder catheter was in place.     Next, using a marking pen, the inferior portion of the excessive skin of the left labia minora to be removed was outlined, and similarly the excessive tissue of the superior portion of the bilateral labia minora were marked in a wedge shape. Local anesthetic of .5% marcaine was injected into the demarcated regions. Using a scalpel each of the marked areas was incised  following which using a needle tip cautery device the subcutaneous tissue was excised while maintaining hemostasis. Care was taken to ensure that all incisions and sutures were performed below the clitoral page. The medial aspect of the labia minora was re-approximated with horizontal mattress sutures and interrupted stitches of 4-0 Monocryl. The subcutaneous tissue of the lateral aspect of the labia minora was also re-approximated with horizontal mattress sutures of 4-0 Monocryl. This same procedure was performed on the right side.     Upon completion of the procedure the patient's legs were taken out of lithotomy, and she was returned to supine position.  All drapes were removed, and she was cleaned.  At the end of the case all counts were correct x 2.  She was awakened from anesthesia and taken to the PACU in stable condition.  She tolerated the procedure well.    Dr. Rick was present and scrubbed for the entirety of the procedure.      Estimated Blood Loss (EBL): Minimal           Implants:   Implant Name Type Inv. Item Serial No.  Lot No. LRB No. Used Action   MESH RESTORELLE Y [id number] - [id number]  MESH RESTORELLE Y [id number]  [facility] [id number]  1 Implanted       Specimens:   Specimen (24h ago, onward)       Start     Ordered    09/08/23 1403  Specimen to Pathology, Surgery Gynecology and Obstetrics  Once        Comments: Pre-op Diagnosis: Labia minora hypertrophy [N90.60]Prolapse of vaginal vault after hysterectomy [N99.3]Procedure(s):XI ROBOTIC SACROCOLPOPEXY, ABDOMINALLABIAPLASTY, VULVA Number of specimens: 2Name of specimens: 1. Right Fallopian Tube2. Left Fallopian Tube and Ovary     References:    Click here for ordering Quick Tip   Question Answer Comment   Procedure Type: Gynecology and Obstetrics    Specimen Class: Routine/Screening    Which provider would you like to cc? JONNA FRANCIS    Release to patient Immediate        09/08/23 1423    09/08/23 1328  Specimen to Pathology, Surgery  Gynecology and Obstetrics  Once        Comments: Pre-op Diagnosis: Labia minora hypertrophy [N90.60]Prolapse of vaginal vault after hysterectomy [N99.3]Procedure(s):LABIAPLASTY, VULVAROBOTIC REMOVAL, MESH, VAGINAROBOTIC SACROCOLPOPEXY, WITH CYSTOSCOPYSALPINGO-OOPHORECTOMY, Left ROBOTIC URETEROLYSISXI ROBOTIC SALPINGECTOMY, RIGHT Number of specimens: 2Name of specimens: 1. LEFT LABIA2. RIGHT LABIA     References:    Click here for ordering Quick Tip   Question Answer Comment   Procedure Type: Gynecology and Obstetrics    Specimen Class: Routine/Screening    Which provider would you like to cc? KALYN ESTRADA.    Release to patient Immediate        09/08/23 9935                            Condition: Good    Disposition: PACU - hemodynamically stable.    Attestation: I was present and scrubbed for the entire procedure.    Kalyn Estrada MD

## 2023-09-08 NOTE — OP NOTE
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 The patient was positioned in high lithotomy position with legs in yellow fin stirrups. Care was taken to avoid joint hyperflexion or hyperextension, and all extremity surfaces were carefully padded so as to minimize risk of neurologic injury. Intravenous antibiotics were administered preoperatively. Sequential compression devices were applied to the patient's lower extremities preoperatively VTE prophylaxis. Surgical time-out was performed, where the patient was identified and procedures confirmed. An examination under anesthesia was performed with findings described as above. The patient's abdomen, perineum, and vagina were sterilely prepped and draped. A reeder catheter was placed in the bladder for drainage.   Exam under anesthesia revealed stage 2 apical prolapse and stage 3 satinder vaginal wall prolapse. Attention was then turned to the abdomen. A 10-mm incision suprafraumbilical incision was made with a scalpel. A 5 mm laparoscoped 0 degree was  placed into a 5 mm trocar under direct visualization the abdomen was entered. Insufflation was activated. Appropriate intraperitoneal pressure returned. We insufflated to 15 mm of mercury. Reinserted the laparoscoped safe entry confirmed.   The camera was inserted through this port for visualization of all subsequent port placement. Two 8 mm ports were placed on either side of the supraumbilical port, each approximately 8 cm lateral, along the mid clavicular line. Each 8 mm trocar was inserted under direct visualization without significant trauma, at least 2 cm cephalad and medial to the anterior superior iliac spine. A third 8 mm port was placed in the left upper quadrant, 2 cm below the costal margin in his lateral as possible. A 12 mm assistant port was placed in the righ upper quadrant, cephalad to and midway between the umbilical and 8 mm right lower quadrant port in a triangulated fashion. There were no complications with these port placements. At this point, a total of 5 ports ( 4 robotic) had been placed, including the umbilical port for the camera. The robot was then docked.  Additional lysis of omental adhesions was performed until the omentum was completely freed from the anterior abdominal wall. Filmy adhesions were noted along the posterior cul-de sac and sigmoid colon. These were easily lysed.   In the lower right quadrant that port is designated as arm 1. And we placed a monopolar scissors through that under direct visualization  In the lower left quadrant that port is designated as arm number 3 arm 2. Will be holding the camera through the super umbilical port. In the upper left quadrant that will be designated as the 4th port for the 4th robotic arm.  Through the 3rd robotic port a [place] bipolar is placed under direct visualization arm, the 4th robotic port Has a robotic cardier placed under direct visualization.        The small sacral tip is 8.5 cm in length it is attached to the BRANNON wish  attached to the AL LY positioning system and deployed cephalad when we do this the base Sitz about 1 cm inside the introitus thus a total vaginal length of 9 cm while we are flowing in such a manner I can see that we have omental adhesions to the vaginal vagina the apex as well as the adnexa specifically the fallopian tube on the left adhered to the apex on the left adnexa is totally scarred down I am multiple cyst with in a manner that interferes with visual visualization of the ureter on the left.  I decided to remove the left adnexa the right ovary is benign is not securing visualization course of the ureter but decision is made to remove the fallopian tube.             The right adnexa was normal in appearance but the fallopian tube densely adherent to surrounding structures.  I am able to delineate the fimbriated end from there I am able to then identify the mesial salpinx well away from the lead supply of the ovary able to utilize the Ayah bipolar and transect in a para well manner to the long axis through I placed this in cul-de-sac retrieval later.    I do think that the ureter on the right is very medial for that reason I am going to perform urethral lysis this can be secondary to previous scarring surgery causing fibrosis of the peritoneum.  Especially around the area of the presacral space. Traction was applied to the fallopian tube upward and lysis of adhesions from surrounding peritoneum, bladder and vaginal cuff performed sharply until  fallopian tube were free     Attention was then turned to the left.The ureter was identified and ureterolysis performed. There was fibrosis here from prior surgery. I was able to lateralize the ureter off of the pelvic sidewall peritoneum and adnexa. Next a window was was made in the peritoneum to isolate the infundibulopelvic ligament. It was skeletonized, cauterized and transected with bipolar followed by monopolar energy. Traction was applied to the adnexa upward  and lysis of adhesions from surrounding rectum, bladder and vaginal cuff performed sharply  until ovary and fallopian tube were free.      Both specimens were put in to an Endo-Catch bag for retrieval later     Once we were able to do that I was able to visualize the course of the ureter     I then was able to Antivert and I was able to get into the rectovaginal space and take this all the way down beyond the level of base of the sacral tip a distance of 9 cm.  This was a very difficult dissection I could see from the outline in anteversion of the small sacral tip that there was specific tension on the uterosacral bilaterally it is obvious that this individual a graft supplementation at the time of her hysterectomy which was done secondary to prolapse.  The tissue on the proximal posterior component you can actually see the formation of 2 distinct flares there is rectovaginal tissue and then there is a band of tissue that when you palpate is actually puts tension on the uterosacral so I definitely think some type graft augmentation this can explain why the posterior compartment essentially within normal limits however there was significant anterior defect.    I can not stress how difficult to this to get into the rectovaginal space but when we finally are able to I do take it to level of the perineal body as stated a posterior dissection is less than usually 5 minutes this was a 20 minute dissection thus 4 times not amount of normal time just to get this plane I want to preserve any type of inherent strength for that reason I am able to develop the flap of the previous biologic graft that I can form a union onto with my polypropylene graft.      And being very meticulous in keeping peritoneum on the vaginal cuff we retroverted get into the vesicovaginal space and take this to the level of the Canchola bulb but distance of 6 cm.                Great care and attention was put forth to maintain as much of the peritoneum  as possible. Thus we had an anterior leaflet developed and posterior leaflet.        Attention was then turned to the sacral promontory, which was identified by visualization and palpation. Again, the course of the right ureter as well as the location of the sigmoid colon was identified. The peritoneum overlying the sacral promontory was then elevated and incised using the robotic scissors. Gentle blunt dissection was then undertaken to reveal presacral fascia again with hemostasis noted. A Channel was created along the right aspect of the pelvic sidewall making sure to remain medial to the right ureter and lateral to the sigmoid.  Once this child developed I went ahead and took the lateral aspect of the peritoneal edge and performed urethral lysis from pelvic brim all the way to its insertion at the level of bladder this is done to take undo tension secondary to previous surgery causing some type of fibrotic change I am concerned that during repair discussion with too much pressure on the ureter.    The mesh was cut to size. The mesh was introduced into the abdomen and the mesh was sutured in place with CV 4 Cortex suturesl.The anterior and posterior arms of the mesh were attached to the anterior and posterior aspects of the vagina With such suture.  Posterior graft was 10 cm anterior graft was the 6 cm I was able to incorporate the proximal posterior component of the polypropylene Y graft into the previous biologic.    Posteriorly there are 3 sutures independent interrupted of Chandler-Rashawn this is supplemented with 3-0 V lock anteriorly we utilize 9 sutures in an interrupted fashion.      Posteriorly 5 interrupted sutures proximally and distally then supplemented with a 3-0 V lock anteriorly 5 interrupted Chandler-Rashawn sutures distally and proximally and then supplemented with a 3-0 V lock I was very careful to perform the between the distal aspect of my anterior polypropylene graft and the proximal portion of the prior  biologic graft.     The anterior and posterior leaves of the peritoneum of the vagina that were referenced above were then pursestring together utilizing a single 0 Monocryl on a CT 1 needle. The suture was then parked safely. The sacro tip was oriented towards the presacral space and exposed anterior longitudinal ligament.  The tail of the Y graft under appropriate tension was then fixated to the anterior longitudinal ligament without difficulty using 0 Ethibond suture x2 Visual and tactile check showed excellent tension.  In the channel there was no discrete bleeding I did I decreased pneumoperitoneum multiple times down to 8 mm mL of mercury I did not see any distinct bleeding for that reason we used Surgiflo along the channel and on presacral space.  I felt completely at ease that we had good hemostatic control before cinching down the peritoneum  The peritoneal channel was then closed with the same suture that was picked back up and then brought to the apex of the incision that was utilized to enter the presacral space  . Excellent hemostasis was noted in the pelvis was noted in the pelvis before complete tensioning this Monocryl thus repair to mobilizing the entire graft  Cystourethrsocopy: normal bladder mucosa, small area at the base of the bladder with erythema no active bleeding. The bladder mucosa without stones or diverticulum. Bilateral ureteral jets were noted. Normal urethra. The Canchola catheter was reinserted.   Attention was again turned to the abdomen, close inspection of the pelvis revealed excellent hemostasis. All instruments were then removed from the abdomen and the pelvis.  We are easily able to remove Endo-Catch bag A suture East device was deployed to close the 12 mm air seal port an endoscopic fashion in the appropriate manner. The remainder of the skin incisions were reapproximated with 4.0 Monocryl, Steri-strips, 2x2s, and Tegaderm.The patient tolerated the procedure well. Needle, sponge  lap, and instrument counts were correct x2.        This concluded my portion of the surgery please see operative dictation from Dr. MARLIN MUKHERJEE regarding bilateral labioplasty             Estimated Blood Loss: * No values recorded between 9/8/2023 11:05 AM and 9/8/2023  1:59 PM *           Specimens (From admission, onward)      None          Implants:   Implant Name Type Inv. Item Serial No.  Lot No. LRB No. Used Action   MESH RESTORELLE Y [id number] - [id number]  MESH RESTORELLE Y 24X4CM  [id number]  1 Implanted              Disposition: PACU - hemodynamically stable.           Condition: Good    Attestation:  I was present and scrubbed for the entire procedure.           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      buPROPion (WELLBUTRIN XL) 300 MG 24 hr tablet Take 1 tablet (300 mg total) by mouth once daily.  Qty: 90 tablet, Refills: 3    Associated Diagnoses: Anxiety      gabapentin (NEURONTIN) 300 MG capsule Take 1 capsule (300 mg total) by mouth 2 (two) times daily.  Qty: 60 capsule, Refills: 11    Associated Diagnoses: Cervical radiculopathy      SHONNA ROOT EXTRACT ORAL Take by mouth.      loratadine (CLARITIN ORAL) Take by mouth Daily.      orphenadrine (NORFLEX) 100 mg tablet Take 1 tablet (100 mg total) by mouth 2 (two) times daily.  Qty: 180 tablet, Refills: 3    Associated Diagnoses: Cervical radiculopathy      pantoprazole (PROTONIX) 40 MG tablet Take 1 tablet (40 mg total) by mouth once daily.  Qty: 90 tablet, Refills: 3    Associated Diagnoses:  Gastroesophageal reflux disease without esophagitis      pravastatin (PRAVACHOL) 20 MG tablet Take 1 tablet (20 mg total) by mouth once daily.  Qty: 90 tablet, Refills: 3    Associated Diagnoses: Mixed hyperlipidemia      primidone (MYSOLINE) 50 MG Tab Take 1 tablet (50 mg total) by mouth every evening.  Qty: 30 tablet, Refills: 11    Associated Diagnoses: Tremor of both hands      traZODone (DESYREL) 100 MG tablet Take 1 tablet (100 mg total) by mouth every evening.  Qty: 90 tablet, Refills: 3    Comments: ZERO refills remain on this prescription. Your patient is requesting advance approval of refills for this medication to PREVENT ANY MISSED DOSES  Associated Diagnoses: Anxiety      TURMERIC ORAL Take by mouth.             Discharge Procedure Orders (must include Diet, Follow-up, Activity)  No discharge procedures on file.     Discharge Date: No discharge date for patient encounter.

## 2023-09-09 VITALS
HEART RATE: 92 BPM | WEIGHT: 125.63 LBS | SYSTOLIC BLOOD PRESSURE: 111 MMHG | RESPIRATION RATE: 17 BRPM | DIASTOLIC BLOOD PRESSURE: 74 MMHG | TEMPERATURE: 98 F | HEIGHT: 60 IN | OXYGEN SATURATION: 99 % | BODY MASS INDEX: 24.66 KG/M2

## 2023-09-09 LAB
ANION GAP SERPL CALC-SCNC: 8 MMOL/L (ref 8–16)
BASOPHILS # BLD AUTO: 0.03 K/UL (ref 0–0.2)
BASOPHILS NFR BLD: 0.2 % (ref 0–1.9)
BUN SERPL-MCNC: 7 MG/DL (ref 6–20)
CALCIUM SERPL-MCNC: 8.8 MG/DL (ref 8.7–10.5)
CHLORIDE SERPL-SCNC: 102 MMOL/L (ref 95–110)
CO2 SERPL-SCNC: 26 MMOL/L (ref 23–29)
CREAT SERPL-MCNC: 0.7 MG/DL (ref 0.5–1.4)
DIFFERENTIAL METHOD: ABNORMAL
EOSINOPHIL # BLD AUTO: 0.1 K/UL (ref 0–0.5)
EOSINOPHIL NFR BLD: 0.6 % (ref 0–8)
ERYTHROCYTE [DISTWIDTH] IN BLOOD BY AUTOMATED COUNT: 13.2 % (ref 11.5–14.5)
EST. GFR  (NO RACE VARIABLE): >60 ML/MIN/1.73 M^2
GLUCOSE SERPL-MCNC: 109 MG/DL (ref 70–110)
HCT VFR BLD AUTO: 37.2 % (ref 37–48.5)
HGB BLD-MCNC: 11.9 G/DL (ref 12–16)
IMM GRANULOCYTES # BLD AUTO: 0.06 K/UL (ref 0–0.04)
IMM GRANULOCYTES NFR BLD AUTO: 0.5 % (ref 0–0.5)
LYMPHOCYTES # BLD AUTO: 2.5 K/UL (ref 1–4.8)
LYMPHOCYTES NFR BLD: 20.8 % (ref 18–48)
MCH RBC QN AUTO: 27.6 PG (ref 27–31)
MCHC RBC AUTO-ENTMCNC: 32 G/DL (ref 32–36)
MCV RBC AUTO: 86 FL (ref 82–98)
MONOCYTES # BLD AUTO: 0.9 K/UL (ref 0.3–1)
MONOCYTES NFR BLD: 7.7 % (ref 4–15)
NEUTROPHILS # BLD AUTO: 8.5 K/UL (ref 1.8–7.7)
NEUTROPHILS NFR BLD: 70.2 % (ref 38–73)
NRBC BLD-RTO: 0 /100 WBC
PLATELET # BLD AUTO: 325 K/UL (ref 150–450)
PMV BLD AUTO: 9.2 FL (ref 9.2–12.9)
POTASSIUM SERPL-SCNC: 3.9 MMOL/L (ref 3.5–5.1)
RBC # BLD AUTO: 4.31 M/UL (ref 4–5.4)
SODIUM SERPL-SCNC: 136 MMOL/L (ref 136–145)
WBC # BLD AUTO: 12.15 K/UL (ref 3.9–12.7)

## 2023-09-09 PROCEDURE — 85025 COMPLETE CBC W/AUTO DIFF WBC: CPT

## 2023-09-09 PROCEDURE — 80048 BASIC METABOLIC PNL TOTAL CA: CPT

## 2023-09-09 PROCEDURE — 25000003 PHARM REV CODE 250

## 2023-09-09 PROCEDURE — 63600175 PHARM REV CODE 636 W HCPCS

## 2023-09-09 PROCEDURE — 51798 US URINE CAPACITY MEASURE: CPT

## 2023-09-09 PROCEDURE — 94761 N-INVAS EAR/PLS OXIMETRY MLT: CPT

## 2023-09-09 PROCEDURE — 36415 COLL VENOUS BLD VENIPUNCTURE: CPT

## 2023-09-09 RX ORDER — OXYCODONE HYDROCHLORIDE 5 MG/1
5 TABLET ORAL EVERY 4 HOURS PRN
Qty: 15 TABLET | Refills: 0 | Status: ON HOLD | OUTPATIENT
Start: 2023-09-09 | End: 2024-02-15

## 2023-09-09 RX ORDER — IBUPROFEN 600 MG/1
600 TABLET ORAL 3 TIMES DAILY
Qty: 30 TABLET | Refills: 2 | Status: ON HOLD | OUTPATIENT
Start: 2023-09-09 | End: 2024-02-15

## 2023-09-09 RX ORDER — DOCUSATE SODIUM 100 MG/1
100 CAPSULE, LIQUID FILLED ORAL 2 TIMES DAILY
Qty: 30 CAPSULE | Refills: 1 | Status: SHIPPED | OUTPATIENT
Start: 2023-09-09

## 2023-09-09 RX ADMIN — DOCUSATE SODIUM 100 MG: 100 CAPSULE, LIQUID FILLED ORAL at 09:09

## 2023-09-09 RX ADMIN — PRAVASTATIN SODIUM 20 MG: 20 TABLET ORAL at 09:09

## 2023-09-09 RX ADMIN — KETOROLAC TROMETHAMINE 15 MG: 30 INJECTION, SOLUTION INTRAMUSCULAR; INTRAVENOUS at 05:09

## 2023-09-09 RX ADMIN — ACETAMINOPHEN 1000 MG: 500 TABLET ORAL at 11:09

## 2023-09-09 RX ADMIN — SENNOSIDES 8.6 MG: 8.6 TABLET, FILM COATED ORAL at 09:09

## 2023-09-09 RX ADMIN — KETOROLAC TROMETHAMINE 15 MG: 30 INJECTION, SOLUTION INTRAMUSCULAR; INTRAVENOUS at 11:09

## 2023-09-09 RX ADMIN — PANTOPRAZOLE SODIUM 40 MG: 40 TABLET, DELAYED RELEASE ORAL at 09:09

## 2023-09-09 RX ADMIN — MAGNESIUM HYDROXIDE 2400 MG: 400 SUSPENSION ORAL at 09:09

## 2023-09-09 RX ADMIN — OXYCODONE HYDROCHLORIDE 5 MG: 5 TABLET ORAL at 07:09

## 2023-09-09 RX ADMIN — MUPIROCIN: 20 OINTMENT TOPICAL at 09:09

## 2023-09-09 RX ADMIN — GABAPENTIN 300 MG: 300 CAPSULE ORAL at 09:09

## 2023-09-09 RX ADMIN — BUPROPION HYDROCHLORIDE 300 MG: 150 TABLET, FILM COATED, EXTENDED RELEASE ORAL at 09:09

## 2023-09-09 NOTE — PLAN OF CARE
Problem: Adult Inpatient Plan of Care  Goal: Plan of Care Review  Outcome: Ongoing, Progressing  Goal: Optimal Comfort and Wellbeing  Outcome: Ongoing, Progressing     Problem: Fall Injury Risk  Goal: Absence of Fall and Fall-Related Injury  Outcome: Ongoing, Progressing     Problem: Pain Acute  Goal: Acceptable Pain Control and Functional Ability  Outcome: Ongoing, Progressing  POC reviewed with patient, verbalized understanding. Patient AAOX4; VSS. Continuous IVF LR infusing @40ml/hr. Pain managed with prn analgesics. Canchola catheter in place draining clear yellow urine. Canchola to be d/champ after initiating active voiding trial this morning. X5 lap sites with steri-strips and bandaid. No acute events overnight. Fall precautions maintained; bed locked and in lowest position. Side rails up and locked X2. Bed alarm set and audible; call light and personal belongings within reach. All questions and concerns addressed; pt progressing towards goal. See flow sheet for full assessment and V/S info. Pt instructed to call for assistance, verbalized understanding.

## 2023-09-09 NOTE — PLAN OF CARE
Patient identified using two identifiers:  Name and date of birth. Introduced self to pt and explained sw role. Pt alert and oriented and assist in completing assessment.     Demographics confirmed and no need for update.     Person who will help at home if needed:Cezar MillerKwasi [phone]      Preferred pharmacy:   [id number] - [place], [place] - [id number] [place] [address] AT [address]   [address] [address] [address]  Phone: [phone] Fax: [phone]          09/09/23 1006   Discharge Assessment   Assessment Type Discharge Planning Assessment   Confirmed/corrected address, phone number and insurance Yes   Confirmed Demographics Correct on Facesheet   Source of Information patient   People in Home significant other   Facility Arrived From: Home   Do you expect to return to your current living situation? Yes   Do you have help at home or someone to help you manage your care at home? Yes   Who are your caregiver(s) and their phone number(s)? Cezar MillerKwasi [phone]   Prior to hospitilization cognitive status: Alert/Oriented   Current cognitive status: Alert/Oriented   Equipment Currently Used at Home none   Readmission within 30 days? No   Patient currently being followed by outpatient case management? No   Do you currently have service(s) that help you manage your care at home? No   Do you take prescription medications? Yes   Do you have prescription coverage? Yes   Coverage Medicaid   Do you have any problems affording any of your prescribed medications? No   Is the patient taking medications as prescribed? yes   Who is going to help you get home at discharge? Cezar Sanchez [phone]   Are you on dialysis? No   Do you take coumadin? No   DME Needed Upon Discharge  none   Discharge Plan discussed with: Patient

## 2023-09-09 NOTE — PLAN OF CARE
Problem: Fall Injury Risk  Goal: Absence of Fall and Fall-Related Injury  Outcome: Met     Problem: Pain Acute  Goal: Acceptable Pain Control and Functional Ability  Outcome: Met     Problem: Adult Inpatient Plan of Care  Goal: Optimal Comfort and Wellbeing  Outcome: Met     Problem: Adult Inpatient Plan of Care  Goal: Readiness for Transition of Care  Outcome: Met

## 2023-09-09 NOTE — NURSING
Pt  given discharge instructions and paperwork. Belongings are with patient. No questions or concerns. No distress noted. Pt left via wheelchair per private vehicle.

## 2023-09-09 NOTE — DISCHARGE SUMMARY
Discharge Summary  Gynecology      Admit Date: 2023    Discharge Date and Time: 2023     Attending Physician: Wu Skaggs MD    Principal Diagnoses: S/P sacrocolpopexy    Active Hospital Problems    Diagnosis  POA    *S/P RA-sacrocolpopexy/LSO/Right salpingectomy/bilateral labiaplasty 23 [Z98.890]  Not Applicable      Resolved Hospital Problems   No resolved problems to display.       Procedures: Procedure(s) (LRB):  LABIAPLASTY, VULVA (Bilateral)  ROBOTIC REMOVAL, MESH, VAGINA (N/A)  ROBOTIC SACROCOLPOPEXY, WITH CYSTOSCOPY (N/A)  SALPINGO-OOPHORECTOMY, Left  (Left)  ROBOTIC URETEROLYSIS  XI ROBOTIC SALPINGECTOMY, RIGHT (Right)    Discharged Condition: good    Hospital Course:   Dina Sosa is a 48 y.o. y.o.  female who presented on 2023   for above procedures for the treatment of vaginal vault prolpase/UI. Patient tolerated procedure. Post-operative course was uncomplicated.  On day of discharge, patient was urinating, ambulating, and tolerating a regular diet without difficulty. Pain was well controlled on PO medication. She was discharged home on POD#1 in stable condition with instructions to follow up with Dr. Skaggs in 6 weeks.     Consults: None    Significant Diagnostic Studies:  Recent Labs   Lab 23  0851 23  0405   WBC 8.46 12.15   HGB 13.9 11.9*   HCT 44.1 37.2   MCV 87 86    325        Treatments:   1. Surgery as above    Disposition: Home or Self Care    Patient Instructions:   Current Discharge Medication List        START taking these medications    Details   docusate sodium (COLACE) 100 MG capsule Take 1 capsule (100 mg total) by mouth 2 (two) times daily.  Qty: 30 capsule, Refills: 1      ibuprofen (ADVIL,MOTRIN) 600 MG tablet Take 1 tablet (600 mg total) by mouth 3 (three) times daily.  Qty: 30 tablet, Refills: 2      oxyCODONE (ROXICODONE) 5 MG immediate release tablet Take 1 tablet (5 mg total) by mouth every 4 (four) hours as needed for  Pain.  Qty: 15 tablet, Refills: 0    Comments: Quantity prescribed more than 7 day supply? No           CONTINUE these medications which have NOT CHANGED    Details   ascorbic acid (VITAMIN C ORAL) Take by mouth Daily.      buPROPion (WELLBUTRIN XL) 300 MG 24 hr tablet Take 1 tablet (300 mg total) by mouth once daily.  Qty: 90 tablet, Refills: 3    Associated Diagnoses: Anxiety      gabapentin (NEURONTIN) 300 MG capsule Take 1 capsule (300 mg total) by mouth 2 (two) times daily.  Qty: 60 capsule, Refills: 11    Associated Diagnoses: Cervical radiculopathy      SHONNA ROOT EXTRACT ORAL Take by mouth.      loratadine (CLARITIN ORAL) Take by mouth Daily.      orphenadrine (NORFLEX) 100 mg tablet Take 1 tablet (100 mg total) by mouth 2 (two) times daily.  Qty: 180 tablet, Refills: 3    Associated Diagnoses: Cervical radiculopathy      pantoprazole (PROTONIX) 40 MG tablet Take 1 tablet (40 mg total) by mouth once daily.  Qty: 90 tablet, Refills: 3    Associated Diagnoses: Gastroesophageal reflux disease without esophagitis      pravastatin (PRAVACHOL) 20 MG tablet Take 1 tablet (20 mg total) by mouth once daily.  Qty: 90 tablet, Refills: 3    Associated Diagnoses: Mixed hyperlipidemia      primidone (MYSOLINE) 50 MG Tab Take 1 tablet (50 mg total) by mouth every evening.  Qty: 30 tablet, Refills: 11    Associated Diagnoses: Tremor of both hands      traZODone (DESYREL) 100 MG tablet Take 1 tablet (100 mg total) by mouth every evening.  Qty: 90 tablet, Refills: 3    Comments: ZERO refills remain on this prescription. Your patient is requesting advance approval of refills for this medication to PREVENT ANY MISSED DOSES  Associated Diagnoses: Anxiety      TURMERIC ORAL Take by mouth.             Discharge Procedure Orders   Diet general     Lifting restrictions   Order Comments: No lifting greater than 15 pounds for six weeks.     Other restrictions (specify):   Order Comments: PELVIC REST (IF YOU HAD A HYSTERECTOMY):  No  douching, tampons, or intercourse for 6 weeks.    If prescribed, vaginal estrogen cream may be used during the postoperative period.     DRIVING:  No driving while on narcotics. Driving may be resumed initially with a competent passenger one to two weeks after surgery if no longer taking narcotics.     EXERCISE:  For six weeks your exercise should be limited to walking. You may walk as far as you wish, as long as you increase your level of exertion gradually and avoid slippery surfaces. You may climb stairs as needed to get around, but should not use stair climbing for exercise.     Remove dressing in 24 hours   Order Comments: If you have a bandage on wound, you may remove it the day after dismissal.  If you had steri-strips remove them once they begin to peel off (usually 2 weeks).  If your steri-strips still haven't come off in 2 weeks, please remove them.  Keep incision clean and dry.  Inspect the incision daily for signs and symptoms of infection.     Wound care routine (specify)   Order Comments: WOUND CARE:  If you have a band-aid or bandage on your wound, you may remove it the day after dismissal.  You may wash the wound with mild soap and water.   You may shower at any time but should avoid immersing any abdominal incisions in water for at least two weeks after surgery or until the wound is completely healed. If given, please shower with Hibiclens soap until bottle is completely finished. Keep your wound clean and dry.  You should observe your incision for signs of infection which include redness, warmth, drainage or fever.     Call MD for:  temperature >100.4     Call MD for:  persistent nausea and vomiting     Call MD for:  severe uncontrolled pain     Call MD for:  difficulty breathing, headache or visual disturbances     Call MD for:  redness, tenderness, or signs of infection (pain, swelling, redness, odor or green/yellow discharge around incision site)     Call MD for:  hives     Call MD for:   Order  Comments: Inability to void,urine is ketchup colored or you have large clots, vaginal bleeding is heavier than a period.    VAGINAL DISCHARGE: You may develop a vaginal discharge and intermittent vaginal spotting after surgery and up to 6 weeks postoperatively.  The discharge may have an odor and may change in color but it is normal.  This is due to dissolving stiches.  Contact your surgical team if you develop vaginal or vulvar irritation along with a discharge.  Also contact your surgical team if you have vaginal discharge that smells like urine or stool.    CONSTIPATION REMEDIES: Patients are often constipated after surgery or with use of oral narcotic medicine. You should continue to take the stool softener, Senokot-S during the next six weeks, and consume adequate amounts of water.  If you have not had a bowel movement for 3 days after dismissal, or are uncomfortable and unable to pass stool, please try one or all of the following measures:  1.  Milk of Magnesia - 30 cc by mouth every 12 hours   2.  Dulcolax suppository - One suppository per rectum every 4-6 hours   3.  Metamucil, Fibercon or other bulk former - use as directed  4.  Fleets Enema      PAIN MEDICATIONS:     Take your pain medications as instructed. It is best to take pain medications before your pain becomes severe. This will allow you to take less medication yet have better pain relief. For the first 2 or 3 days it may be helpful to take your pain medications on a regular schedule (e.g. every 4 to 6 hours). This will help you to keep your pain under better control. You should then begin to take fewer medications each day until you no longer need them. Do not take pain medication on an empty stomach. This may lead to nausea and vomiting.     Activity as tolerated   Order Comments: PELVIC REST:  No douching, tampons, or intercourse for 6 weeks.    If prescribed, vaginal estrogen cream may be used during the postoperative period.     DRIVING:  No  driving while on narcotics. Driving may be resumed initially with a competent passenger one to two weeks after surgery if no longer taking narcotics.     EXERCISE:  For six weeks your exercise should be limited to walking. You may walk as far as you wish, as long as you increase your level of exertion gradually and avoid slippery surfaces. You may climb stairs as needed to get around, but should not use stair climbing for exercise.     Shower on day dressing removed (No bath)   Order Comments: You may shower at any time but should avoid immersing any abdominal incisions in water for at least 2 weeks after surgery or until the wound is completely healed.  If given, please shower with Hibaclens soap until bottle is completely finish        Follow-up Information       Wu Skaggs MD Follow up in 6 week(s).    Specialties: Gynecology, Urology  Why: post op follow up  Contact information:  [address] [id number] [phone]                             Dia Mckeon MD  OBGYN PGY-3

## 2023-09-14 LAB
FINAL PATHOLOGIC DIAGNOSIS: NORMAL
FINAL PATHOLOGIC DIAGNOSIS: NORMAL
Lab: NORMAL
Lab: NORMAL

## 2023-09-15 ENCOUNTER — PATIENT MESSAGE (OUTPATIENT)
Dept: FAMILY MEDICINE | Facility: CLINIC | Age: 48
End: 2023-09-15
Payer: MEDICAID

## 2023-09-15 ENCOUNTER — LAB VISIT (OUTPATIENT)
Dept: LAB | Facility: HOSPITAL | Age: 48
End: 2023-09-15
Attending: INTERNAL MEDICINE
Payer: MEDICAID

## 2023-09-15 DIAGNOSIS — Z12.11 COLON CANCER SCREENING: ICD-10-CM

## 2023-09-15 PROCEDURE — 82274 ASSAY TEST FOR BLOOD FECAL: CPT | Performed by: INTERNAL MEDICINE

## 2023-09-18 ENCOUNTER — PATIENT MESSAGE (OUTPATIENT)
Dept: PRIMARY CARE CLINIC | Facility: CLINIC | Age: 48
End: 2023-09-18
Payer: MEDICAID

## 2023-09-18 ENCOUNTER — OFFICE VISIT (OUTPATIENT)
Dept: UROGYNECOLOGY | Facility: CLINIC | Age: 48
End: 2023-09-18
Payer: MEDICAID

## 2023-09-18 VITALS
SYSTOLIC BLOOD PRESSURE: 134 MMHG | HEART RATE: 83 BPM | WEIGHT: 115.5 LBS | DIASTOLIC BLOOD PRESSURE: 84 MMHG | BODY MASS INDEX: 22.68 KG/M2 | HEIGHT: 60 IN

## 2023-09-18 DIAGNOSIS — Z09 POSTOP CHECK: Primary | ICD-10-CM

## 2023-09-18 PROCEDURE — 99213 OFFICE O/P EST LOW 20 MIN: CPT | Mod: PBBFAC | Performed by: OBSTETRICS & GYNECOLOGY

## 2023-09-18 PROCEDURE — 99999 PR PBB SHADOW E&M-EST. PATIENT-LVL III: ICD-10-PCS | Mod: PBBFAC,,, | Performed by: OBSTETRICS & GYNECOLOGY

## 2023-09-18 PROCEDURE — 99024 POSTOP FOLLOW-UP VISIT: CPT | Mod: ,,, | Performed by: OBSTETRICS & GYNECOLOGY

## 2023-09-18 PROCEDURE — 3075F SYST BP GE 130 - 139MM HG: CPT | Mod: CPTII,,, | Performed by: OBSTETRICS & GYNECOLOGY

## 2023-09-18 PROCEDURE — 3079F PR MOST RECENT DIASTOLIC BLOOD PRESSURE 80-89 MM HG: ICD-10-PCS | Mod: CPTII,,, | Performed by: OBSTETRICS & GYNECOLOGY

## 2023-09-18 PROCEDURE — 99999 PR PBB SHADOW E&M-EST. PATIENT-LVL III: CPT | Mod: PBBFAC,,, | Performed by: OBSTETRICS & GYNECOLOGY

## 2023-09-18 PROCEDURE — 3008F BODY MASS INDEX DOCD: CPT | Mod: CPTII,,, | Performed by: OBSTETRICS & GYNECOLOGY

## 2023-09-18 PROCEDURE — 99024 PR POST-OP FOLLOW-UP VISIT: ICD-10-PCS | Mod: ,,, | Performed by: OBSTETRICS & GYNECOLOGY

## 2023-09-18 PROCEDURE — 3075F PR MOST RECENT SYSTOLIC BLOOD PRESS GE 130-139MM HG: ICD-10-PCS | Mod: CPTII,,, | Performed by: OBSTETRICS & GYNECOLOGY

## 2023-09-18 PROCEDURE — 3008F PR BODY MASS INDEX (BMI) DOCUMENTED: ICD-10-PCS | Mod: CPTII,,, | Performed by: OBSTETRICS & GYNECOLOGY

## 2023-09-18 PROCEDURE — 3079F DIAST BP 80-89 MM HG: CPT | Mod: CPTII,,, | Performed by: OBSTETRICS & GYNECOLOGY

## 2023-09-18 NOTE — PROGRESS NOTES
Subjective:      Patient ID: Dina Sosa is a 48 y.o. female.    Chief Complaint:  Post-op Evaluation      History of Present Illness  Two weeks postop doing well no issues no complaints    Trocar skin incision site slightly irritated        Past Medical History:   Diagnosis Date    Anxiety     Cancer     History of breast cancer     Right breast. Invasive Ductal.       Past Surgical History:   Procedure Laterality Date    BILATERAL MASTECTOMY Bilateral 10/07/2022    Procedure: MASTECTOMY, BILATERAL;  Surgeon: Leeann Sawyer MD;  Location: [facility];  Service: General;  Laterality: Bilateral;    BREAST CAPSULECTOMY Bilateral 2023    Procedure: CAPSULECTOMY, BREAST;  Surgeon: Tae Trinidad DO;  Location: [facility];  Service: Plastics;  Laterality: Bilateral;  Bilat CAPSULOTOMY    CERVICAL DISC ARTHROPLASTY      C5-C6     SECTION      x2    FAT GRAFTING, OTHER Bilateral 2023    Procedure: INJECTION, FAT GRAFT;  Surgeon: Tae Trinidad DO;  Location: [facility];  Service: Plastics;  Laterality: Bilateral;    HIP SURGERY Left     fusion of pelvis around hip    HYSTERECTOMY  2002    ROGER, ovaries remain (AUB)    INJECTION FOR SENTINEL NODE IDENTIFICATION Right 10/07/2022    Procedure: INJECTION, FOR SENTINEL NODE IDENTIFICATION;  Surgeon: Leeann Sawyer MD;  Location: [facility];  Service: General;  Laterality: Right;    INSERTION OF BREAST IMPLANT Bilateral 2023    Procedure: INSERTION, BREAST IMPLANT;  Surgeon: Tae Trinidad DO;  Location: [facility];  Service: Plastics;  Laterality: Bilateral;  2 hours    INSERTION OF BREAST TISSUE EXPANDER Bilateral 10/07/2022    Procedure: INSERTION, TISSUE EXPANDER, BREAST / BILATERAL;  Surgeon: Tae Trinidad DO;  Location: [facility];  Service: Plastics;  Laterality: Bilateral;    INSERTION OF BREAST TISSUE EXPANDER Bilateral 02/10/2023    Procedure: INSERTION, TISSUE EXPANDER, BREAST;  Surgeon: Tae Trinidad DO;   Location: [facility];  Service: Plastics;  Laterality: Bilateral;  Bilateral tissue expanders/ 2 HOURS    LABIOPLASTY Bilateral 9/8/2023    Procedure: LABIAPLASTY, VULVA;  Surgeon: Kalyn Rick MD;  Location: [facility];  Service: OB/GYN;  Laterality: Bilateral;    mobi c      disk replacement C5-C6    ROBOT-ASSISTED LAPAROSCOPIC URETEROLYSIS N/A 9/8/2023    Procedure: ROBOTIC URETEROLYSIS;  Surgeon: Wu Skaggs MD;  Location: [facility];  Service: OB/GYN;  Laterality: N/A;    ROBOT-ASSISTED SURGICAL REMOVAL OF FALLOPIAN TUBE USING DA LETA XI Right 9/8/2023    Procedure: XI ROBOTIC SALPINGECTOMY;  Surgeon: Wu Skaggs MD;  Location: [facility];  Service: OB/GYN;  Laterality: Right;    ROBOTIC REMOVAL, MESH, VAGINA N/A 9/8/2023    Procedure: ROBOTIC REMOVAL, MESH, VAGINA;  Surgeon: Wu Skaggs MD;  Location: [facility];  Service: OB/GYN;  Laterality: N/A;    ROBOTIC SACROCOLPOPEXY, WITH CYSTOSCOPY N/A 9/8/2023    Procedure: ROBOTIC SACROCOLPOPEXY, WITH CYSTOSCOPY;  Surgeon: Wu Skaggs MD;  Location: [facility];  Service: OB/GYN;  Laterality: N/A;    SALPINGOOPHORECTOMY Left 9/8/2023    Procedure: SALPINGO-OOPHORECTOMY, Left ;  Surgeon: Wu Skaggs MD;  Location: [facility];  Service: OB/GYN;  Laterality: Left;    SENTINEL LYMPH NODE BIOPSY Right 10/07/2022    Procedure: BIOPSY, LYMPH NODE, SENTINEL;  Surgeon: Leeann Sawyer MD;  Location: [facility];  Service: General;  Laterality: Right;  2.5 HRS    si joint fusion      left hip    TISSUE EXPANDER REMOVAL Bilateral 10/17/2022    Procedure: REMOVAL, TISSUE EXPANDER;  Surgeon: Tae Trinidad DO;  Location: [facility];  Service: Plastics;  Laterality: Bilateral;    TISSUE EXPANDER REMOVAL Bilateral 4/28/2023    Procedure: REMOVAL, TISSUE EXPANDER;  Surgeon: Tae Trinidad DO;  Location: [facility];  Service: Plastics;  Laterality: Bilateral;  Bilat TE removal - Implant placement    TONSILLECTOMY      TYMPANOSTOMY TUBE PLACEMENT      x6    WOUND  DEBRIDEMENT Bilateral 10/17/2022    Procedure: DEBRIDEMENT, WOUND;  Surgeon: Tae Trinidad DO;  Location: [facility];  Service: Plastics;  Laterality: Bilateral;  1.5 HRS       GYN & OB History  Patient's last menstrual period was 2001 (approximate).   Date of Last Pap: No result found    OB History    Para Term  AB Living   2 2 2     2   SAB IAB Ectopic Multiple Live Births                  # Outcome Date GA Lbr Brandon/2nd Weight Sex Delivery Anes PTL Lv   2 Term            1 Term                Health Maintenance         Date Due Completion Date    TETANUS VACCINE Never done ---    Colorectal Cancer Screening 2023    Influenza Vaccine (1) 2023 10/21/2021    Lipid Panel 2024            Family History   Problem Relation Age of Onset    Cancer Maternal Grandmother 83        origin? brain (mets?)    Lung cancer Maternal Grandfather         long-term smoker    Hepatitis Father         cirrhosis    Hypertension Father     Genetic Disorder Mother         JAK2+ ET    Lung cancer Mother 63        ~30-yr smoker    Skin cancer Mother 67        mole nasal bridge (type?); maybe also one on hip    Hypertension Mother     Esophageal cancer Mother     Hodgkin's lymphoma Brother     Esophageal cancer Brother     Hypertension Sister     Von Willebrand disease Sister     Hemophilia Daughter         hemophilia A    Von Willebrand disease Daughter     Fibrocystic breast disease Maternal Aunt     Lung cancer Maternal Aunt         believes was smoker       Social History     Socioeconomic History    Marital status:    Tobacco Use    Smoking status: Former     Current packs/day: 0.00     Average packs/day: 0.5 packs/day for 22.0 years (11.0 ttl pk-yrs)     Types: Cigarettes     Start date: 5/10/1994     Quit date: 5/10/2016     Years since quittin.3    Smokeless tobacco: Never   Substance and Sexual Activity    Alcohol use: No    Drug use: No    Sexual activity: Yes      Partners: Male     Birth control/protection: None     Social Determinants of Health     Financial Resource Strain: Unknown (9/15/2023)    Overall Financial Resource Strain (CARDIA)     Difficulty of Paying Living Expenses: Patient refused   Recent Concern: Financial Resource Strain - Medium Risk (8/22/2023)    Overall Financial Resource Strain (CARDIA)     Difficulty of Paying Living Expenses: Somewhat hard   Food Insecurity: Unknown (9/15/2023)    Hunger Vital Sign     Worried About Running Out of Food in the Last Year: Patient refused     Ran Out of Food in the Last Year: Patient refused   Transportation Needs: No Transportation Needs (9/15/2023)    PRAPARE - Transportation     Lack of Transportation (Medical): No     Lack of Transportation (Non-Medical): No   Physical Activity: Insufficiently Active (9/15/2023)    Exercise Vital Sign     Days of Exercise per Week: 4 days     Minutes of Exercise per Session: 20 min   Stress: Stress Concern Present (9/15/2023)    [place] [employer] - Occupational Stress Questionnaire     Feeling of Stress : To some extent   Social Connections: Socially Isolated (9/15/2023)    Social Connection and Isolation Panel [NHANES]     Frequency of Communication with Friends and Family: More than three times a week     Frequency of Social Gatherings with Friends and Family: Once a week     Attends Rastafarian Services: Never     Active Member of Clubs or Organizations: No     Attends Club or Organization Meetings: Never     Marital Status:    Housing Stability: Low Risk  (9/15/2023)    Housing Stability Vital Sign     Unable to Pay for Housing in the Last Year: No     Number of Places Lived in the Last Year: 1     Unstable Housing in the Last Year: No       Review of Systems  Review of Systems  No complaints     Objective:   /84   Pulse 83   Ht 5' (1.524 m)   Wt 52.4 kg (115 lb 8.3 oz)   LMP 06/01/2001 (Approximate)   BMI 22.56 kg/m²     Physical  Exam   Lab via minora repair labioplasty healing well   Perfect anatomy from sacral colpopexy.    Abdominal trocar sites its superficial  Assessment:     1. Postop check            Plan:     1. Postop check      Overall patient doing very well will see her back at next scheduled postop visit  There are no Patient Instructions on file for this visit.

## 2023-09-19 LAB — HEMOCCULT STL QL IA: NEGATIVE

## 2023-09-20 ENCOUNTER — LAB VISIT (OUTPATIENT)
Dept: LAB | Facility: HOSPITAL | Age: 48
End: 2023-09-20
Attending: INTERNAL MEDICINE
Payer: MEDICAID

## 2023-09-20 DIAGNOSIS — E78.2 MIXED HYPERLIPIDEMIA: ICD-10-CM

## 2023-09-20 LAB
CHOLEST SERPL-MCNC: 170 MG/DL (ref 120–199)
CHOLEST/HDLC SERPL: 3.8 {RATIO} (ref 2–5)
HDLC SERPL-MCNC: 45 MG/DL (ref 40–75)
HDLC SERPL: 26.5 % (ref 20–50)
LDLC SERPL CALC-MCNC: 103.6 MG/DL (ref 63–159)
NONHDLC SERPL-MCNC: 125 MG/DL
TRIGL SERPL-MCNC: 107 MG/DL (ref 30–150)

## 2023-09-20 PROCEDURE — 36415 COLL VENOUS BLD VENIPUNCTURE: CPT | Mod: PO | Performed by: INTERNAL MEDICINE

## 2023-09-20 PROCEDURE — 80061 LIPID PANEL: CPT | Performed by: INTERNAL MEDICINE

## 2023-09-22 ENCOUNTER — OFFICE VISIT (OUTPATIENT)
Dept: FAMILY MEDICINE | Facility: CLINIC | Age: 48
End: 2023-09-22
Payer: MEDICAID

## 2023-09-22 VITALS
OXYGEN SATURATION: 99 % | HEIGHT: 60 IN | WEIGHT: 115.06 LBS | HEART RATE: 80 BPM | DIASTOLIC BLOOD PRESSURE: 78 MMHG | BODY MASS INDEX: 22.59 KG/M2 | SYSTOLIC BLOOD PRESSURE: 106 MMHG

## 2023-09-22 DIAGNOSIS — E78.2 MIXED HYPERLIPIDEMIA: ICD-10-CM

## 2023-09-22 DIAGNOSIS — T81.31XA POSTOPERATIVE DEHISCENCE OF SKIN WOUND, INITIAL ENCOUNTER: Primary | ICD-10-CM

## 2023-09-22 DIAGNOSIS — B37.31 YEAST VAGINITIS: ICD-10-CM

## 2023-09-22 PROCEDURE — 3078F DIAST BP <80 MM HG: CPT | Mod: CPTII,,, | Performed by: INTERNAL MEDICINE

## 2023-09-22 PROCEDURE — 99999 PR PBB SHADOW E&M-EST. PATIENT-LVL IV: CPT | Mod: PBBFAC,,, | Performed by: INTERNAL MEDICINE

## 2023-09-22 PROCEDURE — 3074F PR MOST RECENT SYSTOLIC BLOOD PRESSURE < 130 MM HG: ICD-10-PCS | Mod: CPTII,,, | Performed by: INTERNAL MEDICINE

## 2023-09-22 PROCEDURE — 1160F RVW MEDS BY RX/DR IN RCRD: CPT | Mod: CPTII,,, | Performed by: INTERNAL MEDICINE

## 2023-09-22 PROCEDURE — 1159F PR MEDICATION LIST DOCUMENTED IN MEDICAL RECORD: ICD-10-PCS | Mod: CPTII,,, | Performed by: INTERNAL MEDICINE

## 2023-09-22 PROCEDURE — 99999 PR PBB SHADOW E&M-EST. PATIENT-LVL IV: ICD-10-PCS | Mod: PBBFAC,,, | Performed by: INTERNAL MEDICINE

## 2023-09-22 PROCEDURE — 1160F PR REVIEW ALL MEDS BY PRESCRIBER/CLIN PHARMACIST DOCUMENTED: ICD-10-PCS | Mod: CPTII,,, | Performed by: INTERNAL MEDICINE

## 2023-09-22 PROCEDURE — 99214 OFFICE O/P EST MOD 30 MIN: CPT | Mod: S$PBB,,, | Performed by: INTERNAL MEDICINE

## 2023-09-22 PROCEDURE — 1159F MED LIST DOCD IN RCRD: CPT | Mod: CPTII,,, | Performed by: INTERNAL MEDICINE

## 2023-09-22 PROCEDURE — 99999PBSHW FLU VACCINE (QUAD) GREATER THAN OR EQUAL TO 3YO PRESERVATIVE FREE IM: Mod: PBBFAC,,,

## 2023-09-22 PROCEDURE — 3074F SYST BP LT 130 MM HG: CPT | Mod: CPTII,,, | Performed by: INTERNAL MEDICINE

## 2023-09-22 PROCEDURE — 99214 OFFICE O/P EST MOD 30 MIN: CPT | Mod: PBBFAC,PO | Performed by: INTERNAL MEDICINE

## 2023-09-22 PROCEDURE — 3008F PR BODY MASS INDEX (BMI) DOCUMENTED: ICD-10-PCS | Mod: CPTII,,, | Performed by: INTERNAL MEDICINE

## 2023-09-22 PROCEDURE — 3078F PR MOST RECENT DIASTOLIC BLOOD PRESSURE < 80 MM HG: ICD-10-PCS | Mod: CPTII,,, | Performed by: INTERNAL MEDICINE

## 2023-09-22 PROCEDURE — 99999PBSHW FLU VACCINE (QUAD) GREATER THAN OR EQUAL TO 3YO PRESERVATIVE FREE IM: ICD-10-PCS | Mod: PBBFAC,,,

## 2023-09-22 PROCEDURE — 3008F BODY MASS INDEX DOCD: CPT | Mod: CPTII,,, | Performed by: INTERNAL MEDICINE

## 2023-09-22 PROCEDURE — 99214 PR OFFICE/OUTPT VISIT, EST, LEVL IV, 30-39 MIN: ICD-10-PCS | Mod: S$PBB,,, | Performed by: INTERNAL MEDICINE

## 2023-09-22 PROCEDURE — 90471 IMMUNIZATION ADMIN: CPT | Mod: PBBFAC,PO

## 2023-09-22 RX ORDER — FLUCONAZOLE 100 MG/1
100 TABLET ORAL DAILY
Qty: 3 TABLET | Refills: 0 | Status: SHIPPED | OUTPATIENT
Start: 2023-09-22 | End: 2023-09-25

## 2023-09-22 RX ORDER — CEPHALEXIN 500 MG/1
500 CAPSULE ORAL EVERY 6 HOURS
Qty: 28 CAPSULE | Refills: 0 | Status: SHIPPED | OUTPATIENT
Start: 2023-09-22 | End: 2023-09-29

## 2023-09-22 NOTE — PROGRESS NOTES
[facility]  [facility] [place]   [address] [facility]       Patient ID: Dina Sosa     Chief Complaint:   Chief Complaint   Patient presents with    Follow-up     4 month           HPI:  Follow-up after she received her pelvic floor prolapse surgery which went well.  She is concerned about a few of the trocar sites as 1 is not healing very well and to others look like they should be healing better than they are.  In the site in her left lower abdomen, it does have an obvious skin disruption down to the dermis with exudate inside of the defect but thankfully I do not see any cellulitis nor do I feel any induration.  The periumbilical and right lower quadrant sites are similar though look like they are healing better.  Regardless, I want her to keep the sites clean and dry and I will give her 1 week of Keflex to speed the healing process.  She does consent to a flu shot today.  Vital signs look good.  Cholesterol looks fantastic.  She will continue pravastatin.  Stool blood test is negative.    Review of Systems       Post op wounds    Objective:      Physical Exam   Physical Exam       Trocar sites not healing well     Vitals:   Vitals:    09/22/23 1127   BP: 106/78   Pulse: 80   SpO2: 99%   Weight: 52.2 kg (115 lb 1.3 oz)   Height: 5' (1.524 m)        Assessment:           Plan:       Dina Sosa  was seen today for follow-up and may need lab work.    Diagnoses and all orders for this visit:    Dina was seen today for follow-up.    Diagnoses and all orders for this visit:    Postoperative dehiscence of skin wound, initial encounter  -     cephALEXin (KEFLEX) 500 MG capsule; Take 1 capsule (500 mg total) by mouth every 6 (six) hours. for 7 days  Keep site clean and dry     Yeast vaginitis  -     fluconazole (DIFLUCAN) 100 MG tablet; Take 1 tablet (100 mg total) by mouth once daily. for 3 days    Mixed hyperlipidemia  -     CBC Auto Differential; Future  -     Comprehensive Metabolic  Panel; Future  -     Lipid Panel; Future  -     TSH; Future  Controlled with med - Monitor Labs            Jb Pastor MD

## 2023-10-04 ENCOUNTER — TELEPHONE (OUTPATIENT)
Dept: PLASTIC SURGERY | Facility: CLINIC | Age: 48
End: 2023-10-04
Payer: MEDICAID

## 2023-10-04 NOTE — TELEPHONE ENCOUNTER
Attempted to contact pt to discuss scheduling minor proc 10/12 am -Pt was not available at the time of the call. I left a detailed VM asking pt to please call office at her convenience to discuss.Call Back number given

## 2023-10-05 ENCOUNTER — TELEPHONE (OUTPATIENT)
Dept: PLASTIC SURGERY | Facility: CLINIC | Age: 48
End: 2023-10-05
Payer: MEDICAID

## 2023-10-05 NOTE — TELEPHONE ENCOUNTER
Call placed to pt to offer 10/12 date for minor procedure - pt stated going on vacay 1 week after and may wait to do bresat revision and skin tailoring all together - pt to f/u in Nov and decide then

## 2023-10-13 ENCOUNTER — OFFICE VISIT (OUTPATIENT)
Dept: UROGYNECOLOGY | Facility: CLINIC | Age: 48
End: 2023-10-13
Payer: MEDICAID

## 2023-10-13 VITALS
WEIGHT: 113.56 LBS | DIASTOLIC BLOOD PRESSURE: 84 MMHG | BODY MASS INDEX: 22.29 KG/M2 | HEART RATE: 83 BPM | HEIGHT: 60 IN | SYSTOLIC BLOOD PRESSURE: 134 MMHG

## 2023-10-13 DIAGNOSIS — Z09 POSTOP CHECK: Primary | ICD-10-CM

## 2023-10-13 PROCEDURE — 99999 PR PBB SHADOW E&M-EST. PATIENT-LVL III: ICD-10-PCS | Mod: PBBFAC,,, | Performed by: OBSTETRICS & GYNECOLOGY

## 2023-10-13 PROCEDURE — 99024 POSTOP FOLLOW-UP VISIT: CPT | Mod: ,,, | Performed by: OBSTETRICS & GYNECOLOGY

## 2023-10-13 PROCEDURE — 99999 PR PBB SHADOW E&M-EST. PATIENT-LVL III: CPT | Mod: PBBFAC,,, | Performed by: OBSTETRICS & GYNECOLOGY

## 2023-10-13 PROCEDURE — 3079F PR MOST RECENT DIASTOLIC BLOOD PRESSURE 80-89 MM HG: ICD-10-PCS | Mod: CPTII,,, | Performed by: OBSTETRICS & GYNECOLOGY

## 2023-10-13 PROCEDURE — 1159F PR MEDICATION LIST DOCUMENTED IN MEDICAL RECORD: ICD-10-PCS | Mod: CPTII,,, | Performed by: OBSTETRICS & GYNECOLOGY

## 2023-10-13 PROCEDURE — 3075F PR MOST RECENT SYSTOLIC BLOOD PRESS GE 130-139MM HG: ICD-10-PCS | Mod: CPTII,,, | Performed by: OBSTETRICS & GYNECOLOGY

## 2023-10-13 PROCEDURE — 99213 OFFICE O/P EST LOW 20 MIN: CPT | Mod: PBBFAC | Performed by: OBSTETRICS & GYNECOLOGY

## 2023-10-13 PROCEDURE — 1159F MED LIST DOCD IN RCRD: CPT | Mod: CPTII,,, | Performed by: OBSTETRICS & GYNECOLOGY

## 2023-10-13 PROCEDURE — 99024 PR POST-OP FOLLOW-UP VISIT: ICD-10-PCS | Mod: ,,, | Performed by: OBSTETRICS & GYNECOLOGY

## 2023-10-13 PROCEDURE — 3075F SYST BP GE 130 - 139MM HG: CPT | Mod: CPTII,,, | Performed by: OBSTETRICS & GYNECOLOGY

## 2023-10-13 PROCEDURE — 3079F DIAST BP 80-89 MM HG: CPT | Mod: CPTII,,, | Performed by: OBSTETRICS & GYNECOLOGY

## 2023-10-13 NOTE — PROGRESS NOTES
Subjective:      Patient ID: Dina Sosa is a 48 y.o. female.    Chief Complaint:  Post-op Evaluation (Abdominal soreness right side )      History of Present Illness  Six weeks postop doing well no issues no complaints            Past Medical History:   Diagnosis Date    Anxiety     Cancer     History of breast cancer     Right breast. Invasive Ductal.       Past Surgical History:   Procedure Laterality Date    BILATERAL MASTECTOMY Bilateral 10/07/2022    Procedure: MASTECTOMY, BILATERAL;  Surgeon: Leeann Sawyer MD;  Location: [facility];  Service: General;  Laterality: Bilateral;    BREAST CAPSULECTOMY Bilateral 2023    Procedure: CAPSULECTOMY, BREAST;  Surgeon: Tae Trinidad DO;  Location: [facility];  Service: Plastics;  Laterality: Bilateral;  Bilat CAPSULOTOMY    CERVICAL DISC ARTHROPLASTY      C5-C6     SECTION      x2    FAT GRAFTING, OTHER Bilateral 2023    Procedure: INJECTION, FAT GRAFT;  Surgeon: Tae Trinidad DO;  Location: [facility];  Service: Plastics;  Laterality: Bilateral;    HIP SURGERY Left     fusion of pelvis around hip    HYSTERECTOMY  2002    ROGER, ovaries remain (AUB)    INJECTION FOR SENTINEL NODE IDENTIFICATION Right 10/07/2022    Procedure: INJECTION, FOR SENTINEL NODE IDENTIFICATION;  Surgeon: Leeann Sawyer MD;  Location: [facility];  Service: General;  Laterality: Right;    INSERTION OF BREAST IMPLANT Bilateral 2023    Procedure: INSERTION, BREAST IMPLANT;  Surgeon: Tae Trinidad DO;  Location: [facility];  Service: Plastics;  Laterality: Bilateral;  2 hours    INSERTION OF BREAST TISSUE EXPANDER Bilateral 10/07/2022    Procedure: INSERTION, TISSUE EXPANDER, BREAST / BILATERAL;  Surgeon: Tae Trinidad DO;  Location: [facility];  Service: Plastics;  Laterality: Bilateral;    INSERTION OF BREAST TISSUE EXPANDER Bilateral 02/10/2023    Procedure: INSERTION, TISSUE EXPANDER, BREAST;  Surgeon: Tae Trinidad DO;  Location: [facility]  OR;  Service: Plastics;  Laterality: Bilateral;  Bilateral tissue expanders/ 2 HOURS    LABIOPLASTY Bilateral 9/8/2023    Procedure: LABIAPLASTY, VULVA;  Surgeon: Kalyn Rick MD;  Location: [facility];  Service: OB/GYN;  Laterality: Bilateral;    mobi c      disk replacement C5-C6    ROBOT-ASSISTED LAPAROSCOPIC URETEROLYSIS N/A 9/8/2023    Procedure: ROBOTIC URETEROLYSIS;  Surgeon: Wu Skaggs MD;  Location: [facility] OR;  Service: OB/GYN;  Laterality: N/A;    ROBOT-ASSISTED SURGICAL REMOVAL OF FALLOPIAN TUBE USING DA LETA XI Right 9/8/2023    Procedure: XI ROBOTIC SALPINGECTOMY;  Surgeon: Wu Skaggs MD;  Location: [facility];  Service: OB/GYN;  Laterality: Right;    ROBOTIC REMOVAL, MESH, VAGINA N/A 9/8/2023    Procedure: ROBOTIC REMOVAL, MESH, VAGINA;  Surgeon: Wu Skaggs MD;  Location: [facility];  Service: OB/GYN;  Laterality: N/A;    ROBOTIC SACROCOLPOPEXY, WITH CYSTOSCOPY N/A 9/8/2023    Procedure: ROBOTIC SACROCOLPOPEXY, WITH CYSTOSCOPY;  Surgeon: Wu Skaggs MD;  Location: [facility];  Service: OB/GYN;  Laterality: N/A;    SALPINGOOPHORECTOMY Left 9/8/2023    Procedure: SALPINGO-OOPHORECTOMY, Left ;  Surgeon: Wu Skaggs MD;  Location: [facility];  Service: OB/GYN;  Laterality: Left;    SENTINEL LYMPH NODE BIOPSY Right 10/07/2022    Procedure: BIOPSY, LYMPH NODE, SENTINEL;  Surgeon: Leeann Sawyer MD;  Location: [facility];  Service: General;  Laterality: Right;  2.5 HRS    si joint fusion      left hip    TISSUE EXPANDER REMOVAL Bilateral 10/17/2022    Procedure: REMOVAL, TISSUE EXPANDER;  Surgeon: Tae Trinidad DO;  Location: [facility] OR;  Service: Plastics;  Laterality: Bilateral;    TISSUE EXPANDER REMOVAL Bilateral 4/28/2023    Procedure: REMOVAL, TISSUE EXPANDER;  Surgeon: Tae Triindad DO;  Location: [facility] OR;  Service: Plastics;  Laterality: Bilateral;  Bilat TE removal - Implant placement    TONSILLECTOMY      TYMPANOSTOMY TUBE PLACEMENT      x6    WOUND DEBRIDEMENT  Bilateral 10/17/2022    Procedure: DEBRIDEMENT, WOUND;  Surgeon: Tae Trinidad DO;  Location: [facility] OR;  Service: Plastics;  Laterality: Bilateral;  1.5 HRS       GYN & OB History  Patient's last menstrual period was 2001 (approximate).   Date of Last Pap: No result found    OB History    Para Term  AB Living   2 2 2     2   SAB IAB Ectopic Multiple Live Births                  # Outcome Date GA Lbr Brandon/2nd Weight Sex Delivery Anes PTL Lv   2 Term            1 Term                Health Maintenance         Date Due Completion Date    TETANUS VACCINE Never done ---    Colorectal Cancer Screening 2024    Lipid Panel 2024            Family History   Problem Relation Age of Onset    Cancer Maternal Grandmother 83        origin? brain (mets?)    Lung cancer Maternal Grandfather         long-term smoker    Hepatitis Father         cirrhosis    Hypertension Father     Genetic Disorder Mother         JAK2+ ET    Lung cancer Mother 63        ~30-yr smoker    Skin cancer Mother 67        mole nasal bridge (type?); maybe also one on hip    Hypertension Mother     Esophageal cancer Mother     Hodgkin's lymphoma Brother     Esophageal cancer Brother     Hypertension Sister     Von Willebrand disease Sister     Hemophilia Daughter         hemophilia A    Von Willebrand disease Daughter     Fibrocystic breast disease Maternal Aunt     Lung cancer Maternal Aunt         believes was smoker       Social History     Socioeconomic History    Marital status:    Tobacco Use    Smoking status: Former     Current packs/day: 0.00     Average packs/day: 0.5 packs/day for 22.0 years (11.0 ttl pk-yrs)     Types: Cigarettes     Start date: 5/10/1994     Quit date: 5/10/2016     Years since quittin.4    Smokeless tobacco: Never   Substance and Sexual Activity    Alcohol use: No    Drug use: No    Sexual activity: Yes     Partners: Male     Birth control/protection: None      Social Determinants of Health     Financial Resource Strain: Unknown (9/15/2023)    Overall Financial Resource Strain (CARDIA)     Difficulty of Paying Living Expenses: Patient refused   Recent Concern: Financial Resource Strain - Medium Risk (8/22/2023)    Overall Financial Resource Strain (CARDIA)     Difficulty of Paying Living Expenses: Somewhat hard   Food Insecurity: Unknown (9/15/2023)    Hunger Vital Sign     Worried About Running Out of Food in the Last Year: Patient refused     Ran Out of Food in the Last Year: Patient refused   Transportation Needs: No Transportation Needs (9/15/2023)    PRAPARE - Transportation     Lack of Transportation (Medical): No     Lack of Transportation (Non-Medical): No   Physical Activity: Insufficiently Active (9/15/2023)    Exercise Vital Sign     Days of Exercise per Week: 4 days     Minutes of Exercise per Session: 20 min   Stress: Stress Concern Present (9/15/2023)    [employer] - Occupational Stress Questionnaire     Feeling of Stress : To some extent   Social Connections: Unknown (9/15/2023)    Social Connection and Isolation Panel [NHANES]     Frequency of Communication with Friends and Family: More than three times a week     Frequency of Social Gatherings with Friends and Family: Once a week     Active Member of Clubs or Organizations: No     Attends Club or Organization Meetings: Never     Marital Status:    Recent Concern: Social Connections - Socially Isolated (9/15/2023)    Social Connection and Isolation Panel [NHANES]     Frequency of Communication with Friends and Family: More than three times a week     Frequency of Social Gatherings with Friends and Family: Once a week     Attends Rastafarian Services: Never     Active Member of Clubs or Organizations: No     Attends Club or Organization Meetings: Never     Marital Status:    Housing Stability: Low Risk  (9/15/2023)    Housing Stability Vital Sign     Unable to  Pay for Housing in the Last Year: No     Number of Places Lived in the Last Year: 1     Unstable Housing in the Last Year: No       Review of Systems  Review of Systems  No complaints   Objective:   /84   Pulse 83   Ht 5' (1.524 m)   Wt 51.5 kg (113 lb 8.6 oz)   LMP 06/01/2001 (Approximate)   BMI 22.17 kg/m²     Physical Exam   Completely normal anatomy healing well  Assessment:     1. Postop check            Plan:     1. Postop check        Answered all questions patient is very happy patient is released from clinic return as needed    There are no Patient Instructions on file for this visit.

## 2023-10-18 ENCOUNTER — PATIENT MESSAGE (OUTPATIENT)
Dept: CARDIOLOGY | Facility: CLINIC | Age: 48
End: 2023-10-18
Payer: MEDICAID

## 2023-11-24 ENCOUNTER — PATIENT MESSAGE (OUTPATIENT)
Dept: PLASTIC SURGERY | Facility: CLINIC | Age: 48
End: 2023-11-24
Payer: MEDICAID

## 2024-01-04 ENCOUNTER — OFFICE VISIT (OUTPATIENT)
Dept: SURGERY | Facility: CLINIC | Age: 49
End: 2024-01-04
Payer: MEDICAID

## 2024-01-04 VITALS
SYSTOLIC BLOOD PRESSURE: 128 MMHG | WEIGHT: 113 LBS | DIASTOLIC BLOOD PRESSURE: 90 MMHG | HEIGHT: 60 IN | HEART RATE: 97 BPM | BODY MASS INDEX: 22.19 KG/M2

## 2024-01-04 DIAGNOSIS — Z98.890 HISTORY OF BREAST RECONSTRUCTION: Primary | ICD-10-CM

## 2024-01-04 PROCEDURE — 99999 PR PBB SHADOW E&M-EST. PATIENT-LVL III: CPT | Mod: PBBFAC,,, | Performed by: SURGERY

## 2024-01-04 PROCEDURE — 3080F DIAST BP >= 90 MM HG: CPT | Mod: CPTII,,, | Performed by: SURGERY

## 2024-01-04 PROCEDURE — 3074F SYST BP LT 130 MM HG: CPT | Mod: CPTII,,, | Performed by: SURGERY

## 2024-01-04 PROCEDURE — 99213 OFFICE O/P EST LOW 20 MIN: CPT | Mod: PBBFAC | Performed by: SURGERY

## 2024-01-04 PROCEDURE — 3008F BODY MASS INDEX DOCD: CPT | Mod: CPTII,,, | Performed by: SURGERY

## 2024-01-04 PROCEDURE — 99213 OFFICE O/P EST LOW 20 MIN: CPT | Mod: S$PBB,,, | Performed by: SURGERY

## 2024-01-04 PROCEDURE — 1159F MED LIST DOCD IN RCRD: CPT | Mod: CPTII,,, | Performed by: SURGERY

## 2024-01-04 NOTE — PROGRESS NOTES
Dina Sosa femalepresents to [facility]astic Surgery [facility] for a follow up visit status post implant replacement with fat grafting on 4/28/23. Patient is doing well today with no issues since surgery. She would like to be a little bigger in terms of projection. She also has a dog ear at the top of her wise pattern scars bilaterally.      PHYSICAL EXAMINATION  Bilateral breast incision(s) well healed with dog ears at the top of her vertical incisional scars bilaterally.  Soft supple implants, right implant slightly lower with IMF scar pulled up over the bottom of the implant  Nipple(s) is/are surgically absent         ASSESSMENT/PLAN  Dina Sosa is s/p implant exchange  - Plan for larger implant exchange with right capsule revision  - Discussed lipo with fat grafting but elected not to do any as she has good upper pole fullness on the left and the right upper pole will likely improve by adjusting the capsule.      All questions were answered. The patient was advised to contact the clinic with any questions or concerns prior to their next visit.       Doreen Garcia PA-C  Plastic and Reconstructive Surgery

## 2024-01-06 DIAGNOSIS — F41.9 ANXIETY: ICD-10-CM

## 2024-01-06 NOTE — TELEPHONE ENCOUNTER
No care due was identified.  [facility] Embedded Care Due Messages. Reference number: [id number].   1/06/2024 6:18:03 AM CST

## 2024-01-08 RX ORDER — BUPROPION HYDROCHLORIDE 300 MG/1
300 TABLET ORAL
Qty: 90 TABLET | Refills: 3 | Status: SHIPPED | OUTPATIENT
Start: 2024-01-08

## 2024-01-08 NOTE — TELEPHONE ENCOUNTER
Refill Routing Note   Medication(s) are not appropriate for processing by [facility] for the following reason(s):        Required vitals abnormal    [facility] action(s):  Defer               Appointments  past 12m or future 3m with PCP    Date Provider   Last Visit   9/22/2023 Jb Pastor MD   Next Visit   3/22/2024 Jb Pastor MD   ED visits in past 90 days: 0        Note composed:3:15 AM 01/08/2024

## 2024-01-17 ENCOUNTER — PATIENT MESSAGE (OUTPATIENT)
Dept: SURGERY | Facility: CLINIC | Age: 49
End: 2024-01-17
Payer: MEDICAID

## 2024-01-31 DIAGNOSIS — D05.11 DUCTAL CARCINOMA IN SITU (DCIS) OF RIGHT BREAST: Primary | ICD-10-CM

## 2024-02-08 ENCOUNTER — TELEPHONE (OUTPATIENT)
Dept: PREADMISSION TESTING | Facility: HOSPITAL | Age: 49
End: 2024-02-08
Payer: MEDICAID

## 2024-02-08 NOTE — ANESTHESIA PAT ROS NOTE
02/08/2024  Dina Sosa is a 48 y.o., female.      Pre-op Assessment    I have reviewed the NPO Status.      Review of Systems  Anesthesia Hx:  No problems with previous Anesthesia   History of prior surgery of interest to airway management or planning:          Denies Family Hx of Anesthesia complications.    Denies Personal Hx of Anesthesia complications.                    Social:  Former Smoker, No Alcohol Use, Non-Smoker       Hematology/Oncology:  Hematology Normal                     Current/Recent Cancer.  --  Cancer in past history:       Breast    right      Oncology Comments: Ductal carcinoma in situ of right breast     EENT/Dental:  EENT/Dental Normal           Cardiovascular:  Exercise tolerance: good   Denies Pacemaker.        Denies Angina.     hyperlipidemia                             Pulmonary:  Pulmonary Normal                       Renal/:  Renal/ Normal                 Hepatic/GI:     GERD, well controlled             Musculoskeletal:  Musculoskeletal Normal            Cervical Spine Disorder Pt with h/o cervical disc surgery    Neurological:  Denies TIA.  Denies CVA.   Headaches        Pain Syndrome   Chronic Pain Syndrome                         Endocrine:  Endocrine Normal            Psych:  Psychiatric History anxiety depression                     Anesthesia Assessment: Preoperative EQUATION    Planned Procedure: Procedure(s) (LRB):  CAPSULOTOMY, BREAST (Right)  REPLACEMENT, IMPLANT, BREAST (Bilateral)  Requested Anesthesia Type:General  Surgeon: Tae Trinidad DO  Service: Plastics  Known or anticipated Date of Surgery:2/15/2024    Surgeon notes: reviewed    Electronic QUestionnaire Assessment completed via nurse interview with patient.        Triage considerations:     The patient has no apparent active cardiac condition (No unstable coronary Syndrome such as  severe unstable angina or recent [<1 month] myocardial infarction, decompensated CHF, severe valvular   disease or significant arrhythmia)    Previous anesthesia records:GETA and No problems  09/08/23 ROBOTIC REMOVAL, MESH, VAGINA (VAGINA). ROBOTIC SACROCOLPOPEXY, WITH CYSTOSCOPY (ABDOMEN). SALPINGO-OOPHORECTOMY, LEFT (LEFT: ABDOMEN). ROBOTIC URETEROLYSIS (UTERUS). XI ROBOTIC SALPINGECTOMY (RIGHT: ABDOMEN). LABIAPLASTY, VULVA (BILATERAL), gen anes, ASA 2  Airway:  Mallampati: II   Mouth Opening: Normal  TM Distance: Normal  Tongue: Normal  Neck ROM: Extension Decreased  Intubation     Date/Time: 9/8/2023 10:49 AM     Performed by: Tete Gustafson CRNA  Authorized by: Neil Kendall MD    Intubation:     Induction:  Intravenous    Intubated:  Postinduction    Mask Ventilation:  Easy mask    Attempts:  1    Attempted By:  CRNA    Method of Intubation:  Video laryngoscopy    Blade:  Gonzalez 3    Laryngeal View Grade: Grade I - full view of cords      Difficult Airway Encountered?: No      Complications:  None    Airway Device:  Oral endotracheal tube    Airway Device Size:  7.0    Style/Cuff Inflation:  Cuffed (inflated to minimal occlusive pressure)    Inflation Amount (mL):  5    Tube secured:  20    Secured at:  The lips    Placement Verified By:  Capnometry    Complicating Factors:  None    Findings Post-Intubation:  Atraumatic/condition of teeth unchanged and BS equal bilateral  Notes:      Neck remained neutral    Last PCP note: 3-6 months ago , within [facility]   Subspecialty notes:  Plastics    Other important co-morbidities: GERD, HLD, and rt breast invasive ductal cancer, anxiety, h/o cervical surgery, h/o pelvic floor prolapse surgery, chronic pain syndrome       Tests already available:  No recent tests.             Instructions given. (See in Nurse's note)    Optimization:  Anesthesia Preop Clinic Assessment Indicated-not indicated        Plan:    Testing:  BMP and Hemoglobin      Navigation: Tests  Scheduled.             Results will be tracked by Preop Clinic.

## 2024-02-12 ENCOUNTER — LAB VISIT (OUTPATIENT)
Dept: LAB | Facility: HOSPITAL | Age: 49
End: 2024-02-12
Attending: ANESTHESIOLOGY
Payer: MEDICAID

## 2024-02-12 ENCOUNTER — PATIENT MESSAGE (OUTPATIENT)
Dept: SURGERY | Facility: CLINIC | Age: 49
End: 2024-02-12
Payer: MEDICAID

## 2024-02-12 DIAGNOSIS — Z01.818 PREOP TESTING: ICD-10-CM

## 2024-02-12 LAB
ANION GAP SERPL CALC-SCNC: 8 MMOL/L (ref 8–16)
BUN SERPL-MCNC: 12 MG/DL (ref 6–20)
CALCIUM SERPL-MCNC: 9.5 MG/DL (ref 8.7–10.5)
CHLORIDE SERPL-SCNC: 104 MMOL/L (ref 95–110)
CO2 SERPL-SCNC: 27 MMOL/L (ref 23–29)
CREAT SERPL-MCNC: 0.8 MG/DL (ref 0.5–1.4)
EST. GFR  (NO RACE VARIABLE): >60 ML/MIN/1.73 M^2
GLUCOSE SERPL-MCNC: 99 MG/DL (ref 70–110)
HGB BLD-MCNC: 12.9 G/DL (ref 12–16)
POTASSIUM SERPL-SCNC: 4.1 MMOL/L (ref 3.5–5.1)
SODIUM SERPL-SCNC: 139 MMOL/L (ref 136–145)

## 2024-02-12 PROCEDURE — 80048 BASIC METABOLIC PNL TOTAL CA: CPT | Performed by: ANESTHESIOLOGY

## 2024-02-12 PROCEDURE — 36415 COLL VENOUS BLD VENIPUNCTURE: CPT | Mod: PO | Performed by: ANESTHESIOLOGY

## 2024-02-12 PROCEDURE — 85018 HEMOGLOBIN: CPT | Performed by: ANESTHESIOLOGY

## 2024-02-14 ENCOUNTER — ANESTHESIA EVENT (OUTPATIENT)
Dept: SURGERY | Facility: HOSPITAL | Age: 49
End: 2024-02-14
Payer: MEDICAID

## 2024-02-14 ENCOUNTER — TELEPHONE (OUTPATIENT)
Dept: PLASTIC SURGERY | Facility: CLINIC | Age: 49
End: 2024-02-14
Payer: MEDICAID

## 2024-02-14 NOTE — TELEPHONE ENCOUNTER
Pre op call complete. Advised pt of arrival time for surgery, pt advised to arrive location [facility], second floor [facility], at 0500 for 0700 surgery, NPO status reinforced No Solids after 9 PM; can drink Gatorade /Powerade or water until leave the home  - ride and care giver arranged and verified.Pre op education reinforced. Pt Verbalized understanding, all questions and concerns addressed at this time.

## 2024-02-15 ENCOUNTER — ANESTHESIA (OUTPATIENT)
Dept: SURGERY | Facility: HOSPITAL | Age: 49
End: 2024-02-15
Payer: MEDICAID

## 2024-02-15 ENCOUNTER — HOSPITAL ENCOUNTER (OUTPATIENT)
Facility: HOSPITAL | Age: 49
Discharge: HOME | End: 2024-02-15
Attending: SURGERY | Admitting: SURGERY
Payer: MEDICAID

## 2024-02-15 VITALS
BODY MASS INDEX: 21.6 KG/M2 | WEIGHT: 110 LBS | DIASTOLIC BLOOD PRESSURE: 103 MMHG | OXYGEN SATURATION: 100 % | RESPIRATION RATE: 15 BRPM | HEIGHT: 60 IN | HEART RATE: 92 BPM | TEMPERATURE: 98 F | SYSTOLIC BLOOD PRESSURE: 140 MMHG

## 2024-02-15 DIAGNOSIS — Z85.3 HISTORY OF BREAST CANCER: Primary | ICD-10-CM

## 2024-02-15 DIAGNOSIS — M54.12 CERVICAL RADICULOPATHY: ICD-10-CM

## 2024-02-15 DIAGNOSIS — Z01.818 PREOP TESTING: ICD-10-CM

## 2024-02-15 PROCEDURE — 71000016 HC POSTOP RECOV ADDL HR: Performed by: SURGERY

## 2024-02-15 PROCEDURE — 88300 SURGICAL PATH GROSS: CPT | Mod: 59 | Performed by: PATHOLOGY

## 2024-02-15 PROCEDURE — 25000003 PHARM REV CODE 250: Performed by: ANESTHESIOLOGY

## 2024-02-15 PROCEDURE — 88300 SURGICAL PATH GROSS: CPT | Mod: 26,,, | Performed by: PATHOLOGY

## 2024-02-15 PROCEDURE — 37000009 HC ANESTHESIA EA ADD 15 MINS: Performed by: SURGERY

## 2024-02-15 PROCEDURE — 37000008 HC ANESTHESIA 1ST 15 MINUTES: Performed by: SURGERY

## 2024-02-15 PROCEDURE — 71000015 HC POSTOP RECOV 1ST HR: Performed by: SURGERY

## 2024-02-15 PROCEDURE — 71000044 HC DOSC ROUTINE RECOVERY FIRST HOUR: Performed by: SURGERY

## 2024-02-15 PROCEDURE — 19342 INSJ/RPLCMT BRST IMPLT SEP D: CPT | Mod: 50,,, | Performed by: SURGERY

## 2024-02-15 PROCEDURE — 25000003 PHARM REV CODE 250: Performed by: NURSE ANESTHETIST, CERTIFIED REGISTERED

## 2024-02-15 PROCEDURE — 25000003 PHARM REV CODE 250: Performed by: SURGERY

## 2024-02-15 PROCEDURE — D9220A PRA ANESTHESIA: Mod: CRNA,,, | Performed by: NURSE ANESTHETIST, CERTIFIED REGISTERED

## 2024-02-15 PROCEDURE — 63600175 PHARM REV CODE 636 W HCPCS: Performed by: ANESTHESIOLOGY

## 2024-02-15 PROCEDURE — C1789 PROSTHESIS, BREAST, IMP: HCPCS | Performed by: SURGERY

## 2024-02-15 PROCEDURE — 36000706: Performed by: SURGERY

## 2024-02-15 PROCEDURE — 36000707: Performed by: SURGERY

## 2024-02-15 PROCEDURE — 25000003 PHARM REV CODE 250: Performed by: PHYSICIAN ASSISTANT

## 2024-02-15 PROCEDURE — 11402 EXC TR-EXT B9+MARG 1.1-2 CM: CPT | Mod: 51,,, | Performed by: SURGERY

## 2024-02-15 PROCEDURE — D9220A PRA ANESTHESIA: Mod: ANES,,, | Performed by: ANESTHESIOLOGY

## 2024-02-15 DEVICE — IMPLANTABLE DEVICE: Type: IMPLANTABLE DEVICE | Site: BREAST | Status: FUNCTIONAL

## 2024-02-15 RX ORDER — IBUPROFEN 600 MG/1
600 TABLET ORAL 3 TIMES DAILY
Qty: 21 TABLET | Refills: 0 | Status: SHIPPED | OUTPATIENT
Start: 2024-02-15 | End: 2024-02-22

## 2024-02-15 RX ORDER — HALOPERIDOL 5 MG/ML
INJECTION INTRAMUSCULAR
Status: DISCONTINUED | OUTPATIENT
Start: 2024-02-15 | End: 2024-02-15

## 2024-02-15 RX ORDER — LIDOCAINE HYDROCHLORIDE 10 MG/ML
1 INJECTION, SOLUTION EPIDURAL; INFILTRATION; INTRACAUDAL; PERINEURAL ONCE
Status: DISCONTINUED | OUTPATIENT
Start: 2024-02-15 | End: 2024-02-15 | Stop reason: HOSPADM

## 2024-02-15 RX ORDER — PROPOFOL 10 MG/ML
VIAL (ML) INTRAVENOUS
Status: DISCONTINUED | OUTPATIENT
Start: 2024-02-15 | End: 2024-02-15

## 2024-02-15 RX ORDER — ONDANSETRON HYDROCHLORIDE 2 MG/ML
4 INJECTION, SOLUTION INTRAVENOUS ONCE AS NEEDED
Status: DISCONTINUED | OUTPATIENT
Start: 2024-02-15 | End: 2024-02-15 | Stop reason: HOSPADM

## 2024-02-15 RX ORDER — OXYCODONE HYDROCHLORIDE 5 MG/1
5 TABLET ORAL ONCE
Status: COMPLETED | OUTPATIENT
Start: 2024-02-15 | End: 2024-02-15

## 2024-02-15 RX ORDER — OXYCODONE HYDROCHLORIDE 5 MG/1
5 TABLET ORAL EVERY 4 HOURS PRN
Qty: 10 TABLET | Refills: 0 | Status: SHIPPED | OUTPATIENT
Start: 2024-02-15 | End: 2024-03-22

## 2024-02-15 RX ORDER — MIDAZOLAM HYDROCHLORIDE 1 MG/ML
INJECTION, SOLUTION INTRAMUSCULAR; INTRAVENOUS
Status: DISCONTINUED | OUTPATIENT
Start: 2024-02-15 | End: 2024-02-15

## 2024-02-15 RX ORDER — FENTANYL CITRATE 50 UG/ML
INJECTION, SOLUTION INTRAMUSCULAR; INTRAVENOUS
Status: DISCONTINUED | OUTPATIENT
Start: 2024-02-15 | End: 2024-02-15

## 2024-02-15 RX ORDER — HALOPERIDOL 5 MG/ML
0.5 INJECTION INTRAMUSCULAR EVERY 10 MIN PRN
Status: DISCONTINUED | OUTPATIENT
Start: 2024-02-15 | End: 2024-02-15 | Stop reason: HOSPADM

## 2024-02-15 RX ORDER — SODIUM CHLORIDE 0.9 % (FLUSH) 0.9 %
10 SYRINGE (ML) INJECTION
Status: DISCONTINUED | OUTPATIENT
Start: 2024-02-15 | End: 2024-02-15 | Stop reason: HOSPADM

## 2024-02-15 RX ORDER — DEXAMETHASONE SODIUM PHOSPHATE 4 MG/ML
INJECTION, SOLUTION INTRA-ARTICULAR; INTRALESIONAL; INTRAMUSCULAR; INTRAVENOUS; SOFT TISSUE
Status: DISCONTINUED | OUTPATIENT
Start: 2024-02-15 | End: 2024-02-15

## 2024-02-15 RX ORDER — MUPIROCIN 20 MG/G
OINTMENT TOPICAL
Status: DISCONTINUED | OUTPATIENT
Start: 2024-02-15 | End: 2024-02-15 | Stop reason: HOSPADM

## 2024-02-15 RX ORDER — CEFAZOLIN 2 G/1
INJECTION, POWDER, FOR SOLUTION INTRAMUSCULAR; INTRAVENOUS
Status: DISCONTINUED | OUTPATIENT
Start: 2024-02-15 | End: 2024-02-15

## 2024-02-15 RX ORDER — LIDOCAINE HYDROCHLORIDE 20 MG/ML
INJECTION INTRAVENOUS
Status: DISCONTINUED | OUTPATIENT
Start: 2024-02-15 | End: 2024-02-15

## 2024-02-15 RX ORDER — ROCURONIUM BROMIDE 10 MG/ML
INJECTION, SOLUTION INTRAVENOUS
Status: DISCONTINUED | OUTPATIENT
Start: 2024-02-15 | End: 2024-02-15

## 2024-02-15 RX ORDER — LORAZEPAM 2 MG/ML
0.25 INJECTION INTRAMUSCULAR ONCE AS NEEDED
Status: DISCONTINUED | OUTPATIENT
Start: 2024-02-15 | End: 2024-02-15 | Stop reason: HOSPADM

## 2024-02-15 RX ORDER — SODIUM CHLORIDE 0.9 % (FLUSH) 0.9 %
3 SYRINGE (ML) INJECTION
Status: DISCONTINUED | OUTPATIENT
Start: 2024-02-15 | End: 2024-02-15 | Stop reason: HOSPADM

## 2024-02-15 RX ORDER — ONDANSETRON HYDROCHLORIDE 2 MG/ML
INJECTION, SOLUTION INTRAVENOUS
Status: DISCONTINUED | OUTPATIENT
Start: 2024-02-15 | End: 2024-02-15

## 2024-02-15 RX ORDER — ACETAMINOPHEN 500 MG
1000 TABLET ORAL EVERY 6 HOURS PRN
Qty: 42 TABLET | Refills: 0 | Status: SHIPPED | OUTPATIENT
Start: 2024-02-15 | End: 2024-02-22

## 2024-02-15 RX ORDER — SODIUM CHLORIDE 9 MG/ML
INJECTION, SOLUTION INTRAVENOUS CONTINUOUS
Status: DISCONTINUED | OUTPATIENT
Start: 2024-02-15 | End: 2024-02-15 | Stop reason: HOSPADM

## 2024-02-15 RX ORDER — HYDROMORPHONE HYDROCHLORIDE 1 MG/ML
0.2 INJECTION, SOLUTION INTRAMUSCULAR; INTRAVENOUS; SUBCUTANEOUS EVERY 5 MIN PRN
Status: DISCONTINUED | OUTPATIENT
Start: 2024-02-15 | End: 2024-02-15 | Stop reason: HOSPADM

## 2024-02-15 RX ADMIN — FENTANYL CITRATE 100 MCG: 50 INJECTION, SOLUTION INTRAMUSCULAR; INTRAVENOUS at 07:02

## 2024-02-15 RX ADMIN — MUPIROCIN: 20 OINTMENT TOPICAL at 06:02

## 2024-02-15 RX ADMIN — HYDROMORPHONE HYDROCHLORIDE 0.2 MG: 1 INJECTION, SOLUTION INTRAMUSCULAR; INTRAVENOUS; SUBCUTANEOUS at 09:02

## 2024-02-15 RX ADMIN — HALOPERIDOL LACTATE 1 MG: 5 INJECTION, SOLUTION INTRAMUSCULAR at 08:02

## 2024-02-15 RX ADMIN — ONDANSETRON 4 MG: 2 INJECTION INTRAMUSCULAR; INTRAVENOUS at 07:02

## 2024-02-15 RX ADMIN — CEFAZOLIN 2 G: 2 INJECTION, POWDER, FOR SOLUTION INTRAMUSCULAR; INTRAVENOUS at 07:02

## 2024-02-15 RX ADMIN — LIDOCAINE HYDROCHLORIDE 60 MG: 20 INJECTION INTRAVENOUS at 07:02

## 2024-02-15 RX ADMIN — MIDAZOLAM HYDROCHLORIDE 2 MG: 1 INJECTION, SOLUTION INTRAMUSCULAR; INTRAVENOUS at 06:02

## 2024-02-15 RX ADMIN — OXYCODONE HYDROCHLORIDE 5 MG: 5 TABLET ORAL at 10:02

## 2024-02-15 RX ADMIN — SODIUM CHLORIDE, SODIUM GLUCONATE, SODIUM ACETATE, POTASSIUM CHLORIDE, MAGNESIUM CHLORIDE, SODIUM PHOSPHATE, DIBASIC, AND POTASSIUM PHOSPHATE: .53; .5; .37; .037; .03; .012; .00082 INJECTION, SOLUTION INTRAVENOUS at 08:02

## 2024-02-15 RX ADMIN — DEXAMETHASONE SODIUM PHOSPHATE 4 MG: 4 INJECTION, SOLUTION INTRAMUSCULAR; INTRAVENOUS at 07:02

## 2024-02-15 RX ADMIN — HYDROMORPHONE HYDROCHLORIDE 0.2 MG: 1 INJECTION, SOLUTION INTRAMUSCULAR; INTRAVENOUS; SUBCUTANEOUS at 10:02

## 2024-02-15 RX ADMIN — SUGAMMADEX 200 MG: 100 INJECTION, SOLUTION INTRAVENOUS at 08:02

## 2024-02-15 RX ADMIN — ROCURONIUM BROMIDE 50 MG: 10 INJECTION, SOLUTION INTRAVENOUS at 07:02

## 2024-02-15 RX ADMIN — PROPOFOL 150 MG: 10 INJECTION, EMULSION INTRAVENOUS at 07:02

## 2024-02-15 RX ADMIN — SODIUM CHLORIDE: 0.9 INJECTION, SOLUTION INTRAVENOUS at 06:02

## 2024-02-15 NOTE — ANESTHESIA PROCEDURE NOTES
Intubation    Date/Time: 2/15/2024 7:13 AM    Performed by: Darius Maxwell MD  Authorized by: Darius Maxwell MD    Intubation:     Induction:  Intravenous    Intubated:  Postinduction    Mask Ventilation:  Easy mask    Attempts:  1    Attempted By:  CRNA    Method of Intubation:  Video laryngoscopy    Blade:  Gonzalez 3    Laryngeal View Grade: Grade I - full view of cords      Difficult Airway Encountered?: No      Complications:  None    Airway Device:  Oral endotracheal tube    Airway Device Size:  7.0    Style/Cuff Inflation:  Cuffed    Inflation Amount (mL):  8    Tube secured:  22    Secured at:  The lips    Placement Verified By:  Capnometry and Revisualization with laryngoscopy    Findings Post-Intubation:  BS equal bilateral

## 2024-02-15 NOTE — OP NOTE
[facility] - Surgery ([facility])  Plastic Surgery  Operative Note    SUMMARY     Date of Procedure: 2/15/2024     Location:  [facility]    Procedure(s):   Right breast capsulorrhaphy   Bilateral implant exchange  Bilateral dog-ear excision    Surgeon(s) and Role:     * Tae Trinidad DO - Primary     * Toribio Khan MD - Fellow    Assistant: Toribio Khan DO, Fellow    Pre-Operative Diagnosis: Ductal carcinoma in situ (DCIS) of right breast [D05.11]    Post-Operative Diagnosis: same    Anesthesia: General    Indications for Procedure:  48-year-old woman with a history of breast cancer.  She underwent bilateral skin sparing mastectomy.  Unfortunately she lost a fair portion of IMF skin.  I had to take out the expander and let this heal.  She was subsequently expanded and then had implants placed.  Her current complaints are that her right IMF has descended.  She also wishes to be a little bit larger.  We discussed the potential for more fat grafting however I think what this would just be corrected with improved implant position.    Operative Findings (including complications, if any):  Right capsulorrhaphy and elevation of the IMF about 1.5 cm.  Small capsulorrhaphy on the left side.  Bilateral 520 cc implants were removed with placement 585 cc high-profile boost smooth round silicone implants    Description of Procedures:  Patient was seen in the preoperative holding area.  All questions were answered.  Surgical consents were signed.  Patient was taken the operating room general anesthesia was induced.  Both breasts were prepped and draped in sterile fashion.      Beginning on the right side and reopened her previous incision which had some abdominal wall skin that had been recruited above the position of the IMF.  I created a plane between the subcutaneous fat and the capsule all the way down to the chest wall.  Leaving the previous implant in place using 3 interrupted 3-0 Prolene sutures I  tacked the capsule to the next rib.  The patient was sat up.  I compared both sides.  The right implant was now too high.  The patient was laid back flat.  I then lowered the IMF.  I removed the 2 medial sutures and replaced them to the chest wall fascia about 1 cm lower.  The patient was then sat back up.  It was point she had a good height.  The patient was laid back flat and I ran that right capsule were fee with a running 3-0 Prolene suture as well for added security.  Next the capsule was opened above the capsulorrhaphy on that side and the implant was removed    On the left side the previous IMF incision was opened.  We entered the capsule.  The implant was removed.  Performed a capsulorrhaphy 4-6 o'clock to tighten about 1 cm of the capsule inferior laterally.      Next both breast pockets were irrigated with dilute Betadine followed by Vashe solution.  2585 cc implants were opened on the back table to soak in Vashe solution.  The skin was prepped with Betadine.  I changed gloves.  Using a Corcoran funnel and a no-touch technique 585 smooth round silicone high-profile boost implants were placed into each pocket.  The capsules were then closed using interrupted and running 3-0 PDS suture.  The skin was temporarily stapled.  The patient was sat back up.  She would good correction of the implant position.  Some additional skin was tailor tacked on the right side and was marked for excision.  The patient was laid back flat.  It was small amount of additional excision was excised laterally to improve the skin contour along the lateral IMF.      Skin incisions were then closed with buried 3-0 Monocryl and running 3-0 Stratafix.  She had 2 small dog ears on the top of the breast.  These were each excised using a 15 blade about 1 cm in length and closed with interrupted 5 0 nylon suture.    The IMF incisions were dressed with Prineo tape.  Antibiotic ointment was placed over the nylon sutures.  The patient was padded  with ABD pads and placed in a postoperative bra.  She tolerated the procedure well taken to recovery in stable condition.    Significant Surgical Tasks Conducted by the Assistant(s), if Applicable: The indicated resident served as first assistant for this procedure.    Estimated Blood Loss (EBL):  10 mL           Implants:   Implant Name Type Inv. Item Serial No.  Lot No. LRB No. Used Action   mentor memory gel boost breast implant   [id number] [employer] KEL [id number] Left 1 Implanted   mentor memory gel boost breast implants   [id number] [employer] KEL [id number] Right 1 Implanted       Specimens:   Specimen (24h ago, onward)       Start     Ordered    02/15/24 0824  Specimen to Pathology, Surgery Other  Once        Comments: Pre-op Diagnosis: Ductal carcinoma in situ (DCIS) of right breast [D05.11]Procedure(s):CAPSULOTOMY, BREASTREPLACEMENT, IMPLANT, BREAST Number of specimens: 2Name of specimens: 1. Left breast implant  2. Right breast implant for gross anatomy     References:    Click here for ordering Quick Tip   Question Answer Comment   Procedure Type: Other    Which provider would you like to cc? ANTONELLA CARMONA    Release to patient Immediate        02/15/24 0824                            Condition: Good    Disposition: PACU - hemodynamically stable., DC home,     Attestation: I was present and scrubbed for the entire procedure.

## 2024-02-15 NOTE — H&P
Plastic and Reconstructive Surgery   H&P    Date:   02/15/2024    History of Present Illness:    48 y.o. female  with history of right breast DCIS s/p bilateral mastectomy who presents for bilateral implant exchange, possible capsulotomy, bilateral dog ear revision and any other indicated procedure.    There is no change in H&P since last office visit. Will proceed with planned procedure.      Past Medical History:    has a past medical history of Anxiety, Cancer, and History of breast cancer.    Past Surgical History:    has a past surgical history that includes  section; Tonsillectomy; Tympanostomy tube placement; Hysterectomy (2002); si joint fusion; mobi c; [place] lymph node biopsy (Right, 10/07/2022); Bilateral mastectomy (Bilateral, 10/07/2022); Injection for sentinel node identification (Right, 10/07/2022); Insertion of breast tissue expander (Bilateral, 10/07/2022); Cervical disc arthroplasty (); Wound debridement (Bilateral, 10/17/2022); Tissue expander removal (Bilateral, 10/17/2022); Insertion of breast tissue expander (Bilateral, 02/10/2023); Hip surgery (Left); Tissue expander removal (Bilateral, 2023); Insertion of breast implant (Bilateral, 2023); Fat Grafting, Other (Bilateral, 2023); Breast Capsulectomy (Bilateral, 2023); robotic removal, mesh, vagina (N/A, 2023); robotic sacrocolpopexy, with cystoscopy (N/A, 2023); Salpingoophorectomy (Left, 2023); Robot-assisted laparoscopic ureterolysis (N/A, 2023); Robot-assisted surgical removal of fallopian tube using da Adalberto Xi (Right, 2023); and Labioplasty (Bilateral, 2023).    Social History:  Social History     Tobacco Use    Smoking status: Former     Current packs/day: 0.00     Average packs/day: 0.5 packs/day for 22.0 years (11.0 ttl pk-yrs)     Types: Cigarettes     Start date: 5/10/1994     Quit date: 5/10/2016     Years since quittin.7    Smokeless tobacco: Never   Substance Use  Topics    Alcohol use: No     Social History     Substance and Sexual Activity   Drug Use No       Family History:  Family History   Problem Relation Age of Onset    Cancer Maternal Grandmother 83        origin? brain (mets?)    Lung cancer Maternal Grandfather         long-term smoker    Hepatitis Father         cirrhosis    Hypertension Father     Genetic Disorder Mother         JAK2+ ET    Lung cancer Mother 63        ~30-yr smoker    Skin cancer Mother 67        mole nasal bridge (type?); maybe also one on hip    Hypertension Mother     Esophageal cancer Mother     Hodgkin's lymphoma Brother     Esophageal cancer Brother     Hypertension Sister     Von Willebrand disease Sister     Hemophilia Daughter         hemophilia A    Von Willebrand disease Daughter     Fibrocystic breast disease Maternal Aunt     Lung cancer Maternal Aunt         believes was smoker       Allergies:  Review of patient's allergies indicates:   Allergen Reactions    Morphine Itching     Pt states just itching as side effect. Can have dilaudid, may need benedryl for itching with admin    Codeine Itching     Can take hydrocodone and oxycodone    Lexapro [escitalopram oxalate] Other (See Comments)     sedation       Home Medications:  Scheduled Meds:   LIDOcaine (PF) 10 mg/ml (1%)  1 mL Intradermal Once     Continuous Infusions:  PRN Meds:.ceFAZolin (Ancef) IV (PEDS and ADULTS), mupirocin, sodium chloride 0.9%      Review of Systems:  Negative except for what is noted in HPI    Physical Exam:  VITAL SIGNS:   Vitals:    02/15/24 0602 02/15/24 0603   BP: (!) 139/95 (!) 139/95   BP Location: Left forearm    Patient Position: Lying    Pulse: 82    Resp: 16    Temp: 97.9 °F (36.6 °C)    TempSrc: Temporal    SpO2: 98%    Weight: 49.9 kg (110 lb)    Height: 5' (1.524 m)      TMAX: Temp (24hrs), Av.9 °F (36.6 °C), Min:97.9 °F (36.6 °C), Max:97.9 °F (36.6 °C)      General: Alert; No acute distress  Cardiovascular: Regular rate   Respiratory:  "Normal respiratory effort. Chest rise symmetric.   Abdomen: Soft, nontender, nondistended  Extremity: Moves all extremities equally.  Neurologic: No focal deficit. Speech normal         Diagnostic Data:  No results found for this or any previous visit (from the past 336 hour(s)).  Recent Results (from the past 336 hour(s))   Basic Metabolic Panel    Collection Time: 02/12/24  1:42 PM   Result Value Ref Range    Sodium 139 136 - 145 mmol/L    Potassium 4.1 3.5 - 5.1 mmol/L    Chloride 104 95 - 110 mmol/L    CO2 27 23 - 29 mmol/L    BUN 12 6 - 20 mg/dL    Creatinine 0.8 0.5 - 1.4 mg/dL    Calcium 9.5 8.7 - 10.5 mg/dL    Anion Gap 8 8 - 16 mmol/L     Lab Results   Component Value Date    ALBUMIN 3.9 06/14/2023     No results found for: "CRP"  Lab Results   Component Value Date    INR 0.9 03/18/2021     No results found for: "PTT"    Microbiology Results (last 7 days)       ** No results found for the last 168 hours. **            Assessment:  48 y.o.female with history of right breast DCIS s/p bilateral mastectomy who presents for bilateral implant exchange, possible capsulotomy, bilateral dog ear revision and any other indicated procedure.    Plan:  Plan for bilateral implant exchange, possible capsulotomy, bilateral dog ear revision and any other indicated procedure in OR  Consent obtained        Toribio Khan DO  Plastic Surgery Fellow   "

## 2024-02-15 NOTE — BRIEF OP NOTE
[facility] ([facility])  Brief Operative Note    SUMMARY     Surgery Date: 2/15/2024     Surgeon(s) and Role:     * Tae Carmona, DO - Primary     * Toribio Khan MD - Fellow    Assisting Surgeon: None    Pre-op Diagnosis:  Ductal carcinoma in situ (DCIS) of right breast [D05.11]    Post-op Diagnosis:  Post-Op Diagnosis Codes:     * Ductal carcinoma in situ (DCIS) of right breast [D05.11]    Procedure(s) (LRB):  CAPSULOTOMY, BREAST (Right)  REPLACEMENT, IMPLANT, BREAST (Bilateral)    Anesthesia: General    Implants:  Implant Name Type Inv. Item Serial No.  Lot No. LRB No. Used Action   mentor memory gel boost breast implant   [id number] [employer] KEL [id number] Left 1 Implanted   mentor memory gel boost breast implants   [id number] [employer] KEL [id number] Right 1 Implanted       Operative Findings: bilateral implant exchange with 585 cc boost high profile, capsulorrhapy, revision of dog ear    Estimated Blood Loss: * No values recorded between 2/15/2024  7:32 AM and 2/15/2024  9:06 AM *    Estimated Blood Loss has not been documented. EBL = minimal.         Specimens:   Specimen (24h ago, onward)       Start     Ordered    02/15/24 0824  Specimen to Pathology, Surgery Other  Once        Comments: Pre-op Diagnosis: Ductal carcinoma in situ (DCIS) of right breast [D05.11]Procedure(s):CAPSULOTOMY, BREASTREPLACEMENT, IMPLANT, BREAST Number of specimens: 2Name of specimens: 1. Left breast implant  2. Right breast implant for gross anatomy     References:    Click here for ordering Quick Tip   Question Answer Comment   Procedure Type: Other    Which provider would you like to cc? TAE CARMONA    Release to patient Immediate        02/15/24 0824                    [id number]

## 2024-02-15 NOTE — DISCHARGE INSTRUCTIONS
Plastic Surgery Discharge Instructions  Luke Trinidad DO    Wound Care  1. Shower daily beginning 48 hrs after surgery  2. Okay to let warm soapy water run over the wound at that time  3. Do not submerge wounds in the bath  4. Leave any skin glue or mesh tape in place    Drain Care  If you have drains, strip tubing and record output when drain bulb becomes half full, or at least twice daily  Record output for each drain and bring to our follow up appointment    Diet  Regular Diet    Activity  Light activity only - no lifting greater than 10 lbs  Avoid strenuous activity that would cause you to get hot or sweaty    Medications  You have been given a prescription for narcotic pain medication, anti-inflammatory pain, muscle relaxer, and nerve pain  Unless otherwise contraindicated by your doctor, take 2 extra strength Tylenol and 600mg of ibuprofen every 8 hours  Please take medications as prescribed     What to call for:  1. Sustained fever > 101.0  2. Changes in color, sensation, temperature or pain at surgical site  3. Redness and/or drainage from the surgical site  4. Any reaction to prescribed medications  5. Continuous bloody drainage from surgical drains  6. Wound vac malfunction  7. Questions related to the procedure    Follow-up  1. Please call [phone] to schedule or confirm your follow up visit at the [facility] on 2/22/2024  2. Call with any questions or concerns.  2. After hours call [phone] - ask to speak with the Plastic Surgery fellow on call

## 2024-02-15 NOTE — ANESTHESIA PREPROCEDURE EVALUATION
02/15/2024  Dnia Sosa is a 48 y.o., female.      Pre-op Assessment    I have reviewed the Patient Summary Reports.     I have reviewed the Nursing Notes. I have reviewed the NPO Status.   I have reviewed the Medications.     Review of Systems  Anesthesia Hx:  No problems with previous Anesthesia   History of prior surgery of interest to airway management or planning:  Previous anesthesia: General          Denies Personal Hx of Anesthesia complications.                    Cardiovascular:  Cardiovascular Normal                                            Pulmonary:  Pulmonary Normal                       Renal/:  Renal/ Normal                 Hepatic/GI:     GERD             Neurological:    Neuromuscular Disease,  Headaches                                 Endocrine:  Endocrine Normal            Psych:  Psychiatric History                Patient Active Problem List   Diagnosis    Anxiety    Environmental allergies    Cervical strain    Traumatic ecchymosis of left upper arm    Neuropathy    Lumbar facet arthropathy    Lumbosacral strain    Gastroesophageal reflux disease without esophagitis    History of fusion of cervical spine    Menopausal syndrome    Depression    History of cervical spinal surgery    History of hip surgery    Breast cancer    Ductal carcinoma in situ (DCIS) of right breast    History of bilateral mastectomy    History of breast cancer    Tremor of both hands    Mixed hyperlipidemia    Cervical radiculopathy    Migraine without aura and without status migrainosus, not intractable    S/P RA-sacrocolpopexy/LSO/Right salpingectomy/bilateral labiaplasty 9/8/23     Past Medical History:   Diagnosis Date    Anxiety     Cancer     History of breast cancer     Right breast. Invasive Ductal.     Past Surgical History:   Procedure Laterality Date    BILATERAL MASTECTOMY Bilateral  10/07/2022    Procedure: MASTECTOMY, BILATERAL;  Surgeon: Leeann Sawyer MD;  Location: [facility];  Service: General;  Laterality: Bilateral;    BREAST CAPSULECTOMY Bilateral 2023    Procedure: CAPSULECTOMY, BREAST;  Surgeon: Tae Trinidad DO;  Location: [facility];  Service: Plastics;  Laterality: Bilateral;  Bilat CAPSULOTOMY    CERVICAL DISC ARTHROPLASTY      C5-C6     SECTION      x2    FAT GRAFTING, OTHER Bilateral 2023    Procedure: INJECTION, FAT GRAFT;  Surgeon: Tae Trinidad DO;  Location: [facility];  Service: Plastics;  Laterality: Bilateral;    HIP SURGERY Left     fusion of pelvis around hip    HYSTERECTOMY  2002    ROGER, ovaries remain (AUB)    INJECTION FOR SENTINEL NODE IDENTIFICATION Right 10/07/2022    Procedure: INJECTION, FOR SENTINEL NODE IDENTIFICATION;  Surgeon: Leeann Sawyer MD;  Location: [facility];  Service: General;  Laterality: Right;    INSERTION OF BREAST IMPLANT Bilateral 2023    Procedure: INSERTION, BREAST IMPLANT;  Surgeon: Tae Trinidad DO;  Location: [facility];  Service: Plastics;  Laterality: Bilateral;  2 hours    INSERTION OF BREAST TISSUE EXPANDER Bilateral 10/07/2022    Procedure: INSERTION, TISSUE EXPANDER, BREAST / BILATERAL;  Surgeon: Tae Trinidad DO;  Location: [facility];  Service: Plastics;  Laterality: Bilateral;    INSERTION OF BREAST TISSUE EXPANDER Bilateral 02/10/2023    Procedure: INSERTION, TISSUE EXPANDER, BREAST;  Surgeon: Tae Trinidad DO;  Location: [facility];  Service: Plastics;  Laterality: Bilateral;  Bilateral tissue expanders/ 2 HOURS    LABIOPLASTY Bilateral 2023    Procedure: LABIAPLASTY, VULVA;  Surgeon: Kalyn Rick MD;  Location: [facility];  Service: OB/GYN;  Laterality: Bilateral;    mobi c      disk replacement C5-C6    ROBOT-ASSISTED LAPAROSCOPIC URETEROLYSIS N/A 2023    Procedure: ROBOTIC URETEROLYSIS;  Surgeon: Wu Skaggs MD;  Location: [facility];  Service: OB/GYN;   Laterality: N/A;    ROBOT-ASSISTED SURGICAL REMOVAL OF FALLOPIAN TUBE USING DA LETA XI Right 9/8/2023    Procedure: XI ROBOTIC SALPINGECTOMY;  Surgeon: Wu Skaggs MD;  Location: [facility];  Service: OB/GYN;  Laterality: Right;    ROBOTIC REMOVAL, MESH, VAGINA N/A 9/8/2023    Procedure: ROBOTIC REMOVAL, MESH, VAGINA;  Surgeon: Wu Skaggs MD;  Location: [facility] OR;  Service: OB/GYN;  Laterality: N/A;    ROBOTIC SACROCOLPOPEXY, WITH CYSTOSCOPY N/A 9/8/2023    Procedure: ROBOTIC SACROCOLPOPEXY, WITH CYSTOSCOPY;  Surgeon: Wu Skaggs MD;  Location: [facility];  Service: OB/GYN;  Laterality: N/A;    SALPINGOOPHORECTOMY Left 9/8/2023    Procedure: SALPINGO-OOPHORECTOMY, Left ;  Surgeon: Wu Skaggs MD;  Location: [facility];  Service: OB/GYN;  Laterality: Left;    SENTINEL LYMPH NODE BIOPSY Right 10/07/2022    Procedure: BIOPSY, LYMPH NODE, SENTINEL;  Surgeon: Leeann Sawyer MD;  Location: [facility];  Service: General;  Laterality: Right;  2.5 HRS    si joint fusion      left hip    TISSUE EXPANDER REMOVAL Bilateral 10/17/2022    Procedure: REMOVAL, TISSUE EXPANDER;  Surgeon: Tae Trinidad DO;  Location: [facility];  Service: Plastics;  Laterality: Bilateral;    TISSUE EXPANDER REMOVAL Bilateral 4/28/2023    Procedure: REMOVAL, TISSUE EXPANDER;  Surgeon: Tae Trinidad DO;  Location: [facility];  Service: Plastics;  Laterality: Bilateral;  Bilat TE removal - Implant placement    TONSILLECTOMY      TYMPANOSTOMY TUBE PLACEMENT      x6    WOUND DEBRIDEMENT Bilateral 10/17/2022    Procedure: DEBRIDEMENT, WOUND;  Surgeon: Tae Trinidad DO;  Location: [facility];  Service: Plastics;  Laterality: Bilateral;  1.5 HRS         Physical Exam  General: Well nourished, Cooperative and Alert    Airway:  Mallampati: II   Mouth Opening: Normal  TM Distance: Normal  Tongue: Normal  Neck ROM: Normal ROM    Dental:  Intact    Chest/Lungs:  Normal Respiratory Rate    Heart:  Rate: Normal  Rhythm: Regular  Rhythm        Anesthesia Plan  Type of Anesthesia, risks & benefits discussed:    Anesthesia Type: Gen Natural Airway, Gen ETT, MAC  Intra-op Monitoring Plan: Standard ASA Monitors  Post Op Pain Control Plan: multimodal analgesia  Induction:  IV  Airway Plan: Direct, Post-Induction  Informed Consent: Informed consent signed with the Patient and all parties understand the risks and agree with anesthesia plan.  All questions answered.   ASA Score: 2  Day of Surgery Review of History & Physical: H&P Update referred to the surgeon/provider.    Ready For Surgery From Anesthesia Perspective.     .

## 2024-02-15 NOTE — DISCHARGE SUMMARY
Discharge Note      SUMMARY     Admit Date: 2/15/2024    Attending Physician: Tae Trinidad DO     Discharge Physician: Tae Trinidad DO    Discharge Date: 2/15/2024     Final Diagnosis: Ductal carcinoma in situ (DCIS) of right breast [D05.11]    Hospital Course: Patient was admitted for an outpatient procedure and tolerated the procedure well with no complications.    Disposition: Home or Self Care    Follow Up/Patient Instructions:   Current Discharge Medication List        START taking these medications    Details   acetaminophen (TYLENOL) 500 MG tablet Take 2 tablets (1,000 mg total) by mouth every 6 (six) hours as needed for Pain.  Qty: 42 tablet, Refills: 0           CONTINUE these medications which have CHANGED    Details   ibuprofen (ADVIL,MOTRIN) 600 MG tablet Take 1 tablet (600 mg total) by mouth 3 (three) times daily. for 7 days  Qty: 21 tablet, Refills: 0      oxyCODONE (ROXICODONE) 5 MG immediate release tablet Take 1 tablet (5 mg total) by mouth every 4 (four) hours as needed for Pain.  Qty: 10 tablet, Refills: 0    Comments: Quantity prescribed more than 7 day supply? No           CONTINUE these medications which have NOT CHANGED    Details   ascorbic acid (VITAMIN C ORAL) Take by mouth Daily.      buPROPion (WELLBUTRIN XL) 300 MG 24 hr tablet TAKE 1 TABLET(300 MG) BY MOUTH EVERY DAY  Qty: 90 tablet, Refills: 3    Associated Diagnoses: Anxiety      docusate sodium (COLACE) 100 MG capsule Take 1 capsule (100 mg total) by mouth 2 (two) times daily.  Qty: 30 capsule, Refills: 1      gabapentin (NEURONTIN) 300 MG capsule Take 1 capsule (300 mg total) by mouth 2 (two) times daily.  Qty: 60 capsule, Refills: 11    Associated Diagnoses: Cervical radiculopathy      GINGER ROOT EXTRACT ORAL Take by mouth.      loratadine (CLARITIN ORAL) Take by mouth Daily.      orphenadrine (NORFLEX) 100 mg tablet Take 1 tablet (100 mg total) by mouth 2 (two) times daily.  Qty: 180 tablet, Refills: 3     Associated Diagnoses: Cervical radiculopathy      pantoprazole (PROTONIX) 40 MG tablet TAKE 1 TABLET(40 MG) BY MOUTH EVERY DAY  Qty: 90 tablet, Refills: 3    Associated Diagnoses: Gastroesophageal reflux disease without esophagitis      pravastatin (PRAVACHOL) 20 MG tablet Take 1 tablet (20 mg total) by mouth once daily.  Qty: 90 tablet, Refills: 3    Associated Diagnoses: Mixed hyperlipidemia      primidone (MYSOLINE) 50 MG Tab Take 1 tablet (50 mg total) by mouth every evening.  Qty: 30 tablet, Refills: 11    Associated Diagnoses: Tremor of both hands      traZODone (DESYREL) 100 MG tablet Take 1 tablet (100 mg total) by mouth every evening.  Qty: 90 tablet, Refills: 3    Comments: ZERO refills remain on this prescription. Your patient is requesting advance approval of refills for this medication to PREVENT ANY MISSED DOSES  Associated Diagnoses: Anxiety      TURMERIC ORAL Take by mouth.             Discharge Procedure Orders (must include Diet, Follow-up, Activity)   Discharge Procedure Orders (must include Diet, Follow-up, Activity)   Basic Metabolic Panel   Standing Status: Future Number of Occurrences: 1 Standing Exp. Date: 04/08/25     Hemoglobin   Standing Status: Future Number of Occurrences: 1 Standing Exp. Date: 04/08/25     Notify your health care provider if you experience any of the following:  temperature >100.4     Notify your health care provider if you experience any of the following:  increased confusion or weakness     Notify your health care provider if you experience any of the following:  persistent dizziness, light-headedness, or visual disturbances     Notify your health care provider if you experience any of the following:  worsening rash     Notify your health care provider if you experience any of the following:  severe persistent headache     Notify your health care provider if you experience any of the following:  difficulty breathing or increased cough     Notify your health care  provider if you experience any of the following:  redness, tenderness, or signs of infection (pain, swelling, redness, odor or green/yellow discharge around incision site)     Notify your health care provider if you experience any of the following:  severe uncontrolled pain     Notify your health care provider if you experience any of the following:  persistent nausea and vomiting or diarrhea     Leave dressing on - Keep it clean, dry, and intact until clinic visit

## 2024-02-15 NOTE — TRANSFER OF CARE
Anesthesia Transfer of Care Note    Patient: Dina Sosa    Procedure(s) Performed: Procedure(s) (LRB):  CAPSULOTOMY, BREAST (Right)  REPLACEMENT, IMPLANT, BREAST (Bilateral)    Patient location: PACU    Anesthesia Type: general    Transport from OR: Transported from OR on 6-10 L/min O2 by face mask with adequate spontaneous ventilation    Post pain: adequate analgesia    Post assessment: no apparent anesthetic complications    Post vital signs: stable    Level of consciousness: awake    Nausea/Vomiting: no nausea/vomiting    Complications: none    Transfer of care protocol was followed      Last vitals: Visit Vitals  BP (!) 139/95   Pulse 82   Temp 36.6 °C (97.9 °F) (Temporal)   Resp 16   Ht 5' (1.524 m)   Wt 49.9 kg (110 lb)   LMP 06/01/2001 (Approximate)   SpO2 98%   Breastfeeding No   BMI 21.48 kg/m²

## 2024-02-16 ENCOUNTER — PATIENT MESSAGE (OUTPATIENT)
Dept: FAMILY MEDICINE | Facility: CLINIC | Age: 49
End: 2024-02-16
Payer: MEDICAID

## 2024-02-16 DIAGNOSIS — G43.009 MIGRAINE WITHOUT AURA AND WITHOUT STATUS MIGRAINOSUS, NOT INTRACTABLE: Primary | ICD-10-CM

## 2024-02-20 NOTE — ANESTHESIA POSTPROCEDURE EVALUATION
Anesthesia Post Evaluation    Patient: Dina Sosa    Procedure(s) Performed: Procedure(s) (LRB):  CAPSULOTOMY, BREAST (Right)  REPLACEMENT, IMPLANT, BREAST (Bilateral)    Final Anesthesia Type: general      Patient location during evaluation: PACU  Patient participation: Yes- Able to Participate  Level of consciousness: awake and alert and oriented  Post-procedure vital signs: reviewed and stable  Pain management: adequate  Airway patency: patent    PONV status at discharge: No PONV  Anesthetic complications: no      Cardiovascular status: hemodynamically stable  Respiratory status: unassisted, spontaneous ventilation and room air  Hydration status: euvolemic  Follow-up not needed.              Vitals Value Taken Time   /103 02/15/24 1030   Temp 98 02/20/24 1040   Pulse 87 02/15/24 1044   Resp 20 02/15/24 1037   SpO2 100 % 02/15/24 1044   Vitals shown include unvalidated device data.      No case tracking events are documented in the log.      Pain/Daria Score: No data recorded

## 2024-02-22 ENCOUNTER — OFFICE VISIT (OUTPATIENT)
Dept: SURGERY | Facility: CLINIC | Age: 49
End: 2024-02-22
Payer: MEDICAID

## 2024-02-22 VITALS
WEIGHT: 110 LBS | SYSTOLIC BLOOD PRESSURE: 129 MMHG | DIASTOLIC BLOOD PRESSURE: 89 MMHG | HEART RATE: 91 BPM | HEIGHT: 60 IN | OXYGEN SATURATION: 98 % | BODY MASS INDEX: 21.6 KG/M2 | RESPIRATION RATE: 19 BRPM

## 2024-02-22 DIAGNOSIS — Z09 SURGERY FOLLOW-UP: Primary | ICD-10-CM

## 2024-02-22 PROCEDURE — 3074F SYST BP LT 130 MM HG: CPT | Mod: CPTII,,, | Performed by: SURGERY

## 2024-02-22 PROCEDURE — 99999 PR PBB SHADOW E&M-EST. PATIENT-LVL III: CPT | Mod: PBBFAC,,, | Performed by: SURGERY

## 2024-02-22 PROCEDURE — 3079F DIAST BP 80-89 MM HG: CPT | Mod: CPTII,,, | Performed by: SURGERY

## 2024-02-22 PROCEDURE — 1159F MED LIST DOCD IN RCRD: CPT | Mod: CPTII,,, | Performed by: SURGERY

## 2024-02-22 PROCEDURE — 99213 OFFICE O/P EST LOW 20 MIN: CPT | Mod: PBBFAC | Performed by: SURGERY

## 2024-02-22 PROCEDURE — 99024 POSTOP FOLLOW-UP VISIT: CPT | Mod: ,,, | Performed by: SURGERY

## 2024-02-22 NOTE — PROGRESS NOTES
Dina Sosa femalepresents to Plastic Surgery Clinic for a follow up visit status post bilateral implant exchange and right capsulotomy on 2/15/24. Patient is doing well today with no issues since surgery. She has 585 cc high-profile boost smooth round silicone implants . She is happy with the size and projection.      PHYSICAL EXAMINATION  Bilateral breast incision(s) well approximated with no issues. Dog ear excisions of bilateral vertical scars, nylon sutures in place.  Nipple(s) is/are surgically absent   Drains are not present       ASSESSMENT/PLAN  Dina Sosa is s/p bilateral implant exchange and right capsule revision  - Nylon sutures removed  - Counseled on incision/scar care  - Follow up 3 months      All questions were answered. The patient was advised to contact the clinic with any questions or concerns prior to their next visit.       Doreen Garcia PA-C  Plastic and Reconstructive Surgery

## 2024-02-23 DIAGNOSIS — R25.1 TREMOR OF BOTH HANDS: ICD-10-CM

## 2024-02-23 NOTE — TELEPHONE ENCOUNTER
Refill Routing Note   Medication(s) are not appropriate for processing by [facility] for the following reason(s):        Outside of protocol    [facility] action(s):  Route               Appointments  past 12m or future 3m with PCP    Date Provider   Last Visit   9/22/2023 Jb Pastor MD   Next Visit   3/22/2024 Jb Pastor MD   ED visits in past 90 days: 0        Note composed:12:17 PM 02/23/2024

## 2024-02-25 RX ORDER — PRIMIDONE 50 MG/1
50 TABLET ORAL NIGHTLY
Qty: 90 TABLET | Refills: 3 | Status: SHIPPED | OUTPATIENT
Start: 2024-02-25 | End: 2025-02-24

## 2024-02-26 LAB
FINAL PATHOLOGIC DIAGNOSIS: NORMAL
GROSS: NORMAL
Lab: NORMAL

## 2024-03-10 DIAGNOSIS — M54.12 CERVICAL RADICULOPATHY: ICD-10-CM

## 2024-03-10 RX ORDER — ORPHENADRINE CITRATE 100 MG/1
100 TABLET, EXTENDED RELEASE ORAL 2 TIMES DAILY
Qty: 180 TABLET | Refills: 3 | Status: SHIPPED | OUTPATIENT
Start: 2024-03-10 | End: 2025-03-05

## 2024-03-10 NOTE — TELEPHONE ENCOUNTER
No care due was identified.  [facility] Embedded Care Due Messages. Reference number: [id number].   3/10/2024 3:15:31 PM CDT

## 2024-03-15 ENCOUNTER — LAB VISIT (OUTPATIENT)
Dept: LAB | Facility: HOSPITAL | Age: 49
End: 2024-03-15
Attending: INTERNAL MEDICINE
Payer: MEDICAID

## 2024-03-15 DIAGNOSIS — E78.2 MIXED HYPERLIPIDEMIA: ICD-10-CM

## 2024-03-15 LAB
ALBUMIN SERPL BCP-MCNC: 4 G/DL (ref 3.5–5.2)
ALP SERPL-CCNC: 64 U/L (ref 55–135)
ALT SERPL W/O P-5'-P-CCNC: 20 U/L (ref 10–44)
ANION GAP SERPL CALC-SCNC: 8 MMOL/L (ref 8–16)
AST SERPL-CCNC: 20 U/L (ref 10–40)
BASOPHILS # BLD AUTO: 0.05 K/UL (ref 0–0.2)
BASOPHILS NFR BLD: 0.8 % (ref 0–1.9)
BILIRUB SERPL-MCNC: 0.2 MG/DL (ref 0.1–1)
BUN SERPL-MCNC: 11 MG/DL (ref 6–20)
CALCIUM SERPL-MCNC: 9.6 MG/DL (ref 8.7–10.5)
CHLORIDE SERPL-SCNC: 109 MMOL/L (ref 95–110)
CHOLEST SERPL-MCNC: 194 MG/DL (ref 120–199)
CHOLEST/HDLC SERPL: 4.1 {RATIO} (ref 2–5)
CO2 SERPL-SCNC: 23 MMOL/L (ref 23–29)
CREAT SERPL-MCNC: 0.8 MG/DL (ref 0.5–1.4)
DIFFERENTIAL METHOD BLD: ABNORMAL
EOSINOPHIL # BLD AUTO: 0.2 K/UL (ref 0–0.5)
EOSINOPHIL NFR BLD: 2.5 % (ref 0–8)
ERYTHROCYTE [DISTWIDTH] IN BLOOD BY AUTOMATED COUNT: 13.5 % (ref 11.5–14.5)
EST. GFR  (NO RACE VARIABLE): >60 ML/MIN/1.73 M^2
GLUCOSE SERPL-MCNC: 82 MG/DL (ref 70–110)
HCT VFR BLD AUTO: 42.5 % (ref 37–48.5)
HDLC SERPL-MCNC: 47 MG/DL (ref 40–75)
HDLC SERPL: 24.2 % (ref 20–50)
HGB BLD-MCNC: 13.4 G/DL (ref 12–16)
IMM GRANULOCYTES # BLD AUTO: 0.05 K/UL (ref 0–0.04)
IMM GRANULOCYTES NFR BLD AUTO: 0.8 % (ref 0–0.5)
LDLC SERPL CALC-MCNC: 118.6 MG/DL (ref 63–159)
LYMPHOCYTES # BLD AUTO: 1.9 K/UL (ref 1–4.8)
LYMPHOCYTES NFR BLD: 30.8 % (ref 18–48)
MCH RBC QN AUTO: 28 PG (ref 27–31)
MCHC RBC AUTO-ENTMCNC: 31.5 G/DL (ref 32–36)
MCV RBC AUTO: 89 FL (ref 82–98)
MONOCYTES # BLD AUTO: 0.6 K/UL (ref 0.3–1)
MONOCYTES NFR BLD: 10 % (ref 4–15)
NEUTROPHILS # BLD AUTO: 3.4 K/UL (ref 1.8–7.7)
NEUTROPHILS NFR BLD: 55.1 % (ref 38–73)
NONHDLC SERPL-MCNC: 147 MG/DL
NRBC BLD-RTO: 0 /100 WBC
PLATELET # BLD AUTO: 372 K/UL (ref 150–450)
PMV BLD AUTO: 9.8 FL (ref 9.2–12.9)
POTASSIUM SERPL-SCNC: 4.3 MMOL/L (ref 3.5–5.1)
PROT SERPL-MCNC: 6.9 G/DL (ref 6–8.4)
RBC # BLD AUTO: 4.78 M/UL (ref 4–5.4)
SODIUM SERPL-SCNC: 140 MMOL/L (ref 136–145)
TRIGL SERPL-MCNC: 142 MG/DL (ref 30–150)
TSH SERPL DL<=0.005 MIU/L-ACNC: 2.88 UIU/ML (ref 0.4–4)
WBC # BLD AUTO: 6.11 K/UL (ref 3.9–12.7)

## 2024-03-15 PROCEDURE — 80053 COMPREHEN METABOLIC PANEL: CPT | Performed by: INTERNAL MEDICINE

## 2024-03-15 PROCEDURE — 84443 ASSAY THYROID STIM HORMONE: CPT | Performed by: INTERNAL MEDICINE

## 2024-03-15 PROCEDURE — 85025 COMPLETE CBC W/AUTO DIFF WBC: CPT | Performed by: INTERNAL MEDICINE

## 2024-03-15 PROCEDURE — 36415 COLL VENOUS BLD VENIPUNCTURE: CPT | Mod: PO | Performed by: INTERNAL MEDICINE

## 2024-03-15 PROCEDURE — 80061 LIPID PANEL: CPT | Performed by: INTERNAL MEDICINE

## 2024-03-22 ENCOUNTER — OFFICE VISIT (OUTPATIENT)
Dept: FAMILY MEDICINE | Facility: CLINIC | Age: 49
End: 2024-03-22
Payer: MEDICAID

## 2024-03-22 VITALS
HEART RATE: 78 BPM | BODY MASS INDEX: 21.99 KG/M2 | WEIGHT: 112 LBS | OXYGEN SATURATION: 99 % | HEIGHT: 60 IN | DIASTOLIC BLOOD PRESSURE: 76 MMHG | SYSTOLIC BLOOD PRESSURE: 124 MMHG

## 2024-03-22 DIAGNOSIS — G47.01 INSOMNIA DUE TO MEDICAL CONDITION: ICD-10-CM

## 2024-03-22 DIAGNOSIS — G43.009 MIGRAINE WITHOUT AURA AND WITHOUT STATUS MIGRAINOSUS, NOT INTRACTABLE: ICD-10-CM

## 2024-03-22 DIAGNOSIS — Z85.3 HISTORY OF BREAST CANCER: ICD-10-CM

## 2024-03-22 DIAGNOSIS — F32.0 CURRENT MILD EPISODE OF MAJOR DEPRESSIVE DISORDER WITHOUT PRIOR EPISODE: ICD-10-CM

## 2024-03-22 DIAGNOSIS — M54.12 CERVICAL RADICULOPATHY: ICD-10-CM

## 2024-03-22 DIAGNOSIS — F41.9 ANXIETY: ICD-10-CM

## 2024-03-22 DIAGNOSIS — K21.9 GASTROESOPHAGEAL REFLUX DISEASE WITHOUT ESOPHAGITIS: ICD-10-CM

## 2024-03-22 DIAGNOSIS — Z00.00 WELLNESS EXAMINATION: Primary | ICD-10-CM

## 2024-03-22 DIAGNOSIS — E78.2 MIXED HYPERLIPIDEMIA: ICD-10-CM

## 2024-03-22 PROCEDURE — 1159F MED LIST DOCD IN RCRD: CPT | Mod: CPTII,,, | Performed by: INTERNAL MEDICINE

## 2024-03-22 PROCEDURE — 99213 OFFICE O/P EST LOW 20 MIN: CPT | Mod: PBBFAC,PO | Performed by: INTERNAL MEDICINE

## 2024-03-22 PROCEDURE — 1160F RVW MEDS BY RX/DR IN RCRD: CPT | Mod: CPTII,,, | Performed by: INTERNAL MEDICINE

## 2024-03-22 PROCEDURE — 99396 PREV VISIT EST AGE 40-64: CPT | Mod: S$PBB,,, | Performed by: INTERNAL MEDICINE

## 2024-03-22 PROCEDURE — 3074F SYST BP LT 130 MM HG: CPT | Mod: CPTII,,, | Performed by: INTERNAL MEDICINE

## 2024-03-22 PROCEDURE — 99999 PR PBB SHADOW E&M-EST. PATIENT-LVL III: CPT | Mod: PBBFAC,,, | Performed by: INTERNAL MEDICINE

## 2024-03-22 PROCEDURE — 3078F DIAST BP <80 MM HG: CPT | Mod: CPTII,,, | Performed by: INTERNAL MEDICINE

## 2024-03-22 PROCEDURE — 3008F BODY MASS INDEX DOCD: CPT | Mod: CPTII,,, | Performed by: INTERNAL MEDICINE

## 2024-03-22 RX ORDER — TRAZODONE HYDROCHLORIDE 100 MG/1
100 TABLET ORAL NIGHTLY
Qty: 90 TABLET | Refills: 3 | Status: SHIPPED | OUTPATIENT
Start: 2024-03-22

## 2024-03-22 RX ORDER — DIAZEPAM 2 MG/1
2 TABLET ORAL NIGHTLY
Qty: 30 TABLET | Refills: 0 | Status: SHIPPED | OUTPATIENT
Start: 2024-03-22 | End: 2024-04-23 | Stop reason: SDUPTHER

## 2024-03-22 NOTE — PROGRESS NOTES
[facility]  [facility]   [address]       Patient ID: Dina Sosa     Chief Complaint:   Chief Complaint   Patient presents with    Follow-up     6 month           HPI:  Annual exam, and overall she is doing well but she has had a lot of stress in her life over the past few months stemming from her mother finding out she has a recurrence of breast cancer with Mets to her spine and to her brain, and her father being diagnosed with cirrhosis and having troubles with that.  She does admit to increased anxiety despite Wellbutrin and I completely agree.  We are going to try diazepam 2 mg at night to help get her some sleep and adjust her medicines from there.  She thinks she may have had a paradoxical response to clonazepam in the past so I will keep that in mind.  She has seeing a new neurologist for migraines and they have her on a combination of metoprolol 25 mg twice daily and sumatriptan only when needed.  Her vital signs look good.  Her labs do look pretty good and her cholesterol is relatively well-controlled.  I would like to have short-term follow-up with her to see if we are getting the right dosage of the right medications and adjust as needed.    Review of Systems       Anxiety    Objective:      Physical Exam   Physical Exam       Normal    Vitals:   Vitals:    03/22/24 1123   BP: 124/76   Pulse: 78   SpO2: 99%   Weight: 50.8 kg (111 lb 15.9 oz)   Height: 5' (1.524 m)        Assessment:           Plan:       Dina Sosa  was seen today for follow-up and may need lab work.    Diagnoses and all orders for this visit:    Dina was seen today for follow-up.    Diagnoses and all orders for this visit:    Wellness examination    Anxiety  -     traZODone (DESYREL) 100 MG tablet; Take 1 tablet (100 mg total) by mouth every evening.  -     diazePAM (VALIUM) 2 MG tablet; Take 1 tablet (2 mg total) by mouth every evening.  Continue Wellbutrin as well     Cervical  radiculopathy  To get an MRI from Neuro soon     Migraine without aura and without status migrainosus, not intractable  Improving with Metoprolol and Sumatriptan     Current mild episode of major depressive disorder without prior episode  Monitor on meds     Mixed hyperlipidemia  LDL relatively well Controlled     History of breast cancer  In remission     Gastroesophageal reflux disease without esophagitis  Controlled     Insomnia due to medical condition  -     diazePAM (VALIUM) 2 MG tablet; Take 1 tablet (2 mg total) by mouth every evening.  Monitor on meds          Jb Pastor MD

## 2024-04-05 DIAGNOSIS — E78.2 MIXED HYPERLIPIDEMIA: ICD-10-CM

## 2024-04-05 NOTE — TELEPHONE ENCOUNTER
No care due was identified.  [facility] Embedded Care Due Messages. Reference number: [id number].   4/05/2024 2:43:57 PM CDT

## 2024-04-06 RX ORDER — PRAVASTATIN SODIUM 20 MG/1
20 TABLET ORAL
Qty: 90 TABLET | Refills: 3 | Status: SHIPPED | OUTPATIENT
Start: 2024-04-06

## 2024-04-06 NOTE — TELEPHONE ENCOUNTER
Refill Decision Note   Dina Sosa  is requesting a refill authorization.  Brief Assessment and Rationale for Refill:  Approve     Medication Therapy Plan:        Comments:     Note composed:4:56 PM 04/06/2024

## 2024-04-10 ENCOUNTER — HOSPITAL ENCOUNTER (OUTPATIENT)
Dept: RADIOLOGY | Facility: HOSPITAL | Age: 49
Discharge: HOME | End: 2024-04-10
Attending: STUDENT IN AN ORGANIZED HEALTH CARE EDUCATION/TRAINING PROGRAM
Payer: MEDICAID

## 2024-04-10 DIAGNOSIS — F07.81 POST CONCUSSION SYNDROME: ICD-10-CM

## 2024-04-10 PROCEDURE — A9585 GADOBUTROL INJECTION: HCPCS | Mod: PO | Performed by: STUDENT IN AN ORGANIZED HEALTH CARE EDUCATION/TRAINING PROGRAM

## 2024-04-10 PROCEDURE — 70553 MRI BRAIN STEM W/O & W/DYE: CPT | Mod: TC,PO

## 2024-04-10 PROCEDURE — 25500020 PHARM REV CODE 255: Mod: PO | Performed by: STUDENT IN AN ORGANIZED HEALTH CARE EDUCATION/TRAINING PROGRAM

## 2024-04-10 PROCEDURE — 70553 MRI BRAIN STEM W/O & W/DYE: CPT | Mod: 26,,, | Performed by: RADIOLOGY

## 2024-04-10 RX ORDER — GADOBUTROL 604.72 MG/ML
5 INJECTION INTRAVENOUS
Status: COMPLETED | OUTPATIENT
Start: 2024-04-10 | End: 2024-04-10

## 2024-04-10 RX ADMIN — GADOBUTROL 5 ML: 604.72 INJECTION INTRAVENOUS at 02:04

## 2024-04-23 ENCOUNTER — OFFICE VISIT (OUTPATIENT)
Dept: FAMILY MEDICINE | Facility: CLINIC | Age: 49
End: 2024-04-23
Payer: MEDICAID

## 2024-04-23 DIAGNOSIS — G47.01 INSOMNIA DUE TO MEDICAL CONDITION: ICD-10-CM

## 2024-04-23 DIAGNOSIS — F41.9 ANXIETY: Primary | ICD-10-CM

## 2024-04-23 DIAGNOSIS — G43.009 MIGRAINE WITHOUT AURA AND WITHOUT STATUS MIGRAINOSUS, NOT INTRACTABLE: ICD-10-CM

## 2024-04-23 DIAGNOSIS — E34.8 PINEAL GLAND CYST: ICD-10-CM

## 2024-04-23 PROCEDURE — 1159F MED LIST DOCD IN RCRD: CPT | Mod: CPTII,95,, | Performed by: INTERNAL MEDICINE

## 2024-04-23 PROCEDURE — 99213 OFFICE O/P EST LOW 20 MIN: CPT | Mod: 95,,, | Performed by: INTERNAL MEDICINE

## 2024-04-23 PROCEDURE — 1160F RVW MEDS BY RX/DR IN RCRD: CPT | Mod: CPTII,95,, | Performed by: INTERNAL MEDICINE

## 2024-04-23 RX ORDER — TOPIRAMATE 25 MG/1
25 TABLET ORAL DAILY
COMMUNITY
Start: 2024-04-05

## 2024-04-23 RX ORDER — DIAZEPAM 2 MG/1
2 TABLET ORAL NIGHTLY
Qty: 30 TABLET | Refills: 5 | Status: SHIPPED | OUTPATIENT
Start: 2024-04-23 | End: 2024-10-20

## 2024-04-23 NOTE — PROGRESS NOTES
[facility]  Primary Care   [address]       Patient ID: Dina Sosa     Chief Complaint: Follow up for anxiety and insomnia    The patient location is: [place]   The chief complaint leading to consultation is: Follow up for anxiety and insomnia    Visit type: audiovisual    Face to Face time with patient: 12 minutes   16 minutes of total time spent on the encounter, which includes face to face time and non-face to face time preparing to see the patient (eg, review of tests), Obtaining and/or reviewing separately obtained history, Documenting clinical information in the electronic or other health record, Independently interpreting results (not separately reported) and communicating results to the patient/family/caregiver, or Care coordination (not separately reported).         Each patient to whom he or she provides medical services by telemedicine is:  (1) informed of the relationship between the physician and patient and the respective role of any other health care provider with respect to management of the patient; and (2) notified that he or she may decline to receive medical services by telemedicine and may withdraw from such care at any time.    Notes:          HPI:  Follow-up after we started diazepam and trazodone and continue Wellbutrin for anxiety and insomnia.  She reports that she is sleeping better at night but the anxiety is so present.  Over the last month, wonder both of her parents have been in the hospital at any point in time.  Her mother has a recurrence of breast cancer with Mets to her brain and spine and she is finally finished with radiation but she goes days without eating or drinking.  Her father has end-stage cirrhosis and he is currently in the hospital getting treated for volume overload.  With all this extraneous stress, I do not think I am going to fix her anxiety right now but I hope to maybe keep a lid on it.  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  I did review the MRI images and report.  The Topamax trazodone and diazepam can all act to cause somnolence but it has not really affected her too much so we will continue them for now.    Review of Systems       Anxiety   Insomnia    Objective:      Physical Exam   Physical Exam       Virtual Visit     Vitals: There were no vitals filed for this visit.     Assessment:           Plan:       Dina Sosa  was seen today for follow-up and may need lab work.    Diagnoses and all orders for this visit:    Diagnoses and all orders for this visit:    Anxiety  -     diazePAM (VALIUM) 2 MG tablet; Take 1 tablet (2 mg total) by mouth every evening.  Somewhat temporized with diazepam and Wellbutrin    Insomnia due to medical condition  -     diazePAM (VALIUM) 2 MG tablet; Take 1 tablet (2 mg total) by mouth every evening.  Somewhat controlled with diazepam and trazodone.    Pineal gland cyst  Stable.  I defer management this to Neurology.         Jb Pastor MD

## 2024-05-09 ENCOUNTER — OFFICE VISIT (OUTPATIENT)
Dept: SURGERY | Facility: CLINIC | Age: 49
End: 2024-05-09
Payer: MEDICAID

## 2024-05-09 VITALS
BODY MASS INDEX: 21.79 KG/M2 | HEART RATE: 93 BPM | WEIGHT: 111 LBS | DIASTOLIC BLOOD PRESSURE: 83 MMHG | SYSTOLIC BLOOD PRESSURE: 120 MMHG | HEIGHT: 60 IN

## 2024-05-09 DIAGNOSIS — Z12.39 ENCOUNTER FOR SCREENING BREAST EXAMINATION: Primary | ICD-10-CM

## 2024-05-09 DIAGNOSIS — Z85.3 PERSONAL HISTORY OF BREAST CANCER: ICD-10-CM

## 2024-05-09 PROCEDURE — 99999 PR PBB SHADOW E&M-EST. PATIENT-LVL III: CPT | Mod: PBBFAC,,, | Performed by: NURSE PRACTITIONER

## 2024-05-09 PROCEDURE — 99213 OFFICE O/P EST LOW 20 MIN: CPT | Mod: PBBFAC | Performed by: NURSE PRACTITIONER

## 2024-05-09 PROCEDURE — 99213 OFFICE O/P EST LOW 20 MIN: CPT | Mod: S$PBB,,, | Performed by: NURSE PRACTITIONER

## 2024-05-09 PROCEDURE — 1159F MED LIST DOCD IN RCRD: CPT | Mod: CPTII,,, | Performed by: NURSE PRACTITIONER

## 2024-05-09 PROCEDURE — 3074F SYST BP LT 130 MM HG: CPT | Mod: CPTII,,, | Performed by: NURSE PRACTITIONER

## 2024-05-09 PROCEDURE — 3008F BODY MASS INDEX DOCD: CPT | Mod: CPTII,,, | Performed by: NURSE PRACTITIONER

## 2024-05-09 PROCEDURE — 3079F DIAST BP 80-89 MM HG: CPT | Mod: CPTII,,, | Performed by: NURSE PRACTITIONER

## 2024-05-09 NOTE — PROGRESS NOTES
[facility]  Department of Surgery    REFERRING PROVIDER: No referring provider defined for this encounter. Jb Pastor MD  CHIEF COMPLAINT:   Chief Complaint   Patient presents with    1 Year F/U       DIAGNOSIS:   This is a 49 y.o. 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 bilateral mastectomy with right sentinel node biopsy on 10/7/2022 SHIELA Sawyer M.D. Surgical Oncology. Final pathology revealed 9 mm DCIS with clear margins.0/3 lymph nodes were negative for carcinoma.  Left breast was benign. She had to return to the operating room for debridement following pressure necrosis around the nipple on 10/17/20 per plastic surgeries op note.  2. No further adjuvant treatment was recommended    HISTORY OF PRESENT ILLNESS:   Dina Sosa is a 49 y.o. female comes in for oncological follow up.  She denies change in her breast self-exam specifically denying new masses, skin or nipple changes, or nipple discharge. Recently underwent implant insertion with Dr. Trinidad on 4/28/2023 and subsequent right breast capsulotomy with bilateral implant exchange on 2/15/2024. She is recovering well. She is following with neurologist at outside hospital for worsening migraine and vision changes. There have been no changes to family history. The patient denies constitutional symptoms of night sweats, chills, weight loss, new headaches, visual changes, new back or bony pain, chest pain, or shortness of breath. Recently lost her father, friday is his birthday       Review of Systems: See HPI/Interval History for other systems reviewed.     IMAGING:   No recent imaging obtained for review during this visit      MEDICATIONS/ALLERGIES:     Current Outpatient Medications   Medication Sig    ascorbic acid (VITAMIN C ORAL) Take by mouth Daily.    buPROPion (WELLBUTRIN XL) 300 MG 24 hr tablet TAKE 1 TABLET(300 MG) BY MOUTH EVERY DAY    diazePAM (VALIUM) 2 MG tablet Take 1  tablet (2 mg total) by mouth every evening.    docusate sodium (COLACE) 100 MG capsule Take 1 capsule (100 mg total) by mouth 2 (two) times daily.    gabapentin (NEURONTIN) 300 MG capsule Take 1 capsule (300 mg total) by mouth 2 (two) times daily.    SHONNA ROOT EXTRACT ORAL Take by mouth.    loratadine (CLARITIN ORAL) Take by mouth Daily.    orphenadrine (NORFLEX) 100 mg tablet Take 1 tablet (100 mg total) by mouth 2 (two) times daily.    pantoprazole (PROTONIX) 40 MG tablet TAKE 1 TABLET(40 MG) BY MOUTH EVERY DAY    pravastatin (PRAVACHOL) 20 MG tablet TAKE 1 TABLET(20 MG) BY MOUTH EVERY DAY    primidone (MYSOLINE) 50 MG Tab Take 1 tablet (50 mg total) by mouth every evening.    topiramate (TOPAMAX) 25 MG tablet Take 25 mg by mouth once daily.    traZODone (DESYREL) 100 MG tablet Take 1 tablet (100 mg total) by mouth every evening.    TURMERIC ORAL Take by mouth.     No current facility-administered medications for this visit.     Facility-Administered Medications Ordered in Other Visits   Medication    ceFAZolin 1 g, gentamicin 80 mg in sodium chloride 0.9% 500 mL irrigation    ceFAZolin 1 g, gentamicin 80 mg in sodium chloride 0.9% 500 mL irrigation    ceFAZolin 1 g, gentamicin 80 mg in sodium chloride 0.9% 500 mL irrigation    ceFAZolin 1 g, gentamicin 80 mg in sodium chloride 0.9% 500 mL irrigation    LIDOcaine (PF) 10 mg/ml (1%) injection 10 mg    LIDOcaine HCL 10 mg/ml (1%) 50 mL, EPINEPHrine 1 mg in lactated Ringers 1,000 mL irrigation    LIDOcaine HCL 10 mg/ml (1%) 50 mL, EPINEPHrine 1 mg in lactated Ringers 1,000 mL irrigation    mupirocin 2 % ointment    sodium chloride 0.9% flush 10 mL      Review of patient's allergies indicates:   Allergen Reactions    Morphine Itching     Pt states just itching as side effect. Can have dilaudid, may need benedryl for itching with admin    Codeine Itching     Can take hydrocodone and oxycodone    Lexapro [escitalopram oxalate] Other (See Comments)     sedation        PHYSICAL EXAM:   /83   Pulse 93   Ht 5' (1.524 m)   Wt 50.3 kg (111 lb)   LMP 06/01/2001 (Approximate)   BMI 21.68 kg/m²     Physical Exam   Vitals reviewed.  Constitutional: She is oriented to person, place, and time.   Eyes: Right eye exhibits no discharge. Left eye exhibits no discharge.   Cardiovascular:  Normal rate.            Pulmonary/Chest: Effort normal. No respiratory distress. She has no wheezes. Right breast exhibits no mass, no skin change and no tenderness. Left breast exhibits no mass, no skin change and no tenderness.       Abdominal: Normal appearance.   Musculoskeletal: Normal range of motion. Lymphadenopathy:      Cervical: No cervical adenopathy.     Neurological: She is alert and oriented to person, place, and time.   Skin: Skin is warm and dry. No erythema. No pallor.         ASSESSMENT:   This is a 49 y.o. female without evidence of recurrence by exam, history or imaging.       PLAN:   1. We discussed there was nothing concerning on exam   2. Continue monthly self breast exams and call the clinic with any changes or problems.  3. Mammogram not indicated in the setting of bilateral mastectomy   4. Return to clinic in 1 year .  5. Thinking about getting sangeeta nipple tattoo     The patient is in agreement with the plan. Questions were encouraged and answered to patient's satisfaction. Dina will call our office with any questions or concerns.

## 2024-05-21 ENCOUNTER — OFFICE VISIT (OUTPATIENT)
Dept: PLASTIC SURGERY | Facility: CLINIC | Age: 49
End: 2024-05-21
Payer: MEDICAID

## 2024-05-21 ENCOUNTER — TELEPHONE (OUTPATIENT)
Dept: SURGERY | Facility: CLINIC | Age: 49
End: 2024-05-21
Payer: MEDICAID

## 2024-05-21 VITALS
BODY MASS INDEX: 21.66 KG/M2 | WEIGHT: 110.88 LBS | HEART RATE: 92 BPM | RESPIRATION RATE: 19 BRPM | DIASTOLIC BLOOD PRESSURE: 83 MMHG | OXYGEN SATURATION: 100 % | SYSTOLIC BLOOD PRESSURE: 120 MMHG

## 2024-05-21 DIAGNOSIS — Z09 SURGERY FOLLOW-UP: Primary | ICD-10-CM

## 2024-05-21 PROCEDURE — 99024 POSTOP FOLLOW-UP VISIT: CPT | Mod: ,,, | Performed by: SURGERY

## 2024-05-21 PROCEDURE — 99213 OFFICE O/P EST LOW 20 MIN: CPT | Mod: PBBFAC | Performed by: SURGERY

## 2024-05-21 PROCEDURE — 99999 PR PBB SHADOW E&M-EST. PATIENT-LVL III: CPT | Mod: PBBFAC,,, | Performed by: SURGERY

## 2024-05-21 PROCEDURE — 3074F SYST BP LT 130 MM HG: CPT | Mod: CPTII,,, | Performed by: SURGERY

## 2024-05-21 PROCEDURE — 3079F DIAST BP 80-89 MM HG: CPT | Mod: CPTII,,, | Performed by: SURGERY

## 2024-05-21 PROCEDURE — 1159F MED LIST DOCD IN RCRD: CPT | Mod: CPTII,,, | Performed by: SURGERY

## 2024-05-21 NOTE — TELEPHONE ENCOUNTER
----- Message from Yancy Aiken RN sent at 5/21/2024  2:10 PM CDT -----  Regarding: Nipple Tattoo  Hi ladies -     This sweet lady is interested in nipple tattooing - had sx 02/15 ( Implant exchange and R Capsule -major Revision )         Please reach out at your convenience.    Thanks,  Yancy

## 2024-05-22 NOTE — PROGRESS NOTES
Dina Sosa presents to [facility] for a follow up visit status post right capsulotomy and bilateral implant replacement on 2/15/24. She has 585cc high profile boost smooth round silicone implants.    She denies fever, chills, nausea, vomiting or other systemic signs of infection.     Her dad recently . Mom had good news today that she is cancer free.      ROS - negative, other than stated above    PHYSICAL EXAMINATION  Vitals:    24 1337   BP: 120/83   Pulse: 92   Resp: 19       Gen: NAD, appears stated age  Neuro: normal without focal findings, mental status and speech normal  HEENT: NCAT, neck supple, PEERL  CV: RRR  Pulm: Breathing non-labored, chest wall movement equal bilaterally   Breast:  soft bilateral implants, good symmetry. Nipples surgically absent.   Abdomen: soft, nontender, no guarding  Gu: genitalia not examined  Extremity:normal strength, no cyanosis or edema  Skin: Skin color, texture, turgor normal. No rashes or lesions  Psych: oriented to time, place and person      ASSESSMENT/PLAN  49 y.o. female s/p bilateral implant based breast reconstruction.   She's healed well and is happy with size and symmetry. She's ready to get nipple tattoos.     Referral placed for tattoo. Will see her back in 6 months and plan for post op photos then.    Doreen Garcia PA-C  Plastic and Reconstructive Surgery      I spent a total of 20 minutes on the day of the visit.This includes face to face time and non-face to face time preparing to see the patient (eg, review of tests), obtaining and/or reviewing separately obtained history, documenting clinical information in the electronic or other health record, independently interpreting results and communicating results to the patient/family/caregiver, or care coordinator.

## 2024-06-04 DIAGNOSIS — M54.12 CERVICAL RADICULOPATHY: ICD-10-CM

## 2024-06-04 RX ORDER — GABAPENTIN 300 MG/1
300 CAPSULE ORAL 2 TIMES DAILY
Qty: 180 CAPSULE | Refills: 3 | Status: SHIPPED | OUTPATIENT
Start: 2024-06-04 | End: 2025-06-04

## 2024-06-04 NOTE — TELEPHONE ENCOUNTER
No care due was identified.  [facility] Embedded Care Due Messages. Reference number: [id number].   6/04/2024 6:08:59 AM CDT

## 2024-09-24 ENCOUNTER — PATIENT MESSAGE (OUTPATIENT)
Dept: ADMINISTRATIVE | Facility: HOSPITAL | Age: 49
End: 2024-09-24
Payer: MEDICAID

## 2024-09-25 DIAGNOSIS — Z12.11 SCREENING FOR COLON CANCER: ICD-10-CM

## 2024-10-16 DIAGNOSIS — G47.01 INSOMNIA DUE TO MEDICAL CONDITION: ICD-10-CM

## 2024-10-16 DIAGNOSIS — F41.9 ANXIETY: ICD-10-CM

## 2024-10-16 NOTE — TELEPHONE ENCOUNTER
No care due was identified.  [facility] Embedded Care Due Messages. Reference number: [id number].   10/16/2024 11:37:25 AM CDT

## 2024-10-17 ENCOUNTER — PATIENT MESSAGE (OUTPATIENT)
Dept: FAMILY MEDICINE | Facility: CLINIC | Age: 49
End: 2024-10-17
Payer: MEDICAID

## 2024-10-17 RX ORDER — DIAZEPAM 2 MG/1
2 TABLET ORAL NIGHTLY
Qty: 30 TABLET | Refills: 0 | Status: SHIPPED | OUTPATIENT
Start: 2024-10-17 | End: 2024-11-16

## 2024-10-29 ENCOUNTER — OFFICE VISIT (OUTPATIENT)
Dept: FAMILY MEDICINE | Facility: CLINIC | Age: 49
End: 2024-10-29
Payer: MEDICAID

## 2024-10-29 DIAGNOSIS — F32.0 CURRENT MILD EPISODE OF MAJOR DEPRESSIVE DISORDER WITHOUT PRIOR EPISODE: Primary | ICD-10-CM

## 2024-10-29 DIAGNOSIS — K21.9 GASTROESOPHAGEAL REFLUX DISEASE WITHOUT ESOPHAGITIS: ICD-10-CM

## 2024-10-29 DIAGNOSIS — E78.2 MIXED HYPERLIPIDEMIA: ICD-10-CM

## 2024-10-29 DIAGNOSIS — F41.9 ANXIETY: ICD-10-CM

## 2024-10-29 DIAGNOSIS — G47.01 INSOMNIA DUE TO MEDICAL CONDITION: ICD-10-CM

## 2024-10-29 PROCEDURE — 99214 OFFICE O/P EST MOD 30 MIN: CPT | Mod: 95,,, | Performed by: INTERNAL MEDICINE

## 2024-10-29 PROCEDURE — G2211 COMPLEX E/M VISIT ADD ON: HCPCS | Mod: 95,,, | Performed by: INTERNAL MEDICINE

## 2024-10-29 PROCEDURE — 1159F MED LIST DOCD IN RCRD: CPT | Mod: CPTII,95,, | Performed by: INTERNAL MEDICINE

## 2024-10-29 PROCEDURE — 1160F RVW MEDS BY RX/DR IN RCRD: CPT | Mod: CPTII,95,, | Performed by: INTERNAL MEDICINE

## 2024-10-29 RX ORDER — DIAZEPAM 2 MG/1
2 TABLET ORAL NIGHTLY
Qty: 30 TABLET | Refills: 2 | Status: SHIPPED | OUTPATIENT
Start: 2024-10-29 | End: 2025-01-27

## 2024-10-29 RX ORDER — BUPROPION HYDROCHLORIDE 300 MG/1
300 TABLET ORAL DAILY
Qty: 90 TABLET | Refills: 3 | Status: SHIPPED | OUTPATIENT
Start: 2024-10-29 | End: 2025-10-29

## 2024-10-29 RX ORDER — PRAVASTATIN SODIUM 20 MG/1
20 TABLET ORAL NIGHTLY
Qty: 90 TABLET | Refills: 3 | Status: SHIPPED | OUTPATIENT
Start: 2024-10-29 | End: 2025-10-29

## 2024-10-29 RX ORDER — PANTOPRAZOLE SODIUM 40 MG/1
40 TABLET, DELAYED RELEASE ORAL DAILY
Qty: 90 TABLET | Refills: 3 | Status: SHIPPED | OUTPATIENT
Start: 2024-10-29 | End: 2025-10-29

## 2024-11-04 NOTE — PROGRESS NOTES
PATIENT NAME:  PAVEL RICKS  MRN:  [id number]  DATE OF SURGERY:  11/04/2024  TIME OF SURGERY:  Refer to Procedural timeline    SURGEON(S):  MD YIMI Nathan MD    ASSISTANT(S):  zaheer Borrero    ASSISTANT TASKS PERFORMED:  zaheer Borrero was necessary for this case, and there were no other appropriately, skilled assistance available. During this procedure, it is important to have more than two skilled hands to use for application of postoperative dressing. Knowledge of the subsequent steps of the procedure and experience with techniques and/or arthroscopic surgery is mandatory to provide the patient with best care possible, completion of the procedure in a timely fashion, and prevent any potential complications.  No qualified resident was available to assist in this procedure.       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    PROCEDURE PERFORMED:  Radical resection greater than 12 cm of left thigh sarcoma.    ANESTHESIA:  geta    COMPLICATIONS:  None.    ESTIMATED BLOOD LOSS:  150 cc.    SPECIMENS:  sarcoma    GRAFTS/IMPLANTS:  none    CONDITION:  Stable.    CO-SURGEON:  Rodolfo Cornelius MD.  Two surgeons were necessary given complex nature of anatomic location.    INDICATION FOR OPERATION:  Pleasant female with a high-grade sarcoma of her thigh, treated with neoadjuvant radiation therapy.    DESCRIPTION OF PROCEDURE:  The patient was taken to the operating room, sterilely prepped and draped in a normal fashion.  Incision made through the skin and subcutaneous tissue.  Wide flaps were then made.  A wide margin was taken all the way around the tumor leaving good tissue all the way around this.  The femoral vessels were then identified and protected throughout approximately 20 cm of their length.  Following this, the soft tissue mass was resected, found to be approximately 15 cm in  Subjective:       Patient ID: Dina Sosa is a 42 y.o. female.    Chief Complaint: No chief complaint on file.  Dina Sosa 42 y.o. female is here for office visit to review care and physical exam, reports doing well in general, does exercise 3-4 times a week, at least 3o minutes.  Does aerobic activity.  Is very busy also.  Has been taking care of Mother who had surgery for lung Ca, samll cell?  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    HPI  Review of Systems   Constitutional: Negative for activity change, fatigue, fever and unexpected weight change.   HENT: Negative for congestion, hearing loss, postnasal drip, rhinorrhea and trouble swallowing.    Eyes: Negative for discharge, redness and visual disturbance.   Respiratory: Negative for chest tightness, shortness of breath and wheezing.    Cardiovascular: Negative for chest pain, palpitations and leg swelling.   Gastrointestinal: Negative for abdominal distention, blood in stool, constipation, diarrhea and vomiting.   Endocrine: Negative for polydipsia and polyuria.   Genitourinary: Negative for decreased urine volume, difficulty urinating, dysuria, flank pain, hematuria, menstrual problem, pelvic pain and urgency.   Musculoskeletal: Negative for arthralgias, back pain, gait problem, joint swelling, neck pain and neck stiffness.   Skin: Negative for color change, rash and wound.   Neurological: Negative for dizziness, syncope, weakness and headaches.   Psychiatric/Behavioral: Negative for behavioral problems, confusion, dysphoric mood and sleep disturbance. The patient is not nervous/anxious.        Objective:      Physical Exam   Constitutional: She is oriented to person, place, and time. She appears well-developed and well-nourished. No distress.   HENT:   Head: Normocephalic.   Mouth/Throat: No oropharyngeal exudate.   Eyes: EOM are normal. Pupils are equal, round, and reactive to light. No scleral icterus.   Neck: Neck  greatest dimension.  The wound was then thoroughly irrigated.  Deep tissue closed with 2-0 Monocryl and 3-0 nylon.  Two surgeons were necessary given the very complex nature of surgery, and no available resident.        Dictated By:  Jonah Brandon MD  Signing Provider:  Jonah Brandon MD    NPW/AQT  D:  11/04/2024 04:14:14 PM  T:  11/04/2024 05:46:44 PM  Job:  [id number]      supple. No JVD present. No thyromegaly present.   Cardiovascular: Normal rate, regular rhythm and normal heart sounds.  Exam reveals no gallop and no friction rub.    No murmur heard.  Pulmonary/Chest: Effort normal and breath sounds normal. She has no wheezes. She has no rales.   Abdominal: Soft. Bowel sounds are normal. She exhibits no distension and no mass. There is no tenderness. There is no guarding.   Musculoskeletal: Normal range of motion. She exhibits no edema.   Lymphadenopathy:     She has no cervical adenopathy.   Neurological: She is alert and oriented to person, place, and time. She has normal reflexes. She displays normal reflexes. No cranial nerve deficit. She exhibits normal muscle tone.   Skin: Skin is warm. No rash noted. No erythema.   Psychiatric: She has a normal mood and affect. Thought content normal.       Assessment:       No diagnosis found.    Plan:       Dina was seen today for annual exam.    Diagnoses and all orders for this visit:    Preventative health care  -     CBC auto differential; Future  -     Comprehensive metabolic panel; Future  -     Lipid panel; Future  -     TSH; Future  -     Mammo Digital Screening Bilateral With CAD; Future

## 2024-11-20 ENCOUNTER — PATIENT MESSAGE (OUTPATIENT)
Dept: PLASTIC SURGERY | Facility: CLINIC | Age: 49
End: 2024-11-20
Payer: MEDICAID

## 2024-11-20 DIAGNOSIS — D05.11 DUCTAL CARCINOMA IN SITU (DCIS) OF RIGHT BREAST: Primary | ICD-10-CM

## 2024-12-01 DIAGNOSIS — K21.9 GASTROESOPHAGEAL REFLUX DISEASE WITHOUT ESOPHAGITIS: ICD-10-CM

## 2024-12-01 RX ORDER — PANTOPRAZOLE SODIUM 40 MG/1
40 TABLET, DELAYED RELEASE ORAL DAILY
Qty: 90 TABLET | Refills: 3 | Status: SHIPPED | OUTPATIENT
Start: 2024-12-01

## 2024-12-01 NOTE — TELEPHONE ENCOUNTER
No care due was identified.  [facility] Embedded Care Due Messages. Reference number: [id number].   12/01/2024 6:12:07 AM CST

## 2024-12-01 NOTE — TELEPHONE ENCOUNTER
Refill Decision Note   Dina Sosa  is requesting a refill authorization.  Brief Assessment and Rationale for Refill:  Approve     Medication Therapy Plan:         Comments:     Note composed:5:57 PM 12/01/2024

## 2024-12-04 ENCOUNTER — PATIENT MESSAGE (OUTPATIENT)
Dept: FAMILY MEDICINE | Facility: CLINIC | Age: 49
End: 2024-12-04
Payer: MEDICAID

## 2024-12-09 ENCOUNTER — PATIENT MESSAGE (OUTPATIENT)
Dept: FAMILY MEDICINE | Facility: CLINIC | Age: 49
End: 2024-12-09
Payer: MEDICAID

## 2024-12-09 DIAGNOSIS — R04.0 EPISTAXIS: Primary | ICD-10-CM

## 2024-12-09 NOTE — TELEPHONE ENCOUNTER
Pt has not been seen since 10/2024.    Pt will need office visit.  Please schedule. I can not schedule due to pts insurance.

## 2024-12-10 ENCOUNTER — LAB VISIT (OUTPATIENT)
Dept: LAB | Facility: HOSPITAL | Age: 49
End: 2024-12-10
Payer: MEDICAID

## 2024-12-10 DIAGNOSIS — R04.0 EPISTAXIS: ICD-10-CM

## 2024-12-10 LAB
BASOPHILS # BLD AUTO: 0.08 K/UL (ref 0–0.2)
BASOPHILS NFR BLD: 1 % (ref 0–1.9)
DIFFERENTIAL METHOD BLD: ABNORMAL
EOSINOPHIL # BLD AUTO: 0.1 K/UL (ref 0–0.5)
EOSINOPHIL NFR BLD: 1.4 % (ref 0–8)
ERYTHROCYTE [DISTWIDTH] IN BLOOD BY AUTOMATED COUNT: 13.4 % (ref 11.5–14.5)
HCT VFR BLD AUTO: 39.7 % (ref 37–48.5)
HGB BLD-MCNC: 12.3 G/DL (ref 12–16)
IMM GRANULOCYTES # BLD AUTO: 0.17 K/UL (ref 0–0.04)
IMM GRANULOCYTES NFR BLD AUTO: 2.1 % (ref 0–0.5)
LYMPHOCYTES # BLD AUTO: 2.5 K/UL (ref 1–4.8)
LYMPHOCYTES NFR BLD: 31.5 % (ref 18–48)
MCH RBC QN AUTO: 27.8 PG (ref 27–31)
MCHC RBC AUTO-ENTMCNC: 31 G/DL (ref 32–36)
MCV RBC AUTO: 90 FL (ref 82–98)
MONOCYTES # BLD AUTO: 0.6 K/UL (ref 0.3–1)
MONOCYTES NFR BLD: 8 % (ref 4–15)
NEUTROPHILS # BLD AUTO: 4.5 K/UL (ref 1.8–7.7)
NEUTROPHILS NFR BLD: 56 % (ref 38–73)
NRBC BLD-RTO: 0 /100 WBC
PLATELET # BLD AUTO: 368 K/UL (ref 150–450)
PMV BLD AUTO: 9.7 FL (ref 9.2–12.9)
RBC # BLD AUTO: 4.43 M/UL (ref 4–5.4)
WBC # BLD AUTO: 7.96 K/UL (ref 3.9–12.7)

## 2024-12-10 PROCEDURE — 85025 COMPLETE CBC W/AUTO DIFF WBC: CPT | Performed by: INTERNAL MEDICINE

## 2024-12-10 PROCEDURE — 36415 COLL VENOUS BLD VENIPUNCTURE: CPT | Mod: PO | Performed by: INTERNAL MEDICINE

## 2024-12-12 ENCOUNTER — OFFICE VISIT (OUTPATIENT)
Dept: PLASTIC SURGERY | Facility: CLINIC | Age: 49
End: 2024-12-12
Payer: MEDICAID

## 2024-12-12 VITALS
DIASTOLIC BLOOD PRESSURE: 90 MMHG | HEART RATE: 88 BPM | WEIGHT: 102.75 LBS | BODY MASS INDEX: 20.17 KG/M2 | HEIGHT: 60 IN | SYSTOLIC BLOOD PRESSURE: 127 MMHG

## 2024-12-12 DIAGNOSIS — R04.0 EPISTAXIS: Primary | ICD-10-CM

## 2024-12-12 PROCEDURE — 99213 OFFICE O/P EST LOW 20 MIN: CPT | Mod: PBBFAC | Performed by: SURGERY

## 2024-12-12 PROCEDURE — 3074F SYST BP LT 130 MM HG: CPT | Mod: CPTII,,, | Performed by: SURGERY

## 2024-12-12 PROCEDURE — 3080F DIAST BP >= 90 MM HG: CPT | Mod: CPTII,,, | Performed by: SURGERY

## 2024-12-12 PROCEDURE — 3008F BODY MASS INDEX DOCD: CPT | Mod: CPTII,,, | Performed by: SURGERY

## 2024-12-12 PROCEDURE — 99999 PR PBB SHADOW E&M-EST. PATIENT-LVL III: CPT | Mod: PBBFAC,,, | Performed by: SURGERY

## 2024-12-12 PROCEDURE — 99214 OFFICE O/P EST MOD 30 MIN: CPT | Mod: S$PBB,,, | Performed by: SURGERY

## 2024-12-18 NOTE — PROGRESS NOTES
Dina Sosa presents to [facility] for a follow up visit status post right capsulotomy and bilateral implant replacement on 2/15/24. She has 585cc high profile boost smooth round silicone implants. Got nipple tattoos since last visit.   She denies fever, chills, nausea, vomiting or other systemic signs of infection.      Mom unfortunately  since last visit, Dad had passed not long before that. She's planning to move to [place] to be closer to her grandchild.        ROS - negative, other than stated above     PHYSICAL EXAMINATION      Vitals:     24 1337   BP: 120/83   Pulse: 92   Resp: 19         Gen: NAD, appears stated age  Neuro: normal without focal findings, mental status and speech normal  HEENT: NCAT, neck supple, PEERL  CV: RRR  Pulm: Breathing non-labored, chest wall movement equal bilaterally   Breast:  soft bilateral implants, good symmetry. Nipples surgically absent.   Abdomen: soft, nontender, no guarding  Gu: genitalia not examined  Extremity:normal strength, no cyanosis or edema  Skin: Skin color, texture, turgor normal. No rashes or lesions  Psych: oriented to time, place and person        ASSESSMENT/PLAN  49 y.o. female s/p bilateral implant based breast reconstruction.   She's healed well and is happy with size and symmetry. She's happy with nipple tattoos which were done by a friend.  Discussed signs and symptoms of implant rupture, malposition, or capsular contracture to be aware of that would indicate need for a follow up visit.  Follow up as needed     Doreen Garcia PA-C  Plastic and Reconstructive Surgery

## 2024-12-19 ENCOUNTER — OFFICE VISIT (OUTPATIENT)
Dept: FAMILY MEDICINE | Facility: CLINIC | Age: 49
End: 2024-12-19
Payer: MEDICAID

## 2024-12-19 VITALS
HEIGHT: 60 IN | DIASTOLIC BLOOD PRESSURE: 86 MMHG | WEIGHT: 103.38 LBS | BODY MASS INDEX: 20.3 KG/M2 | OXYGEN SATURATION: 97 % | HEART RATE: 74 BPM | SYSTOLIC BLOOD PRESSURE: 116 MMHG

## 2024-12-19 DIAGNOSIS — E78.2 MIXED HYPERLIPIDEMIA: ICD-10-CM

## 2024-12-19 DIAGNOSIS — F32.A ANXIETY AND DEPRESSION: ICD-10-CM

## 2024-12-19 DIAGNOSIS — K21.9 GASTROESOPHAGEAL REFLUX DISEASE WITHOUT ESOPHAGITIS: ICD-10-CM

## 2024-12-19 DIAGNOSIS — G43.009 MIGRAINE WITHOUT AURA AND WITHOUT STATUS MIGRAINOSUS, NOT INTRACTABLE: ICD-10-CM

## 2024-12-19 DIAGNOSIS — F41.9 ANXIETY AND DEPRESSION: ICD-10-CM

## 2024-12-19 DIAGNOSIS — R04.0 EPISTAXIS: Primary | ICD-10-CM

## 2024-12-19 DIAGNOSIS — Z23 NEED FOR IMMUNIZATION AGAINST INFLUENZA: ICD-10-CM

## 2024-12-19 PROBLEM — S40.022A TRAUMATIC ECCHYMOSIS OF LEFT UPPER ARM: Status: RESOLVED | Noted: 2019-05-09 | Resolved: 2024-12-19

## 2024-12-19 PROCEDURE — 99999 PR PBB SHADOW E&M-EST. PATIENT-LVL IV: CPT | Mod: PBBFAC,,, | Performed by: INTERNAL MEDICINE

## 2024-12-19 PROCEDURE — 1159F MED LIST DOCD IN RCRD: CPT | Mod: CPTII,,, | Performed by: INTERNAL MEDICINE

## 2024-12-19 PROCEDURE — 90471 IMMUNIZATION ADMIN: CPT | Mod: PBBFAC,PO

## 2024-12-19 PROCEDURE — 3079F DIAST BP 80-89 MM HG: CPT | Mod: CPTII,,, | Performed by: INTERNAL MEDICINE

## 2024-12-19 PROCEDURE — 90656 IIV3 VACC NO PRSV 0.5 ML IM: CPT | Mod: PBBFAC,PO

## 2024-12-19 PROCEDURE — 3008F BODY MASS INDEX DOCD: CPT | Mod: CPTII,,, | Performed by: INTERNAL MEDICINE

## 2024-12-19 PROCEDURE — 99214 OFFICE O/P EST MOD 30 MIN: CPT | Mod: S$PBB,,, | Performed by: INTERNAL MEDICINE

## 2024-12-19 PROCEDURE — 99999PBSHW PR PBB SHADOW TECHNICAL ONLY FILED TO HB: Mod: PBBFAC,,,

## 2024-12-19 PROCEDURE — 3074F SYST BP LT 130 MM HG: CPT | Mod: CPTII,,, | Performed by: INTERNAL MEDICINE

## 2024-12-19 PROCEDURE — G2211 COMPLEX E/M VISIT ADD ON: HCPCS | Mod: S$PBB,,, | Performed by: INTERNAL MEDICINE

## 2024-12-19 PROCEDURE — 99214 OFFICE O/P EST MOD 30 MIN: CPT | Mod: PBBFAC,PO | Performed by: INTERNAL MEDICINE

## 2024-12-19 PROCEDURE — 1160F RVW MEDS BY RX/DR IN RCRD: CPT | Mod: CPTII,,, | Performed by: INTERNAL MEDICINE

## 2024-12-19 RX ADMIN — INFLUENZA VIRUS VACCINE 0.5 ML: 15; 15; 15 SUSPENSION INTRAMUSCULAR at 10:12

## 2024-12-19 NOTE — PROGRESS NOTES
[facility]  Primary Care   [address]       Patient ID: Dina Sosa     Chief Complaint:   Chief Complaint   Patient presents with    Follow-up        HPI:  Follow-up for epistaxis that has been much more prevalent in the last month occurring numerous times each day.  She reached out to me a few weeks ago alerting me to this and I recommended that she moisturize more and consider an ENT referral.  Our ENT could not see her until May so she saw want him the [place] who correctly identified a problematic vessel on the left side of the nasal septum and cauterize it.  She may need cautery on the right if she bleeds again but she has been doing well since that cauterization.  I did check a CBC and she is not anemic.  She was concerned about her immature granulocytes being slightly elevated but I assured her that is since her white blood cell count is normal, that is not to be worried about.  She is still doing well on the combination of Topamax and trazodone and diazepam and Wellbutrin so I am not going to change any of those medicines until where pass the holidays.  She does wonder if the Wellbutrin is working so we may consider stopping that at a later date.  She does consent to a flu shot which I will give her today, and in the meantime I hope she has a merry Alyssa.  Of note, she has noticed more stomach irritation, so she switch to lactose-free milk but still gets an upset stomach.  Her ENT did tell her to take the pantoprazole in the morning and I agree with that is some hopefully that will fix that problem.  Along with the epistaxis, she had some bruising but does not take any over-the-counter ibuprofen or Naprosyn.  Platelet count and a CBC was normal so I am not sure why that occurred.    Review of Systems       Epistaxis    Objective:      Physical Exam   Physical Exam       Normal    Vitals:   Vitals:    12/19/24 0954   BP: 116/86   Pulse: 74   SpO2: 97%   Weight:  46.9 kg (103 lb 6.3 oz)   Height: 5' (1.524 m)        Assessment:           Plan:       Dina Sosa  was seen today for follow-up and may need lab work.    Diagnoses and all orders for this visit:    Dina was seen today for follow-up.    Diagnoses and all orders for this visit:    Epistaxis  Due to aberrant vessel that was cauterized.     Need for immunization against influenza  -     influenza (Flulaval, Fluzone, Fluarix) 45 mcg/0.5 mL IM vaccine (> or = 6 mo) 0.5 mL    Migraine without aura and without status migrainosus, not intractable  Controlled with Sumatriptan and Topamax     Anxiety and depression  Controlled with Diazepam and Wellbutrin     Gastroesophageal reflux disease without esophagitis  Take Pantoprazole in the morning     Visit today included increased complexity associated with the care of the episodic problem epistaxis migraines anxiety depression GERD addressed and managing the longitudinal care of the patient due to the serious and/or complex managed problem(s) .    Labs ordered.     Jb Pastor MD

## 2024-12-27 ENCOUNTER — TELEPHONE (OUTPATIENT)
Facility: CLINIC | Age: 49
End: 2024-12-27
Payer: MEDICAID

## 2024-12-27 NOTE — TELEPHONE ENCOUNTER
Informed pt that we were not currently accepting new medicaid pt's and referred her to a different dermatology clinic. Pt was compliant.     ----- Message from Emelia sent at 12/27/2024  1:46 PM CST -----  Contact: Self  Type:  Sooner Appointment Request    Caller is requesting a sooner appointment.  Caller declined first available appointment listed below.  Caller will not accept being placed on the waitlist and is requesting a message be sent to doctor.    Name of Caller:  Patient  When is the first available appointment?  N/A  Symptoms:  has strange rash all over her back, red splotchy dots that only some spots that itch, has noticed it a few weeks ago and it was little spot now its all over back  Would the patient rather a call back or a response via [facility]? Call  Best Call Back Number:  [phone]  Additional Information:  Pt was seen at  for this rash and they have no clue what it is and was told to try and see a dermatologist. Pt has medicaid but wanted to see if there was anyway we can look at it and let her know something. Thank You.

## 2025-01-15 ENCOUNTER — HOSPITAL ENCOUNTER (OUTPATIENT)
Dept: RADIOLOGY | Facility: HOSPITAL | Age: 50
Discharge: HOME | End: 2025-01-15
Attending: STUDENT IN AN ORGANIZED HEALTH CARE EDUCATION/TRAINING PROGRAM
Payer: MEDICAID

## 2025-01-15 DIAGNOSIS — M54.2 NECK PAIN: ICD-10-CM

## 2025-01-15 PROCEDURE — 72141 MRI NECK SPINE W/O DYE: CPT | Mod: 26,,, | Performed by: RADIOLOGY

## 2025-01-15 PROCEDURE — 72141 MRI NECK SPINE W/O DYE: CPT | Mod: TC,PO

## 2025-02-03 ENCOUNTER — PATIENT MESSAGE (OUTPATIENT)
Dept: FAMILY MEDICINE | Facility: CLINIC | Age: 50
End: 2025-02-03
Payer: MEDICAID

## 2025-02-03 DIAGNOSIS — M54.12 CERVICAL RADICULOPATHY: Primary | ICD-10-CM

## 2025-02-21 ENCOUNTER — TELEPHONE (OUTPATIENT)
Dept: NEUROSURGERY | Facility: CLINIC | Age: 50
End: 2025-02-21
Payer: MEDICAID

## 2025-02-21 NOTE — TELEPHONE ENCOUNTER
----- Message from Leticia sent at 2/19/2025  2:39 PM CST -----  Type: General Call Back Name of Caller:pt Reason Ambulatory referral/consult to Neurosurgery Cervical radiculopathy [M54.12]External Referral Would the patient rather a call back or a response via [facility]? Call  Best Call Back Number:[phone] Additional Information: pt states that she would like an appt at the soonest she can be seen  pt states that she has a 10mm mass.

## 2025-02-26 DIAGNOSIS — F41.9 ANXIETY: ICD-10-CM

## 2025-02-26 DIAGNOSIS — G47.01 INSOMNIA DUE TO MEDICAL CONDITION: ICD-10-CM

## 2025-02-26 NOTE — TELEPHONE ENCOUNTER
Care Due:                  Date            Visit Type   Department     Provider  --------------------------------------------------------------------------------                                EP -                              [facility] FAMILY  Last Visit: 12-      CARE ([facility])   NATALY Pastor                               -                              [facility]  Next Visit: 06-      CARE ([facility])   NATALY Pastor                                                            Last  Test          Frequency    Reason                     Performed    Due Date  --------------------------------------------------------------------------------    CMP.........  12 months..  pravastatin..............  03-   03-    Lipid Panel.  12 months..  pravastatin..............  03-   03-    [facility] Embedded Care Due Messages. Reference number: [id number].   2/26/2025 5:17:54 PM CST

## 2025-02-28 RX ORDER — DIAZEPAM 2 MG/1
2 TABLET ORAL NIGHTLY
Qty: 30 TABLET | Refills: 3 | Status: SHIPPED | OUTPATIENT
Start: 2025-02-28 | End: 2025-06-28

## 2025-03-13 DIAGNOSIS — R25.1 TREMOR OF BOTH HANDS: ICD-10-CM

## 2025-03-14 RX ORDER — PRIMIDONE 50 MG/1
50 TABLET ORAL NIGHTLY
Qty: 90 TABLET | Refills: 3 | Status: SHIPPED | OUTPATIENT
Start: 2025-03-14 | End: 2026-03-14

## 2025-03-14 NOTE — TELEPHONE ENCOUNTER
----- Message from Jeannie sent at 3/14/2025 10:38 AM CDT -----  Contact: pt [phone]  Type:  Patient Requesting ReferralWho Called:  Pt Does the patient already have the specialty appointment scheduled?:  No If yes, what is the date of that appointment?:  NA Referral to What Specialty:  Pain management Reason for Referral:  Leg pain/ burning Does the patient want the referral with a specific physician?:  No Is the specialist an [facility]?:  Non Patient Requesting a Call Back?:  yes Best Call Back Number:  [phone] Information:   Pls call back

## 2025-04-02 DIAGNOSIS — F41.9 ANXIETY: ICD-10-CM

## 2025-04-02 NOTE — TELEPHONE ENCOUNTER
No care due was identified.  [facility] Embedded Care Due Messages. Reference number: [id number].   4/02/2025 4:41:48 PM CDT

## 2025-04-03 RX ORDER — TRAZODONE HYDROCHLORIDE 100 MG/1
100 TABLET ORAL NIGHTLY
Qty: 90 TABLET | Refills: 3 | Status: SHIPPED | OUTPATIENT
Start: 2025-04-03 | End: 2026-04-03

## 2025-05-08 DIAGNOSIS — R26.89 BALANCE PROBLEM: ICD-10-CM

## 2025-05-08 DIAGNOSIS — E34.8 PINEAL GLAND CYST: Primary | ICD-10-CM

## 2025-05-20 ENCOUNTER — HOSPITAL ENCOUNTER (OUTPATIENT)
Dept: RADIOLOGY | Facility: HOSPITAL | Age: 50
Discharge: HOME | End: 2025-05-20
Attending: STUDENT IN AN ORGANIZED HEALTH CARE EDUCATION/TRAINING PROGRAM
Payer: MEDICAID

## 2025-05-20 DIAGNOSIS — R26.89 BALANCE PROBLEM: ICD-10-CM

## 2025-05-20 DIAGNOSIS — E34.8 PINEAL GLAND CYST: ICD-10-CM

## 2025-05-20 PROCEDURE — 70551 MRI BRAIN STEM W/O DYE: CPT | Mod: TC,PO

## 2025-05-20 PROCEDURE — 70551 MRI BRAIN STEM W/O DYE: CPT | Mod: 26,,, | Performed by: RADIOLOGY

## 2025-05-21 ENCOUNTER — HOSPITAL ENCOUNTER (OUTPATIENT)
Dept: RADIOLOGY | Facility: HOSPITAL | Age: 50
Discharge: HOME | End: 2025-05-21
Attending: SPECIALIST
Payer: MEDICAID

## 2025-05-21 DIAGNOSIS — R11.2 NAUSEA AND/OR VOMITING: ICD-10-CM

## 2025-05-21 DIAGNOSIS — R14.0 ABDOMINAL DISTENSION: ICD-10-CM

## 2025-05-21 DIAGNOSIS — R10.13 EPIGASTRIC PAIN: ICD-10-CM

## 2025-05-21 DIAGNOSIS — R63.4 WEIGHT LOSS: ICD-10-CM

## 2025-05-21 PROCEDURE — 76700 US EXAM ABDOM COMPLETE: CPT | Mod: TC,PO

## 2025-05-21 PROCEDURE — 76700 US EXAM ABDOM COMPLETE: CPT | Mod: 26,,, | Performed by: RADIOLOGY

## 2025-06-09 ENCOUNTER — PATIENT MESSAGE (OUTPATIENT)
Dept: FAMILY MEDICINE | Facility: CLINIC | Age: 50
End: 2025-06-09
Payer: MEDICAID

## 2025-06-09 DIAGNOSIS — M79.10 MYALGIA: Primary | ICD-10-CM

## 2025-06-09 DIAGNOSIS — E78.2 MIXED HYPERLIPIDEMIA: ICD-10-CM

## 2025-06-10 NOTE — TELEPHONE ENCOUNTER
Please add the TSH and inflammatory markers and CPK to the labs that we are already getting get on Thursday.

## 2025-06-12 ENCOUNTER — LAB VISIT (OUTPATIENT)
Dept: LAB | Facility: HOSPITAL | Age: 50
End: 2025-06-12
Attending: INTERNAL MEDICINE
Payer: MEDICAID

## 2025-06-12 DIAGNOSIS — E78.2 MIXED HYPERLIPIDEMIA: ICD-10-CM

## 2025-06-12 DIAGNOSIS — F41.9 ANXIETY AND DEPRESSION: ICD-10-CM

## 2025-06-12 DIAGNOSIS — F32.A ANXIETY AND DEPRESSION: ICD-10-CM

## 2025-06-12 LAB
ABSOLUTE EOSINOPHIL (OHS): 0.11 K/UL
ABSOLUTE MONOCYTE (OHS): 0.44 K/UL (ref 0.3–1)
ABSOLUTE NEUTROPHIL COUNT (OHS): 3.44 K/UL (ref 1.8–7.7)
ALBUMIN SERPL BCP-MCNC: 4.3 G/DL (ref 3.5–5.2)
ALP SERPL-CCNC: 62 UNIT/L (ref 40–150)
ALT SERPL W/O P-5'-P-CCNC: 33 UNIT/L (ref 10–44)
ANION GAP (OHS): 5 MMOL/L (ref 8–16)
AST SERPL-CCNC: 19 UNIT/L (ref 11–45)
BASOPHILS # BLD AUTO: 0.03 K/UL
BASOPHILS NFR BLD AUTO: 0.5 %
BILIRUB SERPL-MCNC: 0.2 MG/DL (ref 0.1–1)
BUN SERPL-MCNC: 12 MG/DL (ref 6–20)
CALCIUM SERPL-MCNC: 8.7 MG/DL (ref 8.7–10.5)
CHLORIDE SERPL-SCNC: 107 MMOL/L (ref 95–110)
CHOLEST SERPL-MCNC: 194 MG/DL (ref 120–199)
CHOLEST/HDLC SERPL: 4.4 {RATIO} (ref 2–5)
CO2 SERPL-SCNC: 25 MMOL/L (ref 23–29)
CREAT SERPL-MCNC: 0.8 MG/DL (ref 0.5–1.4)
ERYTHROCYTE [DISTWIDTH] IN BLOOD BY AUTOMATED COUNT: 12.6 % (ref 11.5–14.5)
GFR SERPLBLD CREATININE-BSD FMLA CKD-EPI: >60 ML/MIN/1.73/M2
GLUCOSE SERPL-MCNC: 76 MG/DL (ref 70–110)
HCT VFR BLD AUTO: 40.6 % (ref 37–48.5)
HDLC SERPL-MCNC: 44 MG/DL (ref 40–75)
HDLC SERPL: 22.7 % (ref 20–50)
HGB BLD-MCNC: 12.5 GM/DL (ref 12–16)
IMM GRANULOCYTES # BLD AUTO: 0.02 K/UL (ref 0–0.04)
IMM GRANULOCYTES NFR BLD AUTO: 0.4 % (ref 0–0.5)
LDLC SERPL CALC-MCNC: 104.2 MG/DL (ref 63–159)
LYMPHOCYTES # BLD AUTO: 1.66 K/UL (ref 1–4.8)
MCH RBC QN AUTO: 27.7 PG (ref 27–31)
MCHC RBC AUTO-ENTMCNC: 30.8 G/DL (ref 32–36)
MCV RBC AUTO: 90 FL (ref 82–98)
NONHDLC SERPL-MCNC: 150 MG/DL
NUCLEATED RBC (/100WBC) (OHS): 0 /100 WBC
PLATELET # BLD AUTO: 298 K/UL (ref 150–450)
PMV BLD AUTO: 10.1 FL (ref 9.2–12.9)
POTASSIUM SERPL-SCNC: 3.9 MMOL/L (ref 3.5–5.1)
PROT SERPL-MCNC: 6.6 GM/DL (ref 6–8.4)
RBC # BLD AUTO: 4.51 M/UL (ref 4–5.4)
RELATIVE EOSINOPHIL (OHS): 1.9 %
RELATIVE LYMPHOCYTE (OHS): 29.1 % (ref 18–48)
RELATIVE MONOCYTE (OHS): 7.7 % (ref 4–15)
RELATIVE NEUTROPHIL (OHS): 60.4 % (ref 38–73)
SODIUM SERPL-SCNC: 137 MMOL/L (ref 136–145)
TRIGL SERPL-MCNC: 229 MG/DL (ref 30–150)
WBC # BLD AUTO: 5.7 K/UL (ref 3.9–12.7)

## 2025-06-12 PROCEDURE — 80061 LIPID PANEL: CPT

## 2025-06-12 PROCEDURE — 36415 COLL VENOUS BLD VENIPUNCTURE: CPT | Mod: PO

## 2025-06-12 PROCEDURE — 80053 COMPREHEN METABOLIC PANEL: CPT

## 2025-06-12 PROCEDURE — 85025 COMPLETE CBC W/AUTO DIFF WBC: CPT

## 2025-06-16 ENCOUNTER — OFFICE VISIT (OUTPATIENT)
Dept: NEUROSURGERY | Facility: CLINIC | Age: 50
End: 2025-06-16
Payer: MEDICAID

## 2025-06-16 VITALS
TEMPERATURE: 98 F | BODY MASS INDEX: 17.18 KG/M2 | SYSTOLIC BLOOD PRESSURE: 147 MMHG | HEART RATE: 71 BPM | WEIGHT: 97 LBS | DIASTOLIC BLOOD PRESSURE: 95 MMHG

## 2025-06-16 DIAGNOSIS — Z85.3 HISTORY OF BREAST CANCER: ICD-10-CM

## 2025-06-16 DIAGNOSIS — M54.2 NECK PAIN: ICD-10-CM

## 2025-06-16 DIAGNOSIS — G44.229 CHRONIC TENSION-TYPE HEADACHE, NOT INTRACTABLE: ICD-10-CM

## 2025-06-16 DIAGNOSIS — E34.8 PINEAL GLAND CYST: Primary | ICD-10-CM

## 2025-06-16 DIAGNOSIS — M54.12 CERVICAL RADICULOPATHY: ICD-10-CM

## 2025-06-16 DIAGNOSIS — Z98.1 HISTORY OF FUSION OF CERVICAL SPINE: ICD-10-CM

## 2025-06-16 PROCEDURE — 99215 OFFICE O/P EST HI 40 MIN: CPT | Mod: PBBFAC | Performed by: NURSE PRACTITIONER

## 2025-06-16 PROCEDURE — 1159F MED LIST DOCD IN RCRD: CPT | Mod: CPTII,,, | Performed by: NURSE PRACTITIONER

## 2025-06-16 PROCEDURE — 3008F BODY MASS INDEX DOCD: CPT | Mod: CPTII,,, | Performed by: NURSE PRACTITIONER

## 2025-06-16 PROCEDURE — 99204 OFFICE O/P NEW MOD 45 MIN: CPT | Mod: S$PBB,,, | Performed by: NURSE PRACTITIONER

## 2025-06-16 PROCEDURE — 1160F RVW MEDS BY RX/DR IN RCRD: CPT | Mod: CPTII,,, | Performed by: NURSE PRACTITIONER

## 2025-06-16 PROCEDURE — 3077F SYST BP >= 140 MM HG: CPT | Mod: CPTII,,, | Performed by: NURSE PRACTITIONER

## 2025-06-16 PROCEDURE — 99999 PR PBB SHADOW E&M-EST. PATIENT-LVL V: CPT | Mod: PBBFAC,,, | Performed by: NURSE PRACTITIONER

## 2025-06-16 PROCEDURE — 3080F DIAST BP >= 90 MM HG: CPT | Mod: CPTII,,, | Performed by: NURSE PRACTITIONER

## 2025-06-16 NOTE — PROGRESS NOTES
Neurosurgery  History & Physical    SUBJECTIVE:     History of Present Illness: Dina Sosa is a 50 y.o. female presents today as a referral from her PCP to discuss recent MRI brain and cervical spine findings. The patient has a hx of ductal carcinoma in situ of the right breast;undergone six breast surgeries, including implant placement under the muscle. She has also had a C5-6 disc replacement in 2021 with Dr. Mcgill. Today, she reports significant neck pain at the base of her skull that radiates upward causing worsening headaches as well as downward. Pain is worse with neck movement. She is currently in PT. The patient also follows with neurology for her headaches which started after falling down a flight of stairs in 2019 with LOC; she is taking Topamax with some relief. Rates the pain as a 7/10. Reports dizziness, particularly when closing her eyes, which has been described as similar to vertigo. The patient is currently undergoing testing with cardiology.     Review of patient's allergies indicates:   Allergen Reactions    Morphine Itching     Pt states just itching as side effect. Can have dilaudid, may need benedryl for itching with admin    Codeine Itching     Can take hydrocodone and oxycodone    Lexapro [escitalopram oxalate] Other (See Comments)     sedation       Current Medications[1]    Past Medical History:   Diagnosis Date    Anxiety     Cancer     History of breast cancer     Right breast. Invasive Ductal.     Past Surgical History:   Procedure Laterality Date    BILATERAL MASTECTOMY Bilateral 10/07/2022    Procedure: MASTECTOMY, BILATERAL;  Surgeon: Leeann Sawyer MD;  Location: [facility];  Service: General;  Laterality: Bilateral;    BREAST CAPSULECTOMY Bilateral 4/28/2023    Procedure: CAPSULECTOMY, BREAST;  Surgeon: Tae Trinidad DO;  Location: [facility];  Service: Plastics;  Laterality: Bilateral;  Bilat CAPSULOTOMY    BREAST CAPSULOTOMY Right 2/15/2024    Procedure:  CAPSULOTOMY, BREAST;  Surgeon: Tae Trinidad DO;  Location: [facility] OR 2ND FLR;  Service: Plastics;  Laterality: Right;  Right Capsule major revision    CERVICAL DISC ARTHROPLASTY      C5-C6     SECTION      x2    FAT GRAFTING, OTHER Bilateral 2023    Procedure: INJECTION, FAT GRAFT;  Surgeon: Tae Trinidad DO;  Location: [facility];  Service: Plastics;  Laterality: Bilateral;    HIP SURGERY Left     fusion of pelvis around hip    HYSTERECTOMY  2002    ROGER, ovaries remain (AUB)    INJECTION FOR SENTINEL NODE IDENTIFICATION Right 10/07/2022    Procedure: INJECTION, FOR SENTINEL NODE IDENTIFICATION;  Surgeon: Leeann Sawyer MD;  Location: [facility];  Service: General;  Laterality: Right;    INSERTION OF BREAST IMPLANT Bilateral 2023    Procedure: INSERTION, BREAST IMPLANT;  Surgeon: Tae Trinidad DO;  Location: [facility];  Service: Plastics;  Laterality: Bilateral;  2 hours    INSERTION OF BREAST TISSUE EXPANDER Bilateral 10/07/2022    Procedure: INSERTION, TISSUE EXPANDER, BREAST / BILATERAL;  Surgeon: Tae Trinidad DO;  Location: [facility];  Service: Plastics;  Laterality: Bilateral;    INSERTION OF BREAST TISSUE EXPANDER Bilateral 02/10/2023    Procedure: INSERTION, TISSUE EXPANDER, BREAST;  Surgeon: Tae Trinidad DO;  Location: [facility];  Service: Plastics;  Laterality: Bilateral;  Bilateral tissue expanders/ 2 HOURS    LABIOPLASTY Bilateral 2023    Procedure: LABIAPLASTY, VULVA;  Surgeon: Kalyn Rick MD;  Location: [facility];  Service: OB/GYN;  Laterality: Bilateral;    mobi c      disk replacement C5-C6    REPLACEMENT OF IMPLANT OF BREAST Bilateral 2/15/2024    Procedure: REPLACEMENT, IMPLANT, BREAST;  Surgeon: Tae Trinidad DO;  Location: [facility] OR 2ND FLR;  Service: Plastics;  Laterality: Bilateral;  Implant exchange    ROBOT-ASSISTED LAPAROSCOPIC URETEROLYSIS N/A 2023    Procedure: ROBOTIC URETEROLYSIS;  Surgeon: Wu Skaggs MD;   Location: [facility];  Service: OB/GYN;  Laterality: N/A;    ROBOT-ASSISTED SURGICAL REMOVAL OF FALLOPIAN TUBE USING DA LETA XI Right 9/8/2023    Procedure: XI ROBOTIC SALPINGECTOMY;  Surgeon: Wu Skaggs MD;  Location: [facility];  Service: OB/GYN;  Laterality: Right;    ROBOTIC REMOVAL, MESH, VAGINA N/A 9/8/2023    Procedure: ROBOTIC REMOVAL, MESH, VAGINA;  Surgeon: Wu Skaggs MD;  Location: [facility] OR;  Service: OB/GYN;  Laterality: N/A;    ROBOTIC SACROCOLPOPEXY, WITH CYSTOSCOPY N/A 9/8/2023    Procedure: ROBOTIC SACROCOLPOPEXY, WITH CYSTOSCOPY;  Surgeon: Wu Skaggs MD;  Location: [facility];  Service: OB/GYN;  Laterality: N/A;    SALPINGOOPHORECTOMY Left 9/8/2023    Procedure: SALPINGO-OOPHORECTOMY, Left ;  Surgeon: Wu Skaggs MD;  Location: [facility];  Service: OB/GYN;  Laterality: Left;    SENTINEL LYMPH NODE BIOPSY Right 10/07/2022    Procedure: BIOPSY, LYMPH NODE, SENTINEL;  Surgeon: Leeann Sawyer MD;  Location: [facility];  Service: General;  Laterality: Right;  2.5 HRS    si joint fusion      left hip    TISSUE EXPANDER REMOVAL Bilateral 10/17/2022    Procedure: REMOVAL, TISSUE EXPANDER;  Surgeon: Tae Trinidad DO;  Location: [facility];  Service: Plastics;  Laterality: Bilateral;    TISSUE EXPANDER REMOVAL Bilateral 4/28/2023    Procedure: REMOVAL, TISSUE EXPANDER;  Surgeon: Tae Trinidad DO;  Location: [facility];  Service: Plastics;  Laterality: Bilateral;  Bilat TE removal - Implant placement    TONSILLECTOMY      TYMPANOSTOMY TUBE PLACEMENT      x6    WOUND DEBRIDEMENT Bilateral 10/17/2022    Procedure: DEBRIDEMENT, WOUND;  Surgeon: Tae Trinidad DO;  Location: [facility];  Service: Plastics;  Laterality: Bilateral;  1.5 HRS     Family History       Problem Relation (Age of Onset)    Brain cancer Mother    Cancer Maternal Grandmother (83)    Cirrhosis Father    Esophageal cancer Mother, Brother    Fibrocystic breast disease Maternal Aunt    Genetic Disorder Mother     Hemophilia Daughter    Hodgkin's lymphoma Brother    Hypertension Mother, Father, Sister    Lung cancer Mother (63), Maternal Grandfather, Maternal Aunt    Skin cancer Mother (67)    Von Willebrand disease Sister, Daughter          Social History     Socioeconomic History    Marital status:    Tobacco Use    Smoking status: Former     Current packs/day: 0.00     Average packs/day: 0.5 packs/day for 22.0 years (11.0 ttl pk-yrs)     Types: Cigarettes     Start date: 5/10/1994     Quit date: 5/10/2016     Years since quittin.1    Smokeless tobacco: Never   Substance and Sexual Activity    Alcohol use: No    Drug use: No    Sexual activity: Yes     Partners: Male     Birth control/protection: None     Social Drivers of Health     Financial Resource Strain: Medium Risk (2025)    Overall Financial Resource Strain (CARDIA)     Difficulty of Paying Living Expenses: Somewhat hard   Food Insecurity: Patient Declined (2025)    Hunger Vital Sign     Worried About Running Out of Food in the Last Year: Patient declined     Ran Out of Food in the Last Year: Patient declined   Transportation Needs: No Transportation Needs (2025)    PRAPARE - Transportation     Lack of Transportation (Medical): No     Lack of Transportation (Non-Medical): No   Physical Activity: Insufficiently Active (2025)    Exercise Vital Sign     Days of Exercise per Week: 2 days     Minutes of Exercise per Session: 20 min   Stress: Stress Concern Present (2025)    [employer] [place] [employer] - Occupational Stress Questionnaire     Feeling of Stress : Very much   Housing Stability: Low Risk  (2025)    Housing Stability Vital Sign     Unable to Pay for Housing in the Last Year: No     Homeless in the Last Year: No       Review of Systems    OBJECTIVE:     Vital Signs  Temp: 97.9 °F (36.6 °C)  Pulse: 71  BP: (!) 147/95  Pain Score:   7  Weight: 44 kg (97 lb)  Body mass index is 17.18 kg/m².      Neurosurgery  Physical Exam  General: well developed, well nourished, no distress.   Head: normocephalic, atraumatic  Neurologic: Alert and oriented. Thought content appropriate.  GCS: Motor: 6/Verbal: 5/Eyes: 4 GCS Total: 15  Mental Status: Awake, Alert, Oriented x 4  Language: No aphasia  Speech: No dysarthria  Cranial nerves: face symmetric, tongue midline, CN II-XII grossly intact.   Eyes: pupils equal, round, reactive to light with accomodation, EOMI.   Pulmonary: normal respirations, no signs of respiratory distress  Abdomen: soft, non-distended  Skin: Skin is warm, dry and intact.  Sensory: intact to light touch throughout  Motor Strength:Moves all extremities spontaneously with good tone.       Diagnostic Results:  I have personally reviewed the imaging with Dr. Corral:    MRI cervical dated 1/15/25 shows stable postsurgical changes at the C5-6 level. Mild degenerative without significant stenosis.    2.   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 No acute process.      ASSESSMENT/PLAN:     Dina Sosa is a 50 y.o. female presents today as a referral from her PCP to discuss recent MRI brain and cervical spine findings. We discussed that her neck pain is likely due to the mild degenerative changes seen on imaging. PT and injections are recommended. 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 Corral recommended a contrasted MRI brain to further evaluate the cyst. I will notify her of the findings. The cyst is likely not causing her headaches; a referral to headache neurology has been placed. Lastly, the patient expressed concerns with increased bleeding and unintentional weight loss over the past 6 months; I will reach out to hem/onc given her hx of breast CA and mild form of Type 1 von Willebrand's Disease. Referral to genetics has also been placed.       MEJIA Avilez  Neurosurgery  [facility].        Note dictated with voice recognition software, please excuse any  grammatical errors.           [1]   Current Outpatient Medications   Medication Sig Dispense Refill    ascorbic acid (VITAMIN C ORAL) Take by mouth Daily.      buPROPion (WELLBUTRIN XL) 300 MG 24 hr tablet Take 1 tablet (300 mg total) by mouth once daily. 90 tablet 3    diazePAM (VALIUM) 2 MG tablet Take 1 tablet (2 mg total) by mouth every evening. 30 tablet 3    docusate sodium (COLACE) 100 MG capsule Take 1 capsule (100 mg total) by mouth 2 (two) times daily. 30 capsule 1    loratadine (CLARITIN ORAL) Take by mouth Daily.      ondansetron (ZOFRAN-ODT) 4 MG TbDL Take 1 tablet (4 mg total) by mouth every 8 (eight) hours as needed (Nausea/vomiting). 10 tablet 0    pantoprazole (PROTONIX) 40 MG tablet Take 1 tablet (40 mg total) by mouth once daily. 90 tablet 3    pravastatin (PRAVACHOL) 20 MG tablet Take 1 tablet (20 mg total) by mouth every evening. 90 tablet 3    primidone (MYSOLINE) 50 MG Tab Take 1 tablet (50 mg total) by mouth every evening. 90 tablet 3    traZODone (DESYREL) 100 MG tablet Take 1 tablet (100 mg total) by mouth every evening. 90 tablet 3    gabapentin (NEURONTIN) 300 MG capsule Take 1 capsule (300 mg total) by mouth 2 (two) times daily. (Patient not taking: Reported on 5/29/2025) 180 capsule 3    topiramate (TOPAMAX) 25 MG tablet Take 25 mg by mouth once daily.       No current facility-administered medications for this visit.     Facility-Administered Medications Ordered in Other Visits   Medication Dose Route Frequency Provider Last Rate Last Admin    ceFAZolin 1 g, gentamicin 80 mg in sodium chloride 0.9% 500 mL irrigation   Irrigation On Call Procedure Tae Trinidad, DO        ceFAZolin 1 g, gentamicin 80 mg in sodium chloride 0.9% 500 mL irrigation   Irrigation On Call Procedure Tae Trinidad, DO        ceFAZolin 1 g, gentamicin 80 mg in sodium chloride 0.9% 500 mL irrigation   Irrigation On Call Procedure Tae Trinidad, DO        ceFAZolin 1 g, gentamicin 80 mg in  sodium chloride 0.9% 500 mL irrigation   Irrigation On Call Procedure Tae Trinidad,         LIDOcaine (PF) 10 mg/ml (1%) injection 10 mg  1 mL Intradermal Once Doreen Garcia PA-C        LIDOcaine HCL 10 mg/ml (1%) 50 mL, EPINEPHrine 1 mg in lactated Ringers 1,000 mL irrigation   Irrigation On Call Procedure Tae Trinidad,         LIDOcaine HCL 10 mg/ml (1%) 50 mL, EPINEPHrine 1 mg in lactated Ringers 1,000 mL irrigation   Irrigation On Call Procedure Tae Trinidad,         mupirocin 2 % ointment   Nasal On Call Procedure Doreen Garcia PA-C   Given at 04/28/23 0609    sodium chloride 0.9% flush 10 mL  10 mL Intravenous PRN Doreen Garcia, REID

## 2025-06-17 DIAGNOSIS — Z83.2 FAMILY HISTORY OF BLEEDING DISORDER: Primary | ICD-10-CM

## 2025-06-18 ENCOUNTER — LAB VISIT (OUTPATIENT)
Dept: LAB | Facility: HOSPITAL | Age: 50
End: 2025-06-18
Payer: MEDICAID

## 2025-06-18 DIAGNOSIS — Z83.2 FAMILY HISTORY OF BLEEDING DISORDER: ICD-10-CM

## 2025-06-18 LAB
ABSOLUTE EOSINOPHIL (OHS): 0.1 K/UL
ABSOLUTE MONOCYTE (OHS): 0.38 K/UL (ref 0.3–1)
ABSOLUTE NEUTROPHIL COUNT (OHS): 4.25 K/UL (ref 1.8–7.7)
ALBUMIN SERPL BCP-MCNC: 5 G/DL (ref 3.5–5.2)
ALP SERPL-CCNC: 72 UNIT/L (ref 40–150)
ALT SERPL W/O P-5'-P-CCNC: 53 UNIT/L (ref 10–44)
ANION GAP (OHS): 13 MMOL/L (ref 8–16)
APTT PPP: 26.1 SECONDS (ref 21–32)
AST SERPL-CCNC: 30 UNIT/L (ref 11–45)
BASOPHILS # BLD AUTO: 0.04 K/UL
BASOPHILS NFR BLD AUTO: 0.6 %
BILIRUB SERPL-MCNC: 0.3 MG/DL (ref 0.1–1)
BUN SERPL-MCNC: 14 MG/DL (ref 6–20)
CALCIUM SERPL-MCNC: 9.8 MG/DL (ref 8.7–10.5)
CHLORIDE SERPL-SCNC: 106 MMOL/L (ref 95–110)
CO2 SERPL-SCNC: 20 MMOL/L (ref 23–29)
CREAT SERPL-MCNC: 0.8 MG/DL (ref 0.5–1.4)
ERYTHROCYTE [DISTWIDTH] IN BLOOD BY AUTOMATED COUNT: 12.3 % (ref 11.5–14.5)
GFR SERPLBLD CREATININE-BSD FMLA CKD-EPI: >60 ML/MIN/1.73/M2
GLUCOSE SERPL-MCNC: 78 MG/DL (ref 70–110)
HCT VFR BLD AUTO: 46.6 % (ref 37–48.5)
HGB BLD-MCNC: 14.8 GM/DL (ref 12–16)
IMM GRANULOCYTES # BLD AUTO: 0.02 K/UL (ref 0–0.04)
IMM GRANULOCYTES NFR BLD AUTO: 0.3 % (ref 0–0.5)
INR PPP: 0.9 (ref 0.8–1.2)
LYMPHOCYTES # BLD AUTO: 2.3 K/UL (ref 1–4.8)
MCH RBC QN AUTO: 27.6 PG (ref 27–31)
MCHC RBC AUTO-ENTMCNC: 31.8 G/DL (ref 32–36)
MCV RBC AUTO: 87 FL (ref 82–98)
NUCLEATED RBC (/100WBC) (OHS): 0 /100 WBC
PLATELET # BLD AUTO: 364 K/UL (ref 150–450)
PMV BLD AUTO: 9.5 FL (ref 9.2–12.9)
POTASSIUM SERPL-SCNC: 3.9 MMOL/L (ref 3.5–5.1)
PROT SERPL-MCNC: 8.3 GM/DL (ref 6–8.4)
PROTHROMBIN TIME: 10.5 SECONDS (ref 9–12.5)
RBC # BLD AUTO: 5.37 M/UL (ref 4–5.4)
RELATIVE EOSINOPHIL (OHS): 1.4 %
RELATIVE LYMPHOCYTE (OHS): 32.4 % (ref 18–48)
RELATIVE MONOCYTE (OHS): 5.4 % (ref 4–15)
RELATIVE NEUTROPHIL (OHS): 59.9 % (ref 38–73)
SODIUM SERPL-SCNC: 139 MMOL/L (ref 136–145)
WBC # BLD AUTO: 7.09 K/UL (ref 3.9–12.7)

## 2025-06-18 PROCEDURE — 85240 CLOT FACTOR VIII AHG 1 STAGE: CPT

## 2025-06-18 PROCEDURE — 85730 THROMBOPLASTIN TIME PARTIAL: CPT | Mod: PO

## 2025-06-18 PROCEDURE — 85397 CLOTTING FUNCT ACTIVITY: CPT

## 2025-06-18 PROCEDURE — 84075 ASSAY ALKALINE PHOSPHATASE: CPT | Mod: PO

## 2025-06-18 PROCEDURE — 85246 CLOT FACTOR VIII VW ANTIGEN: CPT

## 2025-06-18 PROCEDURE — 85025 COMPLETE CBC W/AUTO DIFF WBC: CPT | Mod: PO

## 2025-06-18 PROCEDURE — 36415 COLL VENOUS BLD VENIPUNCTURE: CPT | Mod: PO

## 2025-06-18 PROCEDURE — 85610 PROTHROMBIN TIME: CPT | Mod: PO

## 2025-06-19 ENCOUNTER — LAB VISIT (OUTPATIENT)
Dept: LAB | Facility: HOSPITAL | Age: 50
End: 2025-06-19
Attending: INTERNAL MEDICINE
Payer: MEDICAID

## 2025-06-19 ENCOUNTER — OFFICE VISIT (OUTPATIENT)
Dept: FAMILY MEDICINE | Facility: CLINIC | Age: 50
End: 2025-06-19
Payer: MEDICAID

## 2025-06-19 VITALS — HEART RATE: 90 BPM | OXYGEN SATURATION: 98 % | WEIGHT: 97 LBS | BODY MASS INDEX: 17.19 KG/M2 | HEIGHT: 63 IN

## 2025-06-19 DIAGNOSIS — M79.10 MYALGIA: ICD-10-CM

## 2025-06-19 DIAGNOSIS — F32.A ANXIETY AND DEPRESSION: Primary | ICD-10-CM

## 2025-06-19 DIAGNOSIS — E78.2 MIXED HYPERLIPIDEMIA: ICD-10-CM

## 2025-06-19 DIAGNOSIS — F41.9 ANXIETY AND DEPRESSION: Primary | ICD-10-CM

## 2025-06-19 DIAGNOSIS — R53.83 FATIGUE, UNSPECIFIED TYPE: ICD-10-CM

## 2025-06-19 LAB
CK SERPL-CCNC: 62 U/L (ref 20–180)
CRP SERPL-MCNC: <0.3 MG/L
ERYTHROCYTE [SEDIMENTATION RATE] IN BLOOD BY PHOTOMETRIC METHOD: <2 MM/HR
FACT VIII ACT/NOR PPP: 104 % (ref 60–170)
TSH SERPL-ACNC: 2.68 UIU/ML (ref 0.4–4)

## 2025-06-19 PROCEDURE — 1159F MED LIST DOCD IN RCRD: CPT | Mod: CPTII,,, | Performed by: INTERNAL MEDICINE

## 2025-06-19 PROCEDURE — 36415 COLL VENOUS BLD VENIPUNCTURE: CPT | Mod: PO

## 2025-06-19 PROCEDURE — 82550 ASSAY OF CK (CPK): CPT

## 2025-06-19 PROCEDURE — 99213 OFFICE O/P EST LOW 20 MIN: CPT | Mod: PBBFAC,PO | Performed by: INTERNAL MEDICINE

## 2025-06-19 PROCEDURE — 3008F BODY MASS INDEX DOCD: CPT | Mod: CPTII,,, | Performed by: INTERNAL MEDICINE

## 2025-06-19 PROCEDURE — 1160F RVW MEDS BY RX/DR IN RCRD: CPT | Mod: CPTII,,, | Performed by: INTERNAL MEDICINE

## 2025-06-19 PROCEDURE — 86140 C-REACTIVE PROTEIN: CPT

## 2025-06-19 PROCEDURE — 84443 ASSAY THYROID STIM HORMONE: CPT

## 2025-06-19 PROCEDURE — 99214 OFFICE O/P EST MOD 30 MIN: CPT | Mod: S$PBB,,, | Performed by: INTERNAL MEDICINE

## 2025-06-19 PROCEDURE — 99999 PR PBB SHADOW E&M-EST. PATIENT-LVL III: CPT | Mod: PBBFAC,,, | Performed by: INTERNAL MEDICINE

## 2025-06-19 PROCEDURE — 85652 RBC SED RATE AUTOMATED: CPT

## 2025-06-19 PROCEDURE — G2211 COMPLEX E/M VISIT ADD ON: HCPCS | Mod: ,,, | Performed by: INTERNAL MEDICINE

## 2025-06-19 RX ORDER — BUPROPION HYDROCHLORIDE 150 MG/1
150 TABLET ORAL DAILY
Qty: 10 TABLET | Refills: 0 | Status: SHIPPED | OUTPATIENT
Start: 2025-06-19 | End: 2025-06-29

## 2025-06-19 RX ORDER — DULOXETIN HYDROCHLORIDE 30 MG/1
30 CAPSULE, DELAYED RELEASE ORAL DAILY
Qty: 30 CAPSULE | Refills: 11 | Status: SHIPPED | OUTPATIENT
Start: 2025-06-19 | End: 2026-06-19

## 2025-06-19 NOTE — PROGRESS NOTES
[facility]  Primary Care   [address] [facility] [address]       Patient ID: Dina Sosa     Chief Complaint:   Chief Complaint   Patient presents with    Wellness     6 month follow up        HPI:  Routine follow-up up but unfortunate things have not been very good the past few months.  She comes with a list of symptoms and I will try to synthesize.  Before I get to that, she did see neurosurgery for chronic neck pain and hand numbness and MRI findings are minimal at this time so they recommend physical therapy and pain management.  They also refer her to the Neurology headache Clinic for chronic headaches.  She did have about a 13 lb weight loss over the past 6 months though she does eat so they have reached out to Oncology and she has not appointment upcoming.  She notes easy bleeding so they did order some von Willebrand's assays.  She does not take any NSAIDs for her headaches.  Other symptoms include fatigue, muscle aches and pains, burning sensations in various areas of her body, unrefreshing sleep, brain fog, digestive issues, and admittedly worsening anxiety and depression.  While the specialists investigate organic causes for these problems, I am going to try to better medicate her anxiety and depression.  She takes a mixture of Wellbutrin for her depression though I do not think it is working as well anymore.  She also takes topiramate for her headaches and that may change when she sees Neurology.  She takes Topamax at night for depression and insomnia which I do not think his working so I wonder if that could be causing a lot of are mental clouding.  I do think she could have a component of fibromyalgia.  I want her to stop the pravastatin for now.  I want to check some labs today.  I will start Cymbalta 30 mg a day and wean the Wellbutrin off over the course of 7 in 10 days.  We will try to have some short-term follow-up as well.  Of note, she does have a hearing coming up for  "disability and needs me to fill out some paperwork for her.  The paperwork involves what she can physically tolerate and/or due to various degrees but right now I think she is totally disabled because she has significantly decreased strength in her bilateral upper and lower extremities and she can not sent to look at a keyboard for long periods of time due to chronic neck pain.  She has worked with physical therapy without much improvement and she has fallen 4 times.    Review of Systems       Myalgias     Objective:      Physical Exam   Physical Exam       Decreased strength globally     Vitals:   Vitals:    06/19/25 1129   Pulse: 90   SpO2: 98%   Weight: 44 kg (97 lb)   Height: 5' 3" (1.6 m)        Assessment:           Plan:       Dina Sosa  was seen today for follow-up and may need lab work.    Diagnoses and all orders for this visit:    Dina was seen today for wellness.    Diagnoses and all orders for this visit:    Anxiety and depression  -     buPROPion (WELLBUTRIN XL) 150 MG TB24 tablet; Take 1 tablet (150 mg total) by mouth once daily. for 10 days  -     DULoxetine (CYMBALTA) 30 MG capsule; Take 1 capsule (30 mg total) by mouth once daily.  Decrease Wellbutrin and start Cymbalta     Myalgia  Stop Pravastatin ans Check labs      Mixed hyperlipidemia  Will Monitor     Fatigue, unspecified type  Could be due to Depression ?     Visit today included increased complexity associated with the care of the episodic problem anxiety depression fatigue myalgia  addressed and managing the longitudinal care of the patient due to the serious and/or complex managed problem(s) .           Jb Pastor MD    "

## 2025-06-20 ENCOUNTER — TELEPHONE (OUTPATIENT)
Dept: HEMATOLOGY/ONCOLOGY | Facility: CLINIC | Age: 50
End: 2025-06-20
Payer: MEDICAID

## 2025-06-20 NOTE — TELEPHONE ENCOUNTER
Called pt to offer appointment with Marcelle Hollis NP in benign hematology clinic. Pt verbalized understanding and agreeable to appointment on 6/23 at 7:30 AM.

## 2025-06-20 NOTE — PROGRESS NOTES
Subjective:      Patient ID: Dina Sosa is a 50 y.o. female.    Chief Complaint: Nose bleeds      HPI:  Ms. Sosa is a pleasant 50 y.o. female who returns to Hematology for evaluation of Von Willebrand disease. She was initially referred by Efe Xiong DNP and evaluated by Dr. Obrien for Von Willebrand and thrombocytosis in 2022. She had no history of bleeding at that time. Work-up for thrombocytosis at that time negative for Pelon 2 mutation with platelet count returning to normal range. She was ultimately discharged from Hematology but was recently referred back by Neurology for increased bleeding and unintentional weight loss.    In September 2022, her Von Willebrand factor activity was slightly low at 43 on 9/1/22 and again on 9/30/22 at 49.   Factor 8 and Von Willebrand antigen levels normal. She previously reported no personal history of bleeding problems, including after surgeries (partial hysterectomy, SI join fusion, spinal surgeries) and vaginal deliveries, however, she states she began having having epistaxis 7-10 times per week around a year ago. She saw ENT who cauterized the right nostril which did not help. She started using saline spray and bleeding has decreased to one time per week and usually stops within a minute. She also describes excessive bleeding with PIV insertion at recent ED visit for abdominal pain. No hematuria, melena, hematochezia, gingival bleeding, or hemarthrosis. She endorses unexpected bruising on her upper and lower extremities.     She has unintentionally lost 13 lbs in past 6 months with associated decrease in her appetite. This is currently being worked up by her PCP and [facility]. CT of abdomen and pelvis negative for malignancy. Has never had a colonoscopy; recent Cologuard negative. 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  Had partial hysterectomy in 2002 and thinks they removed her cervix as well and was told she  no longer needed pap test.     She has recently noticed blurry vision. Has not seen Optometry.    I have reviewed all of the patient's relevant lab work available in the medical record and have utilized this in my evaluation and management recommendations today.    Social History[1]    Family History   Problem Relation Name Age of Onset    Genetic Disorder Mother Chyrrl         JAK2+ [id number]    Lung cancer Mother Chyrrl 63        ~30-yr smoker    Skin cancer Mother Chyrrl 67        mole nasal bridge (type?); maybe also one on hip    Hypertension Mother Chyrrl     Esophageal cancer Mother Chyrrl     Brain cancer Mother Chyrrl     Cirrhosis Father Erick         Hepatitis    Hypertension Father Erick     Hypertension Sister Paula     Von Willebrand disease Sister Paula     Hodgkin's lymphoma Brother Yousif     Esophageal cancer Brother Yousif     Cancer Maternal Grandmother MGM 83        origin? brain (mets?)    Lung cancer Maternal Grandfather MGF         long-term smoker    Hemophilia Daughter Liz         hemophilia A    Von Willebrand disease Daughter Liz     Fibrocystic breast disease Maternal Aunt Georgia     Lung cancer Maternal Aunt Gwen         believes was smoker       Past Surgical History:   Procedure Laterality Date    BILATERAL MASTECTOMY Bilateral 10/07/2022    Procedure: MASTECTOMY, BILATERAL;  Surgeon: Leeann Sawyer MD;  Location: [facility];  Service: General;  Laterality: Bilateral;    BREAST CAPSULECTOMY Bilateral 2023    Procedure: CAPSULECTOMY, BREAST;  Surgeon: Tae Trinidad DO;  Location: [facility];  Service: Plastics;  Laterality: Bilateral;  Bilat CAPSULOTOMY    BREAST CAPSULOTOMY Right 2/15/2024    Procedure: CAPSULOTOMY, BREAST;  Surgeon: Tae Trinidad DO;  Location: [address];  Service: Plastics;  Laterality: Right;  Right Capsule major revision    CERVICAL DISC ARTHROPLASTY      C5-C6     SECTION      x2    FAT GRAFTING, OTHER Bilateral 2023     Procedure: INJECTION, FAT GRAFT;  Surgeon: Tae Trinidad DO;  Location: [facility];  Service: Plastics;  Laterality: Bilateral;    HIP SURGERY Left     fusion of pelvis around hip    HYSTERECTOMY  01/01/2002    ROGER, ovaries remain (AUB)    INJECTION FOR SENTINEL NODE IDENTIFICATION Right 10/07/2022    Procedure: INJECTION, FOR SENTINEL NODE IDENTIFICATION;  Surgeon: Leaenn Sawyer MD;  Location: [facility];  Service: General;  Laterality: Right;    INSERTION OF BREAST IMPLANT Bilateral 4/28/2023    Procedure: INSERTION, BREAST IMPLANT;  Surgeon: Tae Trinidad DO;  Location: [facility];  Service: Plastics;  Laterality: Bilateral;  2 hours    INSERTION OF BREAST TISSUE EXPANDER Bilateral 10/07/2022    Procedure: INSERTION, TISSUE EXPANDER, BREAST / BILATERAL;  Surgeon: Tae Trinidad DO;  Location: [facility];  Service: Plastics;  Laterality: Bilateral;    INSERTION OF BREAST TISSUE EXPANDER Bilateral 02/10/2023    Procedure: INSERTION, TISSUE EXPANDER, BREAST;  Surgeon: Tae Trinidad DO;  Location: [facility];  Service: Plastics;  Laterality: Bilateral;  Bilateral tissue expanders/ 2 HOURS    LABIOPLASTY Bilateral 9/8/2023    Procedure: LABIAPLASTY, VULVA;  Surgeon: Kalyn Rick MD;  Location: [facility];  Service: OB/GYN;  Laterality: Bilateral;    mobi c      disk replacement C5-C6    REPLACEMENT OF IMPLANT OF BREAST Bilateral 2/15/2024    Procedure: REPLACEMENT, IMPLANT, BREAST;  Surgeon: Tae Trinidad DO;  Location: [address];  Service: Plastics;  Laterality: Bilateral;  Implant exchange    ROBOT-ASSISTED LAPAROSCOPIC URETEROLYSIS N/A 9/8/2023    Procedure: ROBOTIC URETEROLYSIS;  Surgeon: Wu Skaggs MD;  Location: [facility];  Service: OB/GYN;  Laterality: N/A;    ROBOT-ASSISTED SURGICAL REMOVAL OF FALLOPIAN TUBE USING DA LETA XI Right 9/8/2023    Procedure: XI ROBOTIC SALPINGECTOMY;  Surgeon: Wu Skaggs MD;  Location: [facility];  Service: OB/GYN;  Laterality: Right;     ROBOTIC REMOVAL, MESH, VAGINA N/A 9/8/2023    Procedure: ROBOTIC REMOVAL, MESH, VAGINA;  Surgeon: Wu Skaggs MD;  Location: [facility];  Service: OB/GYN;  Laterality: N/A;    ROBOTIC SACROCOLPOPEXY, WITH CYSTOSCOPY N/A 9/8/2023    Procedure: ROBOTIC SACROCOLPOPEXY, WITH CYSTOSCOPY;  Surgeon: Wu Skaggs MD;  Location: [facility];  Service: OB/GYN;  Laterality: N/A;    SALPINGOOPHORECTOMY Left 9/8/2023    Procedure: SALPINGO-OOPHORECTOMY, Left ;  Surgeon: Wu Skaggs MD;  Location: [facility];  Service: OB/GYN;  Laterality: Left;    SENTINEL LYMPH NODE BIOPSY Right 10/07/2022    Procedure: BIOPSY, LYMPH NODE, SENTINEL;  Surgeon: Leeann Sawyer MD;  Location: [facility];  Service: General;  Laterality: Right;  2.5 HRS    si joint fusion      left hip    TISSUE EXPANDER REMOVAL Bilateral 10/17/2022    Procedure: REMOVAL, TISSUE EXPANDER;  Surgeon: Tae Trinidad DO;  Location: [facility];  Service: Plastics;  Laterality: Bilateral;    TISSUE EXPANDER REMOVAL Bilateral 4/28/2023    Procedure: REMOVAL, TISSUE EXPANDER;  Surgeon: Tae Trinidad DO;  Location: [facility];  Service: Plastics;  Laterality: Bilateral;  Bilat TE removal - Implant placement    TONSILLECTOMY      TYMPANOSTOMY TUBE PLACEMENT      x6    WOUND DEBRIDEMENT Bilateral 10/17/2022    Procedure: DEBRIDEMENT, WOUND;  Surgeon: Tae Trinidad DO;  Location: [facility];  Service: Plastics;  Laterality: Bilateral;  1.5 HRS       Past Medical History:   Diagnosis Date    Anxiety     Cancer     History of breast cancer     Right breast. Invasive Ductal.       Review of Systems   Constitutional:  Positive for appetite change, diaphoresis, fatigue and unexpected weight change. Negative for chills and fever.   HENT:  Positive for nosebleeds. Negative for mouth sores.    Eyes:  Positive for visual disturbance.   Respiratory:  Positive for chest tightness and shortness of breath. Negative for cough.    Cardiovascular:  Negative for chest pain.    Gastrointestinal:  Positive for abdominal pain. Negative for anal bleeding, blood in stool and diarrhea.   Genitourinary:  Positive for frequency. Negative for hematuria and vaginal bleeding.   Musculoskeletal:  Positive for back pain.   Skin:  Negative for color change and rash.   Neurological:  Positive for headaches.        Chronic migraines   Hematological:  Negative for adenopathy. Bruises/bleeds easily.   Psychiatric/Behavioral:  The patient is nervous/anxious.           Medication List with Changes/Refills   Current Medications    ASCORBIC ACID (VITAMIN C ORAL)    Take by mouth Daily.    BUPROPION (WELLBUTRIN XL) 150 MG TB24 TABLET    Take 1 tablet (150 mg total) by mouth once daily. for 10 days    DIAZEPAM (VALIUM) 2 MG TABLET    Take 1 tablet (2 mg total) by mouth every evening.    DOCUSATE SODIUM (COLACE) 100 MG CAPSULE    Take 1 capsule (100 mg total) by mouth 2 (two) times daily.    DULOXETINE (CYMBALTA) 30 MG CAPSULE    Take 1 capsule (30 mg total) by mouth once daily.    GABAPENTIN (NEURONTIN) 300 MG CAPSULE    Take 1 capsule (300 mg total) by mouth 2 (two) times daily.    LORATADINE (CLARITIN ORAL)    Take by mouth Daily.    ONDANSETRON (ZOFRAN-ODT) 4 MG TBDL    Take 1 tablet (4 mg total) by mouth every 8 (eight) hours as needed (Nausea/vomiting).    ORPHENADRINE (NORFLEX) 100 MG TABLET    Take 100 mg by mouth 2 (two) times daily as needed.    PANTOPRAZOLE (PROTONIX) 40 MG TABLET    Take 1 tablet (40 mg total) by mouth once daily.    PRAVASTATIN (PRAVACHOL) 20 MG TABLET    Take 1 tablet (20 mg total) by mouth every evening.    PRIMIDONE (MYSOLINE) 50 MG TAB    Take 1 tablet (50 mg total) by mouth every evening.    TOPIRAMATE (TOPAMAX) 25 MG TABLET    Take 25 mg by mouth once daily.    TRAZODONE (DESYREL) 100 MG TABLET    Take 1 tablet (100 mg total) by mouth every evening.        Objective:     Vitals:    06/23/25 0718   BP: 109/67   Pulse: 75   Resp: 16   Temp: (P) 98 °F (36.7 °C)       Physical  Exam  Constitutional:       Appearance: Normal appearance.   HENT:      Head: Normocephalic.      Right Ear: External ear normal.      Left Ear: External ear normal.      Nose: Nose normal.   Cardiovascular:      Rate and Rhythm: Normal rate and regular rhythm.      Heart sounds: Normal heart sounds.   Pulmonary:      Effort: Pulmonary effort is normal.      Breath sounds: Normal breath sounds.   Abdominal:      General: There is no distension.   Musculoskeletal:         General: No swelling. Normal range of motion.      Cervical back: Normal range of motion.   Skin:     General: Skin is warm and dry.      Coloration: Skin is not pale.      Findings: No bruising.   Neurological:      Mental Status: She is alert and oriented to person, place, and time.   Psychiatric:         Mood and Affect: Mood normal.         Behavior: Behavior normal.         Thought Content: Thought content normal.         Judgment: Judgment normal.       Assessment:     Problem List Items Addressed This Visit          Ophtho    Blurry vision    Relevant Orders    Ambulatory referral/consult to Optometry       ENT    Epistaxis requiring cauterization       Hematology    Low von Willebrand factor (vWF) - Primary       Oncology    History of breast cancer       Lab Results   Component Value Date    WBC 7.09 06/18/2025    RBC 5.37 06/18/2025    HGB 14.8 06/18/2025    HCT 46.6 06/18/2025    MCV 87 06/18/2025    MCH 27.6 06/18/2025    MCHC 31.8 (L) 06/18/2025    RDW 12.3 06/18/2025     06/18/2025    MPV 9.5 06/18/2025    GRAN 4.7 05/15/2025    GRAN 69.1 05/15/2025    LYMPH 32.4 06/18/2025    LYMPH 2.30 06/18/2025    MONO 5.4 06/18/2025    MONO 0.38 06/18/2025    EOS 1.4 06/18/2025    EOS 0.10 06/18/2025    BASO 0.05 05/15/2025    EOSINOPHIL 0.7 05/15/2025    BASOPHIL 0.6 06/18/2025    BASOPHIL 0.04 06/18/2025      Lab Results   Component Value Date     06/18/2025    K 3.9 06/18/2025     06/18/2025    CO2 20 (L) 06/18/2025    BUN 14  2025    CREATININE 0.8 2025    CALCIUM 9.8 2025    ANIONGAP 13 2025    ESTGFRAFRICA >60.0 2022    EGFRNONAA >60.0 2022     Lab Results   Component Value Date    ALT 53 (H) 2025    AST 30 2025    ALKPHOS 72 2025    BILITOT 0.3 2025     Lab Results   Component Value Date    IRON 63 2022    TRANSFERRIN 250 2022    TIBC 370 2022    FESATURATED 17 (L) 2022    FERRITIN 59 2022        Plan:   Low von Willebrand factor (vWF)  Von Willebrand factor slightly decreased or previous lab work in   Von Willebrand factor normal today as is Factor VIII and Von Willebrand antigen levels  She is O blood type which can cause somewhat lower von Willebrand factor(VWF)   than individuals of other blood groups   She has history of nosebleeds that improved with saline spray  No further work-up or treatment indicated at this time    Epistaxis requiring cauterization  PT/PTT normal  Von Willebrand work-up as above  Stable with saline spray  Follow with ENT as needed    History of breast cancer  Stable, follows with Dr. Sawyer    Blurry vision   Referral sent to Optometry      Collaborating Provider:  Dr. Nikki Silva MD    Thank You,  Marcelle Hollis, FNP-C  Benign Hematology             [1]   Social History  Socioeconomic History    Marital status:    Tobacco Use    Smoking status: Former     Current packs/day: 0.00     Average packs/day: 0.5 packs/day for 22.0 years (11.0 ttl pk-yrs)     Types: Cigarettes     Start date: 5/10/1994     Quit date: 5/10/2016     Years since quittin.1    Smokeless tobacco: Never   Substance and Sexual Activity    Alcohol use: No    Drug use: No    Sexual activity: Yes     Partners: Male     Birth control/protection: None     Social Drivers of Health     Financial Resource Strain: Medium Risk (2025)    Overall Financial Resource Strain (CARDIA)     Difficulty of Paying Living Expenses: Somewhat hard    Food Insecurity: Patient Declined (5/23/2025)    Hunger Vital Sign     Worried About Running Out of Food in the Last Year: Patient declined     Ran Out of Food in the Last Year: Patient declined   Transportation Needs: No Transportation Needs (5/23/2025)    PRAPARE - Transportation     Lack of Transportation (Medical): No     Lack of Transportation (Non-Medical): No   Physical Activity: Insufficiently Active (5/23/2025)    Exercise Vital Sign     Days of Exercise per Week: 2 days     Minutes of Exercise per Session: 20 min   Stress: Stress Concern Present (5/23/2025)    [employer] - Occupational Stress Questionnaire     Feeling of Stress : Very much   Housing Stability: Low Risk  (5/23/2025)    Housing Stability Vital Sign     Unable to Pay for Housing in the Last Year: No     Homeless in the Last Year: No

## 2025-06-20 NOTE — TELEPHONE ENCOUNTER
Left pt voicemail about reported symptoms and establishing patient with benign hematology specialty clinic. Left call back number.

## 2025-06-21 LAB
VWF AG ACT/NOR PPP IA: 85 % (ref 55–200)
VWF:AC ACT/NOR PPP IA: 63 % (ref 55–200)

## 2025-06-23 ENCOUNTER — OFFICE VISIT (OUTPATIENT)
Dept: HEMATOLOGY/ONCOLOGY | Facility: CLINIC | Age: 50
End: 2025-06-23
Payer: MEDICAID

## 2025-06-23 VITALS
OXYGEN SATURATION: 100 % | SYSTOLIC BLOOD PRESSURE: 109 MMHG | BODY MASS INDEX: 17.36 KG/M2 | HEIGHT: 63 IN | RESPIRATION RATE: 16 BRPM | DIASTOLIC BLOOD PRESSURE: 67 MMHG | HEART RATE: 75 BPM | WEIGHT: 98 LBS

## 2025-06-23 DIAGNOSIS — Z85.3 HISTORY OF BREAST CANCER: ICD-10-CM

## 2025-06-23 DIAGNOSIS — R04.0 EPISTAXIS REQUIRING CAUTERIZATION: ICD-10-CM

## 2025-06-23 DIAGNOSIS — R79.1 LOW VON WILLEBRAND FACTOR (VWF): Primary | ICD-10-CM

## 2025-06-23 DIAGNOSIS — H53.8 BLURRY VISION: ICD-10-CM

## 2025-06-23 PROCEDURE — 99999 PR PBB SHADOW E&M-EST. PATIENT-LVL V: CPT | Mod: PBBFAC,,,

## 2025-06-23 PROCEDURE — 99215 OFFICE O/P EST HI 40 MIN: CPT | Mod: PBBFAC

## 2025-06-23 RX ORDER — ORPHENADRINE CITRATE 100 MG/1
100 TABLET, EXTENDED RELEASE ORAL 2 TIMES DAILY PRN
COMMUNITY
Start: 2025-03-09

## 2025-06-24 NOTE — PROGRESS NOTES
"New Patient     The patient location is: [place]  The chief complaint leading to consultation is: headache     Visit type: audiovisual    Face to Face time with patient: 21  32 minutes of total time spent on the encounter, which includes face to face time and non-face to face time preparing to see the patient (eg, review of tests), Obtaining and/or reviewing separately obtained history, Documenting clinical information in the electronic or other health record, Independently interpreting results (not separately reported) and communicating results to the patient/family/caregiver, or Care coordination (not separately reported).     Each patient to whom he or she provides medical services by telemedicine is:  (1) informed of the relationship between the physician and patient and the respective role of any other health care provider with respect to management of the patient; and (2) notified that he or she may decline to receive medical services by telemedicine and may withdraw from such care at any time.    Notes:       SUBJECTIVE:  Patient ID: Dina Sosa   MRN: [id number]  Referred By: Rowan Navarro  Chief Complaint: No chief complaint on file.      History of Present Illness:   50 y.o. female with migraines, neuropathy, pineal glad cyst, cervical radiculopathy s/p cervical fusion, gerd, HLD, Low von Willebrand factor, R breast cancer s/p bilateral mastectomy, BSO (9/8/2023) , anxiety, depression, insomnia, who presents to virtual visit alone for evaluation of headaches.    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 Per note on 6/16/25 "We discussed that her neck pain is likely due to the mild degenerative changes seen on imaging. PT and injections are recommended. 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 Corral recommended a contrasted MRI brain to further evaluate the cyst. I will " "notify her of the findings. The cyst is likely not causing her headaches; a referral to headache neurology has been placed. Lastly, the patient expressed concerns with increased bleeding and unintentional weight loss over the past 6 months; I will reach out to hem/onc given her hx of breast CA and mild form of Type 1 von Willebrand's Disease. Referral to genetics has also been placed."     Her migraines are currently managed with topamax 50mg BID for about a year. She uses sumatriptan for rescue with only minimal relief. She is also taking cymbalta 30mg (weaning off of wellbutrin) for anxiety/depression.      PMHx negative for TBI, Meningitis, Aneurysms, Kidney Stones, asthma, GI bleed, osteoporosis, CAD/MI, CVA/TIA, DM, pregnancy     Family Hx positive for Migraines mother  Mother also had brain tumor    Headache History:  Onset - headaches 2-3x/month since 20s after childbirth then in 2019 fell down stairs and then began having migraines  Previous Hx of HA -   Location/Radiation - starts at bilateral occipitalis; can start at jaw also and radiates forward; can be unilatearl  Quality - throbbing  Duration - 1hour-12 hours  Intensity (range) - 2-9/10  Frequency - 12-16/30 ha days per month, 15+/30 are debilitating  Triggers - loud noises, neck pain,   Aggravating Factors - light, sound  Alleviating Factors - dark, quiet room, stillness  Recent Changes - more frequent and more severe  Prodrome/Aura - no  HA today? - mild  Time of day of most headaches- anytime   Sleep - poor sleep; wakes up feeling groggy, waking up with headaches; can wake up out of sleep from headache but not typical; no snoring     Associated symptoms with the headache:   Meningeal symptoms - photophobia, phonophobia, exercise intolerance   Nausea/vomitting  Nasal drainage   Visual blurriness   Pallor/flushing  Dizziness   Vertigo  Confusion  Impaired concentration   Pain worsened with physical activity   Neck pain     Cluster headache symptoms: "   Swollen or droopy eyelid  Swelling under or around the eye (may affect both eyes)  Excessive tearing  Red eye  Rhinorrhea or one-sided nasal congestion   Red, flushed face   Forehead and facial sweating    Symptoms of increased intracranial pressure:   Whooshing sounds   Visual spots/blotches     Basilar migraine symptoms:  Dysarthria  Vertigo  Tinnitus  Hypacusia  Diplopia  Simultaneous visual symptoms in both temporal and nasal fields of both eyes  Ataxia  Reduced level of consciousness  Bilateral paresthesias      Treatments Tried   Tylenol   Ibuprofen  Excedrin   Sumatriptan   Topamax 100mg  Cymbalta  Wellbutrin  Gabapentin   Trazodone   Norflex  Valium    Zofran      Social History  Alcohol - denies  Smoke - denies  Recreational Drug Use- denies    Current Medications:  Current Medications[1]    Review of Systems - as per HPI, otherwise a balanced 10 systems review is negative.    OBJECTIVE:  Vitals:  LMP 06/01/2001 (Approximate)     Physical Exam: certain limitations due to video visit platform, able to perform the following with the patient's assistance:  Constitutional: she appears well-developed and well-nourished. she is well groomed. NAD  HENT:    Head: Normocephalic and atraumatic  Musculoskeletal: Normal range of motion.   Psychiatric: Normal mood and affect.     Neuro exam:    Mental status:  The patient is alert and oriented to person, place and time.  Language is intact and fluent  Remote and recent memory are intact  Normal attention and concentration  Mood is stable    Cranial Nerves:  Facial movement is symmetric.   FROM of neck in all (6) directions without pain  Shoulder shrug symmetrical.      Motor:  Normal muscle bulk and symmetry.  Tremor not apparent       Review of Data:   Notes from nsgy reviewed   Labs:  Lab Visit on 06/19/2025   Component Date Value Ref Range Status    TSH 06/19/2025 2.678  0.400 - 4.000 uIU/mL Final    Sed Rate 06/19/2025 <2  <=36 mm/hr Final    CRP 06/19/2025 <0.3   <=8.2 mg/L Final    CPK 06/19/2025 62  20 - 180 U/L Final   Lab Visit on 06/18/2025   Component Date Value Ref Range Status    Sodium 06/18/2025 139  136 - 145 mmol/L Final    Potassium 06/18/2025 3.9  3.5 - 5.1 mmol/L Final    Chloride 06/18/2025 106  95 - 110 mmol/L Final    CO2 06/18/2025 20 (L)  23 - 29 mmol/L Final    Glucose 06/18/2025 78  70 - 110 mg/dL Final    BUN 06/18/2025 14  6 - 20 mg/dL Final    Creatinine 06/18/2025 0.8  0.5 - 1.4 mg/dL Final    Calcium 06/18/2025 9.8  8.7 - 10.5 mg/dL Final    Protein Total 06/18/2025 8.3  6.0 - 8.4 gm/dL Final    Albumin 06/18/2025 5.0  3.5 - 5.2 g/dL Final    Bilirubin Total 06/18/2025 0.3  0.1 - 1.0 mg/dL Final    ALP 06/18/2025 72  40 - 150 unit/L Final    AST 06/18/2025 30  11 - 45 unit/L Final    ALT 06/18/2025 53 (H)  10 - 44 unit/L Final    Anion Gap 06/18/2025 13  8 - 16 mmol/L Final    eGFR 06/18/2025 >60  >60 mL/min/1.73/m2 Final    PTT 06/18/2025 26.1  21.0 - 32.0 seconds Final    PT 06/18/2025 10.5  9.0 - 12.5 seconds Final    INR 06/18/2025 0.9  0.8 - 1.2 Final    von Willebrand Factor Activity, P 06/18/2025 63  55 - 200 % Final    Factor VIII Activity 06/18/2025 104  60 - 170 % Final    von Willebrand Factor Ag, P 06/18/2025 85  55 - 200 % Final    WBC 06/18/2025 7.09  3.90 - 12.70 K/uL Final    RBC 06/18/2025 5.37  4.00 - 5.40 M/uL Final    HGB 06/18/2025 14.8  12.0 - 16.0 gm/dL Final    HCT 06/18/2025 46.6  37.0 - 48.5 % Final    MCV 06/18/2025 87  82 - 98 fL Final    MCH 06/18/2025 27.6  27.0 - 31.0 pg Final    MCHC 06/18/2025 31.8 (L)  32.0 - 36.0 g/dL Final    RDW 06/18/2025 12.3  11.5 - 14.5 % Final    Platelet Count 06/18/2025 364  150 - 450 K/uL Final    MPV 06/18/2025 9.5  9.2 - 12.9 fL Final    Nucleated RBC 06/18/2025 0  <=0 /100 WBC Final    Neut % 06/18/2025 59.9  38 - 73 % Final    Lymph % 06/18/2025 32.4  18 - 48 % Final    Mono % 06/18/2025 5.4  4 - 15 % Final    Eos % 06/18/2025 1.4  <=8 % Final    Basophil % 06/18/2025 0.6  <=1.9 %  Final    Imm Grans % 06/18/2025 0.3  0.0 - 0.5 % Final    Neut # 06/18/2025 4.25  1.8 - 7.7 K/uL Final    Lymph # 06/18/2025 2.30  1 - 4.8 K/uL Final    Mono # 06/18/2025 0.38  0.3 - 1 K/uL Final    Eos # 06/18/2025 0.10  <=0.5 K/uL Final    Baso # 06/18/2025 0.04  <=0.2 K/uL Final    Imm Grans # 06/18/2025 0.02  0.00 - 0.04 K/uL Final   Lab Visit on 06/12/2025   Component Date Value Ref Range Status    Cholesterol Total 06/12/2025 194  120 - 199 mg/dL Final    Triglyceride 06/12/2025 229 (H)  30 - 150 mg/dL Final    HDL Cholesterol 06/12/2025 44  40 - 75 mg/dL Final    LDL Cholesterol 06/12/2025 104.2  63.0 - 159.0 mg/dL Final    HDL/Cholesterol Ratio 06/12/2025 22.7  20.0 - 50.0 % Final    Cholesterol/HDL Ratio 06/12/2025 4.4  2.0 - 5.0 Final    Non HDL Cholesterol 06/12/2025 150  mg/dL Final    Sodium 06/12/2025 137  136 - 145 mmol/L Final    Potassium 06/12/2025 3.9  3.5 - 5.1 mmol/L Final    Chloride 06/12/2025 107  95 - 110 mmol/L Final    CO2 06/12/2025 25  23 - 29 mmol/L Final    Glucose 06/12/2025 76  70 - 110 mg/dL Final    BUN 06/12/2025 12  6 - 20 mg/dL Final    Creatinine 06/12/2025 0.8  0.5 - 1.4 mg/dL Final    Calcium 06/12/2025 8.7  8.7 - 10.5 mg/dL Final    Protein Total 06/12/2025 6.6  6.0 - 8.4 gm/dL Final    Albumin 06/12/2025 4.3  3.5 - 5.2 g/dL Final    Bilirubin Total 06/12/2025 0.2  0.1 - 1.0 mg/dL Final    ALP 06/12/2025 62  40 - 150 unit/L Final    AST 06/12/2025 19  11 - 45 unit/L Final    ALT 06/12/2025 33  10 - 44 unit/L Final    Anion Gap 06/12/2025 5 (L)  8 - 16 mmol/L Final    eGFR 06/12/2025 >60  >60 mL/min/1.73/m2 Final    WBC 06/12/2025 5.70  3.90 - 12.70 K/uL Final    RBC 06/12/2025 4.51  4.00 - 5.40 M/uL Final    HGB 06/12/2025 12.5  12.0 - 16.0 gm/dL Final    HCT 06/12/2025 40.6  37.0 - 48.5 % Final    MCV 06/12/2025 90  82 - 98 fL Final    MCH 06/12/2025 27.7  27.0 - 31.0 pg Final    MCHC 06/12/2025 30.8 (L)  32.0 - 36.0 g/dL Final    RDW 06/12/2025 12.6  11.5 - 14.5 % Final     Platelet Count 06/12/2025 298  150 - 450 K/uL Final    MPV 06/12/2025 10.1  9.2 - 12.9 fL Final    Nucleated RBC 06/12/2025 0  <=0 /100 WBC Final    Neut % 06/12/2025 60.4  38 - 73 % Final    Lymph % 06/12/2025 29.1  18 - 48 % Final    Mono % 06/12/2025 7.7  4 - 15 % Final    Eos % 06/12/2025 1.9  <=8 % Final    Basophil % 06/12/2025 0.5  <=1.9 % Final    Imm Grans % 06/12/2025 0.4  0.0 - 0.5 % Final    Neut # 06/12/2025 3.44  1.8 - 7.7 K/uL Final    Lymph # 06/12/2025 1.66  1 - 4.8 K/uL Final    Mono # 06/12/2025 0.44  0.3 - 1 K/uL Final    Eos # 06/12/2025 0.11  <=0.5 K/uL Final    Baso # 06/12/2025 0.03  <=0.2 K/uL Final    Imm Grans # 06/12/2025 0.02  0.00 - 0.04 K/uL Final   Hospital Outpatient Visit on 06/11/2025   Component Date Value Ref Range Status    85% Max Predicted HR 06/11/2025 145   Final    Max Predicted HR 06/11/2025 171   Final    [facility] CV CPX PATIENT IS MALE 06/11/2025 0.0   Final    [facility] CV CPX PATIENT IS FEMALE 06/11/2025 1.0   Final    HR at rest 06/11/2025 79  bpm Final    Systolic blood pressure 06/11/2025 112  mmHg Final    Diastolic blood pressure 06/11/2025 80  mmHg Final    RPP 06/11/2025 8,848   Final    Exercise duration (min) 06/11/2025 3  minutes Final    Exercise duration (sec) 06/11/2025 52  seconds Final    Peak HR 06/11/2025 112  bpm Final    Peak Systolic BP 06/11/2025 122  mmHg Final    Peak Diatolic BP 06/11/2025 80  mmHg Final    Peak RPP 06/11/2025 13,664   Final    Estimated METs 06/11/2025 6   Final    % Max HR Achieved 06/11/2025 69   Final    1 Minute Recovery HR 06/11/2025 93  bpm Final    ST Depression (mm) 06/11/2025 0.5  mm Final   Hospital Outpatient Visit on 06/11/2025   Component Date Value Ref Range Status    BSA 06/11/2025 1.4  m2 Final    LVIDd 06/11/2025 3.8  3.5 - 6.0 cm Final    LV Systolic Volume 06/11/2025 20  mL Final    LV Systolic Volume Index 06/11/2025 14.1  mL/m2 Final    LVIDs 06/11/2025 2.4  2.1 - 4.0 cm Final    LV Diastolic Volume 06/11/2025 64  mL  Final    LV ESV A4C 06/11/2025 21.49  mL Final    LV Diastolic Volume Index 06/11/2025 45.07  mL/m2 Final    Left Ventricular End Systolic Volu* 06/11/2025 20.40  mL Final    Left Ventricular End Diastolic Vol* 06/11/2025 63.66  mL Final    IVS 06/11/2025 0.8  0.6 - 1.1 cm Final    LVOT diameter 06/11/2025 1.7  cm Final    LVOT area 06/11/2025 2.3  cm2 Final    FS 06/11/2025 36.8  28 - 44 % Final    Left Ventricle Relative Wall Thick* 06/11/2025 0.37  cm Final    PW 06/11/2025 0.7  0.6 - 1.1 cm Final    LV mass 06/11/2025 78.8  g Final    LV Mass Index 06/11/2025 55.5  g/m2 Final    MV Peak E Peter 06/11/2025 0.95  m/s Final    TDI LATERAL 06/11/2025 0.12  m/s Final    TDI SEPTAL 06/11/2025 0.09  m/s Final    E/E' ratio 06/11/2025 9  m/s Final    MV Peak A Peter 06/11/2025 0.73  m/s Final    TR Max Peter 06/11/2025 1.8  m/s Final    E/A ratio 06/11/2025 1.30   Final    E wave deceleration time 06/11/2025 141  msec Final    LV SEPTAL E/E' RATIO 06/11/2025 10.6  m/s Final    LV LATERAL E/E' RATIO 06/11/2025 7.9  m/s Final    RV- santos basal diam 06/11/2025 2.2  cm Final    RV/LV Ratio 06/11/2025 0.58  cm Final    LA size 06/11/2025 3.1  cm Final    AV mean gradient 06/11/2025 2  mmHg Final    AV peak gradient 06/11/2025 5  mmHg Final    Ao peak peter 06/11/2025 1.1  m/s Final    Ao VTI 06/11/2025 22.3  cm Final    MV mean gradient 06/11/2025 2  mmHg Final    MV peak gradient 06/11/2025 3  mmHg Final    MV VTI 06/11/2025 24.1  cm Final    Triscuspid Valve Regurgitation Pea* 06/11/2025 13  mmHg Final    PV PEAK VELOCITY 06/11/2025 0.70  m/s Final    PV peak gradient 06/11/2025 2  mmHg Final    Ao root annulus 06/11/2025 2.2  cm Final    Mean e' 06/11/2025 0.11  m/s Final    ZLVIDS 06/11/2025 -0.95   Final    ZLVIDD 06/11/2025 -1.38   Final    LA area A4C 06/11/2025 10.74  cm2 Final    RVDD 06/11/2025 2.21  cm Final    AORTIC VALVE CUSP SEPERATION 06/11/2025 1.56  cm Final   Hospital Outpatient Visit on 06/11/2025   Component  Date Value Ref Range Status    Right CCA prox sys 06/11/2025 66  cm/s Final    Right CCA prox santos 06/11/2025 17  cm/s Final    Right CCA mid sys 06/11/2025 78  cm/s Final    Right CCA mid santos 06/11/2025 31  cm/s Final    Right CCA dist sys 06/11/2025 75  cm/s Final    Right CCA dist santos 06/11/2025 26  cm/s Final    Rigth CBA sys 06/11/2025 68  cm/s Final    Right CBA santos 06/11/2025 29  cm/s Final    Right ICA prox sys 06/11/2025 60  cm/s Final    Right ICA prox santos 06/11/2025 26  cm/s Final    Right ICA mid sys 06/11/2025 89  cm/s Final    Right ICA mid santos 06/11/2025 45  cm/s Final    Right ICA dist sys 06/11/2025 104  cm/s Final    Right ICA dist santos 06/11/2025 46  cm/s Final    Right ECA sys 06/11/2025 77  cm/s Final    Right ECA santos 06/11/2025 20  cm/s Final    Right vertebral sys 06/11/2025 61  cm/s Final    Right vertebral santos 06/11/2025 20  cm/s Final    Right ICA/CCA ratio 06/11/2025 1.39   Final    Right Highest ICA 06/11/2025 104.00   Final    Right Highest EDV 06/11/2025 46.00   Final    Right Highest CCA 06/11/2025 78   Final    Left CCA prox sys 06/11/2025 72  cm/s Final    Left CCA prox santos 06/11/2025 23  cm/s Final    Left CCA mid sys 06/11/2025 88  cm/s Final    Left CCA mid santos 06/11/2025 33  cm/s Final    Left CCA dist sys 06/11/2025 81  cm/s Final    Left CCA dist santos 06/11/2025 34  cm/s Final    Left CBA sys 06/11/2025 71  cm/s Final    Left CBA santos 06/11/2025 26  cm/s Final    Left ICA prox sys 06/11/2025 58  cm/s Final    Left ICA prox santos 06/11/2025 25  cm/s Final    Left ICA mid sys 06/11/2025 89  cm/s Final    Left ICA mid santos 06/11/2025 43  cm/s Final    Left ICA dist sys 06/11/2025 98  cm/s Final    Left ICA dist santos 06/11/2025 48  cm/s Final    Left ECA sys 06/11/2025 69  cm/s Final    Left ECA santos 06/11/2025 15  cm/s Final    Left vertebral sys 06/11/2025 75  cm/s Final    Left vertebral santos 06/11/2025 32  cm/s Final    Left ICA/CCA ratio 06/11/2025 1.21   Final    Left  Highest ICA 06/11/2025 98.00   Final    LT Highest EDV 06/11/2025 48.00   Final    Left Highest CCA 06/11/2025 88   Final   Admission on 05/15/2025, Discharged on 05/15/2025   Component Date Value Ref Range Status    WBC 05/15/2025 6.85  3.90 - 12.70 K/uL Final    RBC 05/15/2025 4.63  4.00 - 5.40 M/uL Final    Hemoglobin 05/15/2025 13.1  12.0 - 16.0 g/dL Final    Hematocrit 05/15/2025 39.4  37.0 - 48.5 % Final    MCV 05/15/2025 85  82 - 98 fL Final    MCH 05/15/2025 28.3  27.0 - 31.0 pg Final    MCHC 05/15/2025 33.2  32.0 - 36.0 g/dL Final    RDW 05/15/2025 13.0  11.5 - 14.5 % Final    Platelets 05/15/2025 382  150 - 450 K/uL Final    MPV 05/15/2025 9.7  9.2 - 12.9 fL Final    Immature Granulocytes 05/15/2025 0.4  0.0 - 0.5 % Final    Gran # (ANC) 05/15/2025 4.7  1.8 - 7.7 K/uL Final    Immature Grans (Abs) 05/15/2025 0.03  0.00 - 0.04 K/uL Final    Lymph # 05/15/2025 1.5  1.0 - 4.8 K/uL Final    Mono # 05/15/2025 0.5  0.3 - 1.0 K/uL Final    Eos # 05/15/2025 0.1  0.0 - 0.5 K/uL Final    Baso # 05/15/2025 0.05  0.00 - 0.20 K/uL Final    nRBC 05/15/2025 0  0 /100 WBC Final    Gran % 05/15/2025 69.1  38.0 - 73.0 % Final    Lymph % 05/15/2025 21.9  18.0 - 48.0 % Final    Mono % 05/15/2025 7.2  4.0 - 15.0 % Final    Eosinophil % 05/15/2025 0.7  0.0 - 8.0 % Final    Basophil % 05/15/2025 0.7  0.0 - 1.9 % Final    Differential Method 05/15/2025 Automated   Final    Sodium 05/15/2025 142  136 - 145 mmol/L Final    Potassium 05/15/2025 3.9  3.5 - 5.1 mmol/L Final    Chloride 05/15/2025 112 (H)  95 - 110 mmol/L Final    CO2 05/15/2025 22 (L)  23 - 29 mmol/L Final    Glucose 05/15/2025 90  70 - 110 mg/dL Final    BUN 05/15/2025 12  6 - 20 mg/dL Final    Creatinine 05/15/2025 0.76  0.50 - 1.40 mg/dL Final    Calcium 05/15/2025 8.9  8.7 - 10.5 mg/dL Final    Total Protein 05/15/2025 6.7  6.0 - 8.4 g/dL Final    Albumin 05/15/2025 4.4  3.5 - 5.2 g/dL Final    Total Bilirubin 05/15/2025 0.2  0.2 - 1.0 mg/dL Final    Alkaline  Phosphatase 05/15/2025 70  40 - 150 U/L Final    AST 05/15/2025 18  10 - 40 U/L Final    ALT 05/15/2025 15  10 - 44 U/L Final    Anion Gap 05/15/2025 8  8 - 16 mmol/L Final    eGFR 05/15/2025 >60  >60 mL/min/1.73 m^2 Final    Specimen UA 05/15/2025 Urine, Clean Catch   Final    Color, UA 05/15/2025 Yellow  Yellow, Straw, Adry Final    Appearance, UA 05/15/2025 Clear  Clear Final    pH, UA 05/15/2025 6.5  5.0 - 8.0 Final    Specific Gravity, UA 05/15/2025 1.015  1.005 - 1.030 Final    Protein, UA 05/15/2025 Negative  Negative Final    Glucose, UA 05/15/2025 Negative  Negative Final    Ketones, UA 05/15/2025 Negative  Negative Final    Bilirubin (UA) 05/15/2025 Negative  Negative Final    Occult Blood UA 05/15/2025 Negative  Negative Final    Nitrite, UA 05/15/2025 Negative  Negative Final    Urobilinogen, UA 05/15/2025 0.2  <2.0 EU/dL Final    Leukocytes, UA 05/15/2025 Negative  Negative Final    Lipase Result 05/15/2025 30  4 - 60 U/L Final     Imaging:  Results for orders placed or performed during the hospital encounter of 05/20/25   MRI Brain Without Contrast    Narrative    EXAMINATION:  MRI BRAIN WITHOUT CONTRAST    CLINICAL HISTORY:  E34.8 R26.89;.  Other specified endocrine disorders    TECHNIQUE:  Multiplanar multisequence MR imaging of the brain was performed without contrast    COMPARISON:  Brain MRI 04/10/2024.    FINDINGS:  Brain: There are no focal areas of abnormal or restricted diffusion within the brain.  No mass, hemorrhage or acute infarct is present.  Midline Structures: Previously described incidental pineal region cyst is slightly smaller today measuring 8 mm, previously 10 mm.  Midline structures are otherwise within normal limits.  Ventricles: The ventricles, sulci and cisterns are within normal limits.  Vasculature: The vascular flow voids at the base of the brain are within normal limits.  Calvarium: The visualized osseous structures are unremarkable.  Sinuses: The paranasal sinuses are  adequately aerated.  Orbits: The orbits and globes are unremarkable.  Mastoids: The mastoid air cells are adequately aerated.  Extracranial soft tissues: The visualized extracranial soft tissues are unremarkable.      Impression    1. Negative noncontrast MRI of the brain aside from an incidental 8 mm pineal region cyst.  No acute process.      Electronically signed by: Gabriel Lubin  Date:    05/21/2025  Time:    09:10   Results for orders placed or performed during the hospital encounter of 04/10/24   MRI Brain W WO Contrast    Narrative    EXAMINATION:  MRI BRAIN W WO CONTRAST    CLINICAL HISTORY:  .  Postconcussional syndrome    TECHNIQUE:  Multiplanar multisequence MR imaging of the brain was performed before and after the uneventful intravenous administration of 5 mL Gadavist.  Diffusion weighted imaging was performed.  ADC map was generated.    COMPARISON:  Head CT dated 05/02/2019    FINDINGS:  Intracranial compartment:    There is no acute intracranial abnormality.  Brain volume, ventricular size and position are normal.  There is no parenchymal hemorrhage or mass/mass effect.  There are no definite regions of abnormal signal intensity in the brain.  There are no regions of restricted diffusion to suggest acute infarction.  There is no pathologic enhancement.  There is a 10 mm pineal cyst which was present on comparison unenhanced head CT.  The basilar cisterns are open.  There is no abnormal extra-axial fluid collection.  Flow voids indicating patency are present in the major vessels at the base of the brain.  The cerebellar tonsils are in normal position.  The sellar structures are normal.  The orbits are grossly normal.    Skull/extracranial contents: Marrow signal intensity in the clivus and calvarium is grossly normal.  The included paranasal sinuses and mastoid air cells are clear.  The included facial soft tissues and scalp are normal.      Impression    1.  There is no acute abnormality.  There is no  hemorrhage, mass/mass effect, acute infarction.  There is no pathologic enhancement.    2.  There is a stable 10 mm pineal cyst.      Electronically signed by: Ruel Stoner MD  Date:    04/10/2024  Time:    14:12   Results for orders placed or performed during the hospital encounter of 05/02/19   CT Head Without Contrast    Narrative    EXAMINATION:  CT HEAD WITHOUT CONTRAST    CLINICAL HISTORY:  Head trauma, minor, GCS>=13, NOC/NEXUS/CCR neg, first study;Fall down 8 stairs; paresthesias B/L hands, reduced sensation R hand on exam;    TECHNIQUE:  Multiple sequential 5 mm axial images of the head without contrast.  Coronal and sagittal reformatted imaging from the axial acquisition.    COMPARISON:  None    FINDINGS:  There is no evidence for acute intracranial hemorrhage or sulcal effacement.  The ventricles are normal in size without hydrocephalus.  There is no midline shift or mass effect.  Visualized paranasal sinuses and mastoid air cells are clear.      Impression    Unremarkable noncontrast CT head specifically without evidence for acute intracranial hemorrhage.  Clinical correlation and further evaluation as warranted.      Electronically signed by: Hernán Vázquez DO  Date:    05/02/2019  Time:    14:53     Note: I have independently reviewed any/all imaging/labs/tests and agree with the report (s) as documented.  Any discrepancies will be as noted/demarcated by free text.  CANDACE, FNP-C 6/25/2025    ASSESSMENT:  1. Migraine without aura and without status migrainosus, not intractable    2. Pineal gland cyst    3. Anxiety and depression    4. Cervicalgia    5. Chronic tension-type headache, not intractable    6. Poor sleep          PLAN:  - Discussed symptoms appear to be consistent with chronic migraine c/b cervicalgia. Discussed this with patient along with treatment options and patient agreed with the following plan    - rescue: rizatriptan 10mg prn. Take at onset of pain and repeat dose in 2 hours if pain  still present. Can consider nurtec/ubrelvy if ineffective.   - preventative: continue topiramate. Add emgality. Medically necessary as patient has tried and failed several other medications (see full list above).   - Cervicalgia : support recommendation for PT as neck pain can cause/exacerbate headaches/migraines.  - MRI scheduled for 7/1 - ordered by nsgy  - anxiety/depression: continue poc with pcp and uncontrolled symptoms can exacerbate headaches/migraines.   - Poor sleep - suspects that this is due to neck pain, however, can consider sleep study in the future     - risks, benefits, and potential side effects of emgality, rizatriptan discussed   - alternative treatment options offered   - importance of healthy diet, regular exercise and sleep hygiene in the treatment of headaches    - Start tracking headaches via Migraine [employer] lion on phone   - RTC in 2-3 months          I have discussed realistic goals of care with patient at length as well as medication options, and need for lifestyle adjustment. I have explained that treatment will take time. We have agreed that the goal will be to reduce frequency/intensity/quantity of HA, not to be completely HA free. I have explained my non narcotic policy regarding headache treatment.    Patient agreeable to work on lifestyle adjustments.    Discussed potential for teratogenicity with treatment, patient understands if her family planning status should change she will contact office immediately and we will safely adjust medications as needed.     Questions and concerns were sought and answered to the patient's stated verbal satisfaction.  The patient verbalizes understanding and agreement with the above stated treatment plan.     CC: Jb Pastor MD Monique Smith, FNP-C  [facility]  [phone]    Dr. Craig was available during today's encounter.          [1]   Current Outpatient Medications:     ascorbic acid (VITAMIN C ORAL), Take by mouth  Daily., Disp: , Rfl:     buPROPion (WELLBUTRIN XL) 150 MG TB24 tablet, Take 1 tablet (150 mg total) by mouth once daily. for 10 days, Disp: 10 tablet, Rfl: 0    diazePAM (VALIUM) 2 MG tablet, Take 1 tablet (2 mg total) by mouth every evening., Disp: 30 tablet, Rfl: 3    docusate sodium (COLACE) 100 MG capsule, Take 1 capsule (100 mg total) by mouth 2 (two) times daily., Disp: 30 capsule, Rfl: 1    DULoxetine (CYMBALTA) 30 MG capsule, Take 1 capsule (30 mg total) by mouth once daily., Disp: 30 capsule, Rfl: 11    loratadine (CLARITIN ORAL), Take by mouth Daily., Disp: , Rfl:     ondansetron (ZOFRAN-ODT) 4 MG TbDL, Take 1 tablet (4 mg total) by mouth every 8 (eight) hours as needed (Nausea/vomiting)., Disp: 10 tablet, Rfl: 0    orphenadrine (NORFLEX) 100 mg tablet, Take 100 mg by mouth 2 (two) times daily as needed., Disp: , Rfl:     pantoprazole (PROTONIX) 40 MG tablet, Take 1 tablet (40 mg total) by mouth once daily., Disp: 90 tablet, Rfl: 3    pravastatin (PRAVACHOL) 20 MG tablet, Take 1 tablet (20 mg total) by mouth every evening., Disp: 90 tablet, Rfl: 3    primidone (MYSOLINE) 50 MG Tab, Take 1 tablet (50 mg total) by mouth every evening., Disp: 90 tablet, Rfl: 3    topiramate (TOPAMAX) 50 MG tablet, Take 75 mg by mouth 2 (two) times daily., Disp: , Rfl:     traZODone (DESYREL) 100 MG tablet, Take 1 tablet (100 mg total) by mouth every evening., Disp: 90 tablet, Rfl: 3  No current facility-administered medications for this visit.    Facility-Administered Medications Ordered in Other Visits:     ceFAZolin 1 g, gentamicin 80 mg in sodium chloride 0.9% 500 mL irrigation, , Irrigation, On Call Procedure, Tae Trinidad, DO    ceFAZolin 1 g, gentamicin 80 mg in sodium chloride 0.9% 500 mL irrigation, , Irrigation, On Call Procedure, Tae Trinidad, DO    ceFAZolin 1 g, gentamicin 80 mg in sodium chloride 0.9% 500 mL irrigation, , Irrigation, On Call Procedure, Tae Trinidad, DO    ceFAZolin 1 g,  gentamicin 80 mg in sodium chloride 0.9% 500 mL irrigation, , Irrigation, On Call Procedure, Tae Trinidad DO    LIDOcaine (PF) 10 mg/ml (1%) injection 10 mg, 1 mL, Intradermal, Once, Doreen Garcia, REID    LIDOcaine HCL 10 mg/ml (1%) 50 mL, EPINEPHrine 1 mg in lactated Ringers 1,000 mL irrigation, , Irrigation, On Call Procedure, Tae Trinidad DO    LIDOcaine HCL 10 mg/ml (1%) 50 mL, EPINEPHrine 1 mg in lactated Ringers 1,000 mL irrigation, , Irrigation, On Call Procedure, Tae Trinidad DO    mupirocin 2 % ointment, , Nasal, On Call Procedure, Doreen Garcia PA-C, Given at 04/28/23 0609    sodium chloride 0.9% flush 10 mL, 10 mL, Intravenous, PRN, Doreen Garcia, PA-C

## 2025-06-25 ENCOUNTER — OFFICE VISIT (OUTPATIENT)
Facility: CLINIC | Age: 50
End: 2025-06-25
Payer: MEDICAID

## 2025-06-25 DIAGNOSIS — Z72.820 POOR SLEEP: ICD-10-CM

## 2025-06-25 DIAGNOSIS — G44.229 CHRONIC TENSION-TYPE HEADACHE, NOT INTRACTABLE: ICD-10-CM

## 2025-06-25 DIAGNOSIS — F32.A ANXIETY AND DEPRESSION: ICD-10-CM

## 2025-06-25 DIAGNOSIS — E34.8 PINEAL GLAND CYST: ICD-10-CM

## 2025-06-25 DIAGNOSIS — F41.9 ANXIETY AND DEPRESSION: ICD-10-CM

## 2025-06-25 DIAGNOSIS — G43.009 MIGRAINE WITHOUT AURA AND WITHOUT STATUS MIGRAINOSUS, NOT INTRACTABLE: Primary | ICD-10-CM

## 2025-06-25 DIAGNOSIS — M54.2 CERVICALGIA: ICD-10-CM

## 2025-06-25 PROCEDURE — 98002 SYNCH AUDIO-VIDEO NEW MOD 45: CPT | Mod: 95,,,

## 2025-06-25 PROCEDURE — 1159F MED LIST DOCD IN RCRD: CPT | Mod: CPTII,95,,

## 2025-06-25 PROCEDURE — 1160F RVW MEDS BY RX/DR IN RCRD: CPT | Mod: CPTII,95,,

## 2025-06-25 RX ORDER — RIZATRIPTAN BENZOATE 10 MG/1
10 TABLET ORAL 2 TIMES DAILY PRN
Qty: 15 TABLET | Refills: 2 | Status: SHIPPED | OUTPATIENT
Start: 2025-06-25 | End: 2025-07-26

## 2025-06-25 RX ORDER — TOPIRAMATE 50 MG/1
75 TABLET, FILM COATED ORAL 2 TIMES DAILY
COMMUNITY

## 2025-06-29 ENCOUNTER — RESULTS FOLLOW-UP (OUTPATIENT)
Dept: FAMILY MEDICINE | Facility: CLINIC | Age: 50
End: 2025-06-29

## 2025-07-01 ENCOUNTER — HOSPITAL ENCOUNTER (OUTPATIENT)
Dept: RADIOLOGY | Facility: HOSPITAL | Age: 50
Discharge: HOME | End: 2025-07-01
Attending: NURSE PRACTITIONER
Payer: MEDICAID

## 2025-07-01 DIAGNOSIS — E34.8 PINEAL GLAND CYST: ICD-10-CM

## 2025-07-01 PROCEDURE — A9585 GADOBUTROL INJECTION: HCPCS | Mod: PO | Performed by: NURSE PRACTITIONER

## 2025-07-01 PROCEDURE — 70553 MRI BRAIN STEM W/O & W/DYE: CPT | Mod: TC,PO

## 2025-07-01 PROCEDURE — 25500020 PHARM REV CODE 255: Mod: PO | Performed by: NURSE PRACTITIONER

## 2025-07-01 PROCEDURE — 70553 MRI BRAIN STEM W/O & W/DYE: CPT | Mod: 26,,, | Performed by: RADIOLOGY

## 2025-07-01 RX ORDER — GADOBUTROL 604.72 MG/ML
4 INJECTION INTRAVENOUS
Status: COMPLETED | OUTPATIENT
Start: 2025-07-01 | End: 2025-07-01

## 2025-07-01 RX ADMIN — GADOBUTROL 4 ML: 604.72 INJECTION INTRAVENOUS at 12:07

## 2025-07-03 ENCOUNTER — OFFICE VISIT (OUTPATIENT)
Dept: FAMILY MEDICINE | Facility: CLINIC | Age: 50
End: 2025-07-03
Payer: MEDICAID

## 2025-07-03 VITALS
WEIGHT: 99.19 LBS | SYSTOLIC BLOOD PRESSURE: 98 MMHG | OXYGEN SATURATION: 99 % | BODY MASS INDEX: 17.57 KG/M2 | HEART RATE: 79 BPM | DIASTOLIC BLOOD PRESSURE: 72 MMHG | HEIGHT: 63 IN

## 2025-07-03 DIAGNOSIS — E78.2 MIXED HYPERLIPIDEMIA: ICD-10-CM

## 2025-07-03 DIAGNOSIS — R74.8 ELEVATED LIVER ENZYMES: ICD-10-CM

## 2025-07-03 DIAGNOSIS — F41.9 ANXIETY AND DEPRESSION: Primary | ICD-10-CM

## 2025-07-03 DIAGNOSIS — F32.A ANXIETY AND DEPRESSION: Primary | ICD-10-CM

## 2025-07-03 DIAGNOSIS — M79.10 MYALGIA: ICD-10-CM

## 2025-07-03 DIAGNOSIS — Z23 NEED FOR DIPHTHERIA-TETANUS-PERTUSSIS (TDAP) VACCINE: ICD-10-CM

## 2025-07-03 PROBLEM — R04.0 EPISTAXIS REQUIRING CAUTERIZATION: Status: RESOLVED | Noted: 2025-06-23 | Resolved: 2025-07-03

## 2025-07-03 PROCEDURE — 99999 PR PBB SHADOW E&M-EST. PATIENT-LVL III: CPT | Mod: PBBFAC,,, | Performed by: INTERNAL MEDICINE

## 2025-07-03 PROCEDURE — 99213 OFFICE O/P EST LOW 20 MIN: CPT | Mod: PBBFAC,PO | Performed by: INTERNAL MEDICINE

## 2025-07-03 PROCEDURE — 3008F BODY MASS INDEX DOCD: CPT | Mod: CPTII,,, | Performed by: INTERNAL MEDICINE

## 2025-07-03 PROCEDURE — G2211 COMPLEX E/M VISIT ADD ON: HCPCS | Mod: ,,, | Performed by: INTERNAL MEDICINE

## 2025-07-03 PROCEDURE — 1159F MED LIST DOCD IN RCRD: CPT | Mod: CPTII,,, | Performed by: INTERNAL MEDICINE

## 2025-07-03 PROCEDURE — 3074F SYST BP LT 130 MM HG: CPT | Mod: CPTII,,, | Performed by: INTERNAL MEDICINE

## 2025-07-03 PROCEDURE — 90471 IMMUNIZATION ADMIN: CPT | Mod: PBBFAC,PO

## 2025-07-03 PROCEDURE — 99999PBSHW PR PBB SHADOW TECHNICAL ONLY FILED TO HB: Mod: PBBFAC,,,

## 2025-07-03 PROCEDURE — 1160F RVW MEDS BY RX/DR IN RCRD: CPT | Mod: CPTII,,, | Performed by: INTERNAL MEDICINE

## 2025-07-03 PROCEDURE — 90715 TDAP VACCINE 7 YRS/> IM: CPT | Mod: PBBFAC,PO

## 2025-07-03 PROCEDURE — 99214 OFFICE O/P EST MOD 30 MIN: CPT | Mod: S$PBB,,, | Performed by: INTERNAL MEDICINE

## 2025-07-03 PROCEDURE — 3078F DIAST BP <80 MM HG: CPT | Mod: CPTII,,, | Performed by: INTERNAL MEDICINE

## 2025-07-03 RX ADMIN — CLOSTRIDIUM TETANI TOXOID ANTIGEN (FORMALDEHYDE INACTIVATED), CORYNEBACTERIUM DIPHTHERIAE TOXOID ANTIGEN (FORMALDEHYDE INACTIVATED), BORDETELLA PERTUSSIS TOXOID ANTIGEN (GLUTARALDEHYDE INACTIVATED), BORDETELLA PERTUSSIS FILAMENTOUS HEMAGGLUTININ ANTIGEN (FORMALDEHYDE INACTIVATED), BORDETELLA PERTUSSIS PERTACTIN ANTIGEN, AND BORDETELLA PERTUSSIS FIMBRIAE 2/3 ANTIGEN 0.5 ML: 5; 2; 2.5; 5; 3; 5 INJECTION, SUSPENSION INTRAMUSCULAR at 04:07

## 2025-07-03 NOTE — PROGRESS NOTES
"[facility]  Primary Care   [address] [facility] [address]       Patient ID: Dina Sosa     Chief Complaint:   Chief Complaint   Patient presents with    Anxiety     Follow up        HPI:  Routine follow-up after we weaned off Wellbutrin and started Cymbalta for what I thought was a lot of somatic problems caused by an exacerbation of her anxiety/depression.  She notes that the wean went well and Cymbalta seems to be helping her more in the Wellbutrin so we will continue it.  I had her pause the pravastatin and investigate his some myalgias but the blood work did not show she had any muscle breakdown so I would like her to rechallenge herself with the pravastatin 20 mg but every other night at 1st and I would like her to take some over-the-counter Co Q10 200 mg each day to help mitigate any myalgias.  She did not do too well on her treadmill stress test due to hip pain preventing her from getting to her target heart rate so they ordered a nuclear stress test but I do wish her well.    Review of Systems       Feeling better     Objective:      Physical Exam   Physical Exam       Normal    Vitals:   Vitals:    07/03/25 1516   BP: 98/72   Pulse: 79   SpO2: 99%   Weight: 45 kg (99 lb 3.3 oz)   Height: 5' 3" (1.6 m)        Assessment:           Plan:       Dina Sosa  was seen today for follow-up and may need lab work.    Diagnoses and all orders for this visit:    Dina was seen today for anxiety.    Diagnoses and all orders for this visit:    Anxiety and depression  Improved with Cymbalta     Need for diphtheria-tetanus-pertussis (Tdap) vaccine  -     Tdap vaccine injection 0.5 mL    Myalgia  Resolved - could have been due to Fibromyalgia     Mixed hyperlipidemia  Restart Pravastatin 20 mg every other night and add OTC CoQ-10 200 mg / day.     Visit today included increased complexity associated with the care of the episodic problem Anxiety Depression hyperlipidemia addressed and managing " the longitudinal care of the patient due to the serious and/or complex managed problem(s) .         Jb Pastor MD

## 2025-07-07 DIAGNOSIS — G47.01 INSOMNIA DUE TO MEDICAL CONDITION: ICD-10-CM

## 2025-07-07 DIAGNOSIS — F41.9 ANXIETY: ICD-10-CM

## 2025-07-07 RX ORDER — DIAZEPAM 2 MG/1
2 TABLET ORAL NIGHTLY
Qty: 30 TABLET | Refills: 0 | Status: SHIPPED | OUTPATIENT
Start: 2025-07-07

## 2025-07-07 NOTE — TELEPHONE ENCOUNTER
No care due was identified.  [facility] Embedded Care Due Messages. Reference number: [id number].   7/07/2025 9:52:42 AM CDT

## 2025-07-14 ENCOUNTER — PATIENT MESSAGE (OUTPATIENT)
Dept: FAMILY MEDICINE | Facility: CLINIC | Age: 50
End: 2025-07-14
Payer: MEDICAID

## 2025-07-15 ENCOUNTER — OFFICE VISIT (OUTPATIENT)
Dept: HEMATOLOGY/ONCOLOGY | Facility: CLINIC | Age: 50
End: 2025-07-15
Payer: MEDICAID

## 2025-07-15 DIAGNOSIS — Z00.00 GENERAL MEDICAL EXAM: ICD-10-CM

## 2025-07-15 DIAGNOSIS — Z85.3 HISTORY OF BREAST CANCER: ICD-10-CM

## 2025-07-15 DIAGNOSIS — Z71.83 ENCOUNTER FOR NONPROCREATIVE GENETIC COUNSELING: Primary | ICD-10-CM

## 2025-07-15 NOTE — Clinical Note
Pt is overdue for annual gyn visit and would to establish care with new provider.  Referral is in.  Please contact pt to schedule with Dr. Staley or other [facility] provider.  Thank you, Efe Xiong NP Cancer Genetics

## 2025-07-15 NOTE — PROGRESS NOTES
"Cancer Genetics  Department of Hematology and Oncology  [facility]    Date of Service:  7/15/25  Visit Provider:  Efe Xiong DNP  Collaborating Physician:  Sol South MD    Patient ID  Name: Dina Sosa    : 1975    MRN: [id number]      Referring Provider  Rowan Navarro, NP  [address] [place] [address]    Televisit Information  The patient location is: [place], [place]  The chief complaint leading to consultation is: As below    Visit type: audiovisual    Face to Face time with patient: 22 minutes  49 minutes of total time spent on the encounter, which includes face to face time and non-face to face time preparing to see the patient (eg, review of tests), Obtaining and/or reviewing separately obtained history, Documenting clinical information in the electronic or other health record, Independently interpreting results (not separately reported) and communicating results to the patient/family/caregiver, or Care coordination (not separately reported).         Each patient to whom he or she provides medical services by telemedicine is:  (1) informed of the relationship between the physician and patient and the respective role of any other health care provider with respect to management of the patient; and (2) notified that he or she may decline to receive medical services by telemedicine and may withdraw from such care at any time.    Notes:      SUBJECTIVE      Chief Complaint: Follow-up    History of Present Illness (HPI):  Dina Sosa ("Dina"), 50 y.o., assigned female sex at birth, is an established patient who initially and most recently met with me on 2022 for cancer-genetic risk assessment.  Subsequently, in 2022, she underwent germline cancer-genetic testing via the [facility] Multi-Cancer + RNA Panel, results of which were negative.    Dina returns today for cancer genetics follow-up and denies " pertinent interval changes to her personal/family history beyond those noted herein.    ONCOLOGY PEDIGREE  Other than noted, no known family history of cancer, benign tumor, or colon polyps.  If this pedigree appears small/illegible on your screen, expand this note window horizontally.      Personal Cancer-Related History  Cancer:  Yes  DCIS, R breast, UIQ, ER+ [place]+, dx.age 47  Colon polyp:  N/A - No history of colonoscopy  07/2022 Cologuard with negative results  06/2025 Cologuard with negative results  Other pertinent tumor/mass/lesion:  No  Pancreatitis:  No  Personal history of cancer-genetic testing:  Yes:  The below is a partial copy of the report; the complete copy is available under the Labs tab.  If this document appears small/illegible on your screen, expand this note window horizontally.                Other Pertinent Medical History  Blood disorder:  Yes - von Willebrand disease    Focused Surgical History  S/p hysterectomy  S/p LSO and RS, 09/2023 (right ovary remains in situ)  S/p bilateral mastectomy    Focused Social History  Smoked for about 10 yrs, never heavily, and quit about 8-10 years ago    Focused Family History  Ashkenazi Taoism ancestry:  Unknown  Consanguinity in ancestors:  No   Family history of cancer-genetic testing:  No    Review of Systems  See HPI.       OBJECTIVE     Patient Active Problem List    Diagnosis Date Noted    Low von Willebrand factor (vWF) 06/23/2025    Blurry vision 06/23/2025    Pineal gland cyst 04/23/2024    Insomnia due to medical condition 03/22/2024    S/P RA-sacrocolpopexy/LSO/Right salpingectomy/bilateral labiaplasty 9/8/23 09/08/2023    Migraine without aura and without status migrainosus, not intractable 05/24/2023    History of breast cancer 02/24/2023    Tremor of both hands 02/24/2023    Mixed hyperlipidemia 02/24/2023    Cervical radiculopathy 02/24/2023    History of bilateral mastectomy 10/26/2022    Breast cancer 08/25/2022    Ductal carcinoma in situ  "(DCIS) of right breast 08/25/2022    History of hip surgery 07/19/2022    History of cervical spinal surgery 04/21/2022    History of fusion of cervical spine 04/21/2021    Menopausal syndrome 04/21/2021    Gastroesophageal reflux disease without esophagitis 08/23/2019    Neuropathy 05/09/2019    Lumbar facet arthropathy 05/09/2019    Lumbosacral strain 05/09/2019    Cervical strain 08/21/2018    Anxiety and depression 05/30/2014    Environmental allergies 05/30/2014     Past Medical History:   Diagnosis Date    Anxiety     Epistaxis requiring cauterization 06/23/2025    Genetic testing 09/2022    [facility] Multi-Cancer + RNA Panel with negative results    History of breast cancer     Right breast. Invasive Ductal.     Physical Exam  Significantly limited secondary to the inherent nature of a virtual visit.  Very pleasant patient.  Unacccompanied on this virtual visit.  Constitutional      Appearance:  Appears well developed and well nourished. No distress.   Neurological     Mental Status:  Alert and oriented.  Psychiatric         Mood and Affect:  Normal.     Thought Content:  Normal.     Speech:  Normal.     Behavior:  Normal.     Judgment:  Normal.    ASSESSMENT / PLAN      Dina Sosa ("Dina"), 50 y.o., assigned female sex at birth, is an established patient who initially and most recently met with me on 08/31/2022 for cancer-genetic risk assessment.  Subsequently, in 09/2022, she underwent germline cancer-genetic testing via the [facility] Multi-Cancer + RNA Panel, results of which were negative.    PRE-TEST GENETIC COUNSELING    Dina's germline cancer-genetic testing via the [facility] Multi-Cancer + RNA Panel (84 genes) was negative (i.e., normal).  This was a broad, multiple-cancer panel.  Based on these results, her breast cancer was more likely to have been sporadic than hereditary.     CANCER RISKS AND RISK MANAGEMENT    Only a small percentage (approximately 5%-10%) of cancers are hereditary, " meaning caused by an inherited gene mutation; rather, most cancers are sporadic.  Environmental factors, lifestyle factors, and even factors beyond our control play a significant role in the development of many cancers.  Even if an individual has negative genetic testing, they can still be at increased risk for certain cancers, as they may independently have risk factors for those cancers and/or may share risk factors with family members affected by cancer.  It is strongly recommended that you ensure that your healthcare providers are aware of and up-to-date as to your personal and family history so that your cancer screenings can be based in part off of this information.      With regard to cancer risk reduction in general, it is recommended to avoid tobacco use; be physically active; maintain a healthy weight; eat a diet rich in fruits, vegetables, and whole grains and low in saturated/trans fat, red meat, and processed meat; limit alcohol consumption (zero is best); protect against sexually transmitted infections; protect against the sun, and avoid tanning beds; and get regular screenings for cancers as recommended by your healthcare team.    The below is general guidance regarding cancer screenings and is not to be considered exhaustive.  If you believe you need to see any of the below specialists, notify your cancer-genetics specialist promptly.     Cancer in general:    Attend an annual visit with a primary care provider (PCP) for history review, physical exam, and age-appropriate testing.   Beyond those cancer screenings listed herein, undergo screenings/surveillance as recommended by your healthcare providers.      Skin cancer:    Undergo total-body skin examination (TBSE) with a dermatology provider at least annually.    Colorectal cancer (CRC):    For you, CRC screenings should begin by age:  45 years.    [] Colonoscopy is the recommended CRC screening type for you.  [x] Based on your reported personal and  family history, you may be a candidate for other types of CRC screenings rather than colonoscopy -- Discuss this with your PCP.  Repeat CRC screenings as recommended by your gastroenterology (GI) provider.    With regard to family history, advise your GI provider or cancer-genetics specialist if any of the following applies, as such could impact CRC screening recommendations for you:  (a) Any blood relative of yours has been diagnosed with colorectal cancer; (b) a 1st-degree relative of yours has a colorectal polyp history including any of the following characteristics:  adenoma with high-grade dysplasia, adenoma or sessile serrated polyp/adenoma 1 cm or larger in size, villous or tubulovillous adenoma, traditional serrated adenoma (TSA), sessile serrated lesion/polyp/adenoma with any dysplasia, cumulatively 10 or more polyps.    Breast cancer:    Continue breast cancer surveillance as recommended by your breast oncologist.      Gynecologic cancers:    Attend an annual visit with your gynecology provider, which may include pelvic exam and/or Pap smear/HPV testing.    Pancreatic cancer:    Should you learn that you have at least two blood relatives (on the same side of the family) who have had pancreatic cancer, you may be a candidate for pancreatic cancer screening, which should be performed under the care of a pancreas specialist -- Notify your cancer-genetics specialist if this family history applies to you.    Lung cancer:    If you have a 20-pack-year smoking history or greater and are at least 50 years of age, shared patient/provider decision-making regarding low-dose CT scan (LDCT) for lung cancer screening is recommended; if you have at least a 20-pack-year smoking history or are unsure, discuss with your PCP whether lung cancer screening is right for you.    REGARDING YOUR RELATIVES    Your relatives also may be at increased risk for certain cancers, depending upon their own personal and family history;  "therefore, it is important that they, too, ensure that their own healthcare providers are aware of and up-to-date as to their personal and family history so that their medical management, including cancer screenings, can be based in part off of this information.      Additionally, it is possible for your relatives to have hereditary cancer susceptibility gene mutations in addition to and/or other than those identified in you (or if/when you have negative genetic testing).  Of note, you could not have passed on to your offspring a cancer susceptibility gene mutation that you do not carry; however, it is important to remember that your offspring inherit 50% of their genetic DNA from their other biological parent.    Your relatives should speak with a healthcare provider about their cancer risks and whether genetic counseling and potentially testing may be indicated for them.  Anyone interested in pursuing cancer genetic counseling/testing may contact the [facility] at [phone] to schedule an appointment or may visit [email or web address] to locate a cancer-genetic specialist near them.    FOLLOW-UP    Please continue to follow up with all healthcare providers as directed.    Moving forward, please update me with any changes to--or new information learned about--your personal or family history and with any relative's genetic testing results.  Barring any such changes, please follow up with me in 3 years.  In the meantime, please don't hesitate to reach out with any questions or concerns, or follow up anytime sooner than planned as you wish.          DIAGNOSES AND ORDERS FOR THIS ENCOUNTER    ICD-10-CM ICD-9-CM   1. Encounter for nonprocreative genetic counseling  Z71.83 V26.33   2. History of breast cancer  Z85.3 V10.3   3. General medical exam  Z00.00 V70.9     1. Encounter for nonprocreative genetic counseling  - Dina Sosa ("Dina"), 50 y.o., assigned female sex at birth, is an established " patient who initially and most recently met with me on 08/31/2022 for cancer-genetic risk assessment.  Subsequently, in 09/2022, she underwent germline cancer-genetic testing via the [facility] Multi-Cancer + RNA Panel, results of which were negative.  - Genetic counseling was conducted today.  - Dina is to return in 3 years and as needed sooner.    2. History of breast cancer  - Ambulatory referral/consult to Genetics:  Completed  - More likely to have been sporadic than hereditary  - Dina is under the care of [facility] and is overdue for 1-year follow; post-visit, I reached out to their team, asking them to schedule follow-up visit    3. General medical exam  - Dina is overdue for annual gynecology visit and would like to establish care with new provider for such  - Ambulatory referral/consult to Gynecology; Future    Follow-up:  Follow up in about 3 years (around 7/15/2028).  Follow up sooner as needed/desired.    Questions were encouraged and answered to the patient's satisfaction, and she verbalized understanding of the information and agreement with the plan.  Advised Dina that pertinent information will be accessible in this visit note for her review.        Efe Xiong, DNP, APRN, FNP-BC, AOCNP, CGRA  Nurse Practitioner, Cancer Genetics  Department of Hematology and Oncology  [facility]  [facility]        CC:  Rowan Navarro         Routed to Cancer Genetics Staff:  - Please place recall for 07/15/2028.        Portions of the record may have been created with voice-recognition software.  Occasional wrong-word or sound-a-like substitutions may have occurred due to the inherent limitations of voice-recognition software.  Read the chart carefully, and recognize, using context, where substitutions have occurred.

## 2025-07-16 ENCOUNTER — TELEPHONE (OUTPATIENT)
Dept: OBSTETRICS AND GYNECOLOGY | Facility: CLINIC | Age: 50
End: 2025-07-16
Payer: MEDICAID

## 2025-07-16 NOTE — TELEPHONE ENCOUNTER
Scheduled next available slot for a new patient per patient request. Patient verbalized understanding.    ----- Message from Shala Staley MD sent at 7/16/2025 10:33 AM CDT -----  Looks like she has an apt in September with somebody? But if not can do next available annual  ----- Message -----  From: Sana Duran LPN  Sent: 7/16/2025  10:17 AM CDT  To: Shala Staley MD    Request from provider. Not sure if you want to accept. Just wanted to make sure.  ----- Message -----  From: Efe Xiong DNP  Sent: 7/15/2025   3:59 PM CDT  To: [facility] Staff    Pt is overdue for annual gyn visit and would to establish care with new provider.  Referral is in.  Please contact pt to schedule with Dr. Staley or other [facility] provider.    Thank you,  Efe Xiong NP  Cancer Genetics

## 2025-07-25 ENCOUNTER — PATIENT OUTREACH (OUTPATIENT)
Dept: ADMINISTRATIVE | Facility: HOSPITAL | Age: 50
End: 2025-07-25
Payer: MEDICAID

## 2025-08-05 DIAGNOSIS — F41.9 ANXIETY: ICD-10-CM

## 2025-08-05 DIAGNOSIS — G47.01 INSOMNIA DUE TO MEDICAL CONDITION: ICD-10-CM

## 2025-08-05 RX ORDER — DIAZEPAM 2 MG/1
2 TABLET ORAL NIGHTLY
Qty: 30 TABLET | Refills: 3 | Status: SHIPPED | OUTPATIENT
Start: 2025-08-05

## 2025-08-05 NOTE — TELEPHONE ENCOUNTER
No care due was identified.  Health system Embedded Care Due Messages. Reference number: [id number].   8/05/2025 9:00:36 AM CDT

## 2025-08-19 PROBLEM — R07.9 CHEST PAIN: Status: ACTIVE | Noted: 2025-08-19

## 2025-08-19 PROBLEM — R94.39 ABNORMAL STRESS TEST: Status: ACTIVE | Noted: 2025-08-19

## 2025-08-20 ENCOUNTER — DOCUMENTATION ONLY (OUTPATIENT)
Dept: FAMILY MEDICINE | Facility: CLINIC | Age: 50
End: 2025-08-20
Payer: MEDICAID

## 2025-09-02 ENCOUNTER — OFFICE VISIT (OUTPATIENT)
Dept: SURGERY | Facility: CLINIC | Age: 50
End: 2025-09-02
Payer: MEDICAID

## 2025-09-02 ENCOUNTER — OFFICE VISIT (OUTPATIENT)
Dept: OBSTETRICS AND GYNECOLOGY | Facility: CLINIC | Age: 50
End: 2025-09-02
Payer: MEDICAID

## 2025-09-02 ENCOUNTER — TELEPHONE (OUTPATIENT)
Dept: UROGYNECOLOGY | Facility: CLINIC | Age: 50
End: 2025-09-02
Payer: COMMERCIAL

## 2025-09-02 VITALS
DIASTOLIC BLOOD PRESSURE: 84 MMHG | OXYGEN SATURATION: 98 % | SYSTOLIC BLOOD PRESSURE: 126 MMHG | HEART RATE: 100 BPM | HEIGHT: 60 IN | BODY MASS INDEX: 19.59 KG/M2 | RESPIRATION RATE: 18 BRPM

## 2025-09-02 VITALS
BODY MASS INDEX: 19.69 KG/M2 | WEIGHT: 100.31 LBS | DIASTOLIC BLOOD PRESSURE: 88 MMHG | HEIGHT: 60 IN | SYSTOLIC BLOOD PRESSURE: 136 MMHG

## 2025-09-02 DIAGNOSIS — Z01.419 WELL WOMAN EXAM WITH ROUTINE GYNECOLOGICAL EXAM: Primary | ICD-10-CM

## 2025-09-02 DIAGNOSIS — Z91.89 AT RISK FOR DECREASED BONE DENSITY: ICD-10-CM

## 2025-09-02 DIAGNOSIS — Z85.3 PERSONAL HISTORY OF BREAST CANCER: ICD-10-CM

## 2025-09-02 DIAGNOSIS — N39.46 MIXED STRESS AND URGE URINARY INCONTINENCE: ICD-10-CM

## 2025-09-02 DIAGNOSIS — Z12.39 ENCOUNTER FOR SCREENING BREAST EXAMINATION: ICD-10-CM

## 2025-09-02 DIAGNOSIS — R29.898 WEAKNESS OF LOWER EXTREMITY, UNSPECIFIED LATERALITY: ICD-10-CM

## 2025-09-02 DIAGNOSIS — Z08 ENCOUNTER FOR FOLLOW-UP EXAMINATION AFTER COMPLETED TREATMENT FOR MALIGNANT NEOPLASM: Primary | ICD-10-CM

## 2025-09-02 DIAGNOSIS — N83.201 OVARIAN CYST, RIGHT: ICD-10-CM

## 2025-09-02 PROCEDURE — 3079F DIAST BP 80-89 MM HG: CPT | Mod: CPTII,,, | Performed by: STUDENT IN AN ORGANIZED HEALTH CARE EDUCATION/TRAINING PROGRAM

## 2025-09-02 PROCEDURE — 1159F MED LIST DOCD IN RCRD: CPT | Mod: CPTII,S$GLB,, | Performed by: NURSE PRACTITIONER

## 2025-09-02 PROCEDURE — 3008F BODY MASS INDEX DOCD: CPT | Mod: CPTII,,, | Performed by: STUDENT IN AN ORGANIZED HEALTH CARE EDUCATION/TRAINING PROGRAM

## 2025-09-02 PROCEDURE — 3075F SYST BP GE 130 - 139MM HG: CPT | Mod: CPTII,,, | Performed by: STUDENT IN AN ORGANIZED HEALTH CARE EDUCATION/TRAINING PROGRAM

## 2025-09-02 PROCEDURE — 99396 PREV VISIT EST AGE 40-64: CPT | Mod: S$PBB,,, | Performed by: STUDENT IN AN ORGANIZED HEALTH CARE EDUCATION/TRAINING PROGRAM

## 2025-09-02 PROCEDURE — 99213 OFFICE O/P EST LOW 20 MIN: CPT | Mod: S$GLB,,, | Performed by: NURSE PRACTITIONER

## 2025-09-02 PROCEDURE — 3074F SYST BP LT 130 MM HG: CPT | Mod: CPTII,S$GLB,, | Performed by: NURSE PRACTITIONER

## 2025-09-02 PROCEDURE — 99213 OFFICE O/P EST LOW 20 MIN: CPT | Mod: S$PBB,25,, | Performed by: STUDENT IN AN ORGANIZED HEALTH CARE EDUCATION/TRAINING PROGRAM

## 2025-09-02 PROCEDURE — 99214 OFFICE O/P EST MOD 30 MIN: CPT | Mod: PBBFAC,27 | Performed by: STUDENT IN AN ORGANIZED HEALTH CARE EDUCATION/TRAINING PROGRAM

## 2025-09-02 PROCEDURE — 99999 PR PBB SHADOW E&M-EST. PATIENT-LVL IV: CPT | Mod: PBBFAC,,, | Performed by: NURSE PRACTITIONER

## 2025-09-02 PROCEDURE — 3008F BODY MASS INDEX DOCD: CPT | Mod: CPTII,S$GLB,, | Performed by: NURSE PRACTITIONER

## 2025-09-02 PROCEDURE — 99214 OFFICE O/P EST MOD 30 MIN: CPT | Mod: PBBFAC | Performed by: NURSE PRACTITIONER

## 2025-09-02 PROCEDURE — 3079F DIAST BP 80-89 MM HG: CPT | Mod: CPTII,S$GLB,, | Performed by: NURSE PRACTITIONER

## 2025-09-02 PROCEDURE — 99999 PR PBB SHADOW E&M-EST. PATIENT-LVL IV: CPT | Mod: PBBFAC,,, | Performed by: STUDENT IN AN ORGANIZED HEALTH CARE EDUCATION/TRAINING PROGRAM

## 2025-09-02 RX ORDER — AMOXICILLIN AND CLAVULANATE POTASSIUM 875; 125 MG/1; MG/1
1 TABLET, FILM COATED ORAL 2 TIMES DAILY
COMMUNITY
Start: 2025-08-16 | End: 2025-09-03

## 2025-09-02 RX ORDER — METOCLOPRAMIDE 10 MG/1
TABLET ORAL
COMMUNITY
Start: 2025-08-11

## (undated) DEVICE — APPLIER CLIP LIAGCLIP 9.375IN

## (undated) DEVICE — STAPLER SKIN ROTATING HEAD

## (undated) DEVICE — DRAPE THREE-QTR REINF 53X77IN

## (undated) DEVICE — GLOVE BIOGEL SKINSENSE PI 6.5

## (undated) DEVICE — TOWEL OR DISP STRL BLUE 4/PK

## (undated) DEVICE — SOL ELECTROLUBE ANTI-STIC

## (undated) DEVICE — SOL VASHE INSTILLATION WND 16

## (undated) DEVICE — EVACUATOR WOUND BULB 100CC

## (undated) DEVICE — ELECTRODE REM PLYHSV RETURN 9

## (undated) DEVICE — SUT VLOC 90 3-0 V-20 NDL 9

## (undated) DEVICE — SET TRI-LUMEN FILTERED TUBE

## (undated) DEVICE — REMOVER STAPLE SKIN STERILE

## (undated) DEVICE — SUT MCRYL PLUS 3-0 PS2 27IN

## (undated) DEVICE — DRAPE STERI INSTRUMENT 1018

## (undated) DEVICE — BLADE SURG STAINLESS STEEL #15

## (undated) DEVICE — PENCIL ELECTROSURG HOLST W/BLD

## (undated) DEVICE — TIP YANKAUERS BULB NO VENT

## (undated) DEVICE — SYS PRINEO SKIN CLOSURE

## (undated) DEVICE — COVER TIP CURVED SCISSORS XI

## (undated) DEVICE — HOLDER DRAIN POUCH PINK

## (undated) DEVICE — Device

## (undated) DEVICE — POSITIONER IV ARMBOARD FOAM

## (undated) DEVICE — PACK UNIVERSAL SPLIT II

## (undated) DEVICE — SUT MONOCYRL 4-0 PS2 UND

## (undated) DEVICE — CONTAINER SPECIMEN OR STER 4OZ

## (undated) DEVICE — TRAY FOLEY 16FR INFECTION CONT

## (undated) DEVICE — GOWN NONREINF SET-IN SLV XL

## (undated) DEVICE — PORT AIRSEAL 12/120MM LPI

## (undated) DEVICE — SYS CLSR DERMABOND PRINEO 22CM

## (undated) DEVICE — GLOVE BIOGEL SKINSENSE PI 7.5

## (undated) DEVICE — ELECTRODE BLD EXT INSUL 1

## (undated) DEVICE — IRRIGATOR ENDOSCOPY DISP.

## (undated) DEVICE — SUT VICRYL PLUS 4-0 PS2 27

## (undated) DEVICE — DRAPE ARM DAVINCI XI

## (undated) DEVICE — SEAL UNIVERSAL 5MM-8MM XI

## (undated) DEVICE — SOL NORMAL USPCA 0.9%

## (undated) DEVICE — SKIN MARKER DEVON 160

## (undated) DEVICE — SUT VICRYL PLUS 3-0 SH 18IN

## (undated) DEVICE — TROCAR ENDO Z THREAD KII 5X100

## (undated) DEVICE — NDL SAFETY 22G X 1.5 ECLIPSE

## (undated) DEVICE — UNDERGLOVE BIOGEL PI SZ 6.5 LF

## (undated) DEVICE — SUT MONOCRYL 2-0 S UND

## (undated) DEVICE — SUT STRATAFIX PGAPCL 3 FS-1

## (undated) DEVICE — DRAIN CHANNEL ROUND 19FR

## (undated) DEVICE — POSITIONER HEEL FOAM CONVOLTD

## (undated) DEVICE — SUT ABS CLIP LAPRA-TY CTD

## (undated) DEVICE — SUT ETHIBOND EXCEL 0 CT2 30

## (undated) DEVICE — GRASPER EPIX 5X20MM 45CM

## (undated) DEVICE — PAD ABD 8X10 STERILE

## (undated) DEVICE — SOL POVIDONE SCRUB IODINE 4 OZ

## (undated) DEVICE — SUT CHROMIC 3-0 SH 27IN GUT

## (undated) DEVICE — BRA CLASSIC COMFORM BLK SZ 36

## (undated) DEVICE — GLOVE SENSICARE PI SURG 6.5

## (undated) DEVICE — MARKER SKIN STND TIP BLUE BARR

## (undated) DEVICE — SOL POVIDONE PREP IODINE 4 OZ

## (undated) DEVICE — SUT MCRYL PLUS 4-0 PS2 27IN

## (undated) DEVICE — KIT WING PAD POSITIONING

## (undated) DEVICE — PACK UNIV PROCEDURE

## (undated) DEVICE — TRAY MINOR GEN SURG OMC

## (undated) DEVICE — KIT SURGIFLO HEMOSTATIC MATRIX

## (undated) DEVICE — APPLICATOR CHLORAPREP ORN 26ML

## (undated) DEVICE — SYR 10CC LUER LOCK

## (undated) DEVICE — UNDERGLOVES BIOGEL PI SZ 7 LF

## (undated) DEVICE — SPONGE LAP 18X18 PREWASHED

## (undated) DEVICE — BOWL STERILE LARGE 32OZ

## (undated) DEVICE — ELECTRODE NEEDLE 1IN

## (undated) DEVICE — DRAPE COLUMN DAVINCI XI

## (undated) DEVICE — GLOVE BIOGEL SKINSENSE PI 7.0

## (undated) DEVICE — TRAY DO THE ROBOT

## (undated) DEVICE — GLOVE SENSICARE PI ALOE 7.5

## (undated) DEVICE — SOL BETADINE 5%

## (undated) DEVICE — GOWN POLY REINF BRTH SLV 2XL

## (undated) DEVICE — RETRACTOR RADIALUX LIGHTED

## (undated) DEVICE — TRAY DRY SKIN SCRUB PREP

## (undated) DEVICE — SUT MONOCRYL 3-0 PS-2 UND

## (undated) DEVICE — TUBING SUC UNIV W/CONN 12FT

## (undated) DEVICE — SYR ONLY LUER LOCK 20CC

## (undated) DEVICE — BAG DRAIN ANTI REFLUX 2000ML

## (undated) DEVICE — SOL PVP-I SCRUB 7.5% 4OZ

## (undated) DEVICE — GLOVE SENSICARE PI GRN 6.5

## (undated) DEVICE — SUT 2/0 30IN SILK BLK BRAI

## (undated) DEVICE — CLOSURE SKIN STERI STRIP 1/2X4

## (undated) DEVICE — BLANKET HYPER ADULT 24X60IN

## (undated) DEVICE — NDL HYPO REG 25G X 1 1/2

## (undated) DEVICE — BAG TISSUE RETRIEVAL 5MM

## (undated) DEVICE — SUT 2/0 27IN PDS II VIO MO

## (undated) DEVICE — MANIFOLD 4 PORT

## (undated) DEVICE — TIP SACROCOLPOPEXY SM 1.3X3.5

## (undated) DEVICE — DRAIN CHANNEL ROUND 15FR

## (undated) DEVICE — GOWN SURGICAL X-LARGE

## (undated) DEVICE — KIT PROCEDURE STER INLET CLOSU

## (undated) DEVICE — SUT PROLENE 3-0 30SH

## (undated) DEVICE — BANDAGE ROLL COTTN 4.5INX4.1YD

## (undated) DEVICE — BLADE PEAK PLASMA

## (undated) DEVICE — DRAPE HALF SURGICAL 40X58IN

## (undated) DEVICE — SYS REVOLVE FAT PROCESSING

## (undated) DEVICE — SOL IRR SOD CHL .9% POUR

## (undated) DEVICE — SUT VICRYL CTD 2-0 GI 27 SH

## (undated) DEVICE — ELECTRODE BLADE TEFLON 6

## (undated) DEVICE — GLOVE BIOGEL SKINSENSE PI 8.0

## (undated) DEVICE — SUT MONOCRYL 0 CT-1 UND MON

## (undated) DEVICE — DRESSING ANTIMICROBIAL 1 INCH

## (undated) DEVICE — SUT VICRYL 3-0 27 SH

## (undated) DEVICE — SET CYSTO IRR DRP CHMBR 84IN

## (undated) DEVICE — GOWN SMART IMP BREATHABLE XXLG

## (undated) DEVICE — GLOVE SENSICARE PI GRN 7.5

## (undated) DEVICE — MARKER FN REG DUAL UTIL RULER

## (undated) DEVICE — SUT ETHILON 2-0 FS 18IN BLK

## (undated) DEVICE — COVER PROBE US 5.5X58L NON LTX

## (undated) DEVICE — DEVICE ANC SW STAT FOLEY 6-24

## (undated) DEVICE — PAD ABDOMINAL STERILE 8X10IN

## (undated) DEVICE — SOL NS 1000CC

## (undated) DEVICE — APPLICATOR ENDOSCOPIC

## (undated) DEVICE — PAD PREP CUFFED NS 24X48IN

## (undated) DEVICE — BRA POST SURGICAL 34-36 B-D

## (undated) DEVICE — SOL WATER STRL IRR 1000ML

## (undated) DEVICE — SKINMARKER W/RULER DEVON

## (undated) DEVICE — POSITIONER HEAD DONUT 9IN FOAM

## (undated) DEVICE — SUT GORETEX CV-4 TH-26 36IN